# Patient Record
Sex: FEMALE | Race: BLACK OR AFRICAN AMERICAN | Employment: OTHER | URBAN - METROPOLITAN AREA
[De-identification: names, ages, dates, MRNs, and addresses within clinical notes are randomized per-mention and may not be internally consistent; named-entity substitution may affect disease eponyms.]

---

## 2017-01-05 ENCOUNTER — OFFICE VISIT (OUTPATIENT)
Dept: FAMILY MEDICINE CLINIC | Age: 77
End: 2017-01-05

## 2017-01-05 VITALS — DIASTOLIC BLOOD PRESSURE: 82 MMHG | SYSTOLIC BLOOD PRESSURE: 122 MMHG | WEIGHT: 246 LBS | BODY MASS INDEX: 39.11 KG/M2

## 2017-01-05 DIAGNOSIS — I10 ESSENTIAL HYPERTENSION: Primary | ICD-10-CM

## 2017-01-05 DIAGNOSIS — K21.9 GERD WITHOUT ESOPHAGITIS: ICD-10-CM

## 2017-01-05 DIAGNOSIS — N18.30 CHRONIC KIDNEY DISEASE, STAGE 3 (HCC): ICD-10-CM

## 2017-01-05 DIAGNOSIS — F41.9 ANXIETY: ICD-10-CM

## 2017-01-05 DIAGNOSIS — G89.29 CHRONIC MIDLINE LOW BACK PAIN WITHOUT SCIATICA: ICD-10-CM

## 2017-01-05 DIAGNOSIS — I47.1 PAROXYSMAL SUPRAVENTRICULAR TACHYCARDIA (HCC): ICD-10-CM

## 2017-01-05 DIAGNOSIS — M54.50 CHRONIC MIDLINE LOW BACK PAIN WITHOUT SCIATICA: ICD-10-CM

## 2017-01-05 DIAGNOSIS — K64.8 INTERNAL HEMORRHOIDS: ICD-10-CM

## 2017-01-05 DIAGNOSIS — E66.09 NON MORBID OBESITY DUE TO EXCESS CALORIES: ICD-10-CM

## 2017-01-05 LAB
ANION GAP SERPL CALCULATED.3IONS-SCNC: 15 MMOL/L (ref 3–16)
BUN BLDV-MCNC: 16 MG/DL (ref 7–20)
CALCIUM SERPL-MCNC: 9.3 MG/DL (ref 8.3–10.6)
CHLORIDE BLD-SCNC: 101 MMOL/L (ref 99–110)
CHOLESTEROL, TOTAL: 200 MG/DL (ref 0–199)
CO2: 27 MMOL/L (ref 21–32)
CREAT SERPL-MCNC: 1.2 MG/DL (ref 0.6–1.2)
GFR AFRICAN AMERICAN: 53
GFR NON-AFRICAN AMERICAN: 44
GLUCOSE BLD-MCNC: 86 MG/DL (ref 70–99)
HDLC SERPL-MCNC: 70 MG/DL (ref 40–60)
LDL CHOLESTEROL CALCULATED: 111 MG/DL
POTASSIUM SERPL-SCNC: 4 MMOL/L (ref 3.5–5.1)
SODIUM BLD-SCNC: 143 MMOL/L (ref 136–145)
TRIGL SERPL-MCNC: 97 MG/DL (ref 0–150)
VLDLC SERPL CALC-MCNC: 19 MG/DL

## 2017-01-05 PROCEDURE — 36415 COLL VENOUS BLD VENIPUNCTURE: CPT | Performed by: FAMILY MEDICINE

## 2017-01-05 PROCEDURE — 99214 OFFICE O/P EST MOD 30 MIN: CPT | Performed by: FAMILY MEDICINE

## 2017-01-05 ASSESSMENT — ENCOUNTER SYMPTOMS: SHORTNESS OF BREATH: 0

## 2017-01-12 ENCOUNTER — OFFICE VISIT (OUTPATIENT)
Dept: SURGERY | Age: 77
End: 2017-01-12

## 2017-01-12 VITALS
DIASTOLIC BLOOD PRESSURE: 76 MMHG | WEIGHT: 246 LBS | SYSTOLIC BLOOD PRESSURE: 146 MMHG | HEART RATE: 62 BPM | BODY MASS INDEX: 40.98 KG/M2 | HEIGHT: 65 IN

## 2017-01-12 DIAGNOSIS — K64.3 PROLAPSED INTERNAL HEMORRHOIDS, GRADE 4: Primary | ICD-10-CM

## 2017-01-12 PROCEDURE — G8484 FLU IMMUNIZE NO ADMIN: HCPCS | Performed by: SURGERY

## 2017-01-12 PROCEDURE — 1036F TOBACCO NON-USER: CPT | Performed by: SURGERY

## 2017-01-12 PROCEDURE — 1090F PRES/ABSN URINE INCON ASSESS: CPT | Performed by: SURGERY

## 2017-01-12 PROCEDURE — 99203 OFFICE O/P NEW LOW 30 MIN: CPT | Performed by: SURGERY

## 2017-01-12 PROCEDURE — G8599 NO ASA/ANTIPLAT THER USE RNG: HCPCS | Performed by: SURGERY

## 2017-01-12 PROCEDURE — 4040F PNEUMOC VAC/ADMIN/RCVD: CPT | Performed by: SURGERY

## 2017-01-12 PROCEDURE — G8419 CALC BMI OUT NRM PARAM NOF/U: HCPCS | Performed by: SURGERY

## 2017-01-12 PROCEDURE — 1123F ACP DISCUSS/DSCN MKR DOCD: CPT | Performed by: SURGERY

## 2017-01-12 PROCEDURE — G8399 PT W/DXA RESULTS DOCUMENT: HCPCS | Performed by: SURGERY

## 2017-01-12 PROCEDURE — G8427 DOCREV CUR MEDS BY ELIG CLIN: HCPCS | Performed by: SURGERY

## 2017-01-12 ASSESSMENT — ENCOUNTER SYMPTOMS
BLOOD IN STOOL: 1
RECTAL PAIN: 1
CONSTIPATION: 1

## 2017-01-16 RX ORDER — METOPROLOL TARTRATE 100 MG/1
TABLET ORAL
Qty: 90 TABLET | Refills: 0 | Status: SHIPPED | OUTPATIENT
Start: 2017-01-16 | End: 2017-04-17 | Stop reason: SDUPTHER

## 2017-01-19 RX ORDER — HYDROCODONE BITARTRATE AND ACETAMINOPHEN 7.5; 325 MG/1; MG/1
1 TABLET ORAL EVERY 8 HOURS PRN
Qty: 90 TABLET | Refills: 0 | Status: SHIPPED | OUTPATIENT
Start: 2017-01-19 | End: 2017-02-21 | Stop reason: SDUPTHER

## 2017-01-25 ENCOUNTER — OFFICE VISIT (OUTPATIENT)
Dept: FAMILY MEDICINE CLINIC | Age: 77
End: 2017-01-25

## 2017-01-25 VITALS
DIASTOLIC BLOOD PRESSURE: 80 MMHG | WEIGHT: 246 LBS | BODY MASS INDEX: 38.61 KG/M2 | HEIGHT: 67 IN | SYSTOLIC BLOOD PRESSURE: 130 MMHG

## 2017-01-25 DIAGNOSIS — K64.8 OTHER HEMORRHOIDS: ICD-10-CM

## 2017-01-25 DIAGNOSIS — Z01.818 PREOPERATIVE GENERAL PHYSICAL EXAMINATION: Primary | ICD-10-CM

## 2017-01-25 PROCEDURE — 99214 OFFICE O/P EST MOD 30 MIN: CPT | Performed by: FAMILY MEDICINE

## 2017-01-25 PROCEDURE — 4040F PNEUMOC VAC/ADMIN/RCVD: CPT | Performed by: FAMILY MEDICINE

## 2017-01-25 PROCEDURE — 1036F TOBACCO NON-USER: CPT | Performed by: FAMILY MEDICINE

## 2017-01-25 PROCEDURE — G8399 PT W/DXA RESULTS DOCUMENT: HCPCS | Performed by: FAMILY MEDICINE

## 2017-01-25 PROCEDURE — G8419 CALC BMI OUT NRM PARAM NOF/U: HCPCS | Performed by: FAMILY MEDICINE

## 2017-01-25 PROCEDURE — 1090F PRES/ABSN URINE INCON ASSESS: CPT | Performed by: FAMILY MEDICINE

## 2017-01-25 PROCEDURE — G8599 NO ASA/ANTIPLAT THER USE RNG: HCPCS | Performed by: FAMILY MEDICINE

## 2017-01-25 PROCEDURE — G8427 DOCREV CUR MEDS BY ELIG CLIN: HCPCS | Performed by: FAMILY MEDICINE

## 2017-01-25 PROCEDURE — G8484 FLU IMMUNIZE NO ADMIN: HCPCS | Performed by: FAMILY MEDICINE

## 2017-01-25 PROCEDURE — 1123F ACP DISCUSS/DSCN MKR DOCD: CPT | Performed by: FAMILY MEDICINE

## 2017-01-25 ASSESSMENT — ENCOUNTER SYMPTOMS
BLOOD IN STOOL: 1
DIARRHEA: 0
CONSTIPATION: 1
RHINORRHEA: 0
WHEEZING: 0
SORE THROAT: 0
ABDOMINAL PAIN: 0
SHORTNESS OF BREATH: 0
COUGH: 0
NAUSEA: 0
VOMITING: 0

## 2017-01-30 ENCOUNTER — OFFICE VISIT (OUTPATIENT)
Dept: CARDIOLOGY CLINIC | Age: 77
End: 2017-01-30

## 2017-01-30 VITALS — WEIGHT: 243 LBS | BODY MASS INDEX: 38.63 KG/M2

## 2017-01-30 DIAGNOSIS — I47.1 PAROXYSMAL SUPRAVENTRICULAR TACHYCARDIA (HCC): Primary | ICD-10-CM

## 2017-01-30 DIAGNOSIS — I10 ESSENTIAL HYPERTENSION: ICD-10-CM

## 2017-01-30 PROCEDURE — G8599 NO ASA/ANTIPLAT THER USE RNG: HCPCS | Performed by: INTERNAL MEDICINE

## 2017-01-30 PROCEDURE — G8484 FLU IMMUNIZE NO ADMIN: HCPCS | Performed by: INTERNAL MEDICINE

## 2017-01-30 PROCEDURE — G8419 CALC BMI OUT NRM PARAM NOF/U: HCPCS | Performed by: INTERNAL MEDICINE

## 2017-01-30 PROCEDURE — G8427 DOCREV CUR MEDS BY ELIG CLIN: HCPCS | Performed by: INTERNAL MEDICINE

## 2017-01-30 PROCEDURE — 99213 OFFICE O/P EST LOW 20 MIN: CPT | Performed by: INTERNAL MEDICINE

## 2017-01-30 PROCEDURE — 1036F TOBACCO NON-USER: CPT | Performed by: INTERNAL MEDICINE

## 2017-01-30 PROCEDURE — 4040F PNEUMOC VAC/ADMIN/RCVD: CPT | Performed by: INTERNAL MEDICINE

## 2017-01-30 PROCEDURE — 1090F PRES/ABSN URINE INCON ASSESS: CPT | Performed by: INTERNAL MEDICINE

## 2017-01-30 PROCEDURE — 1123F ACP DISCUSS/DSCN MKR DOCD: CPT | Performed by: INTERNAL MEDICINE

## 2017-01-30 PROCEDURE — G8399 PT W/DXA RESULTS DOCUMENT: HCPCS | Performed by: INTERNAL MEDICINE

## 2017-02-02 ENCOUNTER — TELEPHONE (OUTPATIENT)
Dept: SURGERY | Age: 77
End: 2017-02-02

## 2017-02-14 RX ORDER — OMEPRAZOLE 20 MG/1
CAPSULE, DELAYED RELEASE ORAL
Qty: 90 CAPSULE | Refills: 0 | Status: SHIPPED | OUTPATIENT
Start: 2017-02-14 | End: 2017-05-16 | Stop reason: SDUPTHER

## 2017-02-21 RX ORDER — LORAZEPAM 1 MG/1
TABLET ORAL
Qty: 90 TABLET | Refills: 0 | Status: SHIPPED | OUTPATIENT
Start: 2017-02-21 | End: 2017-04-05 | Stop reason: SDUPTHER

## 2017-02-21 RX ORDER — HYDROCODONE BITARTRATE AND ACETAMINOPHEN 7.5; 325 MG/1; MG/1
1 TABLET ORAL EVERY 8 HOURS PRN
Qty: 90 TABLET | Refills: 0 | Status: SHIPPED | OUTPATIENT
Start: 2017-02-21 | End: 2017-03-27 | Stop reason: SDUPTHER

## 2017-02-22 ENCOUNTER — PAT TELEPHONE (OUTPATIENT)
Dept: PREADMISSION TESTING | Age: 77
End: 2017-02-22

## 2017-02-22 VITALS — WEIGHT: 243 LBS | HEIGHT: 67 IN | BODY MASS INDEX: 38.14 KG/M2

## 2017-02-23 ENCOUNTER — TELEPHONE (OUTPATIENT)
Dept: SURGERY | Age: 77
End: 2017-02-23

## 2017-02-24 ENCOUNTER — HOSPITAL ENCOUNTER (OUTPATIENT)
Dept: SURGERY | Age: 77
Discharge: HOME | End: 2017-02-24
Admitting: SURGERY

## 2017-02-24 VITALS
BODY MASS INDEX: 39.01 KG/M2 | HEART RATE: 62 BPM | OXYGEN SATURATION: 94 % | SYSTOLIC BLOOD PRESSURE: 138 MMHG | DIASTOLIC BLOOD PRESSURE: 76 MMHG | WEIGHT: 242.73 LBS | RESPIRATION RATE: 16 BRPM | TEMPERATURE: 97 F | HEIGHT: 66 IN

## 2017-02-24 PROCEDURE — 46255 REMOVE INT/EXT HEM 1 GROUP: CPT | Performed by: SURGERY

## 2017-02-24 RX ORDER — PROMETHAZINE HYDROCHLORIDE 25 MG/ML
6.25 INJECTION, SOLUTION INTRAMUSCULAR; INTRAVENOUS
Status: ACTIVE | OUTPATIENT
Start: 2017-02-24 | End: 2017-02-24

## 2017-02-24 RX ORDER — SODIUM CHLORIDE 9 MG/ML
INJECTION, SOLUTION INTRAVENOUS CONTINUOUS
Status: DISCONTINUED | OUTPATIENT
Start: 2017-02-24 | End: 2017-02-25 | Stop reason: HOSPADM

## 2017-02-24 RX ORDER — OXYCODONE HYDROCHLORIDE 5 MG/1
5 TABLET ORAL EVERY 4 HOURS PRN
Qty: 30 TABLET | Refills: 0 | Status: SHIPPED | OUTPATIENT
Start: 2017-02-24 | End: 2017-03-27

## 2017-02-24 RX ORDER — SODIUM CHLORIDE 0.9 % (FLUSH) 0.9 %
10 SYRINGE (ML) INJECTION EVERY 12 HOURS SCHEDULED
Status: DISCONTINUED | OUTPATIENT
Start: 2017-02-24 | End: 2017-02-25 | Stop reason: HOSPADM

## 2017-02-24 RX ORDER — OXYCODONE HYDROCHLORIDE 5 MG/1
5 TABLET ORAL
Status: COMPLETED | OUTPATIENT
Start: 2017-02-24 | End: 2017-02-24

## 2017-02-24 RX ORDER — LABETALOL HYDROCHLORIDE 5 MG/ML
5 INJECTION, SOLUTION INTRAVENOUS EVERY 10 MIN PRN
Status: DISCONTINUED | OUTPATIENT
Start: 2017-02-24 | End: 2017-02-25 | Stop reason: HOSPADM

## 2017-02-24 RX ORDER — FENTANYL CITRATE 50 UG/ML
25 INJECTION, SOLUTION INTRAMUSCULAR; INTRAVENOUS EVERY 5 MIN PRN
Status: DISCONTINUED | OUTPATIENT
Start: 2017-02-24 | End: 2017-02-25 | Stop reason: HOSPADM

## 2017-02-24 RX ORDER — SODIUM CHLORIDE 0.9 % (FLUSH) 0.9 %
10 SYRINGE (ML) INJECTION PRN
Status: DISCONTINUED | OUTPATIENT
Start: 2017-02-24 | End: 2017-02-25 | Stop reason: HOSPADM

## 2017-02-24 RX ORDER — DOCUSATE SODIUM 100 MG/1
100 CAPSULE, LIQUID FILLED ORAL 2 TIMES DAILY
Qty: 40 CAPSULE | Refills: 0 | Status: SHIPPED | OUTPATIENT
Start: 2017-02-24

## 2017-02-24 RX ADMIN — SODIUM CHLORIDE: 9 INJECTION, SOLUTION INTRAVENOUS at 06:30

## 2017-02-24 RX ADMIN — OXYCODONE HYDROCHLORIDE 5 MG: 5 TABLET ORAL at 09:39

## 2017-02-24 ASSESSMENT — PAIN - FUNCTIONAL ASSESSMENT: PAIN_FUNCTIONAL_ASSESSMENT: 0-10

## 2017-02-24 ASSESSMENT — ENCOUNTER SYMPTOMS: SHORTNESS OF BREATH: 0

## 2017-02-24 ASSESSMENT — PAIN SCALES - GENERAL
PAINLEVEL_OUTOF10: 6
PAINLEVEL_OUTOF10: 6

## 2017-03-16 ENCOUNTER — OFFICE VISIT (OUTPATIENT)
Dept: SURGERY | Age: 77
End: 2017-03-16

## 2017-03-16 DIAGNOSIS — K64.3 GRADE IV HEMORRHOIDS: Primary | ICD-10-CM

## 2017-03-16 PROCEDURE — 99024 POSTOP FOLLOW-UP VISIT: CPT | Performed by: SURGERY

## 2017-03-22 ENCOUNTER — TELEPHONE (OUTPATIENT)
Dept: FAMILY MEDICINE CLINIC | Age: 77
End: 2017-03-22

## 2017-03-27 ENCOUNTER — TELEPHONE (OUTPATIENT)
Dept: FAMILY MEDICINE CLINIC | Age: 77
End: 2017-03-27

## 2017-03-27 RX ORDER — HYDROCODONE BITARTRATE AND ACETAMINOPHEN 7.5; 325 MG/1; MG/1
1 TABLET ORAL EVERY 8 HOURS PRN
Qty: 90 TABLET | Refills: 0 | Status: SHIPPED | OUTPATIENT
Start: 2017-03-27 | End: 2017-05-16 | Stop reason: SDUPTHER

## 2017-04-05 ENCOUNTER — OFFICE VISIT (OUTPATIENT)
Dept: FAMILY MEDICINE CLINIC | Age: 77
End: 2017-04-05

## 2017-04-05 VITALS
HEIGHT: 67 IN | BODY MASS INDEX: 38.45 KG/M2 | SYSTOLIC BLOOD PRESSURE: 122 MMHG | DIASTOLIC BLOOD PRESSURE: 80 MMHG | WEIGHT: 245 LBS

## 2017-04-05 DIAGNOSIS — Z12.39 SCREENING FOR BREAST CANCER: ICD-10-CM

## 2017-04-05 DIAGNOSIS — M54.50 CHRONIC MIDLINE LOW BACK PAIN WITHOUT SCIATICA: Primary | ICD-10-CM

## 2017-04-05 DIAGNOSIS — G89.29 CHRONIC MIDLINE LOW BACK PAIN WITHOUT SCIATICA: Primary | ICD-10-CM

## 2017-04-05 DIAGNOSIS — F41.9 ANXIETY: ICD-10-CM

## 2017-04-05 PROCEDURE — G8417 CALC BMI ABV UP PARAM F/U: HCPCS | Performed by: FAMILY MEDICINE

## 2017-04-05 PROCEDURE — G8399 PT W/DXA RESULTS DOCUMENT: HCPCS | Performed by: FAMILY MEDICINE

## 2017-04-05 PROCEDURE — 1036F TOBACCO NON-USER: CPT | Performed by: FAMILY MEDICINE

## 2017-04-05 PROCEDURE — 1123F ACP DISCUSS/DSCN MKR DOCD: CPT | Performed by: FAMILY MEDICINE

## 2017-04-05 PROCEDURE — G8427 DOCREV CUR MEDS BY ELIG CLIN: HCPCS | Performed by: FAMILY MEDICINE

## 2017-04-05 PROCEDURE — 99213 OFFICE O/P EST LOW 20 MIN: CPT | Performed by: FAMILY MEDICINE

## 2017-04-05 PROCEDURE — 1090F PRES/ABSN URINE INCON ASSESS: CPT | Performed by: FAMILY MEDICINE

## 2017-04-05 PROCEDURE — G8599 NO ASA/ANTIPLAT THER USE RNG: HCPCS | Performed by: FAMILY MEDICINE

## 2017-04-05 PROCEDURE — 4040F PNEUMOC VAC/ADMIN/RCVD: CPT | Performed by: FAMILY MEDICINE

## 2017-04-05 RX ORDER — LORAZEPAM 1 MG/1
TABLET ORAL
Qty: 90 TABLET | Refills: 0 | Status: SHIPPED | OUTPATIENT
Start: 2017-04-05 | End: 2017-07-25 | Stop reason: SDUPTHER

## 2017-04-05 RX ORDER — BIMATOPROST 0.01 %
1 DROPS OPHTHALMIC (EYE)
COMMUNITY
Start: 2017-03-30

## 2017-04-05 ASSESSMENT — ENCOUNTER SYMPTOMS: BACK PAIN: 1

## 2017-04-10 ENCOUNTER — TELEPHONE (OUTPATIENT)
Dept: FAMILY MEDICINE CLINIC | Age: 77
End: 2017-04-10

## 2017-04-10 RX ORDER — AZITHROMYCIN 250 MG/1
TABLET, FILM COATED ORAL
Qty: 1 PACKET | Refills: 0 | Status: SHIPPED | OUTPATIENT
Start: 2017-04-10 | End: 2017-04-20

## 2017-04-12 ENCOUNTER — TELEPHONE (OUTPATIENT)
Dept: FAMILY MEDICINE CLINIC | Age: 77
End: 2017-04-12

## 2017-04-12 RX ORDER — GUAIFENESIN AND CODEINE PHOSPHATE 100; 10 MG/5ML; MG/5ML
5 SOLUTION ORAL 4 TIMES DAILY PRN
Qty: 180 ML | Refills: 0 | Status: SHIPPED | OUTPATIENT
Start: 2017-04-12 | End: 2017-04-22

## 2017-04-18 RX ORDER — METOPROLOL TARTRATE 100 MG/1
100 TABLET ORAL DAILY
Qty: 90 TABLET | Refills: 3 | Status: SHIPPED | OUTPATIENT
Start: 2017-04-18 | End: 2018-04-19 | Stop reason: SDUPTHER

## 2017-05-16 ENCOUNTER — TELEPHONE (OUTPATIENT)
Dept: FAMILY MEDICINE CLINIC | Age: 77
End: 2017-05-16

## 2017-05-16 RX ORDER — HYDROCODONE BITARTRATE AND ACETAMINOPHEN 7.5; 325 MG/1; MG/1
1 TABLET ORAL EVERY 8 HOURS PRN
Qty: 90 TABLET | Refills: 0 | Status: SHIPPED | OUTPATIENT
Start: 2017-05-16 | End: 2017-06-20 | Stop reason: SDUPTHER

## 2017-06-20 ENCOUNTER — TELEPHONE (OUTPATIENT)
Dept: FAMILY MEDICINE CLINIC | Age: 77
End: 2017-06-20

## 2017-06-20 RX ORDER — HYDROCODONE BITARTRATE AND ACETAMINOPHEN 7.5; 325 MG/1; MG/1
1 TABLET ORAL EVERY 8 HOURS PRN
Qty: 90 TABLET | Refills: 0 | Status: SHIPPED | OUTPATIENT
Start: 2017-06-20 | End: 2017-07-20 | Stop reason: SDUPTHER

## 2017-07-20 ENCOUNTER — TELEPHONE (OUTPATIENT)
Dept: FAMILY MEDICINE CLINIC | Age: 77
End: 2017-07-20

## 2017-07-20 RX ORDER — HYDROCODONE BITARTRATE AND ACETAMINOPHEN 7.5; 325 MG/1; MG/1
1 TABLET ORAL EVERY 8 HOURS PRN
Qty: 15 TABLET | Refills: 0 | Status: SHIPPED | OUTPATIENT
Start: 2017-07-20 | End: 2017-07-25 | Stop reason: SDUPTHER

## 2017-07-22 ENCOUNTER — HOSPITAL ENCOUNTER (OUTPATIENT)
Dept: WOMENS IMAGING | Age: 77
Discharge: HOME | End: 2017-07-22
Attending: FAMILY MEDICINE | Admitting: FAMILY MEDICINE

## 2017-07-22 DIAGNOSIS — Z12.39 SCREENING FOR BREAST CANCER: ICD-10-CM

## 2017-07-25 ENCOUNTER — OFFICE VISIT (OUTPATIENT)
Dept: FAMILY MEDICINE CLINIC | Age: 77
End: 2017-07-25

## 2017-07-25 VITALS
WEIGHT: 250 LBS | SYSTOLIC BLOOD PRESSURE: 126 MMHG | BODY MASS INDEX: 39.24 KG/M2 | DIASTOLIC BLOOD PRESSURE: 76 MMHG | HEIGHT: 67 IN

## 2017-07-25 DIAGNOSIS — K21.9 GERD WITHOUT ESOPHAGITIS: ICD-10-CM

## 2017-07-25 DIAGNOSIS — I47.1 PAROXYSMAL SUPRAVENTRICULAR TACHYCARDIA (HCC): ICD-10-CM

## 2017-07-25 DIAGNOSIS — G89.29 CHRONIC MIDLINE LOW BACK PAIN WITHOUT SCIATICA: ICD-10-CM

## 2017-07-25 DIAGNOSIS — N18.30 CHRONIC KIDNEY DISEASE, STAGE 3 (HCC): ICD-10-CM

## 2017-07-25 DIAGNOSIS — F41.9 ANXIETY: ICD-10-CM

## 2017-07-25 DIAGNOSIS — M54.50 CHRONIC MIDLINE LOW BACK PAIN WITHOUT SCIATICA: ICD-10-CM

## 2017-07-25 DIAGNOSIS — I10 ESSENTIAL HYPERTENSION: Primary | ICD-10-CM

## 2017-07-25 DIAGNOSIS — E66.09 NON MORBID OBESITY DUE TO EXCESS CALORIES: ICD-10-CM

## 2017-07-25 LAB
ANION GAP SERPL CALCULATED.3IONS-SCNC: 18 MMOL/L (ref 3–16)
BUN BLDV-MCNC: 20 MG/DL (ref 7–20)
CALCIUM SERPL-MCNC: 9.7 MG/DL (ref 8.3–10.6)
CHLORIDE BLD-SCNC: 102 MMOL/L (ref 99–110)
CO2: 23 MMOL/L (ref 21–32)
CREAT SERPL-MCNC: 1.3 MG/DL (ref 0.6–1.2)
GFR AFRICAN AMERICAN: 48
GFR NON-AFRICAN AMERICAN: 40
GLUCOSE BLD-MCNC: 100 MG/DL (ref 70–99)
POTASSIUM SERPL-SCNC: 4.3 MMOL/L (ref 3.5–5.1)
SODIUM BLD-SCNC: 143 MMOL/L (ref 136–145)

## 2017-07-25 PROCEDURE — 99214 OFFICE O/P EST MOD 30 MIN: CPT | Performed by: FAMILY MEDICINE

## 2017-07-25 PROCEDURE — G8427 DOCREV CUR MEDS BY ELIG CLIN: HCPCS | Performed by: FAMILY MEDICINE

## 2017-07-25 PROCEDURE — G8599 NO ASA/ANTIPLAT THER USE RNG: HCPCS | Performed by: FAMILY MEDICINE

## 2017-07-25 PROCEDURE — 4040F PNEUMOC VAC/ADMIN/RCVD: CPT | Performed by: FAMILY MEDICINE

## 2017-07-25 PROCEDURE — G8417 CALC BMI ABV UP PARAM F/U: HCPCS | Performed by: FAMILY MEDICINE

## 2017-07-25 PROCEDURE — 36415 COLL VENOUS BLD VENIPUNCTURE: CPT | Performed by: FAMILY MEDICINE

## 2017-07-25 PROCEDURE — 1123F ACP DISCUSS/DSCN MKR DOCD: CPT | Performed by: FAMILY MEDICINE

## 2017-07-25 PROCEDURE — 1036F TOBACCO NON-USER: CPT | Performed by: FAMILY MEDICINE

## 2017-07-25 PROCEDURE — G8399 PT W/DXA RESULTS DOCUMENT: HCPCS | Performed by: FAMILY MEDICINE

## 2017-07-25 PROCEDURE — 1090F PRES/ABSN URINE INCON ASSESS: CPT | Performed by: FAMILY MEDICINE

## 2017-07-25 RX ORDER — LORAZEPAM 1 MG/1
TABLET ORAL
Qty: 90 TABLET | Refills: 0 | Status: SHIPPED | OUTPATIENT
Start: 2017-07-25 | End: 2017-10-19 | Stop reason: SDUPTHER

## 2017-07-25 RX ORDER — VIT C/B6/B5/MAGNESIUM/HERB 173 50-5-6-5MG
1000 CAPSULE ORAL DAILY
COMMUNITY
End: 2020-03-03

## 2017-07-25 RX ORDER — RANITIDINE 300 MG/1
300 TABLET ORAL NIGHTLY
Qty: 90 TABLET | Refills: 1 | Status: SHIPPED | OUTPATIENT
Start: 2017-07-25 | End: 2017-08-21

## 2017-07-25 RX ORDER — HYDROCODONE BITARTRATE AND ACETAMINOPHEN 7.5; 325 MG/1; MG/1
1 TABLET ORAL EVERY 8 HOURS PRN
Qty: 90 TABLET | Refills: 0 | Status: SHIPPED | OUTPATIENT
Start: 2017-07-25 | End: 2017-08-25 | Stop reason: SDUPTHER

## 2017-07-25 ASSESSMENT — ENCOUNTER SYMPTOMS: SHORTNESS OF BREATH: 0

## 2017-08-11 RX ORDER — TRIAMTERENE AND HYDROCHLOROTHIAZIDE 37.5; 25 MG/1; MG/1
TABLET ORAL
Qty: 30 TABLET | Refills: 0 | Status: SHIPPED | OUTPATIENT
Start: 2017-08-11 | End: 2017-09-11 | Stop reason: SDUPTHER

## 2017-08-11 RX ORDER — MELOXICAM 15 MG/1
TABLET ORAL
Qty: 30 TABLET | Refills: 0 | Status: SHIPPED | OUTPATIENT
Start: 2017-08-11 | End: 2017-09-11 | Stop reason: SDUPTHER

## 2017-08-21 ENCOUNTER — TELEPHONE (OUTPATIENT)
Dept: FAMILY MEDICINE CLINIC | Age: 77
End: 2017-08-21

## 2017-08-21 RX ORDER — OMEPRAZOLE 20 MG/1
CAPSULE, DELAYED RELEASE ORAL
Qty: 90 CAPSULE | Refills: 1 | Status: SHIPPED | OUTPATIENT
Start: 2017-08-21 | End: 2018-03-07 | Stop reason: SDUPTHER

## 2017-08-25 ENCOUNTER — TELEPHONE (OUTPATIENT)
Dept: FAMILY MEDICINE CLINIC | Age: 77
End: 2017-08-25

## 2017-08-25 RX ORDER — HYDROCODONE BITARTRATE AND ACETAMINOPHEN 7.5; 325 MG/1; MG/1
1 TABLET ORAL EVERY 8 HOURS PRN
Qty: 90 TABLET | Refills: 0 | Status: SHIPPED | OUTPATIENT
Start: 2017-08-25 | End: 2017-10-19 | Stop reason: SDUPTHER

## 2017-09-07 ENCOUNTER — OFFICE VISIT (OUTPATIENT)
Dept: FAMILY MEDICINE CLINIC | Age: 77
End: 2017-09-07

## 2017-09-07 VITALS
BODY MASS INDEX: 40.02 KG/M2 | HEIGHT: 67 IN | DIASTOLIC BLOOD PRESSURE: 62 MMHG | WEIGHT: 255 LBS | SYSTOLIC BLOOD PRESSURE: 122 MMHG

## 2017-09-07 DIAGNOSIS — R35.0 URINARY FREQUENCY: ICD-10-CM

## 2017-09-07 DIAGNOSIS — N30.00 ACUTE CYSTITIS WITHOUT HEMATURIA: Primary | ICD-10-CM

## 2017-09-07 DIAGNOSIS — Z23 NEED FOR INFLUENZA VACCINATION: ICD-10-CM

## 2017-09-07 PROCEDURE — G8399 PT W/DXA RESULTS DOCUMENT: HCPCS | Performed by: FAMILY MEDICINE

## 2017-09-07 PROCEDURE — 1090F PRES/ABSN URINE INCON ASSESS: CPT | Performed by: FAMILY MEDICINE

## 2017-09-07 PROCEDURE — G8427 DOCREV CUR MEDS BY ELIG CLIN: HCPCS | Performed by: FAMILY MEDICINE

## 2017-09-07 PROCEDURE — G8417 CALC BMI ABV UP PARAM F/U: HCPCS | Performed by: FAMILY MEDICINE

## 2017-09-07 PROCEDURE — 4040F PNEUMOC VAC/ADMIN/RCVD: CPT | Performed by: FAMILY MEDICINE

## 2017-09-07 PROCEDURE — 1123F ACP DISCUSS/DSCN MKR DOCD: CPT | Performed by: FAMILY MEDICINE

## 2017-09-07 PROCEDURE — 99213 OFFICE O/P EST LOW 20 MIN: CPT | Performed by: FAMILY MEDICINE

## 2017-09-07 PROCEDURE — 1036F TOBACCO NON-USER: CPT | Performed by: FAMILY MEDICINE

## 2017-09-07 PROCEDURE — G8599 NO ASA/ANTIPLAT THER USE RNG: HCPCS | Performed by: FAMILY MEDICINE

## 2017-09-07 RX ORDER — CIPROFLOXACIN 250 MG/1
250 TABLET, FILM COATED ORAL 2 TIMES DAILY
Qty: 14 TABLET | Refills: 0 | Status: SHIPPED | OUTPATIENT
Start: 2017-09-07 | End: 2017-09-14

## 2017-10-16 RX ORDER — DILTIAZEM HYDROCHLORIDE 120 MG/1
CAPSULE, EXTENDED RELEASE ORAL
Qty: 90 CAPSULE | Refills: 1 | Status: SHIPPED | OUTPATIENT
Start: 2017-10-16 | End: 2018-02-27

## 2017-10-19 ENCOUNTER — TELEPHONE (OUTPATIENT)
Dept: FAMILY MEDICINE CLINIC | Age: 77
End: 2017-10-19

## 2017-10-19 RX ORDER — LORAZEPAM 1 MG/1
TABLET ORAL
Qty: 90 TABLET | Refills: 0 | Status: SHIPPED | OUTPATIENT
Start: 2017-10-19 | End: 2017-12-27 | Stop reason: SDUPTHER

## 2017-10-19 RX ORDER — HYDROCODONE BITARTRATE AND ACETAMINOPHEN 7.5; 325 MG/1; MG/1
1 TABLET ORAL EVERY 8 HOURS PRN
Qty: 90 TABLET | Refills: 0 | Status: SHIPPED | OUTPATIENT
Start: 2017-10-19 | End: 2017-12-27 | Stop reason: SDUPTHER

## 2017-10-24 ENCOUNTER — TELEPHONE (OUTPATIENT)
Dept: FAMILY MEDICINE CLINIC | Age: 77
End: 2017-10-24

## 2017-10-30 ENCOUNTER — OFFICE VISIT (OUTPATIENT)
Dept: FAMILY MEDICINE CLINIC | Age: 77
End: 2017-10-30

## 2017-10-30 VITALS
BODY MASS INDEX: 37.67 KG/M2 | SYSTOLIC BLOOD PRESSURE: 130 MMHG | DIASTOLIC BLOOD PRESSURE: 80 MMHG | HEIGHT: 67 IN | WEIGHT: 240 LBS

## 2017-10-30 DIAGNOSIS — G89.29 CHRONIC MIDLINE LOW BACK PAIN WITHOUT SCIATICA: Primary | ICD-10-CM

## 2017-10-30 DIAGNOSIS — Z23 NEED FOR INFLUENZA VACCINATION: ICD-10-CM

## 2017-10-30 DIAGNOSIS — M54.50 CHRONIC MIDLINE LOW BACK PAIN WITHOUT SCIATICA: Primary | ICD-10-CM

## 2017-10-30 DIAGNOSIS — F41.9 ANXIETY: ICD-10-CM

## 2017-10-30 PROCEDURE — G8599 NO ASA/ANTIPLAT THER USE RNG: HCPCS | Performed by: FAMILY MEDICINE

## 2017-10-30 PROCEDURE — 1090F PRES/ABSN URINE INCON ASSESS: CPT | Performed by: FAMILY MEDICINE

## 2017-10-30 PROCEDURE — 99213 OFFICE O/P EST LOW 20 MIN: CPT | Performed by: FAMILY MEDICINE

## 2017-10-30 PROCEDURE — G8417 CALC BMI ABV UP PARAM F/U: HCPCS | Performed by: FAMILY MEDICINE

## 2017-10-30 PROCEDURE — 1036F TOBACCO NON-USER: CPT | Performed by: FAMILY MEDICINE

## 2017-10-30 PROCEDURE — 4040F PNEUMOC VAC/ADMIN/RCVD: CPT | Performed by: FAMILY MEDICINE

## 2017-10-30 PROCEDURE — G8399 PT W/DXA RESULTS DOCUMENT: HCPCS | Performed by: FAMILY MEDICINE

## 2017-10-30 PROCEDURE — G0008 ADMIN INFLUENZA VIRUS VAC: HCPCS | Performed by: FAMILY MEDICINE

## 2017-10-30 PROCEDURE — G8484 FLU IMMUNIZE NO ADMIN: HCPCS | Performed by: FAMILY MEDICINE

## 2017-10-30 PROCEDURE — 90662 IIV NO PRSV INCREASED AG IM: CPT | Performed by: FAMILY MEDICINE

## 2017-10-30 PROCEDURE — 1123F ACP DISCUSS/DSCN MKR DOCD: CPT | Performed by: FAMILY MEDICINE

## 2017-10-30 PROCEDURE — G8427 DOCREV CUR MEDS BY ELIG CLIN: HCPCS | Performed by: FAMILY MEDICINE

## 2017-10-30 NOTE — PROGRESS NOTES
Vaccine Information Sheet, \"Influenza - Inactivated\"  given to Gaile Mail, or parent/legal guardian of  Gaile Mail and verbalized understanding. Patient responses:    Have you ever had a reaction to a flu vaccine? No  Are you able to eat eggs without adverse effects? Yes  Do you have any current illness? No  Have you ever had [place] Syndrome? No    Flu vaccine given per order. Please see immunization tab.

## 2017-12-07 RX ORDER — PROPAFENONE HYDROCHLORIDE 150 MG/1
TABLET, FILM COATED ORAL
Qty: 270 TABLET | Refills: 3 | Status: SHIPPED | OUTPATIENT
Start: 2017-12-07 | End: 2018-04-26 | Stop reason: ALTCHOICE

## 2017-12-07 NOTE — TELEPHONE ENCOUNTER
Requested Prescriptions     Pending Prescriptions Disp Refills    propafenone (RYTHMOL) 150 MG tablet [Pharmacy Med Name: PROPAFENONE 150MG   TAB] 270 tablet 3     Sig: TAKE ONE TABLET BY MOUTH EVERY 8 HOURS         Last ov:1/30/17    Next ov:2/5/18    Last fill:12/15/16      Please sign in Dr Ricci Garces absence.

## 2017-12-27 ENCOUNTER — TELEPHONE (OUTPATIENT)
Dept: FAMILY MEDICINE CLINIC | Age: 77
End: 2017-12-27

## 2017-12-27 RX ORDER — HYDROCODONE BITARTRATE AND ACETAMINOPHEN 7.5; 325 MG/1; MG/1
1 TABLET ORAL EVERY 8 HOURS PRN
Qty: 90 TABLET | Refills: 0 | Status: SHIPPED | OUTPATIENT
Start: 2017-12-27 | End: 2018-03-09 | Stop reason: SDUPTHER

## 2017-12-27 RX ORDER — LORAZEPAM 1 MG/1
TABLET ORAL
Qty: 90 TABLET | Refills: 0 | Status: SHIPPED | OUTPATIENT
Start: 2017-12-27 | End: 2018-03-09 | Stop reason: SDUPTHER

## 2018-01-04 ENCOUNTER — TELEPHONE (OUTPATIENT)
Dept: FAMILY MEDICINE CLINIC | Age: 78
End: 2018-01-04

## 2018-01-04 ASSESSMENT — ENCOUNTER SYMPTOMS: SHORTNESS OF BREATH: 0

## 2018-01-04 NOTE — PROGRESS NOTES
Subjective:      Patient ID: Emeli Daniels is a 68 y.o. female. Hypertension   This is a chronic problem. The current episode started more than 1 year ago. The problem is unchanged. The problem is controlled. Associated symptoms include palpitations. Pertinent negatives include no chest pain, peripheral edema or shortness of breath. Risk factors for coronary artery disease include family history, obesity, post-menopausal state and sedentary lifestyle. Past treatments include beta blockers, calcium channel blockers and diuretics. The current treatment provides significant improvement. There are no compliance problems. Chronic Kidney Disease Stage 3:  Patient takes Maxzide-25 once daily. Her kidney function is stable. GERD:  Pt is tolerating and compliant with Omeprazole 20 mg daily. She feels that the medication completely controls her symptoms. She previously did not do well with Zantac. Chronic Low Back Pain:  Patient is tolerating and compliant with Mobic 15 mg daily, Flexeril 10 mg TID prn and Norco 7.5-325 every 8 hrs as needed. She feels that the medications keep her functional.       Anxiety:  Pt takes Ativan 1 mg every 8 hrs as needed for anxiety. She generally takes it once daily and feels that it works well to control her allergy symptoms. PSVT:  Patient sees Dr. Benito Angulo (Cardiologist) and takes Rythmol 150 mg TID. She remains in sinus rhythm. Obesity:  Pt weighed 250 at her visit on 7-24-17. She has lost 9 lbs since then. Clogged Left Ear:  Patient started about 2 weeks ago with her left ear feeling clogged. It will occasionally itch but there is no pain. It has not effected her hearing and she denies any drainage. Review of Systems   Constitutional: Negative for chills and fever. HENT: Negative for ear discharge, ear pain and hearing loss. Respiratory: Negative for shortness of breath. Cardiovascular: Positive for palpitations.  Negative for chest pain and leg swelling. /82 (Site: Left Arm)   Ht 5' 6.5\" (1.689 m)   Wt 241 lb (109.3 kg)   BMI 38.32 kg/m²    Objective:   Physical Exam   Constitutional: She is oriented to person, place, and time. She appears well-developed and well-nourished. No distress. HENT:   Head: Normocephalic. Right Ear: External ear normal.   Left Ear: External ear normal.   Mouth/Throat: Oropharynx is clear and moist. No oropharyngeal exudate. Neck: No JVD present. No thyromegaly present. Cardiovascular: Normal rate, regular rhythm and normal heart sounds. No murmur heard. Pulmonary/Chest: Effort normal and breath sounds normal. She has no wheezes. She has no rales. Musculoskeletal: She exhibits no edema. Lymphadenopathy:     She has no cervical adenopathy. Neurological: She is alert and oriented to person, place, and time. Assessment:      Hypertension  Chronic Kidney Disease Stage 3  GERD  Chronic Low Back Pain  Anxiety  PSVT  Obesity  Eustachian Tube Dysfunction Left       Plan:       Chem 7, Lipid Panel  OARRS report was reviewed  Medications refilled  Flonase NS two sprays in each nostril once daily. Patient educated on popping ears  Handout given on ETD. RTO 3 months for Chronic Low Back Pain    Controlled Substances Monitoring:     Attestation: The Prescription Monitoring Report for this patient was reviewed today. Dave Martinez DO)  Documentation: No signs of potential drug abuse or diversion identified. Dave Martinez DO)  Chronic Pain: Functional status reviewed - continues with improved or maintaining ADL's. Dave Martinez DO)  Medication Contracts: Existing medication contract.  Dave Martinez DO)

## 2018-01-05 ENCOUNTER — OFFICE VISIT (OUTPATIENT)
Dept: FAMILY MEDICINE CLINIC | Age: 78
End: 2018-01-05

## 2018-01-05 VITALS
WEIGHT: 241 LBS | DIASTOLIC BLOOD PRESSURE: 82 MMHG | SYSTOLIC BLOOD PRESSURE: 122 MMHG | HEIGHT: 67 IN | BODY MASS INDEX: 37.83 KG/M2

## 2018-01-05 DIAGNOSIS — I10 ESSENTIAL HYPERTENSION: Primary | ICD-10-CM

## 2018-01-05 DIAGNOSIS — H69.82 EUSTACHIAN TUBE DYSFUNCTION, LEFT: ICD-10-CM

## 2018-01-05 DIAGNOSIS — F41.9 ANXIETY: ICD-10-CM

## 2018-01-05 DIAGNOSIS — I47.1 PAROXYSMAL SUPRAVENTRICULAR TACHYCARDIA (HCC): ICD-10-CM

## 2018-01-05 DIAGNOSIS — E66.09 NON MORBID OBESITY DUE TO EXCESS CALORIES: ICD-10-CM

## 2018-01-05 DIAGNOSIS — G89.29 CHRONIC MIDLINE LOW BACK PAIN WITHOUT SCIATICA: ICD-10-CM

## 2018-01-05 DIAGNOSIS — N18.30 CHRONIC KIDNEY DISEASE, STAGE 3 (HCC): ICD-10-CM

## 2018-01-05 DIAGNOSIS — K21.9 GERD WITHOUT ESOPHAGITIS: ICD-10-CM

## 2018-01-05 DIAGNOSIS — M54.50 CHRONIC MIDLINE LOW BACK PAIN WITHOUT SCIATICA: ICD-10-CM

## 2018-01-05 LAB
ANION GAP SERPL CALCULATED.3IONS-SCNC: 14 MMOL/L (ref 3–16)
BUN BLDV-MCNC: 25 MG/DL (ref 7–20)
CALCIUM SERPL-MCNC: 9.4 MG/DL (ref 8.3–10.6)
CHLORIDE BLD-SCNC: 100 MMOL/L (ref 99–110)
CHOLESTEROL, TOTAL: 207 MG/DL (ref 0–199)
CO2: 27 MMOL/L (ref 21–32)
CREAT SERPL-MCNC: 1.3 MG/DL (ref 0.6–1.2)
GFR AFRICAN AMERICAN: 48
GFR NON-AFRICAN AMERICAN: 40
GLUCOSE BLD-MCNC: 94 MG/DL (ref 70–99)
HDLC SERPL-MCNC: 70 MG/DL (ref 40–60)
LDL CHOLESTEROL CALCULATED: 115 MG/DL
POTASSIUM SERPL-SCNC: 4.4 MMOL/L (ref 3.5–5.1)
SODIUM BLD-SCNC: 141 MMOL/L (ref 136–145)
TRIGL SERPL-MCNC: 111 MG/DL (ref 0–150)
VLDLC SERPL CALC-MCNC: 22 MG/DL

## 2018-01-05 PROCEDURE — G8599 NO ASA/ANTIPLAT THER USE RNG: HCPCS | Performed by: FAMILY MEDICINE

## 2018-01-05 PROCEDURE — G8427 DOCREV CUR MEDS BY ELIG CLIN: HCPCS | Performed by: FAMILY MEDICINE

## 2018-01-05 PROCEDURE — 99214 OFFICE O/P EST MOD 30 MIN: CPT | Performed by: FAMILY MEDICINE

## 2018-01-05 PROCEDURE — 1090F PRES/ABSN URINE INCON ASSESS: CPT | Performed by: FAMILY MEDICINE

## 2018-01-05 PROCEDURE — 36415 COLL VENOUS BLD VENIPUNCTURE: CPT | Performed by: FAMILY MEDICINE

## 2018-01-05 PROCEDURE — 4040F PNEUMOC VAC/ADMIN/RCVD: CPT | Performed by: FAMILY MEDICINE

## 2018-01-05 PROCEDURE — 1036F TOBACCO NON-USER: CPT | Performed by: FAMILY MEDICINE

## 2018-01-05 PROCEDURE — G8484 FLU IMMUNIZE NO ADMIN: HCPCS | Performed by: FAMILY MEDICINE

## 2018-01-05 PROCEDURE — 1123F ACP DISCUSS/DSCN MKR DOCD: CPT | Performed by: FAMILY MEDICINE

## 2018-01-05 PROCEDURE — G8417 CALC BMI ABV UP PARAM F/U: HCPCS | Performed by: FAMILY MEDICINE

## 2018-01-05 PROCEDURE — G8399 PT W/DXA RESULTS DOCUMENT: HCPCS | Performed by: FAMILY MEDICINE

## 2018-01-05 RX ORDER — FLUTICASONE PROPIONATE 50 MCG
2 SPRAY, SUSPENSION (ML) NASAL DAILY
Qty: 1 BOTTLE | Refills: 5 | Status: SHIPPED | OUTPATIENT
Start: 2018-01-05 | End: 2018-07-09

## 2018-02-05 ENCOUNTER — OFFICE VISIT (OUTPATIENT)
Dept: CARDIOLOGY CLINIC | Age: 78
End: 2018-02-05

## 2018-02-05 VITALS
WEIGHT: 241.4 LBS | SYSTOLIC BLOOD PRESSURE: 132 MMHG | DIASTOLIC BLOOD PRESSURE: 70 MMHG | HEART RATE: 82 BPM | BODY MASS INDEX: 38.38 KG/M2

## 2018-02-05 DIAGNOSIS — I47.1 PAROXYSMAL SUPRAVENTRICULAR TACHYCARDIA (HCC): Primary | ICD-10-CM

## 2018-02-05 PROCEDURE — G8599 NO ASA/ANTIPLAT THER USE RNG: HCPCS | Performed by: INTERNAL MEDICINE

## 2018-02-05 PROCEDURE — 1123F ACP DISCUSS/DSCN MKR DOCD: CPT | Performed by: INTERNAL MEDICINE

## 2018-02-05 PROCEDURE — G8484 FLU IMMUNIZE NO ADMIN: HCPCS | Performed by: INTERNAL MEDICINE

## 2018-02-05 PROCEDURE — 99213 OFFICE O/P EST LOW 20 MIN: CPT | Performed by: INTERNAL MEDICINE

## 2018-02-05 PROCEDURE — G8399 PT W/DXA RESULTS DOCUMENT: HCPCS | Performed by: INTERNAL MEDICINE

## 2018-02-05 PROCEDURE — 4040F PNEUMOC VAC/ADMIN/RCVD: CPT | Performed by: INTERNAL MEDICINE

## 2018-02-05 PROCEDURE — 1036F TOBACCO NON-USER: CPT | Performed by: INTERNAL MEDICINE

## 2018-02-05 PROCEDURE — 1090F PRES/ABSN URINE INCON ASSESS: CPT | Performed by: INTERNAL MEDICINE

## 2018-02-05 PROCEDURE — G8427 DOCREV CUR MEDS BY ELIG CLIN: HCPCS | Performed by: INTERNAL MEDICINE

## 2018-02-05 PROCEDURE — G8417 CALC BMI ABV UP PARAM F/U: HCPCS | Performed by: INTERNAL MEDICINE

## 2018-02-15 RX ORDER — TRIAMTERENE AND HYDROCHLOROTHIAZIDE 37.5; 25 MG/1; MG/1
TABLET ORAL
Qty: 30 TABLET | Refills: 1 | Status: SHIPPED | OUTPATIENT
Start: 2018-02-15 | End: 2018-04-17 | Stop reason: SDUPTHER

## 2018-02-15 RX ORDER — MELOXICAM 15 MG/1
TABLET ORAL
Qty: 30 TABLET | Refills: 1 | Status: SHIPPED | OUTPATIENT
Start: 2018-02-15 | End: 2018-04-17 | Stop reason: SDUPTHER

## 2018-03-09 ENCOUNTER — TELEPHONE (OUTPATIENT)
Dept: FAMILY MEDICINE CLINIC | Age: 78
End: 2018-03-09

## 2018-03-09 DIAGNOSIS — G89.29 CHRONIC MIDLINE LOW BACK PAIN WITHOUT SCIATICA: Primary | ICD-10-CM

## 2018-03-09 DIAGNOSIS — M54.50 CHRONIC MIDLINE LOW BACK PAIN WITHOUT SCIATICA: Primary | ICD-10-CM

## 2018-03-09 DIAGNOSIS — M15.9 PRIMARY OSTEOARTHRITIS INVOLVING MULTIPLE JOINTS: ICD-10-CM

## 2018-03-09 RX ORDER — LORAZEPAM 1 MG/1
TABLET ORAL
Qty: 90 TABLET | Refills: 0 | Status: CANCELLED | OUTPATIENT
Start: 2018-03-09

## 2018-03-09 RX ORDER — LORAZEPAM 1 MG/1
TABLET ORAL
Qty: 90 TABLET | Refills: 0 | Status: SHIPPED | OUTPATIENT
Start: 2018-03-09 | End: 2018-04-17 | Stop reason: SDUPTHER

## 2018-03-09 RX ORDER — HYDROCODONE BITARTRATE AND ACETAMINOPHEN 7.5; 325 MG/1; MG/1
1 TABLET ORAL EVERY 8 HOURS PRN
Qty: 90 TABLET | Refills: 0 | Status: CANCELLED | OUTPATIENT
Start: 2018-03-09

## 2018-03-09 RX ORDER — HYDROCODONE BITARTRATE AND ACETAMINOPHEN 7.5; 325 MG/1; MG/1
1 TABLET ORAL EVERY 8 HOURS PRN
Qty: 90 TABLET | Refills: 0 | Status: SHIPPED | OUTPATIENT
Start: 2018-03-09 | End: 2018-04-17 | Stop reason: SDUPTHER

## 2018-04-05 ENCOUNTER — OFFICE VISIT (OUTPATIENT)
Dept: FAMILY MEDICINE CLINIC | Age: 78
End: 2018-04-05

## 2018-04-05 VITALS
BODY MASS INDEX: 37.83 KG/M2 | HEIGHT: 67 IN | DIASTOLIC BLOOD PRESSURE: 66 MMHG | SYSTOLIC BLOOD PRESSURE: 118 MMHG | WEIGHT: 241 LBS

## 2018-04-05 DIAGNOSIS — G89.29 CHRONIC MIDLINE LOW BACK PAIN WITHOUT SCIATICA: Primary | ICD-10-CM

## 2018-04-05 DIAGNOSIS — Z23 NEED FOR ZOSTER VACCINATION: ICD-10-CM

## 2018-04-05 DIAGNOSIS — M54.50 CHRONIC MIDLINE LOW BACK PAIN WITHOUT SCIATICA: Primary | ICD-10-CM

## 2018-04-05 PROCEDURE — G8427 DOCREV CUR MEDS BY ELIG CLIN: HCPCS | Performed by: FAMILY MEDICINE

## 2018-04-05 PROCEDURE — 1090F PRES/ABSN URINE INCON ASSESS: CPT | Performed by: FAMILY MEDICINE

## 2018-04-05 PROCEDURE — 1123F ACP DISCUSS/DSCN MKR DOCD: CPT | Performed by: FAMILY MEDICINE

## 2018-04-05 PROCEDURE — G8599 NO ASA/ANTIPLAT THER USE RNG: HCPCS | Performed by: FAMILY MEDICINE

## 2018-04-05 PROCEDURE — 99213 OFFICE O/P EST LOW 20 MIN: CPT | Performed by: FAMILY MEDICINE

## 2018-04-05 PROCEDURE — 4040F PNEUMOC VAC/ADMIN/RCVD: CPT | Performed by: FAMILY MEDICINE

## 2018-04-05 PROCEDURE — G8399 PT W/DXA RESULTS DOCUMENT: HCPCS | Performed by: FAMILY MEDICINE

## 2018-04-05 PROCEDURE — 1036F TOBACCO NON-USER: CPT | Performed by: FAMILY MEDICINE

## 2018-04-05 PROCEDURE — G8417 CALC BMI ABV UP PARAM F/U: HCPCS | Performed by: FAMILY MEDICINE

## 2018-04-17 DIAGNOSIS — M54.50 CHRONIC MIDLINE LOW BACK PAIN WITHOUT SCIATICA: ICD-10-CM

## 2018-04-17 DIAGNOSIS — M15.9 PRIMARY OSTEOARTHRITIS INVOLVING MULTIPLE JOINTS: ICD-10-CM

## 2018-04-17 DIAGNOSIS — G89.29 CHRONIC MIDLINE LOW BACK PAIN WITHOUT SCIATICA: ICD-10-CM

## 2018-04-17 RX ORDER — HYDROCODONE BITARTRATE AND ACETAMINOPHEN 7.5; 325 MG/1; MG/1
1 TABLET ORAL EVERY 8 HOURS PRN
Qty: 90 TABLET | Refills: 0 | Status: SHIPPED | OUTPATIENT
Start: 2018-04-17 | End: 2018-05-21 | Stop reason: SDUPTHER

## 2018-04-17 RX ORDER — LORAZEPAM 1 MG/1
TABLET ORAL
Qty: 90 TABLET | Refills: 0 | Status: SHIPPED | OUTPATIENT
Start: 2018-04-17 | End: 2018-06-22 | Stop reason: SDUPTHER

## 2018-04-19 ENCOUNTER — TELEPHONE (OUTPATIENT)
Dept: FAMILY MEDICINE CLINIC | Age: 78
End: 2018-04-19

## 2018-04-19 RX ORDER — METOPROLOL TARTRATE 100 MG/1
100 TABLET ORAL DAILY
Qty: 90 TABLET | Refills: 0 | Status: SHIPPED | OUTPATIENT
Start: 2018-04-19 | End: 2018-04-26 | Stop reason: ALTCHOICE

## 2018-04-26 ENCOUNTER — OFFICE VISIT (OUTPATIENT)
Dept: CARDIOLOGY CLINIC | Age: 78
End: 2018-04-26

## 2018-04-26 VITALS
HEIGHT: 67 IN | WEIGHT: 240.6 LBS | DIASTOLIC BLOOD PRESSURE: 82 MMHG | SYSTOLIC BLOOD PRESSURE: 120 MMHG | HEART RATE: 61 BPM | BODY MASS INDEX: 37.76 KG/M2 | OXYGEN SATURATION: 96 %

## 2018-04-26 DIAGNOSIS — I48.0 PAF (PAROXYSMAL ATRIAL FIBRILLATION) (HCC): Primary | ICD-10-CM

## 2018-04-26 DIAGNOSIS — I10 ESSENTIAL HYPERTENSION: ICD-10-CM

## 2018-04-26 DIAGNOSIS — I47.29 PVT (PAROXYSMAL VENTRICULAR TACHYCARDIA): ICD-10-CM

## 2018-04-26 PROCEDURE — G8417 CALC BMI ABV UP PARAM F/U: HCPCS | Performed by: INTERNAL MEDICINE

## 2018-04-26 PROCEDURE — 1036F TOBACCO NON-USER: CPT | Performed by: INTERNAL MEDICINE

## 2018-04-26 PROCEDURE — 1123F ACP DISCUSS/DSCN MKR DOCD: CPT | Performed by: INTERNAL MEDICINE

## 2018-04-26 PROCEDURE — 1090F PRES/ABSN URINE INCON ASSESS: CPT | Performed by: INTERNAL MEDICINE

## 2018-04-26 PROCEDURE — 99214 OFFICE O/P EST MOD 30 MIN: CPT | Performed by: INTERNAL MEDICINE

## 2018-04-26 PROCEDURE — 93000 ELECTROCARDIOGRAM COMPLETE: CPT | Performed by: INTERNAL MEDICINE

## 2018-04-26 PROCEDURE — G8399 PT W/DXA RESULTS DOCUMENT: HCPCS | Performed by: INTERNAL MEDICINE

## 2018-04-26 PROCEDURE — G8427 DOCREV CUR MEDS BY ELIG CLIN: HCPCS | Performed by: INTERNAL MEDICINE

## 2018-04-26 PROCEDURE — G8599 NO ASA/ANTIPLAT THER USE RNG: HCPCS | Performed by: INTERNAL MEDICINE

## 2018-04-26 PROCEDURE — 4040F PNEUMOC VAC/ADMIN/RCVD: CPT | Performed by: INTERNAL MEDICINE

## 2018-04-26 RX ORDER — METOPROLOL TARTRATE 50 MG/1
50 TABLET, FILM COATED ORAL 2 TIMES DAILY
Qty: 60 TABLET | Refills: 3
Start: 2018-04-26 | End: 2018-07-17 | Stop reason: SDUPTHER

## 2018-04-30 ENCOUNTER — TELEPHONE (OUTPATIENT)
Dept: CARDIOLOGY CLINIC | Age: 78
End: 2018-04-30

## 2018-05-21 ENCOUNTER — TELEPHONE (OUTPATIENT)
Dept: FAMILY MEDICINE CLINIC | Age: 78
End: 2018-05-21

## 2018-05-21 DIAGNOSIS — G89.29 CHRONIC MIDLINE LOW BACK PAIN WITHOUT SCIATICA: ICD-10-CM

## 2018-05-21 DIAGNOSIS — M15.9 PRIMARY OSTEOARTHRITIS INVOLVING MULTIPLE JOINTS: ICD-10-CM

## 2018-05-21 DIAGNOSIS — M54.50 CHRONIC MIDLINE LOW BACK PAIN WITHOUT SCIATICA: ICD-10-CM

## 2018-05-21 RX ORDER — OMEPRAZOLE 20 MG/1
CAPSULE, DELAYED RELEASE ORAL
Qty: 90 CAPSULE | Refills: 0 | Status: SHIPPED | OUTPATIENT
Start: 2018-05-21 | End: 2018-09-04 | Stop reason: SDUPTHER

## 2018-05-21 RX ORDER — HYDROCODONE BITARTRATE AND ACETAMINOPHEN 7.5; 325 MG/1; MG/1
1 TABLET ORAL EVERY 8 HOURS PRN
Qty: 90 TABLET | Refills: 0 | Status: SHIPPED | OUTPATIENT
Start: 2018-05-21 | End: 2018-06-22 | Stop reason: SDUPTHER

## 2018-05-25 ENCOUNTER — TELEPHONE (OUTPATIENT)
Dept: CARDIOLOGY CLINIC | Age: 78
End: 2018-05-25

## 2018-06-11 DIAGNOSIS — I48.0 PAF (PAROXYSMAL ATRIAL FIBRILLATION) (HCC): ICD-10-CM

## 2018-06-11 DIAGNOSIS — I47.29 PVT (PAROXYSMAL VENTRICULAR TACHYCARDIA): ICD-10-CM

## 2018-06-11 PROCEDURE — 93228 REMOTE 30 DAY ECG REV/REPORT: CPT | Performed by: INTERNAL MEDICINE

## 2018-06-14 ENCOUNTER — OFFICE VISIT (OUTPATIENT)
Dept: CARDIOLOGY CLINIC | Age: 78
End: 2018-06-14

## 2018-06-14 VITALS
BODY MASS INDEX: 37.64 KG/M2 | HEART RATE: 77 BPM | WEIGHT: 239.8 LBS | HEIGHT: 67 IN | DIASTOLIC BLOOD PRESSURE: 80 MMHG | OXYGEN SATURATION: 97 % | SYSTOLIC BLOOD PRESSURE: 110 MMHG

## 2018-06-14 DIAGNOSIS — I10 ESSENTIAL HYPERTENSION: ICD-10-CM

## 2018-06-14 DIAGNOSIS — I47.1 PAROXYSMAL SUPRAVENTRICULAR TACHYCARDIA (HCC): Primary | ICD-10-CM

## 2018-06-14 PROCEDURE — 1036F TOBACCO NON-USER: CPT | Performed by: INTERNAL MEDICINE

## 2018-06-14 PROCEDURE — 99214 OFFICE O/P EST MOD 30 MIN: CPT | Performed by: INTERNAL MEDICINE

## 2018-06-14 PROCEDURE — G8427 DOCREV CUR MEDS BY ELIG CLIN: HCPCS | Performed by: INTERNAL MEDICINE

## 2018-06-14 PROCEDURE — G8599 NO ASA/ANTIPLAT THER USE RNG: HCPCS | Performed by: INTERNAL MEDICINE

## 2018-06-14 PROCEDURE — G8399 PT W/DXA RESULTS DOCUMENT: HCPCS | Performed by: INTERNAL MEDICINE

## 2018-06-14 PROCEDURE — 1123F ACP DISCUSS/DSCN MKR DOCD: CPT | Performed by: INTERNAL MEDICINE

## 2018-06-14 PROCEDURE — 93000 ELECTROCARDIOGRAM COMPLETE: CPT | Performed by: INTERNAL MEDICINE

## 2018-06-14 PROCEDURE — G8417 CALC BMI ABV UP PARAM F/U: HCPCS | Performed by: INTERNAL MEDICINE

## 2018-06-14 PROCEDURE — 4040F PNEUMOC VAC/ADMIN/RCVD: CPT | Performed by: INTERNAL MEDICINE

## 2018-06-14 PROCEDURE — 1090F PRES/ABSN URINE INCON ASSESS: CPT | Performed by: INTERNAL MEDICINE

## 2018-06-22 ENCOUNTER — TELEPHONE (OUTPATIENT)
Dept: FAMILY MEDICINE CLINIC | Age: 78
End: 2018-06-22

## 2018-06-22 DIAGNOSIS — G89.29 CHRONIC MIDLINE LOW BACK PAIN WITHOUT SCIATICA: ICD-10-CM

## 2018-06-22 DIAGNOSIS — F41.9 ANXIETY: Primary | ICD-10-CM

## 2018-06-22 DIAGNOSIS — M54.50 CHRONIC MIDLINE LOW BACK PAIN WITHOUT SCIATICA: ICD-10-CM

## 2018-06-22 DIAGNOSIS — M15.9 PRIMARY OSTEOARTHRITIS INVOLVING MULTIPLE JOINTS: ICD-10-CM

## 2018-06-22 RX ORDER — LORAZEPAM 1 MG/1
TABLET ORAL
Qty: 90 TABLET | Refills: 0 | Status: SHIPPED | OUTPATIENT
Start: 2018-06-22 | End: 2018-10-03 | Stop reason: SDUPTHER

## 2018-06-22 RX ORDER — LORAZEPAM 1 MG/1
TABLET ORAL
Qty: 90 TABLET | Refills: 0 | Status: CANCELLED | OUTPATIENT
Start: 2018-06-22 | End: 2018-07-22

## 2018-06-22 RX ORDER — HYDROCODONE BITARTRATE AND ACETAMINOPHEN 7.5; 325 MG/1; MG/1
1 TABLET ORAL EVERY 8 HOURS PRN
Qty: 90 TABLET | Refills: 0 | Status: SHIPPED | OUTPATIENT
Start: 2018-06-22 | End: 2018-07-23 | Stop reason: SDUPTHER

## 2018-06-22 RX ORDER — HYDROCODONE BITARTRATE AND ACETAMINOPHEN 7.5; 325 MG/1; MG/1
1 TABLET ORAL EVERY 8 HOURS PRN
Qty: 90 TABLET | Refills: 0 | Status: CANCELLED | OUTPATIENT
Start: 2018-06-22 | End: 2018-07-22

## 2018-07-16 ASSESSMENT — ENCOUNTER SYMPTOMS: SHORTNESS OF BREATH: 0

## 2018-07-16 NOTE — PROGRESS NOTES
leg swelling. There were no vitals taken for this visit. Objective:   Physical Exam   Constitutional: She is oriented to person, place, and time. She appears well-developed and well-nourished. No distress. HENT:   Head: Normocephalic. Right Ear: External ear normal.   Left Ear: External ear normal.   Mouth/Throat: Oropharynx is clear and moist. No oropharyngeal exudate. Neck: No JVD present. No thyromegaly present. Cardiovascular: Normal rate, regular rhythm and normal heart sounds. No murmur heard. Pulmonary/Chest: Effort normal and breath sounds normal. She has no wheezes. She has no rales. Musculoskeletal: She exhibits no edema. Lymphadenopathy:     She has no cervical adenopathy. Neurological: She is alert and oriented to person, place, and time. Assessment:      Hypertension  Chronic Kidney Disease Stage 3  GERD  Chronic Low Back Pain  Anxiety  PSVT  Obesity  Eustachian Tube Dysfunction Left       Plan:       Chem 7, Lipid Panel  OARRS report was reviewed  Medications refilled  Flonase NS two sprays in each nostril once daily. Patient educated on popping ears  Handout given on ETD.    RTO 3 months for Chronic Low Back Pain    Controlled Substances Monitoring:

## 2018-07-16 NOTE — PROGRESS NOTES
Subjective:      Patient ID: Shirin Benitez is a 66 y.o. female. Hypertension   This is a chronic problem. The current episode started more than 1 year ago. The problem is unchanged. The problem is controlled. Associated symptoms include palpitations. Pertinent negatives include no chest pain, peripheral edema or shortness of breath. Risk factors for coronary artery disease include family history, obesity, post-menopausal state and sedentary lifestyle. Past treatments include beta blockers, calcium channel blockers and diuretics. The current treatment provides significant improvement. There are no compliance problems. Chronic Kidney Disease Stage 3:  Patient takes Maxzide-25 once daily. She is due for a recheck of her kidney function. GERD:  Pt is tolerating and compliant with Omeprazole 20 mg daily. She feels that the medication completely controls her symptoms. She previously did not do well with Zantac. Chronic Low Back Pain:  Patient is tolerating and compliant with Mobic 15 mg daily, Flexeril 10 mg TID prn and Norco 7.5-325 every 8 hrs as needed. She feels that the medications keep her functional.       Anxiety:  Pt takes Ativan 1 mg every 8 hrs as needed for anxiety. She generally takes it once daily and feels that it works well to control her allergy symptoms. PSVT:  Patient now sees Dr. Mitch Walton and was taken off the Rythmol on 4-26-18 and had an event monitor ordered by him. Obesity:  Pt weighed 241 at her visit on 4-5-18. She has lost 4 lbs since then. Review of Systems   Constitutional: Negative for chills and fever. HENT: Negative for ear discharge, ear pain and hearing loss. Respiratory: Negative for shortness of breath. Cardiovascular: Positive for palpitations. Negative for chest pain and leg swelling.      /78   Pulse 65   Ht 5' 6.5\" (1.689 m)   Wt 237 lb 12.8 oz (107.9 kg)   BMI 37.81 kg/m²    Objective:   Physical Exam   Constitutional: She is oriented to person, place, and time. She appears well-developed and well-nourished. No distress. HENT:   Head: Normocephalic. Right Ear: External ear normal.   Left Ear: External ear normal.   Mouth/Throat: Oropharynx is clear and moist. No oropharyngeal exudate. Neck: No JVD present. No thyromegaly present. Cardiovascular: Normal rate, regular rhythm and normal heart sounds. No murmur heard. Pulmonary/Chest: Effort normal and breath sounds normal. She has no wheezes. She has no rales. Musculoskeletal: She exhibits no edema. Lymphadenopathy:     She has no cervical adenopathy. Neurological: She is alert and oriented to person, place, and time. Assessment:      Hypertension  Chronic Kidney Disease Stage 3  GERD  Chronic Low Back Pain  Anxiety  PSVT  Obesity      Plan:       Chem 7  Refilled medications   OARRS report was reviewed  Medications refilled   RTO 3 months for Chronic Low Back Pain / Anxiety      Controlled Substances Monitoring:     RX Monitoring 7/17/2018   Attestation The Prescription Monitoring Report for this patient was reviewed today. Documentation No signs of potential drug abuse or diversion identified. Chronic Pain Functional status reviewed - continues with improved or maintaining ADL's. Medication Contracts Existing medication contract.

## 2018-07-17 ENCOUNTER — OFFICE VISIT (OUTPATIENT)
Dept: FAMILY MEDICINE CLINIC | Age: 78
End: 2018-07-17

## 2018-07-17 VITALS
WEIGHT: 237.8 LBS | BODY MASS INDEX: 37.32 KG/M2 | DIASTOLIC BLOOD PRESSURE: 78 MMHG | HEIGHT: 67 IN | HEART RATE: 65 BPM | SYSTOLIC BLOOD PRESSURE: 135 MMHG

## 2018-07-17 DIAGNOSIS — M54.50 CHRONIC MIDLINE LOW BACK PAIN WITHOUT SCIATICA: ICD-10-CM

## 2018-07-17 DIAGNOSIS — N18.30 CHRONIC KIDNEY DISEASE, STAGE 3 (HCC): ICD-10-CM

## 2018-07-17 DIAGNOSIS — I10 ESSENTIAL HYPERTENSION: Primary | ICD-10-CM

## 2018-07-17 DIAGNOSIS — I47.1 PAROXYSMAL SUPRAVENTRICULAR TACHYCARDIA (HCC): ICD-10-CM

## 2018-07-17 DIAGNOSIS — E66.09 NON MORBID OBESITY DUE TO EXCESS CALORIES: ICD-10-CM

## 2018-07-17 DIAGNOSIS — K21.9 GERD WITHOUT ESOPHAGITIS: ICD-10-CM

## 2018-07-17 DIAGNOSIS — G89.29 CHRONIC MIDLINE LOW BACK PAIN WITHOUT SCIATICA: ICD-10-CM

## 2018-07-17 DIAGNOSIS — F41.9 ANXIETY: ICD-10-CM

## 2018-07-17 LAB
ANION GAP SERPL CALCULATED.3IONS-SCNC: 12 MMOL/L (ref 3–16)
BUN BLDV-MCNC: 17 MG/DL (ref 7–20)
CALCIUM SERPL-MCNC: 9.3 MG/DL (ref 8.3–10.6)
CHLORIDE BLD-SCNC: 104 MMOL/L (ref 99–110)
CO2: 27 MMOL/L (ref 21–32)
CREAT SERPL-MCNC: 1.3 MG/DL (ref 0.6–1.2)
GFR AFRICAN AMERICAN: 48
GFR NON-AFRICAN AMERICAN: 40
GLUCOSE BLD-MCNC: 92 MG/DL (ref 70–99)
POTASSIUM SERPL-SCNC: 4 MMOL/L (ref 3.5–5.1)
SODIUM BLD-SCNC: 143 MMOL/L (ref 136–145)

## 2018-07-17 PROCEDURE — 1123F ACP DISCUSS/DSCN MKR DOCD: CPT | Performed by: FAMILY MEDICINE

## 2018-07-17 PROCEDURE — G8599 NO ASA/ANTIPLAT THER USE RNG: HCPCS | Performed by: FAMILY MEDICINE

## 2018-07-17 PROCEDURE — G8399 PT W/DXA RESULTS DOCUMENT: HCPCS | Performed by: FAMILY MEDICINE

## 2018-07-17 PROCEDURE — G8427 DOCREV CUR MEDS BY ELIG CLIN: HCPCS | Performed by: FAMILY MEDICINE

## 2018-07-17 PROCEDURE — 4040F PNEUMOC VAC/ADMIN/RCVD: CPT | Performed by: FAMILY MEDICINE

## 2018-07-17 PROCEDURE — G8417 CALC BMI ABV UP PARAM F/U: HCPCS | Performed by: FAMILY MEDICINE

## 2018-07-17 PROCEDURE — 99214 OFFICE O/P EST MOD 30 MIN: CPT | Performed by: FAMILY MEDICINE

## 2018-07-17 PROCEDURE — 1101F PT FALLS ASSESS-DOCD LE1/YR: CPT | Performed by: FAMILY MEDICINE

## 2018-07-17 PROCEDURE — 36415 COLL VENOUS BLD VENIPUNCTURE: CPT | Performed by: FAMILY MEDICINE

## 2018-07-17 PROCEDURE — 1036F TOBACCO NON-USER: CPT | Performed by: FAMILY MEDICINE

## 2018-07-17 PROCEDURE — 1090F PRES/ABSN URINE INCON ASSESS: CPT | Performed by: FAMILY MEDICINE

## 2018-07-17 RX ORDER — MAGNESIUM 200 MG
200 TABLET ORAL DAILY
COMMUNITY
End: 2022-03-28

## 2018-07-17 RX ORDER — CYCLOBENZAPRINE HCL 10 MG
10 TABLET ORAL 3 TIMES DAILY PRN
Qty: 90 TABLET | Refills: 5 | Status: SHIPPED | OUTPATIENT
Start: 2018-07-17 | End: 2019-04-17 | Stop reason: SDUPTHER

## 2018-07-17 RX ORDER — METOPROLOL TARTRATE 50 MG/1
50 TABLET, FILM COATED ORAL 2 TIMES DAILY
Qty: 60 TABLET | Refills: 5 | Status: SHIPPED | OUTPATIENT
Start: 2018-07-17 | End: 2019-01-08 | Stop reason: SDUPTHER

## 2018-07-17 ASSESSMENT — PATIENT HEALTH QUESTIONNAIRE - PHQ9
2. FEELING DOWN, DEPRESSED OR HOPELESS: 0
SUM OF ALL RESPONSES TO PHQ9 QUESTIONS 1 & 2: 0
SUM OF ALL RESPONSES TO PHQ QUESTIONS 1-9: 0
1. LITTLE INTEREST OR PLEASURE IN DOING THINGS: 0

## 2018-07-23 ENCOUNTER — TELEPHONE (OUTPATIENT)
Dept: FAMILY MEDICINE CLINIC | Age: 78
End: 2018-07-23

## 2018-07-23 DIAGNOSIS — G89.29 CHRONIC MIDLINE LOW BACK PAIN WITHOUT SCIATICA: ICD-10-CM

## 2018-07-23 DIAGNOSIS — M15.9 PRIMARY OSTEOARTHRITIS INVOLVING MULTIPLE JOINTS: ICD-10-CM

## 2018-07-23 DIAGNOSIS — M54.50 CHRONIC MIDLINE LOW BACK PAIN WITHOUT SCIATICA: ICD-10-CM

## 2018-07-23 RX ORDER — HYDROCODONE BITARTRATE AND ACETAMINOPHEN 7.5; 325 MG/1; MG/1
1 TABLET ORAL EVERY 8 HOURS PRN
Qty: 90 TABLET | Refills: 0 | Status: CANCELLED | OUTPATIENT
Start: 2018-07-23 | End: 2018-08-22

## 2018-07-23 RX ORDER — HYDROCODONE BITARTRATE AND ACETAMINOPHEN 7.5; 325 MG/1; MG/1
1 TABLET ORAL EVERY 8 HOURS PRN
Qty: 90 TABLET | Refills: 0 | Status: SHIPPED | OUTPATIENT
Start: 2018-07-23 | End: 2018-09-04 | Stop reason: SDUPTHER

## 2018-09-04 ENCOUNTER — TELEPHONE (OUTPATIENT)
Dept: FAMILY MEDICINE CLINIC | Age: 78
End: 2018-09-04

## 2018-09-04 DIAGNOSIS — M54.50 CHRONIC MIDLINE LOW BACK PAIN WITHOUT SCIATICA: ICD-10-CM

## 2018-09-04 DIAGNOSIS — G89.29 CHRONIC MIDLINE LOW BACK PAIN WITHOUT SCIATICA: ICD-10-CM

## 2018-09-04 DIAGNOSIS — M15.9 PRIMARY OSTEOARTHRITIS INVOLVING MULTIPLE JOINTS: ICD-10-CM

## 2018-09-04 RX ORDER — HYDROCODONE BITARTRATE AND ACETAMINOPHEN 7.5; 325 MG/1; MG/1
1 TABLET ORAL EVERY 8 HOURS PRN
Qty: 90 TABLET | Refills: 0 | Status: CANCELLED | OUTPATIENT
Start: 2018-09-04 | End: 2018-10-04

## 2018-09-04 RX ORDER — HYDROCODONE BITARTRATE AND ACETAMINOPHEN 7.5; 325 MG/1; MG/1
1 TABLET ORAL EVERY 8 HOURS PRN
Qty: 90 TABLET | Refills: 0 | Status: SHIPPED | OUTPATIENT
Start: 2018-09-04 | End: 2018-10-03 | Stop reason: SDUPTHER

## 2018-09-04 RX ORDER — OMEPRAZOLE 20 MG/1
CAPSULE, DELAYED RELEASE ORAL
Qty: 90 CAPSULE | Refills: 0 | Status: CANCELLED | OUTPATIENT
Start: 2018-09-04

## 2018-09-04 RX ORDER — OMEPRAZOLE 20 MG/1
CAPSULE, DELAYED RELEASE ORAL
Qty: 90 CAPSULE | Refills: 0 | Status: SHIPPED | OUTPATIENT
Start: 2018-09-04 | End: 2018-12-03 | Stop reason: SDUPTHER

## 2018-10-03 ENCOUNTER — TELEPHONE (OUTPATIENT)
Dept: FAMILY MEDICINE CLINIC | Age: 78
End: 2018-10-03

## 2018-10-03 DIAGNOSIS — M54.50 CHRONIC MIDLINE LOW BACK PAIN WITHOUT SCIATICA: ICD-10-CM

## 2018-10-03 DIAGNOSIS — F41.9 ANXIETY: ICD-10-CM

## 2018-10-03 DIAGNOSIS — M15.9 PRIMARY OSTEOARTHRITIS INVOLVING MULTIPLE JOINTS: ICD-10-CM

## 2018-10-03 DIAGNOSIS — G89.29 CHRONIC MIDLINE LOW BACK PAIN WITHOUT SCIATICA: ICD-10-CM

## 2018-10-03 RX ORDER — LORAZEPAM 1 MG/1
TABLET ORAL
Qty: 90 TABLET | Refills: 0 | Status: SHIPPED | OUTPATIENT
Start: 2018-10-03 | End: 2018-12-26 | Stop reason: SDUPTHER

## 2018-10-03 RX ORDER — HYDROCODONE BITARTRATE AND ACETAMINOPHEN 7.5; 325 MG/1; MG/1
1 TABLET ORAL EVERY 8 HOURS PRN
Qty: 90 TABLET | Refills: 0 | Status: SHIPPED | OUTPATIENT
Start: 2018-10-03 | End: 2018-11-20 | Stop reason: SDUPTHER

## 2018-10-03 RX ORDER — LORAZEPAM 1 MG/1
TABLET ORAL
Qty: 90 TABLET | Refills: 0 | Status: CANCELLED | OUTPATIENT
Start: 2018-10-03 | End: 2018-11-02

## 2018-10-03 RX ORDER — HYDROCODONE BITARTRATE AND ACETAMINOPHEN 7.5; 325 MG/1; MG/1
1 TABLET ORAL EVERY 8 HOURS PRN
Qty: 90 TABLET | Refills: 0 | Status: CANCELLED | OUTPATIENT
Start: 2018-10-03 | End: 2018-11-02

## 2018-10-04 ENCOUNTER — OFFICE VISIT (OUTPATIENT)
Dept: FAMILY MEDICINE CLINIC | Age: 78
End: 2018-10-04
Payer: MEDICARE

## 2018-10-04 VITALS — WEIGHT: 236 LBS | SYSTOLIC BLOOD PRESSURE: 128 MMHG | DIASTOLIC BLOOD PRESSURE: 82 MMHG | BODY MASS INDEX: 37.52 KG/M2

## 2018-10-04 DIAGNOSIS — F41.9 ANXIETY: ICD-10-CM

## 2018-10-04 DIAGNOSIS — Z23 NEED FOR INFLUENZA VACCINATION: ICD-10-CM

## 2018-10-04 DIAGNOSIS — M54.50 CHRONIC MIDLINE LOW BACK PAIN WITHOUT SCIATICA: Primary | ICD-10-CM

## 2018-10-04 DIAGNOSIS — R76.11 POSITIVE PPD: ICD-10-CM

## 2018-10-04 DIAGNOSIS — M54.9 UPPER BACK PAIN ON LEFT SIDE: ICD-10-CM

## 2018-10-04 DIAGNOSIS — G89.29 CHRONIC MIDLINE LOW BACK PAIN WITHOUT SCIATICA: Primary | ICD-10-CM

## 2018-10-04 PROCEDURE — G8417 CALC BMI ABV UP PARAM F/U: HCPCS | Performed by: FAMILY MEDICINE

## 2018-10-04 PROCEDURE — G8427 DOCREV CUR MEDS BY ELIG CLIN: HCPCS | Performed by: FAMILY MEDICINE

## 2018-10-04 PROCEDURE — 1123F ACP DISCUSS/DSCN MKR DOCD: CPT | Performed by: FAMILY MEDICINE

## 2018-10-04 PROCEDURE — 90662 IIV NO PRSV INCREASED AG IM: CPT | Performed by: FAMILY MEDICINE

## 2018-10-04 PROCEDURE — G8482 FLU IMMUNIZE ORDER/ADMIN: HCPCS | Performed by: FAMILY MEDICINE

## 2018-10-04 PROCEDURE — 1090F PRES/ABSN URINE INCON ASSESS: CPT | Performed by: FAMILY MEDICINE

## 2018-10-04 PROCEDURE — 1101F PT FALLS ASSESS-DOCD LE1/YR: CPT | Performed by: FAMILY MEDICINE

## 2018-10-04 PROCEDURE — 99213 OFFICE O/P EST LOW 20 MIN: CPT | Performed by: FAMILY MEDICINE

## 2018-10-04 PROCEDURE — 4040F PNEUMOC VAC/ADMIN/RCVD: CPT | Performed by: FAMILY MEDICINE

## 2018-10-04 PROCEDURE — 1036F TOBACCO NON-USER: CPT | Performed by: FAMILY MEDICINE

## 2018-10-04 PROCEDURE — G8599 NO ASA/ANTIPLAT THER USE RNG: HCPCS | Performed by: FAMILY MEDICINE

## 2018-10-04 PROCEDURE — G8399 PT W/DXA RESULTS DOCUMENT: HCPCS | Performed by: FAMILY MEDICINE

## 2018-10-04 PROCEDURE — G0008 ADMIN INFLUENZA VIRUS VAC: HCPCS | Performed by: FAMILY MEDICINE

## 2018-10-04 ASSESSMENT — ENCOUNTER SYMPTOMS
BACK PAIN: 1
SHORTNESS OF BREATH: 0
COUGH: 0
WHEEZING: 1

## 2018-10-12 ENCOUNTER — HOSPITAL ENCOUNTER (OUTPATIENT)
Age: 78
Discharge: HOME | End: 2018-10-12
Payer: MEDICARE

## 2018-10-12 ENCOUNTER — HOSPITAL ENCOUNTER (OUTPATIENT)
Dept: GENERAL RADIOLOGY | Age: 78
Discharge: HOME | End: 2018-10-12
Payer: MEDICARE

## 2018-10-12 DIAGNOSIS — R76.11 POSITIVE PPD: ICD-10-CM

## 2018-10-12 DIAGNOSIS — M54.9 UPPER BACK PAIN ON LEFT SIDE: ICD-10-CM

## 2018-10-12 PROCEDURE — 71046 X-RAY EXAM CHEST 2 VIEWS: CPT

## 2018-10-12 PROCEDURE — 71100 X-RAY EXAM RIBS UNI 2 VIEWS: CPT

## 2018-10-12 PROCEDURE — 72072 X-RAY EXAM THORAC SPINE 3VWS: CPT

## 2018-11-20 ENCOUNTER — TELEPHONE (OUTPATIENT)
Dept: FAMILY MEDICINE CLINIC | Age: 78
End: 2018-11-20

## 2018-11-20 DIAGNOSIS — G89.29 CHRONIC MIDLINE LOW BACK PAIN WITHOUT SCIATICA: ICD-10-CM

## 2018-11-20 DIAGNOSIS — M15.9 PRIMARY OSTEOARTHRITIS INVOLVING MULTIPLE JOINTS: ICD-10-CM

## 2018-11-20 DIAGNOSIS — M54.50 CHRONIC MIDLINE LOW BACK PAIN WITHOUT SCIATICA: ICD-10-CM

## 2018-11-20 RX ORDER — HYDROCODONE BITARTRATE AND ACETAMINOPHEN 7.5; 325 MG/1; MG/1
1 TABLET ORAL EVERY 8 HOURS PRN
Qty: 90 TABLET | Refills: 0 | Status: SHIPPED | OUTPATIENT
Start: 2018-11-20 | End: 2018-12-26 | Stop reason: SDUPTHER

## 2018-12-03 RX ORDER — OMEPRAZOLE 20 MG/1
CAPSULE, DELAYED RELEASE ORAL
Qty: 90 CAPSULE | Refills: 0 | Status: SHIPPED | OUTPATIENT
Start: 2018-12-03 | End: 2019-03-01 | Stop reason: SDUPTHER

## 2018-12-26 ENCOUNTER — TELEPHONE (OUTPATIENT)
Dept: FAMILY MEDICINE CLINIC | Age: 78
End: 2018-12-26

## 2018-12-26 DIAGNOSIS — F41.9 ANXIETY: ICD-10-CM

## 2018-12-26 DIAGNOSIS — M15.9 PRIMARY OSTEOARTHRITIS INVOLVING MULTIPLE JOINTS: ICD-10-CM

## 2018-12-26 DIAGNOSIS — G89.29 CHRONIC MIDLINE LOW BACK PAIN WITHOUT SCIATICA: ICD-10-CM

## 2018-12-26 DIAGNOSIS — M54.50 CHRONIC MIDLINE LOW BACK PAIN WITHOUT SCIATICA: ICD-10-CM

## 2018-12-26 RX ORDER — LORAZEPAM 1 MG/1
TABLET ORAL
Qty: 90 TABLET | Refills: 0 | Status: SHIPPED | OUTPATIENT
Start: 2018-12-26 | End: 2019-04-17 | Stop reason: SDUPTHER

## 2018-12-26 RX ORDER — HYDROCODONE BITARTRATE AND ACETAMINOPHEN 7.5; 325 MG/1; MG/1
1 TABLET ORAL EVERY 8 HOURS PRN
Qty: 90 TABLET | Refills: 0 | Status: SHIPPED | OUTPATIENT
Start: 2018-12-26 | End: 2019-01-31 | Stop reason: SDUPTHER

## 2019-01-03 ASSESSMENT — ENCOUNTER SYMPTOMS: SHORTNESS OF BREATH: 0

## 2019-01-04 ENCOUNTER — OFFICE VISIT (OUTPATIENT)
Dept: FAMILY MEDICINE CLINIC | Age: 79
End: 2019-01-04
Payer: MEDICARE

## 2019-01-04 VITALS — DIASTOLIC BLOOD PRESSURE: 70 MMHG | BODY MASS INDEX: 38.47 KG/M2 | SYSTOLIC BLOOD PRESSURE: 130 MMHG | WEIGHT: 242 LBS

## 2019-01-04 DIAGNOSIS — M54.50 CHRONIC MIDLINE LOW BACK PAIN WITHOUT SCIATICA: ICD-10-CM

## 2019-01-04 DIAGNOSIS — N18.30 CHRONIC KIDNEY DISEASE, STAGE 3 (HCC): ICD-10-CM

## 2019-01-04 DIAGNOSIS — I10 ESSENTIAL HYPERTENSION: Primary | ICD-10-CM

## 2019-01-04 DIAGNOSIS — F41.9 ANXIETY: ICD-10-CM

## 2019-01-04 DIAGNOSIS — K21.9 GERD WITHOUT ESOPHAGITIS: ICD-10-CM

## 2019-01-04 DIAGNOSIS — E66.09 NON MORBID OBESITY DUE TO EXCESS CALORIES: ICD-10-CM

## 2019-01-04 DIAGNOSIS — M25.549 ARTHRALGIA OF HAND, UNSPECIFIED LATERALITY: ICD-10-CM

## 2019-01-04 DIAGNOSIS — G89.29 CHRONIC MIDLINE LOW BACK PAIN WITHOUT SCIATICA: ICD-10-CM

## 2019-01-04 DIAGNOSIS — I47.1 PAROXYSMAL SUPRAVENTRICULAR TACHYCARDIA (HCC): ICD-10-CM

## 2019-01-04 LAB
ANION GAP SERPL CALCULATED.3IONS-SCNC: 18 MMOL/L (ref 3–16)
BUN BLDV-MCNC: 22 MG/DL (ref 7–20)
CALCIUM SERPL-MCNC: 9.2 MG/DL (ref 8.3–10.6)
CHLORIDE BLD-SCNC: 100 MMOL/L (ref 99–110)
CHOLESTEROL, TOTAL: 216 MG/DL (ref 0–199)
CO2: 25 MMOL/L (ref 21–32)
CREAT SERPL-MCNC: 1.4 MG/DL (ref 0.6–1.2)
GFR AFRICAN AMERICAN: 44
GFR NON-AFRICAN AMERICAN: 36
GLUCOSE BLD-MCNC: 75 MG/DL (ref 70–99)
HDLC SERPL-MCNC: 66 MG/DL (ref 40–60)
LDL CHOLESTEROL CALCULATED: 125 MG/DL
POTASSIUM SERPL-SCNC: 4.3 MMOL/L (ref 3.5–5.1)
RHEUMATOID FACTOR: <10 IU/ML
SODIUM BLD-SCNC: 143 MMOL/L (ref 136–145)
TRIGL SERPL-MCNC: 125 MG/DL (ref 0–150)
VLDLC SERPL CALC-MCNC: 25 MG/DL

## 2019-01-04 PROCEDURE — G8599 NO ASA/ANTIPLAT THER USE RNG: HCPCS | Performed by: FAMILY MEDICINE

## 2019-01-04 PROCEDURE — 4040F PNEUMOC VAC/ADMIN/RCVD: CPT | Performed by: FAMILY MEDICINE

## 2019-01-04 PROCEDURE — G8427 DOCREV CUR MEDS BY ELIG CLIN: HCPCS | Performed by: FAMILY MEDICINE

## 2019-01-04 PROCEDURE — G8482 FLU IMMUNIZE ORDER/ADMIN: HCPCS | Performed by: FAMILY MEDICINE

## 2019-01-04 PROCEDURE — 99214 OFFICE O/P EST MOD 30 MIN: CPT | Performed by: FAMILY MEDICINE

## 2019-01-04 PROCEDURE — G8417 CALC BMI ABV UP PARAM F/U: HCPCS | Performed by: FAMILY MEDICINE

## 2019-01-04 PROCEDURE — 1101F PT FALLS ASSESS-DOCD LE1/YR: CPT | Performed by: FAMILY MEDICINE

## 2019-01-04 PROCEDURE — G8399 PT W/DXA RESULTS DOCUMENT: HCPCS | Performed by: FAMILY MEDICINE

## 2019-01-04 PROCEDURE — 1090F PRES/ABSN URINE INCON ASSESS: CPT | Performed by: FAMILY MEDICINE

## 2019-01-04 PROCEDURE — 36415 COLL VENOUS BLD VENIPUNCTURE: CPT | Performed by: FAMILY MEDICINE

## 2019-01-04 PROCEDURE — 1036F TOBACCO NON-USER: CPT | Performed by: FAMILY MEDICINE

## 2019-01-04 PROCEDURE — 1123F ACP DISCUSS/DSCN MKR DOCD: CPT | Performed by: FAMILY MEDICINE

## 2019-01-07 LAB
ANA INTERPRETATION: NORMAL
ANTI-NUCLEAR ANTIBODY (ANA): NEGATIVE

## 2019-01-08 ENCOUNTER — TELEPHONE (OUTPATIENT)
Dept: FAMILY MEDICINE CLINIC | Age: 79
End: 2019-01-08

## 2019-01-08 RX ORDER — MELOXICAM 15 MG/1
15 TABLET ORAL DAILY
Qty: 90 TABLET | Refills: 1 | Status: SHIPPED | OUTPATIENT
Start: 2019-01-08 | End: 2019-01-08 | Stop reason: SDUPTHER

## 2019-01-08 RX ORDER — TRIAMTERENE AND HYDROCHLOROTHIAZIDE 37.5; 25 MG/1; MG/1
1 TABLET ORAL DAILY
Qty: 90 TABLET | Refills: 0 | Status: CANCELLED | OUTPATIENT
Start: 2019-01-08

## 2019-01-08 RX ORDER — METOPROLOL TARTRATE 50 MG/1
50 TABLET, FILM COATED ORAL 2 TIMES DAILY
Qty: 180 TABLET | Refills: 1 | Status: SHIPPED | OUTPATIENT
Start: 2019-01-08 | End: 2019-01-08 | Stop reason: SDUPTHER

## 2019-01-08 RX ORDER — TRIAMTERENE AND HYDROCHLOROTHIAZIDE 37.5; 25 MG/1; MG/1
1 TABLET ORAL DAILY
Qty: 90 TABLET | Refills: 1 | Status: SHIPPED | OUTPATIENT
Start: 2019-01-08 | End: 2019-01-08 | Stop reason: SDUPTHER

## 2019-01-08 RX ORDER — MELOXICAM 15 MG/1
15 TABLET ORAL DAILY
Qty: 90 TABLET | Refills: 0 | Status: CANCELLED | OUTPATIENT
Start: 2019-01-08

## 2019-01-08 RX ORDER — METOPROLOL TARTRATE 50 MG/1
50 TABLET, FILM COATED ORAL 2 TIMES DAILY
Qty: 180 TABLET | Refills: 1 | Status: CANCELLED | OUTPATIENT
Start: 2019-01-08

## 2019-01-08 RX ORDER — TRIAMTERENE AND HYDROCHLOROTHIAZIDE 37.5; 25 MG/1; MG/1
1 TABLET ORAL DAILY
Qty: 90 TABLET | Refills: 1 | Status: SHIPPED | OUTPATIENT
Start: 2019-01-08 | End: 2019-09-19 | Stop reason: SDUPTHER

## 2019-01-08 RX ORDER — MELOXICAM 15 MG/1
15 TABLET ORAL DAILY
Qty: 90 TABLET | Refills: 1 | Status: SHIPPED | OUTPATIENT
Start: 2019-01-08 | End: 2019-08-05 | Stop reason: SDUPTHER

## 2019-01-08 RX ORDER — METOPROLOL TARTRATE 50 MG/1
50 TABLET, FILM COATED ORAL 2 TIMES DAILY
Qty: 180 TABLET | Refills: 1 | Status: SHIPPED | OUTPATIENT
Start: 2019-01-08 | End: 2019-06-19 | Stop reason: SDUPTHER

## 2019-01-31 DIAGNOSIS — G89.29 CHRONIC MIDLINE LOW BACK PAIN WITHOUT SCIATICA: ICD-10-CM

## 2019-01-31 DIAGNOSIS — M15.9 PRIMARY OSTEOARTHRITIS INVOLVING MULTIPLE JOINTS: ICD-10-CM

## 2019-01-31 DIAGNOSIS — M54.50 CHRONIC MIDLINE LOW BACK PAIN WITHOUT SCIATICA: ICD-10-CM

## 2019-01-31 RX ORDER — HYDROCODONE BITARTRATE AND ACETAMINOPHEN 7.5; 325 MG/1; MG/1
1 TABLET ORAL EVERY 8 HOURS PRN
Qty: 90 TABLET | Refills: 0 | Status: SHIPPED | OUTPATIENT
Start: 2019-01-31 | End: 2019-03-01 | Stop reason: SDUPTHER

## 2019-03-01 ENCOUNTER — TELEPHONE (OUTPATIENT)
Dept: FAMILY MEDICINE CLINIC | Age: 79
End: 2019-03-01

## 2019-03-01 DIAGNOSIS — M54.50 CHRONIC MIDLINE LOW BACK PAIN WITHOUT SCIATICA: ICD-10-CM

## 2019-03-01 DIAGNOSIS — G89.29 CHRONIC MIDLINE LOW BACK PAIN WITHOUT SCIATICA: ICD-10-CM

## 2019-03-01 DIAGNOSIS — M15.9 PRIMARY OSTEOARTHRITIS INVOLVING MULTIPLE JOINTS: ICD-10-CM

## 2019-03-01 RX ORDER — HYDROCODONE BITARTRATE AND ACETAMINOPHEN 7.5; 325 MG/1; MG/1
1 TABLET ORAL EVERY 8 HOURS PRN
Qty: 90 TABLET | Refills: 0 | Status: CANCELLED | OUTPATIENT
Start: 2019-03-01 | End: 2019-03-31

## 2019-03-01 RX ORDER — OMEPRAZOLE 20 MG/1
CAPSULE, DELAYED RELEASE ORAL
Qty: 90 CAPSULE | Refills: 0 | Status: CANCELLED | OUTPATIENT
Start: 2019-03-01

## 2019-03-01 RX ORDER — OMEPRAZOLE 20 MG/1
CAPSULE, DELAYED RELEASE ORAL
Qty: 90 CAPSULE | Refills: 0 | Status: SHIPPED | OUTPATIENT
Start: 2019-03-01 | End: 2019-06-01 | Stop reason: SDUPTHER

## 2019-03-01 RX ORDER — HYDROCODONE BITARTRATE AND ACETAMINOPHEN 7.5; 325 MG/1; MG/1
1 TABLET ORAL EVERY 8 HOURS PRN
Qty: 90 TABLET | Refills: 0 | Status: SHIPPED | OUTPATIENT
Start: 2019-03-01 | End: 2019-04-17 | Stop reason: SDUPTHER

## 2019-04-15 ENCOUNTER — TELEPHONE (OUTPATIENT)
Dept: FAMILY MEDICINE CLINIC | Age: 79
End: 2019-04-15

## 2019-04-16 NOTE — PROGRESS NOTES
Subjective:      Patient ID: Sid Solano is a 78 y.o. female. HPI     Chronic Low Back Pain:  Patient is tolerating and compliant with Mobic 15 mg daily, Flexeril 10 mg TID prn and Norco 7.5-325 every 8 hrs as needed.  She feels that the medications keep her functional.        Anxiety:  Pt takes Ativan 1 mg every 8 hrs as needed for anxiety.  She generally takes it once daily and feels that it works well to control her allergy symptoms. Review of Systems  /80 (Site: Right Upper Arm, Position: Sitting, Cuff Size: Medium Adult)   Ht 5' 6.5\" (1.689 m)   Wt 235 lb 12.8 oz (107 kg)   BMI 37.49 kg/m²    Objective:   Physical Exam   Constitutional: She is oriented to person, place, and time. She appears well-developed and well-nourished. No distress. HENT:   Head: Normocephalic. Right Ear: External ear normal.   Left Ear: External ear normal.   Mouth/Throat: Oropharynx is clear and moist. No oropharyngeal exudate. Neck: No JVD present. No thyromegaly present. Cardiovascular: Normal rate, regular rhythm and normal heart sounds. No murmur heard. Pulmonary/Chest: Effort normal and breath sounds normal. She has no wheezes. She has no rales. Musculoskeletal: She exhibits no edema. Lymphadenopathy:     She has no cervical adenopathy. Neurological: She is alert and oriented to person, place, and time. Assessment:      Chronic Low Back Pain  Anxiety      Plan:      OARRS report was reviewed  Medications refilled   RTO 3 months for Hypertension / Kidney Disease / Back Pain / Anxiety     Controlled Substances Monitoring:     RX Monitoring 4/17/2019   Attestation The Prescription Monitoring Report for this patient was reviewed today. Acute Pain Prescriptions Severe pain not adequately treated with lower dose.    Chronic Pain Routine Monitoring No signs of potential drug abuse or diversion identified: otherwise, see note documentation   Chronic Pain > 80 MEDD -           ENRICO VORA DO

## 2019-04-17 ENCOUNTER — OFFICE VISIT (OUTPATIENT)
Dept: FAMILY MEDICINE CLINIC | Age: 79
End: 2019-04-17
Payer: MEDICARE

## 2019-04-17 VITALS
BODY MASS INDEX: 37.01 KG/M2 | HEIGHT: 67 IN | DIASTOLIC BLOOD PRESSURE: 80 MMHG | SYSTOLIC BLOOD PRESSURE: 136 MMHG | WEIGHT: 235.8 LBS

## 2019-04-17 DIAGNOSIS — M15.9 PRIMARY OSTEOARTHRITIS INVOLVING MULTIPLE JOINTS: ICD-10-CM

## 2019-04-17 DIAGNOSIS — M54.50 CHRONIC MIDLINE LOW BACK PAIN WITHOUT SCIATICA: Primary | ICD-10-CM

## 2019-04-17 DIAGNOSIS — H69.82 EUSTACHIAN TUBE DYSFUNCTION, LEFT: ICD-10-CM

## 2019-04-17 DIAGNOSIS — F41.9 ANXIETY: ICD-10-CM

## 2019-04-17 DIAGNOSIS — G89.29 CHRONIC MIDLINE LOW BACK PAIN WITHOUT SCIATICA: Primary | ICD-10-CM

## 2019-04-17 PROCEDURE — G8599 NO ASA/ANTIPLAT THER USE RNG: HCPCS | Performed by: FAMILY MEDICINE

## 2019-04-17 PROCEDURE — G8399 PT W/DXA RESULTS DOCUMENT: HCPCS | Performed by: FAMILY MEDICINE

## 2019-04-17 PROCEDURE — 4040F PNEUMOC VAC/ADMIN/RCVD: CPT | Performed by: FAMILY MEDICINE

## 2019-04-17 PROCEDURE — 1123F ACP DISCUSS/DSCN MKR DOCD: CPT | Performed by: FAMILY MEDICINE

## 2019-04-17 PROCEDURE — 99213 OFFICE O/P EST LOW 20 MIN: CPT | Performed by: FAMILY MEDICINE

## 2019-04-17 PROCEDURE — 1090F PRES/ABSN URINE INCON ASSESS: CPT | Performed by: FAMILY MEDICINE

## 2019-04-17 PROCEDURE — G8427 DOCREV CUR MEDS BY ELIG CLIN: HCPCS | Performed by: FAMILY MEDICINE

## 2019-04-17 PROCEDURE — G8417 CALC BMI ABV UP PARAM F/U: HCPCS | Performed by: FAMILY MEDICINE

## 2019-04-17 PROCEDURE — 1036F TOBACCO NON-USER: CPT | Performed by: FAMILY MEDICINE

## 2019-04-17 RX ORDER — CYCLOBENZAPRINE HCL 10 MG
10 TABLET ORAL 3 TIMES DAILY PRN
Qty: 90 TABLET | Refills: 5 | Status: SHIPPED | OUTPATIENT
Start: 2019-04-17 | End: 2019-10-16 | Stop reason: SDUPTHER

## 2019-04-17 RX ORDER — LORAZEPAM 1 MG/1
TABLET ORAL
Qty: 90 TABLET | Refills: 0 | Status: SHIPPED | OUTPATIENT
Start: 2019-04-17 | End: 2019-08-22 | Stop reason: SDUPTHER

## 2019-04-17 RX ORDER — HYDROCODONE BITARTRATE AND ACETAMINOPHEN 7.5; 325 MG/1; MG/1
1 TABLET ORAL EVERY 8 HOURS PRN
Qty: 90 TABLET | Refills: 0 | Status: SHIPPED | OUTPATIENT
Start: 2019-04-17 | End: 2019-06-19 | Stop reason: SDUPTHER

## 2019-04-17 RX ORDER — FLUTICASONE PROPIONATE 50 MCG
2 SPRAY, SUSPENSION (ML) NASAL DAILY
Qty: 1 BOTTLE | Refills: 5 | Status: SHIPPED | OUTPATIENT
Start: 2019-04-17 | End: 2021-03-05 | Stop reason: SDUPTHER

## 2019-04-18 ENCOUNTER — TELEPHONE (OUTPATIENT)
Dept: FAMILY MEDICINE CLINIC | Age: 79
End: 2019-04-18

## 2019-04-18 NOTE — TELEPHONE ENCOUNTER
Submitted PA for Cyclobenzaprine HCl 10MG OR TABS, Key: [id number]. Status PENDING.

## 2019-05-13 ENCOUNTER — TELEPHONE (OUTPATIENT)
Dept: FAMILY MEDICINE CLINIC | Age: 79
End: 2019-05-13

## 2019-05-13 DIAGNOSIS — R05.9 COUGH: Primary | ICD-10-CM

## 2019-05-13 RX ORDER — GUAIFENESIN AND CODEINE PHOSPHATE 100; 10 MG/5ML; MG/5ML
5 SOLUTION ORAL 4 TIMES DAILY PRN
Qty: 140 ML | Refills: 0 | Status: SHIPPED | OUTPATIENT
Start: 2019-05-13 | End: 2019-05-20

## 2019-05-13 NOTE — TELEPHONE ENCOUNTER
Patient states she has this cough that is keeping her up at night. States this started with congestion and now it's just an annoying cough. States it started last Thursday. Requesting to see if MD would call a cough medication in for her. Please return call.

## 2019-06-02 RX ORDER — OMEPRAZOLE 20 MG/1
CAPSULE, DELAYED RELEASE ORAL
Qty: 90 CAPSULE | Refills: 0 | Status: SHIPPED | OUTPATIENT
Start: 2019-06-02 | End: 2019-08-30 | Stop reason: SDUPTHER

## 2019-06-19 ENCOUNTER — TELEPHONE (OUTPATIENT)
Dept: FAMILY MEDICINE CLINIC | Age: 79
End: 2019-06-19

## 2019-06-19 DIAGNOSIS — M15.9 PRIMARY OSTEOARTHRITIS INVOLVING MULTIPLE JOINTS: ICD-10-CM

## 2019-06-19 DIAGNOSIS — G89.29 CHRONIC MIDLINE LOW BACK PAIN WITHOUT SCIATICA: ICD-10-CM

## 2019-06-19 DIAGNOSIS — M54.50 CHRONIC MIDLINE LOW BACK PAIN WITHOUT SCIATICA: ICD-10-CM

## 2019-06-19 RX ORDER — HYDROCODONE BITARTRATE AND ACETAMINOPHEN 7.5; 325 MG/1; MG/1
1 TABLET ORAL EVERY 8 HOURS PRN
Qty: 90 TABLET | Refills: 0 | Status: SHIPPED | OUTPATIENT
Start: 2019-06-19 | End: 2019-08-22 | Stop reason: SDUPTHER

## 2019-06-19 RX ORDER — HYDROCODONE BITARTRATE AND ACETAMINOPHEN 7.5; 325 MG/1; MG/1
1 TABLET ORAL EVERY 8 HOURS PRN
Qty: 90 TABLET | Refills: 0 | Status: CANCELLED | OUTPATIENT
Start: 2019-06-19 | End: 2019-07-19

## 2019-06-19 NOTE — TELEPHONE ENCOUNTER
Patient requesting med refill on [place] 7.5-325 mg to go to [facility]. Last office visit 4/17/19.

## 2019-07-11 ASSESSMENT — ENCOUNTER SYMPTOMS: SHORTNESS OF BREATH: 0

## 2019-07-15 ENCOUNTER — OFFICE VISIT (OUTPATIENT)
Dept: FAMILY MEDICINE CLINIC | Age: 79
End: 2019-07-15
Payer: MEDICARE

## 2019-07-15 VITALS
HEIGHT: 67 IN | BODY MASS INDEX: 37.2 KG/M2 | WEIGHT: 237 LBS | SYSTOLIC BLOOD PRESSURE: 130 MMHG | HEART RATE: 64 BPM | DIASTOLIC BLOOD PRESSURE: 82 MMHG

## 2019-07-15 DIAGNOSIS — G89.29 CHRONIC MIDLINE LOW BACK PAIN WITHOUT SCIATICA: ICD-10-CM

## 2019-07-15 DIAGNOSIS — I47.1 PAROXYSMAL SUPRAVENTRICULAR TACHYCARDIA (HCC): ICD-10-CM

## 2019-07-15 DIAGNOSIS — M54.50 CHRONIC MIDLINE LOW BACK PAIN WITHOUT SCIATICA: ICD-10-CM

## 2019-07-15 DIAGNOSIS — N18.30 CHRONIC KIDNEY DISEASE, STAGE 3 (HCC): ICD-10-CM

## 2019-07-15 DIAGNOSIS — K21.9 GERD WITHOUT ESOPHAGITIS: ICD-10-CM

## 2019-07-15 DIAGNOSIS — F41.9 ANXIETY: ICD-10-CM

## 2019-07-15 DIAGNOSIS — Z23 NEED FOR SHINGLES VACCINE: ICD-10-CM

## 2019-07-15 DIAGNOSIS — I10 ESSENTIAL HYPERTENSION: Primary | ICD-10-CM

## 2019-07-15 DIAGNOSIS — Z23 NEED FOR TDAP VACCINATION: ICD-10-CM

## 2019-07-15 DIAGNOSIS — E66.09 NON MORBID OBESITY DUE TO EXCESS CALORIES: ICD-10-CM

## 2019-07-15 PROCEDURE — G8417 CALC BMI ABV UP PARAM F/U: HCPCS | Performed by: FAMILY MEDICINE

## 2019-07-15 PROCEDURE — 99214 OFFICE O/P EST MOD 30 MIN: CPT | Performed by: FAMILY MEDICINE

## 2019-07-15 PROCEDURE — G8599 NO ASA/ANTIPLAT THER USE RNG: HCPCS | Performed by: FAMILY MEDICINE

## 2019-07-15 PROCEDURE — 1036F TOBACCO NON-USER: CPT | Performed by: FAMILY MEDICINE

## 2019-07-15 PROCEDURE — 1123F ACP DISCUSS/DSCN MKR DOCD: CPT | Performed by: FAMILY MEDICINE

## 2019-07-15 PROCEDURE — G8427 DOCREV CUR MEDS BY ELIG CLIN: HCPCS | Performed by: FAMILY MEDICINE

## 2019-07-15 PROCEDURE — 4040F PNEUMOC VAC/ADMIN/RCVD: CPT | Performed by: FAMILY MEDICINE

## 2019-07-15 PROCEDURE — 1090F PRES/ABSN URINE INCON ASSESS: CPT | Performed by: FAMILY MEDICINE

## 2019-07-15 PROCEDURE — G8399 PT W/DXA RESULTS DOCUMENT: HCPCS | Performed by: FAMILY MEDICINE

## 2019-07-15 ASSESSMENT — PATIENT HEALTH QUESTIONNAIRE - PHQ9
SUM OF ALL RESPONSES TO PHQ9 QUESTIONS 1 & 2: 0
1. LITTLE INTEREST OR PLEASURE IN DOING THINGS: 0
2. FEELING DOWN, DEPRESSED OR HOPELESS: 0
SUM OF ALL RESPONSES TO PHQ QUESTIONS 1-9: 0
SUM OF ALL RESPONSES TO PHQ QUESTIONS 1-9: 0

## 2019-08-05 RX ORDER — MELOXICAM 15 MG/1
TABLET ORAL
Qty: 90 TABLET | Refills: 1 | Status: SHIPPED | OUTPATIENT
Start: 2019-08-05 | End: 2019-12-29

## 2019-08-22 ENCOUNTER — TELEPHONE (OUTPATIENT)
Dept: FAMILY MEDICINE CLINIC | Age: 79
End: 2019-08-22

## 2019-08-22 DIAGNOSIS — M54.50 CHRONIC MIDLINE LOW BACK PAIN WITHOUT SCIATICA: ICD-10-CM

## 2019-08-22 DIAGNOSIS — G89.29 CHRONIC MIDLINE LOW BACK PAIN WITHOUT SCIATICA: ICD-10-CM

## 2019-08-22 DIAGNOSIS — M15.9 PRIMARY OSTEOARTHRITIS INVOLVING MULTIPLE JOINTS: ICD-10-CM

## 2019-08-22 DIAGNOSIS — F41.9 ANXIETY: ICD-10-CM

## 2019-08-22 RX ORDER — LORAZEPAM 1 MG/1
TABLET ORAL
Qty: 90 TABLET | Refills: 0 | Status: SHIPPED | OUTPATIENT
Start: 2019-08-22 | End: 2019-12-17 | Stop reason: SDUPTHER

## 2019-08-22 RX ORDER — HYDROCODONE BITARTRATE AND ACETAMINOPHEN 7.5; 325 MG/1; MG/1
1 TABLET ORAL EVERY 8 HOURS PRN
Qty: 90 TABLET | Refills: 0 | Status: SHIPPED | OUTPATIENT
Start: 2019-08-22 | End: 2019-10-16 | Stop reason: SDUPTHER

## 2019-08-30 RX ORDER — OMEPRAZOLE 20 MG/1
CAPSULE, DELAYED RELEASE ORAL
Qty: 90 CAPSULE | Refills: 0 | Status: SHIPPED | OUTPATIENT
Start: 2019-08-30 | End: 2019-11-22 | Stop reason: SDUPTHER

## 2019-09-19 RX ORDER — TRIAMTERENE AND HYDROCHLOROTHIAZIDE 37.5; 25 MG/1; MG/1
TABLET ORAL
Qty: 90 TABLET | Refills: 1 | Status: SHIPPED | OUTPATIENT
Start: 2019-09-19 | End: 2020-03-04

## 2019-09-19 RX ORDER — METOPROLOL TARTRATE 50 MG/1
TABLET, FILM COATED ORAL
Qty: 180 TABLET | Refills: 0 | Status: SHIPPED | OUTPATIENT
Start: 2019-09-19 | End: 2019-12-07 | Stop reason: SDUPTHER

## 2019-10-16 ENCOUNTER — TELEPHONE (OUTPATIENT)
Dept: FAMILY MEDICINE CLINIC | Age: 79
End: 2019-10-16

## 2019-10-16 DIAGNOSIS — M15.9 PRIMARY OSTEOARTHRITIS INVOLVING MULTIPLE JOINTS: ICD-10-CM

## 2019-10-16 DIAGNOSIS — G89.29 CHRONIC MIDLINE LOW BACK PAIN WITHOUT SCIATICA: ICD-10-CM

## 2019-10-16 DIAGNOSIS — M54.50 CHRONIC MIDLINE LOW BACK PAIN WITHOUT SCIATICA: ICD-10-CM

## 2019-10-16 RX ORDER — HYDROCODONE BITARTRATE AND ACETAMINOPHEN 7.5; 325 MG/1; MG/1
1 TABLET ORAL EVERY 8 HOURS PRN
Qty: 90 TABLET | Refills: 0 | Status: SHIPPED | OUTPATIENT
Start: 2019-10-16 | End: 2019-12-18 | Stop reason: SDUPTHER

## 2019-10-16 RX ORDER — CYCLOBENZAPRINE HCL 10 MG
10 TABLET ORAL 3 TIMES DAILY PRN
Qty: 90 TABLET | Refills: 5 | Status: SHIPPED | OUTPATIENT
Start: 2019-10-16 | End: 2022-04-11

## 2019-10-17 ENCOUNTER — OFFICE VISIT (OUTPATIENT)
Dept: FAMILY MEDICINE CLINIC | Age: 79
End: 2019-10-17
Payer: MEDICARE

## 2019-10-17 VITALS
HEIGHT: 67 IN | HEART RATE: 51 BPM | WEIGHT: 235 LBS | DIASTOLIC BLOOD PRESSURE: 70 MMHG | BODY MASS INDEX: 36.88 KG/M2 | SYSTOLIC BLOOD PRESSURE: 136 MMHG

## 2019-10-17 DIAGNOSIS — M54.50 CHRONIC MIDLINE LOW BACK PAIN WITHOUT SCIATICA: Primary | ICD-10-CM

## 2019-10-17 DIAGNOSIS — G89.29 CHRONIC MIDLINE LOW BACK PAIN WITHOUT SCIATICA: Primary | ICD-10-CM

## 2019-10-17 DIAGNOSIS — F41.9 ANXIETY: ICD-10-CM

## 2019-10-17 DIAGNOSIS — Z23 NEED FOR INFLUENZA VACCINATION: ICD-10-CM

## 2019-10-17 PROCEDURE — G8599 NO ASA/ANTIPLAT THER USE RNG: HCPCS | Performed by: FAMILY MEDICINE

## 2019-10-17 PROCEDURE — 90653 IIV ADJUVANT VACCINE IM: CPT | Performed by: FAMILY MEDICINE

## 2019-10-17 PROCEDURE — G8399 PT W/DXA RESULTS DOCUMENT: HCPCS | Performed by: FAMILY MEDICINE

## 2019-10-17 PROCEDURE — 1123F ACP DISCUSS/DSCN MKR DOCD: CPT | Performed by: FAMILY MEDICINE

## 2019-10-17 PROCEDURE — G8417 CALC BMI ABV UP PARAM F/U: HCPCS | Performed by: FAMILY MEDICINE

## 2019-10-17 PROCEDURE — G8427 DOCREV CUR MEDS BY ELIG CLIN: HCPCS | Performed by: FAMILY MEDICINE

## 2019-10-17 PROCEDURE — 1090F PRES/ABSN URINE INCON ASSESS: CPT | Performed by: FAMILY MEDICINE

## 2019-10-17 PROCEDURE — 99213 OFFICE O/P EST LOW 20 MIN: CPT | Performed by: FAMILY MEDICINE

## 2019-10-17 PROCEDURE — G8482 FLU IMMUNIZE ORDER/ADMIN: HCPCS | Performed by: FAMILY MEDICINE

## 2019-10-17 PROCEDURE — G0008 ADMIN INFLUENZA VIRUS VAC: HCPCS | Performed by: FAMILY MEDICINE

## 2019-10-17 PROCEDURE — 1036F TOBACCO NON-USER: CPT | Performed by: FAMILY MEDICINE

## 2019-10-17 PROCEDURE — 4040F PNEUMOC VAC/ADMIN/RCVD: CPT | Performed by: FAMILY MEDICINE

## 2019-10-17 ASSESSMENT — ENCOUNTER SYMPTOMS: BACK PAIN: 1

## 2019-12-05 ENCOUNTER — OFFICE VISIT (OUTPATIENT)
Dept: ORTHOPEDIC SURGERY | Age: 79
End: 2019-12-05
Payer: MEDICARE

## 2019-12-05 VITALS — HEIGHT: 67 IN | WEIGHT: 234 LBS | BODY MASS INDEX: 36.73 KG/M2 | RESPIRATION RATE: 16 BRPM

## 2019-12-05 DIAGNOSIS — M17.11 PRIMARY LOCALIZED OSTEOARTHROSIS OF RIGHT LOWER LEG: Primary | ICD-10-CM

## 2019-12-05 PROCEDURE — G8417 CALC BMI ABV UP PARAM F/U: HCPCS | Performed by: ORTHOPAEDIC SURGERY

## 2019-12-05 PROCEDURE — 4040F PNEUMOC VAC/ADMIN/RCVD: CPT | Performed by: ORTHOPAEDIC SURGERY

## 2019-12-05 PROCEDURE — G8482 FLU IMMUNIZE ORDER/ADMIN: HCPCS | Performed by: ORTHOPAEDIC SURGERY

## 2019-12-05 PROCEDURE — G8427 DOCREV CUR MEDS BY ELIG CLIN: HCPCS | Performed by: ORTHOPAEDIC SURGERY

## 2019-12-05 PROCEDURE — 20610 DRAIN/INJ JOINT/BURSA W/O US: CPT | Performed by: ORTHOPAEDIC SURGERY

## 2019-12-05 PROCEDURE — 1090F PRES/ABSN URINE INCON ASSESS: CPT | Performed by: ORTHOPAEDIC SURGERY

## 2019-12-05 PROCEDURE — G8599 NO ASA/ANTIPLAT THER USE RNG: HCPCS | Performed by: ORTHOPAEDIC SURGERY

## 2019-12-05 PROCEDURE — 99203 OFFICE O/P NEW LOW 30 MIN: CPT | Performed by: ORTHOPAEDIC SURGERY

## 2019-12-05 PROCEDURE — 1123F ACP DISCUSS/DSCN MKR DOCD: CPT | Performed by: ORTHOPAEDIC SURGERY

## 2019-12-05 PROCEDURE — 1036F TOBACCO NON-USER: CPT | Performed by: ORTHOPAEDIC SURGERY

## 2019-12-05 PROCEDURE — G8399 PT W/DXA RESULTS DOCUMENT: HCPCS | Performed by: ORTHOPAEDIC SURGERY

## 2019-12-05 RX ORDER — METHYLPREDNISOLONE ACETATE 40 MG/ML
80 INJECTION, SUSPENSION INTRA-ARTICULAR; INTRALESIONAL; INTRAMUSCULAR; SOFT TISSUE ONCE
Status: COMPLETED | OUTPATIENT
Start: 2019-12-05 | End: 2019-12-05

## 2019-12-05 RX ADMIN — METHYLPREDNISOLONE ACETATE 80 MG: 40 INJECTION, SUSPENSION INTRA-ARTICULAR; INTRALESIONAL; INTRAMUSCULAR; SOFT TISSUE at 09:31

## 2019-12-18 ENCOUNTER — TELEPHONE (OUTPATIENT)
Dept: FAMILY MEDICINE CLINIC | Age: 79
End: 2019-12-18

## 2019-12-18 DIAGNOSIS — M54.50 CHRONIC MIDLINE LOW BACK PAIN WITHOUT SCIATICA: ICD-10-CM

## 2019-12-18 DIAGNOSIS — G89.29 CHRONIC MIDLINE LOW BACK PAIN WITHOUT SCIATICA: ICD-10-CM

## 2019-12-18 DIAGNOSIS — M15.9 PRIMARY OSTEOARTHRITIS INVOLVING MULTIPLE JOINTS: ICD-10-CM

## 2019-12-18 RX ORDER — HYDROCODONE BITARTRATE AND ACETAMINOPHEN 7.5; 325 MG/1; MG/1
1 TABLET ORAL EVERY 8 HOURS PRN
Qty: 90 TABLET | Refills: 0 | Status: CANCELLED | OUTPATIENT
Start: 2019-12-18 | End: 2020-01-17

## 2019-12-18 RX ORDER — HYDROCODONE BITARTRATE AND ACETAMINOPHEN 7.5; 325 MG/1; MG/1
1 TABLET ORAL EVERY 8 HOURS PRN
Qty: 90 TABLET | Refills: 0 | Status: SHIPPED | OUTPATIENT
Start: 2019-12-18 | End: 2020-01-28 | Stop reason: SDUPTHER

## 2020-01-27 ASSESSMENT — ENCOUNTER SYMPTOMS: SHORTNESS OF BREATH: 0

## 2020-01-27 NOTE — PROGRESS NOTES
Subjective:      Patient ID: Katelynn Booth is a [de-identified] y.o. female. Hypertension   This is a chronic problem. The current episode started more than 1 year ago. The problem is unchanged. The problem is controlled. Associated symptoms include palpitations. Pertinent negatives include no chest pain, peripheral edema or shortness of breath. Risk factors for coronary artery disease include family history, obesity, post-menopausal state and sedentary lifestyle. Past treatments include beta blockers, calcium channel blockers and diuretics. The current treatment provides significant improvement. There are no compliance problems. Chronic Kidney Disease Stage 3:  Patient takes Maxzide-25 once daily. Her renal function is checked regularly and has been stable. GERD:  Patient is tolerating and compliant with Omeprazole 20 mg daily. She feels that the medication completely controls her symptoms. She previously did not do well with Zantac. Chronic Low Back Pain:  Patient is tolerating and compliant with Mobic 15 mg daily, Flexeril 10 mg TID prn and Norco 7.5-325 every 8 hrs as needed.  She feels that the medications keep her functional.        Anxiety:  Patient takes Ativan 1 mg every 8 hrs as needed for anxiety.  She generally takes it once daily and feels that it works well to control her allergy symptoms. PSVT:  Patient sees Dr. Jolie Penaloza and was taken off the [facility] on 4-26-18 and had an event monitor ordered by him. She had no events during the 30 day period. She continues to take Lopressor and Calan. Obesity:  Patient weighed 237 at her visit on 7-15-19. She has lost 5 lbs since then. Review of Systems   Constitutional: Negative for chills and fever. HENT: Negative for ear discharge, ear pain and hearing loss. Respiratory: Negative for shortness of breath. Cardiovascular: Positive for palpitations. Negative for chest pain and leg swelling.        /82 (Site: Left Upper Arm)

## 2020-01-28 ENCOUNTER — OFFICE VISIT (OUTPATIENT)
Dept: FAMILY MEDICINE CLINIC | Age: 80
End: 2020-01-28
Payer: MEDICARE

## 2020-01-28 VITALS
BODY MASS INDEX: 36.41 KG/M2 | SYSTOLIC BLOOD PRESSURE: 134 MMHG | HEIGHT: 67 IN | DIASTOLIC BLOOD PRESSURE: 82 MMHG | WEIGHT: 232 LBS

## 2020-01-28 LAB
ANION GAP SERPL CALCULATED.3IONS-SCNC: 16 MMOL/L (ref 3–16)
BUN BLDV-MCNC: 22 MG/DL (ref 7–20)
CALCIUM SERPL-MCNC: 9.5 MG/DL (ref 8.3–10.6)
CHLORIDE BLD-SCNC: 100 MMOL/L (ref 99–110)
CHOLESTEROL, TOTAL: 223 MG/DL (ref 0–199)
CO2: 26 MMOL/L (ref 21–32)
CREAT SERPL-MCNC: 1.4 MG/DL (ref 0.6–1.2)
GFR AFRICAN AMERICAN: 44
GFR NON-AFRICAN AMERICAN: 36
GLUCOSE BLD-MCNC: 81 MG/DL (ref 70–99)
HDLC SERPL-MCNC: 71 MG/DL (ref 40–60)
LDL CHOLESTEROL CALCULATED: 127 MG/DL
POTASSIUM SERPL-SCNC: 4.2 MMOL/L (ref 3.5–5.1)
SODIUM BLD-SCNC: 142 MMOL/L (ref 136–145)
TRIGL SERPL-MCNC: 124 MG/DL (ref 0–150)
VLDLC SERPL CALC-MCNC: 25 MG/DL

## 2020-01-28 PROCEDURE — 1123F ACP DISCUSS/DSCN MKR DOCD: CPT | Performed by: FAMILY MEDICINE

## 2020-01-28 PROCEDURE — 99214 OFFICE O/P EST MOD 30 MIN: CPT | Performed by: FAMILY MEDICINE

## 2020-01-28 PROCEDURE — 1090F PRES/ABSN URINE INCON ASSESS: CPT | Performed by: FAMILY MEDICINE

## 2020-01-28 PROCEDURE — 4040F PNEUMOC VAC/ADMIN/RCVD: CPT | Performed by: FAMILY MEDICINE

## 2020-01-28 PROCEDURE — 1036F TOBACCO NON-USER: CPT | Performed by: FAMILY MEDICINE

## 2020-01-28 PROCEDURE — 36415 COLL VENOUS BLD VENIPUNCTURE: CPT | Performed by: FAMILY MEDICINE

## 2020-01-28 PROCEDURE — G8417 CALC BMI ABV UP PARAM F/U: HCPCS | Performed by: FAMILY MEDICINE

## 2020-01-28 PROCEDURE — G8427 DOCREV CUR MEDS BY ELIG CLIN: HCPCS | Performed by: FAMILY MEDICINE

## 2020-01-28 PROCEDURE — G8482 FLU IMMUNIZE ORDER/ADMIN: HCPCS | Performed by: FAMILY MEDICINE

## 2020-01-28 PROCEDURE — G8399 PT W/DXA RESULTS DOCUMENT: HCPCS | Performed by: FAMILY MEDICINE

## 2020-01-28 RX ORDER — HYDROCODONE BITARTRATE AND ACETAMINOPHEN 7.5; 325 MG/1; MG/1
1 TABLET ORAL EVERY 8 HOURS PRN
Qty: 90 TABLET | Refills: 0 | Status: SHIPPED | OUTPATIENT
Start: 2020-01-28 | End: 2020-03-16 | Stop reason: SDUPTHER

## 2020-02-10 ENCOUNTER — HOSPITAL ENCOUNTER (OUTPATIENT)
Dept: WOMENS IMAGING | Age: 80
Discharge: HOME | End: 2020-02-10
Payer: MEDICARE

## 2020-02-10 PROCEDURE — 77063 BREAST TOMOSYNTHESIS BI: CPT

## 2020-02-21 RX ORDER — OMEPRAZOLE 20 MG/1
CAPSULE, DELAYED RELEASE ORAL
Qty: 90 CAPSULE | Refills: 0 | Status: SHIPPED | OUTPATIENT
Start: 2020-02-21 | End: 2020-05-18

## 2020-03-02 ENCOUNTER — OFFICE VISIT (OUTPATIENT)
Dept: FAMILY MEDICINE CLINIC | Age: 80
End: 2020-03-02
Payer: MEDICARE

## 2020-03-02 VITALS
SYSTOLIC BLOOD PRESSURE: 108 MMHG | OXYGEN SATURATION: 98 % | RESPIRATION RATE: 16 BRPM | TEMPERATURE: 98.1 F | WEIGHT: 230.4 LBS | HEIGHT: 65 IN | BODY MASS INDEX: 38.39 KG/M2 | HEART RATE: 57 BPM | DIASTOLIC BLOOD PRESSURE: 52 MMHG

## 2020-03-02 PROCEDURE — 1123F ACP DISCUSS/DSCN MKR DOCD: CPT | Performed by: FAMILY MEDICINE

## 2020-03-02 PROCEDURE — 4040F PNEUMOC VAC/ADMIN/RCVD: CPT | Performed by: FAMILY MEDICINE

## 2020-03-02 PROCEDURE — G0438 PPPS, INITIAL VISIT: HCPCS | Performed by: FAMILY MEDICINE

## 2020-03-02 PROCEDURE — G8482 FLU IMMUNIZE ORDER/ADMIN: HCPCS | Performed by: FAMILY MEDICINE

## 2020-03-02 ASSESSMENT — LIFESTYLE VARIABLES
AUDIT-C TOTAL SCORE: 1
HOW MANY STANDARD DRINKS CONTAINING ALCOHOL DO YOU HAVE ON A TYPICAL DAY: 0
AUDIT TOTAL SCORE: 1
HOW OFTEN DURING THE LAST YEAR HAVE YOU FOUND THAT YOU WERE NOT ABLE TO STOP DRINKING ONCE YOU HAD STARTED: 0
HOW OFTEN DURING THE LAST YEAR HAVE YOU FAILED TO DO WHAT WAS NORMALLY EXPECTED FROM YOU BECAUSE OF DRINKING: 0
HOW OFTEN DO YOU HAVE SIX OR MORE DRINKS ON ONE OCCASION: 0
HAVE YOU OR SOMEONE ELSE BEEN INJURED AS A RESULT OF YOUR DRINKING: 0
HAS A RELATIVE, FRIEND, DOCTOR, OR ANOTHER HEALTH PROFESSIONAL EXPRESSED CONCERN ABOUT YOUR DRINKING OR SUGGESTED YOU CUT DOWN: 0
HOW OFTEN DURING THE LAST YEAR HAVE YOU HAD A FEELING OF GUILT OR REMORSE AFTER DRINKING: 0
HOW OFTEN DURING THE LAST YEAR HAVE YOU BEEN UNABLE TO REMEMBER WHAT HAPPENED THE NIGHT BEFORE BECAUSE YOU HAD BEEN DRINKING: 0
HOW OFTEN DURING THE LAST YEAR HAVE YOU NEEDED AN ALCOHOLIC DRINK FIRST THING IN THE MORNING TO GET YOURSELF GOING AFTER A NIGHT OF HEAVY DRINKING: 0
HOW OFTEN DO YOU HAVE A DRINK CONTAINING ALCOHOL: 1

## 2020-03-02 NOTE — PROGRESS NOTES
Medicare Annual Wellness Visit  Name: Maye Hinds Date: 3/2/2020   MRN: [id number] Sex: Female   Age: [de-identified] y.o. Ethnicity: Non-/Non    : 1940 Race: Black      Jo Andrews is here for Medicare AWV    Screenings for behavioral, psychosocial and functional/safety risks, and cognitive dysfunction are all negative except as indicated below. These results, as well as other patient data from the [address] form, are documented in Flowsheets linked to this Encounter. Allergies   Allergen Reactions    Latex Itching and Rash    Bactrim Rash       Prior to Visit Medications    Medication Sig Taking?  Authorizing Provider   omeprazole (PRILOSEC) 20 MG delayed release capsule TAKE 1 CAPSULE BY MOUTH ONCE DAILY Yes Rebekah Benitez, DO   meloxicam (MOBIC) 15 MG tablet TAKE 1 TABLET EVERY DAY Yes Konstantin Motta DO   verapamil (CALAN SR) 120 MG extended release tablet TAKE 1 TABLET EVERY DAY Yes Konstantin Motta DO   metoprolol tartrate (LOPRESSOR) 50 MG tablet TAKE 1 TABLET TWICE DAILY Yes Konstantin Motta DO   cyclobenzaprine (FLEXERIL) 10 MG tablet Take 1 tablet by mouth 3 times daily as needed for Muscle spasms Yes Flory Motta, DO   triamterene-hydrochlorothiazide (MAXZIDE-25) 37.5-25 MG per tablet TAKE 1 TABLET EVERY DAY Yes Kathryn Hernandez, DO   fluticasone (FLONASE) 50 MCG/ACT nasal spray 2 sprays by Nasal route daily Yes Konstantin Motta DO   magnesium 200 MG TABS tablet Take 200 mg by mouth daily Yes Historical Provider, MD   B Complex Vitamins (VITAMIN B COMPLEX PO) Take by mouth Yes Historical Provider, MD   Turmeric 500 MG CAPS Take 1,000 mg by mouth daily Yes Historical Provider, MD BRIGGS 0.01 % SOLN ophthalmic drops Place 1 drop into both eyes  Yes Historical Provider, MD   docusate sodium (COLACE) 100 MG capsule Take 1 capsule by mouth 2 times daily Yes Oscar Stewart MD   Zinc 50 MG TABS Take 50 mg by mouth daily  Historical Recommendations for Preventive Services Due: see orders and patient instructions/AVS.     She is now up to date on her Health Maintenance except for T DAP and Shingrix # 2. She will check with [facility] to see if insurance covers and if they have it available. Recommended screening schedule for the next 5-10 years is provided to the patient in written form: see Patient Instructions/AVS.    Dina LINDSEY RN, 3/2/2020, performed the documented evaluation under the direct supervision of the attending physician. This encounter was performed under Victoria murillo DOs, direct supervision, 3/2/2020.

## 2020-03-02 NOTE — PATIENT INSTRUCTIONS
Personalized Preventive Plan for Harris Traore - 3/2/2020  Medicare offers a range of preventive health benefits. Some of the tests and screenings are paid in full while other may be subject to a deductible, co-insurance, and/or copay. Some of these benefits include a comprehensive review of your medical history including lifestyle, illnesses that may run in your family, and various assessments and screenings as appropriate. After reviewing your medical record and screening and assessments performed today your provider may have ordered immunizations, labs, imaging, and/or referrals for you. A list of these orders (if applicable) as well as your Preventive Care list are included within your After Visit Summary for your review. Other Preventive Recommendations:    · A preventive eye exam performed by an eye specialist is recommended every 1-2 years to screen for glaucoma; cataracts, macular degeneration, and other eye disorders. · A preventive dental visit is recommended every 6 months. · Try to get at least 150 minutes of exercise per week or 10,000 steps per day on a pedometer . · Order or download the FREE \"Exercise & Physical Activity: Your Everyday Guide\" from [employer]. Call 5-411.297.5573 or search [employer] online. · You need 1283-1220 mg of calcium and 9500-6467 IU of vitamin D per day. It is possible to meet your calcium requirement with diet alone, but a vitamin D supplement is usually necessary to meet this goal.  · When exposed to the sun, use a sunscreen that protects against both UVA and UVB radiation with an SPF of 30 or greater. Reapply every 2 to 3 hours or after sweating, drying off with a towel, or swimming. · Always wear a seat belt when traveling in a car. Always wear a helmet when riding a bicycle or motorcycle. Learning About Living Saritha Thomas  What is a living will?     A living will is a legal form you use to write down the kind of care you want at the end of your life. It is used by the health professionals who will treat you if you aren't able to decide for yourself. If you put your wishes in writing, your loved ones and others will know what kind of care you want. They won't need to guess. This can ease your mind and be helpful to others. A living will is not the same as an estate or property will. An estate will explains what you want to happen with your money and property after you die. Is a living will a legal document? A living will is a legal document. Each state has its own laws about living walters. If you move to another state, make sure that your living will is legal in the state where you now live. Or you might use a universal form that has been approved by many states. This kind of form can sometimes be completed and stored online. Your electronic copy will then be available wherever you have a connection to the Internet. In most cases, doctors will respect your wishes even if you have a form from a different state. You don't need an  to complete a living will. But legal advice can be helpful if your state's laws are unclear, your health history is complicated, or your family can't agree on what should be in your living will. You can change your living will at any time. Some people find that their wishes about end-of-life care change as their health changes. In addition to making a living will, think about completing a medical power of  form. This form lets you name the person you want to make end-of-life treatment decisions for you (your \"health care agent\") if you're not able to. Many hospitals and nursing homes will give you the forms you need to complete a living will and a medical power of . Your living will is used only if you can't make or communicate decisions for yourself anymore.  If you become able to make decisions again, you can accept or refuse any treatment, no matter what you wrote in your living will. Your state may offer an online registry. This is a place where you can store your living will online so the doctors and nurses who need to treat you can find it right away. What should you think about when creating a living will? Talk about your end-of-life wishes with your family members and your doctor. Let them know what you want. That way the people making decisions for you won't be surprised by your choices. Think about these questions as you make your living will:  Do you know enough about life support methods that might be used? If not, talk to your doctor so you know what might be done if you can't breathe on your own, your heart stops, or you're unable to swallow. What things would you still want to be able to do after you receive life-support methods? Would you want to be able to walk? To speak? To eat on your own? To live without the help of machines? If you have a choice, where do you want to be cared for? In your home? At a hospital or nursing home? Do you want certain Cheondoism practices performed if you become very ill? If you have a choice at the end of your life, where would you prefer to die? At home? In a hospital or nursing home? Somewhere else? Would you prefer to be buried or cremated? Do you want your organs to be donated after you die? What should you do with your living will? Make sure that your family members and your health care agent have copies of your living will. Give your doctor a copy of your living will to keep in your medical record. If you have more than one doctor, make sure that each one has a copy. You may want to put a copy of your living will where it can be easily found. Where can you learn more? Go to [email or web address] and sign in to your [employer] account. Enter P075 in the [employer] box to learn more about \"Learning About Living [place]. \"     If you do not have an account, please click on the \"Sign Up Now\" link. Current as of: April 1, 2019  Content Version: 12.3  © 5713-0321 Healthwise, Incorporated. Care instructions adapted under license by [facility] ([facility]). If you have questions about a medical condition or this instruction, always ask your healthcare professional. [address] any warranty or liability for your use of this information.     ·

## 2020-03-09 ENCOUNTER — ANESTHESIA EVENT (OUTPATIENT)
Dept: ENDOSCOPY | Age: 80
End: 2020-03-09
Payer: MEDICARE

## 2020-03-10 ENCOUNTER — HOSPITAL ENCOUNTER (OUTPATIENT)
Age: 80
Setting detail: OUTPATIENT SURGERY
Discharge: HOME | End: 2020-03-10
Attending: INTERNAL MEDICINE | Admitting: INTERNAL MEDICINE
Payer: MEDICARE

## 2020-03-10 ENCOUNTER — ANESTHESIA (OUTPATIENT)
Dept: ENDOSCOPY | Age: 80
End: 2020-03-10
Payer: MEDICARE

## 2020-03-10 VITALS — DIASTOLIC BLOOD PRESSURE: 65 MMHG | OXYGEN SATURATION: 99 % | SYSTOLIC BLOOD PRESSURE: 127 MMHG

## 2020-03-10 VITALS
TEMPERATURE: 97.1 F | BODY MASS INDEX: 39.27 KG/M2 | WEIGHT: 230 LBS | OXYGEN SATURATION: 100 % | DIASTOLIC BLOOD PRESSURE: 56 MMHG | HEART RATE: 54 BPM | RESPIRATION RATE: 18 BRPM | HEIGHT: 64 IN | SYSTOLIC BLOOD PRESSURE: 121 MMHG

## 2020-03-10 PROCEDURE — 2580000003 HC RX 258: Performed by: NURSE ANESTHETIST, CERTIFIED REGISTERED

## 2020-03-10 PROCEDURE — 7100000010 HC PHASE II RECOVERY - FIRST 15 MIN: Performed by: INTERNAL MEDICINE

## 2020-03-10 PROCEDURE — 3609017100 HC EGD: Performed by: INTERNAL MEDICINE

## 2020-03-10 PROCEDURE — 6360000002 HC RX W HCPCS: Performed by: NURSE ANESTHETIST, CERTIFIED REGISTERED

## 2020-03-10 PROCEDURE — 7100000011 HC PHASE II RECOVERY - ADDTL 15 MIN: Performed by: INTERNAL MEDICINE

## 2020-03-10 PROCEDURE — 3700000000 HC ANESTHESIA ATTENDED CARE: Performed by: INTERNAL MEDICINE

## 2020-03-10 RX ORDER — SODIUM CHLORIDE 9 MG/ML
INJECTION, SOLUTION INTRAVENOUS CONTINUOUS
Status: DISCONTINUED | OUTPATIENT
Start: 2020-03-10 | End: 2020-03-10 | Stop reason: HOSPADM

## 2020-03-10 RX ORDER — SODIUM CHLORIDE 0.9 % (FLUSH) 0.9 %
10 SYRINGE (ML) INJECTION PRN
Status: DISCONTINUED | OUTPATIENT
Start: 2020-03-10 | End: 2020-03-10 | Stop reason: HOSPADM

## 2020-03-10 RX ORDER — SODIUM CHLORIDE 9 MG/ML
INJECTION, SOLUTION INTRAVENOUS CONTINUOUS PRN
Status: DISCONTINUED | OUTPATIENT
Start: 2020-03-10 | End: 2020-03-10 | Stop reason: SDUPTHER

## 2020-03-10 RX ORDER — PROPOFOL 10 MG/ML
INJECTION, EMULSION INTRAVENOUS PRN
Status: DISCONTINUED | OUTPATIENT
Start: 2020-03-10 | End: 2020-03-10 | Stop reason: SDUPTHER

## 2020-03-10 RX ORDER — ONDANSETRON 2 MG/ML
4 INJECTION INTRAMUSCULAR; INTRAVENOUS
Status: DISCONTINUED | OUTPATIENT
Start: 2020-03-10 | End: 2020-03-10 | Stop reason: HOSPADM

## 2020-03-10 RX ORDER — SODIUM CHLORIDE 0.9 % (FLUSH) 0.9 %
10 SYRINGE (ML) INJECTION EVERY 12 HOURS SCHEDULED
Status: DISCONTINUED | OUTPATIENT
Start: 2020-03-10 | End: 2020-03-10 | Stop reason: HOSPADM

## 2020-03-10 RX ADMIN — PROPOFOL 150 MG: 10 INJECTION, EMULSION INTRAVENOUS at 10:11

## 2020-03-10 RX ADMIN — SODIUM CHLORIDE: 9 INJECTION, SOLUTION INTRAVENOUS at 09:50

## 2020-03-10 ASSESSMENT — PAIN SCALES - GENERAL
PAINLEVEL_OUTOF10: 0

## 2020-03-10 ASSESSMENT — PAIN - FUNCTIONAL ASSESSMENT: PAIN_FUNCTIONAL_ASSESSMENT: 0-10

## 2020-03-10 ASSESSMENT — ENCOUNTER SYMPTOMS: SHORTNESS OF BREATH: 0

## 2020-03-10 NOTE — PROCEDURES
[address]                                 PROCEDURE NOTE    PATIENT NAME: Jaspal Grajeda                     :        1940  MED REC NO:   [id number]                          ROOM:  ACCOUNT NO:   [de-identified]                           ADMIT DATE: 03/10/2020  PROVIDER:     Vane Dawson MD      EGD    DATE OF PROCEDURE:  03/10/2020    REFERRING PROVIDER:  Eldora Paget, DO    INSTRUMENT USED:  Olympus GIF-Q180. ANESTHESIA:  The patient was premedicated with Diprivan intravenously as  administered by the anesthesiology service. INDICATIONS:  The patient has a history of chronic GERD with her  symptoms typically well controlled with 20 mg of omeprazole daily. She  went through a recent period of epigastric and retrosternal burning that  did respond to a liquid antacid. This disappeared and she has felt well  since. She does take meloxicam on a daily basis. It was decided to  proceed with an EGD to rule out peptic ulcer disease. PROCEDURE:  The endoscope was inserted into the esophagus without  difficulty. The esophageal mucosa was entirely normal, revealing no  evidence of inflammatory or metaplastic change. The Z-line was located  at 38 cm. In the stomach, there were small scattered fundic gland  polyps. The duodenal bulb and descending duodenum were normal.    ESTIMATED BLOOD LOSS:  None. IMPRESSION:  Incidental gastric fundic gland polyps only. PLAN:  The patient will continue daily omeprazole. If she develops  recurrent symptoms, the dose can be increased to 40 mg daily. The  patient would now like to schedule a surveillance colonoscopy. Her last  examination was in  and polyps were removed.         Alexis Torres MD    D: 03/10/2020 10:44:28       T: 03/10/2020 12:09:57     [id number]  Job#: [id number]     Doc#: [id number]    CC:  Vane Dawson MD

## 2020-03-10 NOTE — H&P
[facility]   Pre-operative History and Physical    Patient: Young Arredondo  : 1940  Acct#:         HISTORY OF PRESENT ILLNESS:    The patient is a [de-identified] y.o. female  who presents with epigastric, chest pain  Past Medical History:        Diagnosis Date    Anesthesia complication     difficulty waking up after breast biopsy    Anxiety     Chronic kidney disease, stage 3 (HCC)     Chronic midline low back pain without sciatica     Coronary artery disease involving native coronary artery without angina pectoris     Essential hypertension     Fatigue     Foot drop, left     GERD without esophagitis     Hot flashes     Internal hemorrhoids     Intrinsic eczema     Lesion of lateral popliteal nerve     Non morbid obesity due to excess calories     Paroxysmal supraventricular tachycardia (HCC)     Positive PPD     Postmenopausal status     Primary insomnia     Primary osteoarthritis involving multiple joints     PUD (peptic ulcer disease)     Restless leg syndrome     Seborrheic keratosis      Past Surgical History:        Procedure Laterality Date    ABDOMINAL EXPLORATION SURGERY      Lysis of Adhesions    BREAST SURGERY Right     Biopsy    CHOLECYSTECTOMY      COLONOSCOPY      CYST REMOVAL      from back    HEMORRHOID SURGERY  2017    HYSTERECTOMY      Complete    JOINT REPLACEMENT      LUMBAR LAMINECTOMY      x 3 with fusion    SHOULDER ARTHROSCOPY Right     Right shoulder scope 2) Right shoulder labral debridement 3) Right shoulder Open Miguel 4) Right shoulder rotator cuff repair    TOTAL HIP ARTHROPLASTY Bilateral     TOTAL KNEE ARTHROPLASTY Left 2-3-15    Dr. Radha Nelson EXTRACTION       Medications prior to admission:   Prior to Admission medications    Medication Sig Start Date End Date Taking?  Authorizing Provider   triamterene-hydrochlorothiazide (MAXZIDE-25) 37.5-25 MG per tablet TAKE 1 TABLET EVERY DAY 3/4/20  Yes Ysabel Deleon, DO metoprolol tartrate (LOPRESSOR) 50 MG tablet TAKE 1 TABLET TWICE DAILY 3/4/20  Yes Mj Motta, DO   verapamil (CALAN SR) 120 MG extended release tablet TAKE 1 TABLET EVERY DAY 3/4/20  Yes Costella Dress Kalia, DO   omeprazole (PRILOSEC) 20 MG delayed release capsule TAKE 1 CAPSULE BY MOUTH ONCE DAILY 20  Yes Rebekah Benitez, DO   meloxicam (MOBIC) 15 MG tablet TAKE 1 TABLET EVERY DAY 19  Yes Dmitriy Kaminski, DO   cyclobenzaprine (FLEXERIL) 10 MG tablet Take 1 tablet by mouth 3 times daily as needed for Muscle spasms 10/16/19  Yes Dmitriy Kaminski, DO   fluticasone (FLONASE) 50 MCG/ACT nasal spray 2 sprays by Nasal route daily 19  Yes Konstantin Motta, DO   magnesium 200 MG TABS tablet Take 200 mg by mouth daily   Yes Historical Provider, MD   B Complex Vitamins (VITAMIN B COMPLEX PO) Take by mouth   Yes Historical Provider, MD BRIGGS 0.01 % SOLN ophthalmic drops Place 1 drop into both eyes  3/30/17  Yes Historical Provider, MD   docusate sodium (COLACE) 100 MG capsule Take 1 capsule by mouth 2 times daily 17  Yes Martín Lima MD     Allergies:    Latex and Bactrim  Social History:   Social History     Socioeconomic History    Marital status:      Spouse name: Not on file    Number of children: 2    Years of education: Not on file    Highest education level: Not on file   Occupational History    Occupation: Retired    Social Needs    Financial resource strain: Not on file    Food insecurity     Worry: Not on file     Inability: Not on file   Mobile [employer] needs     Medical: Not on file     Non-medical: Not on file   Tobacco Use    Smoking status: Former Smoker     Packs/day: 1.00     Last attempt to quit: 1997     Years since quittin.2    Smokeless tobacco: Never Used   Substance and Sexual Activity    Alcohol use:  Yes     Alcohol/week: 2.0 standard drinks     Types: 1 Glasses of wine, 1 Cans of beer per week Comment: Occasional    Drug use: No    Sexual activity: Yes   Lifestyle    Physical activity     Days per week: Not on file     Minutes per session: Not on file    Stress: Not on file   Relationships    Social connections     Talks on phone: Not on file     Gets together: Not on file     Attends Caodaism service: Not on file     Active member of club or organization: Not on file     Attends meetings of clubs or organizations: Not on file     Relationship status: Not on file    Intimate partner violence     Fear of current or ex partner: Not on file     Emotionally abused: Not on file     Physically abused: Not on file     Forced sexual activity: Not on file   Other Topics Concern    Not on file   Social History Narrative    Not on file           Family History:   Family History   Problem Relation Age of Onset    High Blood Pressure Mother     Stroke Father     High Blood Pressure Father     Diabetes Maternal Aunt     Cancer Maternal Uncle         Throat    Alcohol Abuse Maternal Uncle     Asthma Maternal Uncle     Depression Daughter     Cancer Son         Colorectal     Depression Son          PHYSICAL EXAM:      /74   Pulse 58   Temp 96.7 °F (35.9 °C) (Temporal)   Resp 18   Ht 5' 4\" (1.626 m)   Wt 230 lb (104.3 kg)   SpO2 100%   BMI 39.48 kg/m²  I        Heart: Normal    Lungs: Normal    Abdomen: Normal      ASA Grade: ASA 3 - Patient with moderate systemic disease with functional limitations    II (soft palate, uvula, fauces visible)  ASSESSMENT AND PLAN:    1. Patient is a [de-identified] y.o. female here for EGD  2. Procedure options, risks and benefits reviewed with patient who expresses understanding.

## 2020-03-10 NOTE — ANESTHESIA PRE PROCEDURE
Department of Anesthesiology  Preprocedure Note       Name:  Sabrina Yoo   Age:  [de-identified] y.o.  :  1940                                          MRN:  [id number]         Date:  3/10/2020      Surgeon: Shae Eddy):  Anthony Mandujano MD    Procedure: EGD ESOPHAGOGASTRODUODENOSCOPY (N/A )    Medications prior to admission:   Prior to Admission medications    Medication Sig Start Date End Date Taking?  Authorizing Provider   triamterene-hydrochlorothiazide (MAXZIDE-25) 37.5-25 MG per tablet TAKE 1 TABLET EVERY DAY 3/4/20  Yes Dave Motta DO   metoprolol tartrate (LOPRESSOR) 50 MG tablet TAKE 1 TABLET TWICE DAILY 3/4/20  Yes Dave Motta DO   verapamil (CALAN SR) 120 MG extended release tablet TAKE 1 TABLET EVERY DAY 3/4/20  Yes Dave Motta DO   omeprazole (PRILOSEC) 20 MG delayed release capsule TAKE 1 CAPSULE BY MOUTH ONCE DAILY 20  Yes Rebekah Benitez,    meloxicam (MOBIC) 15 MG tablet TAKE 1 TABLET EVERY DAY 19  Yes Zoraida Yanez DO   cyclobenzaprine (FLEXERIL) 10 MG tablet Take 1 tablet by mouth 3 times daily as needed for Muscle spasms 10/16/19  Yes Zoraida Yanez DO   fluticasone (FLONASE) 50 MCG/ACT nasal spray 2 sprays by Nasal route daily 19  Yes Konstantin Motta,    magnesium 200 MG TABS tablet Take 200 mg by mouth daily   Yes Historical Provider, MD   B Complex Vitamins (VITAMIN B COMPLEX PO) Take by mouth   Yes Historical Provider, MD BRIGGS 0.01 % SOLN ophthalmic drops Place 1 drop into both eyes  3/30/17  Yes Historical Provider, MD   docusate sodium (COLACE) 100 MG capsule Take 1 capsule by mouth 2 times daily 17  Yes Luis Norton MD       Current medications:    Current Facility-Administered Medications   Medication Dose Route Frequency Provider Last Rate Last Dose    sodium chloride flush 0.9 % injection 10 mL  10 mL Intravenous 2 times per day Patricia An MD        sodium chloride flush 0.9 % injection 10 mL Fatigue     Foot drop, left     GERD without esophagitis     Hot flashes     Internal hemorrhoids     Intrinsic eczema     Lesion of lateral popliteal nerve     Non morbid obesity due to excess calories     Paroxysmal supraventricular tachycardia (HCC)     Positive PPD     Postmenopausal status     Primary insomnia     Primary osteoarthritis involving multiple joints     PUD (peptic ulcer disease)     Restless leg syndrome     Seborrheic keratosis        Past Surgical History:        Procedure Laterality Date    ABDOMINAL EXPLORATION SURGERY      Lysis of Adhesions    BREAST SURGERY Right     Biopsy    CHOLECYSTECTOMY      COLONOSCOPY      CYST REMOVAL      from back    HEMORRHOID SURGERY  2017    HYSTERECTOMY      Complete    JOINT REPLACEMENT      LUMBAR LAMINECTOMY      x 3 with fusion    SHOULDER ARTHROSCOPY Right     Right shoulder scope 2) Right shoulder labral debridement 3) Right shoulder Open Miguel 4) Right shoulder rotator cuff repair    TOTAL HIP ARTHROPLASTY Bilateral     TOTAL KNEE ARTHROPLASTY Left 2-3-15    Dr. Foster Shed EXTRACTION         Social History:    Social History     Tobacco Use    Smoking status: Former Smoker     Packs/day: 1.00     Last attempt to quit: 1997     Years since quittin.2    Smokeless tobacco: Never Used   Substance Use Topics    Alcohol use:  Yes     Alcohol/week: 2.0 standard drinks     Types: 1 Glasses of wine, 1 Cans of beer per week     Comment: Occasional                                Counseling given: Not Answered      Vital Signs (Current):   Vitals:    20 0926 03/10/20 1003   BP:  129/74   Pulse:  58   Resp:  18   Temp:  96.7 °F (35.9 °C)   TempSrc:  Temporal   SpO2:  100%   Weight: 230 lb (104.3 kg) 230 lb (104.3 kg)   Height: 5' 4.5\" (1.638 m) 5' 4\" (1.626 m)                                              BP Readings from Last 3 Encounters:   03/10/20 129/74   20 (!) 108/52   20 134/82

## 2020-03-10 NOTE — PROGRESS NOTES
Patient and responsible adult verbalized understanding of discharge instructions, sedation medication, and potential complications including pain. Patient instructed to call Doctor if complications occur.
you have a living will and a durable power of  for healthcare, please bring in a copy. As part of our patient safety program to minimize surgical site infections, we ask you to do the following:    · Please notify your surgeon if you develop any illness between         now and the  day of your surgery. · This includes a cough, cold, fever, sore throat, nausea,         or vomiting, and diarrhea, etc.  ·  Please notify your surgeon if you experience dizziness, shortness         of breath or blurred vision between now and the time of your surgery. You may shower the night before surgery or the morning of   your surgery with an antibacterial soap. You will need to bring a photo ID and insurance card    [facility] has an onsite pharmacy, would you like to utilize our pharmacy     If you will be staying overnight and use a C-pap machine, please bring   your C-pap to hospital     Our goal is to provide you with excellent care, therefore, visitors will be limited to two(2) in the room at a time so that we may focus on providing this care for you. Please contact pre-admission testing if you have any further questions. [facility] phone number:  [phone]  Please note these are generalized instructions for all surgical cases, you may be provided with more specific instructions according to your surgery.

## 2020-03-10 NOTE — ANESTHESIA POSTPROCEDURE EVALUATION
Department of Anesthesiology  Postprocedure Note    Patient: Vanesa Bettencourt  MRN: [id number]  YOB: 1940  Date of evaluation: 3/10/2020  Time:  11:06 AM     Procedure Summary     Date:  03/10/20 Room / Location:  [address]    Anesthesia Start:  7877 Anesthesia Stop:  1412    Procedure:  EGD ESOPHAGOGASTRODUODENOSCOPY (N/A ) Diagnosis:       Nausea      Epigastric pain      Reflux esophagitis      (NAUSEA, EPIGASTRIC PAIN, REFLUX)    Surgeon:  Clinton Rosado MD Responsible Provider:  Juan M Washington MD    Anesthesia Type:  MAC ASA Status:  3          Anesthesia Type: MAC    Bandar Phase I: Bandar Score: 10    Bandar Phase II: Bandar Score: 10    Last vitals: Reviewed and per EMR flowsheets.        Anesthesia Post Evaluation    Patient location during evaluation: PACU  Patient participation: complete - patient participated  Level of consciousness: awake and alert  Airway patency: patent  Nausea & Vomiting: no nausea and no vomiting  Complications: no  Cardiovascular status: hemodynamically stable  Respiratory status: acceptable  Hydration status: stable

## 2020-03-16 ENCOUNTER — TELEPHONE (OUTPATIENT)
Dept: GYNECOLOGY | Age: 80
End: 2020-03-16

## 2020-03-16 ENCOUNTER — TELEPHONE (OUTPATIENT)
Dept: FAMILY MEDICINE CLINIC | Age: 80
End: 2020-03-16

## 2020-03-16 RX ORDER — HYDROCODONE BITARTRATE AND ACETAMINOPHEN 7.5; 325 MG/1; MG/1
1 TABLET ORAL EVERY 8 HOURS PRN
Qty: 90 TABLET | Refills: 0 | Status: SHIPPED | OUTPATIENT
Start: 2020-03-16 | End: 2020-04-28 | Stop reason: SDUPTHER

## 2020-03-16 RX ORDER — LORAZEPAM 1 MG/1
TABLET ORAL
Qty: 90 TABLET | Refills: 0 | Status: SHIPPED | OUTPATIENT
Start: 2020-03-16 | End: 2020-04-28 | Stop reason: SDUPTHER

## 2020-04-27 ASSESSMENT — ENCOUNTER SYMPTOMS: BACK PAIN: 1

## 2020-04-27 NOTE — PROGRESS NOTES
compliant with Mobic 15 mg daily, Flexeril 10 mg TID prn and Norco 7.5-325 every 8 hrs as needed.  She feels that the medications keep her functional.        Anxiety:  Patient takes Ativan 1 mg every 8 hrs as needed for anxiety.  She generally takes it once daily and feels that it works well to control her allergy symptoms. Review of Systems   Constitutional: Negative for chills and fever. Musculoskeletal: Positive for back pain. Psychiatric/Behavioral: The patient is nervous/anxious. There were no vitals taken for this visit. Objective:   Physical Exam  Constitutional:       General: She is not in acute distress. Appearance: Normal appearance. She is obese. She is not ill-appearing. HENT:      Head: Normocephalic. Pulmonary:      Effort: Pulmonary effort is normal.   Neurological:      Mental Status: She is alert and oriented to person, place, and time. Psychiatric:         Mood and Affect: Mood normal.         Behavior: Behavior normal.         Thought Content: Thought content normal.         Judgment: Judgment normal.         Assessment:      Chronic Low Back Pain  Anxiety       Plan:      OARRS report was reviewed  Medications refilled   RTO 3 months for Hypertension / CKD / Back Pain / Anxiety    Controlled Substance Monitoring:    Acute and Chronic Pain Monitoring:   RX Monitoring 4/28/2020   Attestation -   Acute Pain Prescriptions Severe pain not adequately treated with lower dose. Periodic Controlled Substance Monitoring No signs of potential drug abuse or diversion identified.    Chronic Pain > 80 MEDD -               ENRICO VORA DO

## 2020-04-28 ENCOUNTER — VIRTUAL VISIT (OUTPATIENT)
Dept: FAMILY MEDICINE CLINIC | Age: 80
End: 2020-04-28
Payer: MEDICARE

## 2020-04-28 PROCEDURE — 99213 OFFICE O/P EST LOW 20 MIN: CPT | Performed by: FAMILY MEDICINE

## 2020-04-28 PROCEDURE — 1090F PRES/ABSN URINE INCON ASSESS: CPT | Performed by: FAMILY MEDICINE

## 2020-04-28 PROCEDURE — 1123F ACP DISCUSS/DSCN MKR DOCD: CPT | Performed by: FAMILY MEDICINE

## 2020-04-28 PROCEDURE — G8399 PT W/DXA RESULTS DOCUMENT: HCPCS | Performed by: FAMILY MEDICINE

## 2020-04-28 PROCEDURE — 1036F TOBACCO NON-USER: CPT | Performed by: FAMILY MEDICINE

## 2020-04-28 PROCEDURE — G8427 DOCREV CUR MEDS BY ELIG CLIN: HCPCS | Performed by: FAMILY MEDICINE

## 2020-04-28 PROCEDURE — 4040F PNEUMOC VAC/ADMIN/RCVD: CPT | Performed by: FAMILY MEDICINE

## 2020-04-28 PROCEDURE — G8417 CALC BMI ABV UP PARAM F/U: HCPCS | Performed by: FAMILY MEDICINE

## 2020-04-28 RX ORDER — LORAZEPAM 1 MG/1
TABLET ORAL
Qty: 90 TABLET | Refills: 0 | Status: SHIPPED | OUTPATIENT
Start: 2020-04-28 | End: 2020-06-11 | Stop reason: SDUPTHER

## 2020-04-28 RX ORDER — HYDROCODONE BITARTRATE AND ACETAMINOPHEN 7.5; 325 MG/1; MG/1
1 TABLET ORAL EVERY 8 HOURS PRN
Qty: 90 TABLET | Refills: 0 | Status: SHIPPED | OUTPATIENT
Start: 2020-04-28 | End: 2020-06-11 | Stop reason: SDUPTHER

## 2020-05-18 RX ORDER — OMEPRAZOLE 20 MG/1
CAPSULE, DELAYED RELEASE ORAL
Qty: 90 CAPSULE | Refills: 0 | Status: SHIPPED | OUTPATIENT
Start: 2020-05-18 | End: 2020-08-14

## 2020-06-11 ENCOUNTER — TELEPHONE (OUTPATIENT)
Dept: FAMILY MEDICINE CLINIC | Age: 80
End: 2020-06-11

## 2020-06-11 RX ORDER — HYDROCODONE BITARTRATE AND ACETAMINOPHEN 7.5; 325 MG/1; MG/1
1 TABLET ORAL EVERY 8 HOURS PRN
Qty: 90 TABLET | Refills: 0 | Status: SHIPPED | OUTPATIENT
Start: 2020-06-11 | End: 2020-07-28 | Stop reason: SDUPTHER

## 2020-06-11 RX ORDER — LORAZEPAM 1 MG/1
TABLET ORAL
Qty: 90 TABLET | Refills: 0 | Status: SHIPPED | OUTPATIENT
Start: 2020-06-11 | End: 2020-07-28 | Stop reason: SDUPTHER

## 2020-07-10 ENCOUNTER — TELEPHONE (OUTPATIENT)
Dept: FAMILY MEDICINE CLINIC | Age: 80
End: 2020-07-10

## 2020-07-10 RX ORDER — MELOXICAM 15 MG/1
TABLET ORAL
Qty: 10 TABLET | Refills: 0 | Status: SHIPPED | OUTPATIENT
Start: 2020-07-10 | End: 2020-08-30

## 2020-07-10 NOTE — TELEPHONE ENCOUNTER
Pt calling, called ariane for refill of meloxicam but it has not arrived yet; pt asking could  call in 4 or 5 pills to get her through until script    [address] [facility], [phone]    Please let her know either way if this can be done before the weekend

## 2020-07-27 ASSESSMENT — ENCOUNTER SYMPTOMS: SHORTNESS OF BREATH: 0

## 2020-07-27 NOTE — PROGRESS NOTES
Subjective:      Patient ID: Florinda Mclaughlin is a [de-identified] y.o. female. Hypertension   This is a chronic problem. The current episode started more than 1 year ago. The problem is unchanged. The problem is controlled. Associated symptoms include palpitations. Pertinent negatives include no chest pain, peripheral edema or shortness of breath. Risk factors for coronary artery disease include family history, obesity, post-menopausal state and sedentary lifestyle. Past treatments include beta blockers, calcium channel blockers and diuretics. The current treatment provides significant improvement. There are no compliance problems. Chronic Kidney Disease Stage 3:  Patient takes Maxzide-25 once daily. Her renal function is checked regularly and has been stable. GERD:  Patient is tolerating and compliant with Omeprazole 20 mg daily. She feels that the medication completely controls her symptoms. She previously did not do well with Zantac. Chronic Low Back Pain:  Patient is tolerating and compliant with Mobic 15 mg daily, Flexeril 10 mg TID prn and Norco 7.5-325 every 8 hrs as needed.  She feels that the medications keep her functional.        Anxiety:  Patient takes Ativan 1 mg every 8 hrs as needed for anxiety.  She generally takes it once daily and feels that it works well to control her allergy symptoms. PSVT:  Patient sees Dr. Deneen Santamaria and was taken off the [employer] on 4-26-18 and had an event monitor ordered by him. She had no events during the 30 day period. She continues to take Lopressor and Calan. Obesity:  Patient weighed 232 at her visit on 1-28-20. She has gained 2 lbs since then. Right Hand Numbness:  Patient states that for years, she has had numbness and tingling in the right hand and wrist.  It has become worse over the past 3 months. She was previously told that she had carpal tunnel but no treatment was done.       Review of Systems   Constitutional: Negative for chills and fever.   HENT: Negative for ear discharge, ear pain and hearing loss. Respiratory: Negative for shortness of breath. Cardiovascular: Positive for palpitations. Negative for chest pain and leg swelling. BP Readings from Last 3 Encounters:   07/28/20 (!) 142/70   03/10/20 127/65   03/10/20 (!) 121/56         BP (!) 142/70   Temp 98.4 °F (36.9 °C) (Oral)   Ht 5' 4\" (1.626 m)   Wt 234 lb 6.4 oz (106.3 kg)   BMI 40.23 kg/m²    Objective:   Physical Exam   Constitutional: She is oriented to person, place, and time. She appears well-developed and well-nourished. No distress. HENT:   Head: Normocephalic. Right Ear: External ear normal.   Left Ear: External ear normal.   Mouth/Throat: Oropharynx is clear and moist. No oropharyngeal exudate. Neck: No JVD present. No thyromegaly present. Cardiovascular: Normal rate, regular rhythm and normal heart sounds. No murmur heard. Pulmonary/Chest: Effort normal and breath sounds normal. She has no wheezes. She has no rales. Musculoskeletal:         General: No edema. Lymphadenopathy:     She has no cervical adenopathy. Neurological: She is alert and oriented to person, place, and time. Assessment:      Hypertension  Chronic Kidney Disease Stage 3  GERD  Chronic Low Back Pain  Anxiety  PSVT  Obesity  Right Hand Numbness - Likely CTS      Plan:       EMG of Right Arm  OARRS report was reviewed  Medications refilled   RTO 3 months for Back Pain / Anxiety       Controlled Substance Monitoring:    Acute and Chronic Pain Monitoring:   RX Monitoring 7/28/2020   Attestation -   Acute Pain Prescriptions Severe pain not adequately treated with lower dose. Periodic Controlled Substance Monitoring No signs of potential drug abuse or diversion identified.    Chronic Pain > 80 MEDD -

## 2020-07-28 ENCOUNTER — OFFICE VISIT (OUTPATIENT)
Dept: FAMILY MEDICINE CLINIC | Age: 80
End: 2020-07-28
Payer: MEDICARE

## 2020-07-28 ENCOUNTER — TELEPHONE (OUTPATIENT)
Dept: FAMILY MEDICINE CLINIC | Age: 80
End: 2020-07-28

## 2020-07-28 VITALS
WEIGHT: 234.4 LBS | DIASTOLIC BLOOD PRESSURE: 70 MMHG | TEMPERATURE: 98.4 F | HEIGHT: 64 IN | BODY MASS INDEX: 40.02 KG/M2 | SYSTOLIC BLOOD PRESSURE: 142 MMHG

## 2020-07-28 PROCEDURE — 1123F ACP DISCUSS/DSCN MKR DOCD: CPT | Performed by: FAMILY MEDICINE

## 2020-07-28 PROCEDURE — G8399 PT W/DXA RESULTS DOCUMENT: HCPCS | Performed by: FAMILY MEDICINE

## 2020-07-28 PROCEDURE — G8417 CALC BMI ABV UP PARAM F/U: HCPCS | Performed by: FAMILY MEDICINE

## 2020-07-28 PROCEDURE — 1036F TOBACCO NON-USER: CPT | Performed by: FAMILY MEDICINE

## 2020-07-28 PROCEDURE — 1090F PRES/ABSN URINE INCON ASSESS: CPT | Performed by: FAMILY MEDICINE

## 2020-07-28 PROCEDURE — G8427 DOCREV CUR MEDS BY ELIG CLIN: HCPCS | Performed by: FAMILY MEDICINE

## 2020-07-28 PROCEDURE — 4040F PNEUMOC VAC/ADMIN/RCVD: CPT | Performed by: FAMILY MEDICINE

## 2020-07-28 PROCEDURE — 99214 OFFICE O/P EST MOD 30 MIN: CPT | Performed by: FAMILY MEDICINE

## 2020-07-28 RX ORDER — HYDROCODONE BITARTRATE AND ACETAMINOPHEN 7.5; 325 MG/1; MG/1
1 TABLET ORAL EVERY 8 HOURS PRN
Qty: 90 TABLET | Refills: 0 | Status: SHIPPED | OUTPATIENT
Start: 2020-07-28 | End: 2020-09-15 | Stop reason: SDUPTHER

## 2020-07-28 RX ORDER — LORAZEPAM 1 MG/1
TABLET ORAL
Qty: 90 TABLET | Refills: 0 | Status: SHIPPED | OUTPATIENT
Start: 2020-07-28 | End: 2020-09-15 | Stop reason: SDUPTHER

## 2020-08-20 ENCOUNTER — HOSPITAL ENCOUNTER (OUTPATIENT)
Dept: NEUROLOGY | Age: 80
Discharge: HOME | End: 2020-08-20
Payer: MEDICARE

## 2020-08-20 PROCEDURE — 95908 NRV CNDJ TST 3-4 STUDIES: CPT

## 2020-08-20 PROCEDURE — 95886 MUSC TEST DONE W/N TEST COMP: CPT

## 2020-08-20 NOTE — PROCEDURES
Test Date:  2020    Patient: Bia Sands : 1940 Physician: Praveen Blackwood DO   Sex: Female ID#:  Ref Phys: Natalie Gardner DO     Patient Complaints:  Patient is a [de-identified]year-old female who presents with numbness in the right upper extremities onset early  increasing in intensity. Patient History / Exam:  PMH: + arthritis, + right shoulder surgery 2+ yrs ago. no endocrine disease. PE: reflexes absent, + thumb opposition weakness    NCV & EMG Findings:  Evaluation of the right median (APB) motor nerve showed prolonged distal onset latency (9.3 ms) and reduced amplitude (4.5 mV). The right median sensory nerve showed no response. The right ulnar sensory nerve showed prolonged distal peak latency (3.9 ms) and decreased conduction velocity (36 m/s). All remaining nerves (as indicated in the following tables) were within normal limits. All examined muscles (as indicated in the following table) showed no evidence of electrical instability. Impression: study is consistent with moderately severe right carpal tunnel syndrome. No evidence of an acute radiculopathy or other lower motor neuron dysfunction. Thank you.        Praveen Blackwood DO        Nerve Conduction Studies  Motor Nerve Results      Latency Amplitude F-Lat Segment Distance CV Comment   Site (ms) Norm (mV) Norm (ms)  (cm) (m/s) Norm    Right Median (APB) Motor   Wrist 9.3  < 4.2 4.5  > 5.0         Elbow 14.1 - 4.4 -  Elbow-Wrist 24 50  > 50    Right Ulnar (ADM) Motor   Wrist 3.9  < 4.2 7.0  > 3.0         Bel Elbow 8.5 - 5.0 -  Bel Elbow-Wrist 25 54  > 50    Abv Elbow 9.7 - 4.7 -  Abv Elbow-Bel Elbow 6 50  > 48      Sensory Nerve Results      Latency (Peak) Amplitude (P-P) Segment Distance CV Comment   Site (ms) Norm (µV) Norm  (cm) (m/s) Norm    Right Median Sensory   Wrist-Dig II NR  < 3.6 NR  > 10 Wrist-Dig II 14 NR  > 39    Right Ulnar Sensory   Wrist-Dig V 3.9  < 3.7 80  > 15 Wrist-Dig V 14 36  > 38 Electromyography     Side Muscle Nerve Root Ins Act Fibs Psw Amp Dur Poly Recrt Int Kiki Solid Comment   Right Deltoid Axillary C5-C6 Nml Nml Nml Nml Nml 0 Nml Nml    Right Biceps Musculocut C5-C6 Nml Nml Nml Nml Nml 0 Nml Nml    Right Triceps Radial C6-C8 Nml Nml Nml Nml Nml 0 Nml Nml    Right Brachiorad Radial C5-C6 Nml Nml Nml Nml Nml 0 Nml Nml    Right Pronator Teres Median C6-C7 Nml Nml Nml Nml Nml 0 Nml Nml    Right EIP Post Interosseous,  R... C7-C8 Nml Nml Nml Nml Nml 0 Nml Nml    Right APB Median C8-T1 Nml Nml Nml Nml Nml 0 Nml Nml    Right FDI Ulnar C8-T1 Nml Nml Nml Nml Nml 0 Nml Nml    Right Cervical Paraspinal (Uppe. .. Rami C1-C3 Nml Nml Nml         Right Cervical Paraspinal (Mid) Rami C4-C6 Nml Nml Nml         Right Cervical Paraspinal (Fiona Hopes. ..  Rami C7-C8 Nml Nml Nml             Electronically signed by Nicholas Sims DO on 8/20/2020 at 10:27 AM

## 2020-08-28 ENCOUNTER — OFFICE VISIT (OUTPATIENT)
Dept: ORTHOPEDIC SURGERY | Age: 80
End: 2020-08-28
Payer: MEDICARE

## 2020-08-28 VITALS — HEIGHT: 64 IN | TEMPERATURE: 97.4 F | BODY MASS INDEX: 39.95 KG/M2 | WEIGHT: 234 LBS

## 2020-08-28 PROBLEM — G56.01 CARPAL TUNNEL SYNDROME OF RIGHT WRIST: Status: ACTIVE | Noted: 2020-08-28

## 2020-08-28 PROCEDURE — G8399 PT W/DXA RESULTS DOCUMENT: HCPCS | Performed by: PHYSICIAN ASSISTANT

## 2020-08-28 PROCEDURE — G8427 DOCREV CUR MEDS BY ELIG CLIN: HCPCS | Performed by: PHYSICIAN ASSISTANT

## 2020-08-28 PROCEDURE — 1090F PRES/ABSN URINE INCON ASSESS: CPT | Performed by: PHYSICIAN ASSISTANT

## 2020-08-28 PROCEDURE — 1123F ACP DISCUSS/DSCN MKR DOCD: CPT | Performed by: PHYSICIAN ASSISTANT

## 2020-08-28 PROCEDURE — 20550 NJX 1 TENDON SHEATH/LIGAMENT: CPT | Performed by: PHYSICIAN ASSISTANT

## 2020-08-28 PROCEDURE — 1036F TOBACCO NON-USER: CPT | Performed by: PHYSICIAN ASSISTANT

## 2020-08-28 PROCEDURE — G8417 CALC BMI ABV UP PARAM F/U: HCPCS | Performed by: PHYSICIAN ASSISTANT

## 2020-08-28 PROCEDURE — 4040F PNEUMOC VAC/ADMIN/RCVD: CPT | Performed by: PHYSICIAN ASSISTANT

## 2020-08-28 PROCEDURE — 99213 OFFICE O/P EST LOW 20 MIN: CPT | Performed by: PHYSICIAN ASSISTANT

## 2020-08-28 NOTE — LETTER
CONSENT TO OPERATION  AND/OR OTHER PROCEDURE(S)          PATIENT : Kalia Arthur OF BIRTH:  1940      DATE : 8/28/20          1. I request and consent that Dr. Tierra Diaz. Stephanie Tran,  and/or his associates or assistants perform an operation and/or procedures on the above patient at  [place] [address], to treat the condition(s) which appear indicated by the diagnostic studies already performed. I have been told that in general terms the nature, purpose and reasonable expectations of the operation and/or procedure(s) are:     Right Carpal Tunnel Release      2. It has been explained to me by the informing physician that during the course of the operation and/or procedure(s) unforeseen conditions may be revealed that necessitate an extension of the original operation and/or procedure(s) or different operation and/or procedures than those set forth in Paragraph 1. I therefore authorize and request that my physician and/or his associates or assistants perform such operations and/or procedures as are necessary and desirable in the exercise of professional judgment. The authority granted under this Paragraph 2 shall extend to all conditions that require treatment and are known to my physician at the time the operation is commenced. 3. I have been made aware by the informing physician of certain risks and consequences that are associated with the operation and/or procedure(s) described in Paragraph 1, the reasonable alternative methods or treatment, the possible consequences, the possibility that the operation and/or procedure(s) may be unsuccessful and the possibility of complications. I understand the reasonably known risks to be:      ? Bleeding  ? Infection  ? Poor Healing  ? Possible Damage to Nerve, Vessel, Tendon/Muscle or Bone  ? Need for further Treatment/Surgery  ? Stiffness  ? Pain  ? Residual or Recurrent Symptoms  ?  Anesthetic and/or Medical Risks ? We have discussed the specific limitations and risks of hospital and/or office based treatment at this time due to the COVID-19 pandemic                I have been counseled about the risks of ry Covid-19 in the nancy-operative and post-operative periods related to this procedure. I have been made aware that ry Covid-19 around the time of a surgical procedure may worsen my prognosis for recovering from the virus and lend to a higher morbidity and or mortality risk. With this knowledge, I have requested to proceed with the procedure as scheduled. 4. I have also been informed by the informing physician that there are other risks from both known and unknown causes that are attendant to the performance of any surgical procedure. I am aware that the practice of medicine and surgery is not an exact science, and that no guarantees have been made to me concerning the results of the operation and/or procedure(s). 5. I   CONSENT / REFUSE CONSENT  (strike the phrase that does not apply) to the taking of photographs before, during and/or after the operation or procedure for scientific/educational purposes. 6. I consent to the administration of anesthesia and to the use of such anesthetics as may be deemed advisable by the anesthesiologist who has been engaged by me or my physician.     7. I certify that I have read and understand the above consent to operation and/or other procedure(s); that the explanations therein referred to were made to me by the informing physician in advance of my signing this consent; that all blanks or statements requiring insertion or completion were filled in and inapplicable paragraphs, if any, were stricken before I signed; and that all questions asked by me about the operation and/or procedure(s) which I have consented to have been fully answered in a satisfactory manner.                                 _______________________           8/28/20 Witness     Signature Of Patient         Date        Regulo Bello. Feng                                                 Informing Physician                                           Signature of Informing Physician                              If patient is unable to sign or is a minor, complete one of the following:    (A)  Patient is a minor   years of age. (B)  Patient is unable to sign because: The undersigned represents that he or she is duly authorized to execute this consent for and on behalf of the above named patient.                Witness               o  Parent  o  Guardian   o  Spouse       o  Other (specify)                                                          Patient Name: Zuleima Amor  Patient YOB: 1940  Dr. Sylvia Benjamin' Return To Work Policy Regarding your ability to return to work after surgery or injury, Dr. Mikal Bruno will not state that any patient is off of work or cannot work at all. He will place you on restrictions after your surgical procedure or injury. This means that you will be allowed to return to work the day after your office visit or surgery with restrictions. Depending on the details of your particular situation, Dr. Mikal Bruno may state that you will have either light use or no use of your hand for a specific number of weeks. It is your obligation to communicate with your employer regarding your restrictions. It is your employer's decision as to whether they will accommodate your restrictions (i.e. allow you to come to work in your restricted capacity) or to not allow you to return to work under your restrictions. Dr. Mikal Bruno does not participate in making this decision and cannot influence your employer regarding their decision. If you do not communicate your restrictions to your employer, or if you do not present to work as you are scheduled to, Dr. Mikal Bruno will not provide an 'excuse' to explain your absence. A doctors note, or official forms (BWC, FMLA, etc.) will be filled out, upon request, to indicate your date of surgery and your restrictions as stated above. Dr. Mikal Bruno' Narcotic Policy  Patients will only be prescribed narcotics after surgical procedures or significant injury. Not all procedures cause pain great enough to require Narcotics and thus, not all patients will receive prescriptions after surgical procedures or injuries. Narcotics are never prescribed for chronic conditions. Narcotics are never prescribed for use longer than one week at a time. Refills are only granted in unusual circumstances and only at Dr. Rincon Simpler discretion. Patients who are receiving narcotic medication from another physician or who are under pain management contracts will not be given a prescription for narcotics for any reason. I have read the above policies and understand that by agreeing to proceed with treatment by Dr. Mona Milan and his team, that I am agreeing to abide by these policies.   Patient Name:  Kemi Amos    Patient Signature:  _____________________________    Demarco Milner Date:   8/28/20

## 2020-08-28 NOTE — Clinical Note
Dear  Evi Mandujano, DO,    Thank you very much for your referral or Ms. Florinda Mclaughlin to me for evaluation and treatment of her Hand & Wrist condition. I appreciate your confidence in me and thank you for allowing me the opportunity to care for your patients. If I can be of any further assistance to you on this or any other patient, please do not hesitate to contact me. Sincerely,    Santiago Finn.  Philomena Dias MD

## 2020-08-28 NOTE — PROGRESS NOTES
Ms. Robin Pandey is a [de-identified] y.o. right handed retiree  who is seen today in Hand Surgical Consultation at the request of 3692 Janet Ghosh DO. She presents today regarding Right symptoms which have been present for approximately 7 years. A history of antecedent trauma or injury is Absent. She reports symptoms to include moderate numbness & tingling in the Median Innervated Digits. Hand symptoms do Frequently awaken her from sleep. She reports no pain located in the diffuse right wrist. Symptoms are worsening over time. Previous treatment has included conservative measures. She does not claim relation of her symptoms to her required work activities. She has undergone electrodiagnostic testing. The patient's , past medical history, medications, allergies,  family history, social history, and review of systems have been updated, reviewed and have been scanned into the chart today. Physical Exam:  Ms. Amina Motta's most recent vitals:  Vitals  Temp: 97.4 °F (36.3 °C)  Temp Source: Infrared  Height: 5' 4\" (162.6 cm)  Weight: 234 lb (106.1 kg)    She is well nourished, oriented to person, place & time. She demonstrates appropriate mood and affect as well as normal gait and station. Skin: Normal in appearance, Normal Color and Free of Lesions Bilaterally   Digital range of motion is limited by pain on the Left, normal on the Right  Wrist range of motion is Full and equal bilaterally bilaterally  There is no evidence of gross joint instability bilaterally. Sensation is subjectively tingling in the Median Innervated Digits on the Right, normal on the Left and objectively present in the same distribution bilaterally. Vascular examination reveals normal, good capillary refill and good color bilaterally  Swelling is absent in the distal volar forearm on the Right, normal on the Left  Muscular strength is clinically appropriate bilaterally.   Examination for Carpal Tunnel Syndrome shows Carpal Tunnel Compression Test to be Mildly Positive on the right & Negative on the left. The patient displays mild baseline symptoms to potentially confound the exam.  The thenar musculature is not atrophied & weakened. Examination for Stenosing Tenosynovitis demonstrates mild tenderness, thickening & nodularity at the A-1 pulley(s) of the Left Middle Finger. There is a palpable Nota's Node. There is triggering on active flexion with pain. No other digits demonstrate evidence of Stenosing Tenosynovitis. Examination for Cubital Tunnel Syndrome shows no tenderness to palpation at the medial & lateral epicondyles of the Humerus. The Ulnar Nerve rests securely behind the medial epicondyle without subluxation upon elbow flexion. Elbow flexion-compression test is negative, and there is no Tinnel's Sign over the Cubital Tunnel. The Ulnar Nerve innervated intrinsic musculature is without atrophy or weakness. Review of Electrodiagnostic Testing:  Test performed on: 08/20/2020    NERVE CONDUCTION STUDY:  RIGHT   Median Nerve: Sensory Latency: NR  Motor Latency: 9.3  Ulnar Nerve:  Conduction Velocity:  50        EMG:  RIGHT  Normal          Impression:  Ms. Robin Pandey is showing clinical evidence of Carpal Tunnel Syndrome  And left middle finger trigger finger and presents requesting further treatment. Plan:  I have had a thorough discussion with Ms. Robin Pandey regarding the treatment options available for her initially presenting carpal tunnel syndrome, which is causing her significant  limitations. I have outlined for Ms. Robin Pandey the benefits and consequences of the various treatment modalities, including the fact that surgical treatment is the only modality which is reasonably expected to provide long lasting or permanent resolution of her symptoms. Based upon our current discussion and a reasonable understating of the options available to her, Ms. Robin Pandey has selected to proceed with surgical Carpal Tunnel Release. I have discussed the details of the surgical procedure, the pre, nancy and postoperative concerns and the appropriate expectations after surgery with Ms. Royal Sylvester today. She was given the opportunity to ask questions, voiced an understanding of the procedure, and she did wish to proceed with Right Carpal Tunnel Release. I had an extensive discussion with Ms. Royal Sylvester (and any family members present with her today) regarding the natural history, etiology, and long term consequences of this problem. We have mutually selected a surgical treatment plan  and, in my opinion, surgical intervention is indicated at this time. I have discussed with her the potential complications, limitations, expectations, alternatives, and risks of Carpal Tunnel Release. She has had full opportunity to ask her questions. I have answered them all to her satisfaction. I feel that Ms. Royal Sylvester (and any family members present with her today) do understand our discussion today and she has provided informed consent for Right Carpal Tunnel Release. I have explained to Ms. Royal Sylvester that despite successful treatment (surgical decompression or otherwise) of her nerve entrapment, that due to her severe nerve damage, that she is likely to have some permanent residual symptoms that do not improve long term. I have also explained that maximal recovery of nerve function may take a full year or longer to realize. She voiced a clear understanding of this. I have had a thorough discussion with Ms. Royal Sylvester regarding the treatment options available for her initially presenting Left Middle Finger stenosing tenosynovitis, which is causing her functional limitations. I have outlined for Ms. Royal Sylvester the benefits and consequences of the various treatment modalities, including a reasonable expectation for the long term success and the likelihood that further more aggressive treatment may be required for her current presenting condition. Based upon our current discussion and a reasonable understating of the options available to her, Ms. Hai Murguia has selected to proceed with  an injection to the left Middle Finger Flexor Tendon Sheath. I have outlined for her the nature of the injection, and the pre, nancy and post injection considerations and the appropriate expectations for this injection. I have clearly explained to her that the above outlined treatment plan should not be expected to 'cure' her stenosing tenosynovitis, but we are rather treating the symptoms with which she presents. She has understood that in order to achieve long lasting relief of her symptoms and to prevent future worsening or further damage, that definitive surgical treatment would be required. Ms. Hai Murguia  voiced an appropriate understanding of our discussion, the options available to her, and of the expectations of her selected  treatment. She did wish to proceed with Left Middle Finger Flexor Tendon Sheath injection. Procedure:  left Middle Finger Trigger Finger Injection  [first Injection]: After full discussion of the nature of this process and outlining a treatment plan with Ms. Hai Murguia, we discussed the complications, limitations, expectations, alternatives, and risks of injection of the flexor tendon sheath. She understood this information well and verbally consented to this treatment. The skin of the symptomatic digit was prepped with Isopropyl Alcohol and under aseptic conditions the flexor tendon sheath was injected with a combination of 1/2 ml of 0.25% Bupivacaine without Epinephrine and 20 mg of Triamcinolone (40 mg/ml). Good filling of the flexor sheath was noted. A dry sterile bandage was applied and the patient tolerated the injection without difficulty. I advised the patient of the expected response, possible reactions and the instructions for care of the hand. I have also discussed with Ms. Aramis Bernal the other treatment options available to her for this condition. We have today selected to proceed with treatment by injection with steroid medication. She and I have agreed that if our current course of Injection treatment does not prove to be effective over the short term future, that she will schedule a follow-up appointment to discuss and select an alternate course of therapy including possibly further conservative treatment or surgical treatment. Ms. Aramis Bernal has been given a full verbal list of instructions and precautions related to her present condition. I have asked her to followup with me in the office at the prescribed time. She is also specifically requested to call or return to the office sooner if her symptoms change or worsen prior to the next scheduled appointment.

## 2020-09-15 ENCOUNTER — TELEPHONE (OUTPATIENT)
Dept: FAMILY MEDICINE CLINIC | Age: 80
End: 2020-09-15

## 2020-09-15 RX ORDER — LORAZEPAM 1 MG/1
TABLET ORAL
Qty: 90 TABLET | Refills: 0 | Status: SHIPPED | OUTPATIENT
Start: 2020-09-15 | End: 2020-10-28 | Stop reason: SDUPTHER

## 2020-09-15 RX ORDER — HYDROCODONE BITARTRATE AND ACETAMINOPHEN 7.5; 325 MG/1; MG/1
1 TABLET ORAL EVERY 8 HOURS PRN
Qty: 90 TABLET | Refills: 0 | Status: SHIPPED | OUTPATIENT
Start: 2020-09-15 | End: 2020-10-28 | Stop reason: SDUPTHER

## 2020-09-15 NOTE — TELEPHONE ENCOUNTER
Patient is calling requesting a refill on:    - HYDROcodone-acetaminophen (NORCO) 7.5-325 MG per tablet     - LORazepam (ATIVAN) 1 MG tablet     *SEND TO [facility], [place], operated by [facility] [address] [facility], [address] - F [phone]

## 2020-10-07 ENCOUNTER — TELEPHONE (OUTPATIENT)
Dept: ORTHOPEDIC SURGERY | Age: 80
End: 2020-10-07

## 2020-10-14 ENCOUNTER — TELEPHONE (OUTPATIENT)
Dept: ORTHOPEDIC SURGERY | Age: 80
End: 2020-10-14

## 2020-10-14 NOTE — TELEPHONE ENCOUNTER
CPT: 67787  AUTHORIZATION: NPR  BODY PART: right wrist  DATE RANGE:   COMMENTS:     Per website, [address] 10/14/20

## 2020-10-26 ASSESSMENT — ENCOUNTER SYMPTOMS: BACK PAIN: 1

## 2020-10-26 NOTE — PROGRESS NOTES
Subjective:      Patient ID: Jon Rodriguez is a [de-identified] y.o. female. HPI PREOPERATIVE PHYSICAL:    Patient was sent by Dr. Josue Sharpe for preoperative clearance to have Right Carpal Tunnel Surgery on 11-3-20 at [facility].      Allergies   Allergen Reactions    Latex Itching and Rash    Bactrim Rash     Current Outpatient Medications   Medication Sig Dispense Refill    verapamil (CALAN SR) 120 MG extended release tablet TAKE 1 TABLET EVERY DAY 90 tablet 1    metoprolol tartrate (LOPRESSOR) 50 MG tablet TAKE 1 TABLET TWICE DAILY 180 tablet 1    triamterene-hydroCHLOROthiazide (MAXZIDE-25) 37.5-25 MG per tablet TAKE 1 TABLET EVERY DAY 90 tablet 1    meloxicam (MOBIC) 15 MG tablet TAKE 1 TABLET EVERY DAY 90 tablet 0    omeprazole (PRILOSEC) 20 MG delayed release capsule Take 1 capsule by mouth once daily 90 capsule 1    cyclobenzaprine (FLEXERIL) 10 MG tablet Take 1 tablet by mouth 3 times daily as needed for Muscle spasms 90 tablet 5    fluticasone (FLONASE) 50 MCG/ACT nasal spray 2 sprays by Nasal route daily 1 Bottle 5    magnesium 200 MG TABS tablet Take 200 mg by mouth daily      B Complex Vitamins (VITAMIN B COMPLEX PO) Take by mouth      LUMIGAN 0.01 % SOLN ophthalmic drops Place 1 drop into both eyes       docusate sodium (COLACE) 100 MG capsule Take 1 capsule by mouth 2 times daily 40 capsule 0     No current facility-administered medications for this visit.       Past Medical History:   Diagnosis Date    Anesthesia complication     difficulty waking up after breast biopsy    Anxiety     Chronic kidney disease, stage 3     Chronic midline low back pain without sciatica     Coronary artery disease involving native coronary artery without angina pectoris     Essential hypertension     Fatigue     Foot drop, left     GERD without esophagitis     Hot flashes     Internal hemorrhoids     Intrinsic eczema     Lesion of lateral popliteal nerve     Non morbid obesity due to excess calories        Anxiety:  Patient takes Ativan 1 mg every 8 hrs as needed for anxiety.  She generally takes it once daily and feels that it works well to control her allergy symptoms. Obesity:  Patient weighed 234 at her visit on 7-28-20. She has gained 2 lbs since then. Review of Systems   Constitutional: Negative for chills and fever. HENT: Negative for congestion, rhinorrhea and sore throat. Respiratory: Negative for cough, shortness of breath and wheezing. Cardiovascular: Positive for leg swelling. Negative for chest pain and palpitations. Gastrointestinal: Positive for constipation. Negative for abdominal pain, blood in stool, diarrhea, nausea and vomiting. Genitourinary: Negative for dysuria, frequency, hematuria and urgency. Musculoskeletal: Positive for back pain. Psychiatric/Behavioral: Negative for decreased concentration and suicidal ideas. The patient is nervous/anxious. BP Readings from Last 3 Encounters:   10/28/20 (!) 140/78   07/28/20 (!) 142/70   03/10/20 127/65         BP (!) 142/78   Temp 97.4 °F (36.3 °C) (Infrared)   Ht 5' 4\" (1.626 m)   Wt 236 lb (107 kg)   BMI 40.51 kg/m²    BP (!) 140/78 (Site: Right Upper Arm)   Temp 97.4 °F (36.3 °C) (Infrared)   Ht 5' 4\" (1.626 m)   Wt 236 lb (107 kg)   BMI 40.51 kg/m²    Objective:   Physical Exam  Constitutional:       General: She is not in acute distress. Appearance: She is well-developed. HENT:      Head: Normocephalic. Right Ear: External ear normal.      Left Ear: External ear normal.      Mouth/Throat:      Pharynx: No oropharyngeal exudate. Neck:      Thyroid: No thyromegaly. Vascular: No JVD. Cardiovascular:      Rate and Rhythm: Normal rate and regular rhythm. Heart sounds: Normal heart sounds. No murmur. Pulmonary:      Effort: Pulmonary effort is normal.      Breath sounds: Normal breath sounds. No wheezing or rales.    Abdominal:      General: Bowel sounds are normal. There is no distension. Palpations: Abdomen is soft. There is no mass. Tenderness: There is no abdominal tenderness. There is no guarding or rebound. Hernia: No hernia is present. Comments: Obese. Musculoskeletal:      Right lower leg: Edema present. Left lower leg: Edema present. Comments: Trace lower leg edema bilaterally. Lymphadenopathy:      Cervical: No cervical adenopathy. Neurological:      Mental Status: She is alert and oriented to person, place, and time. Assessment:       Preoperative Physical  Right Carpal Tunnel Syndrome  Chronic Low Back Pain  Anxiety   Obesity       Plan:       Patient is cleared for surgery. Hold Mobic 1 week prior to surgery  [facility] report was reviewed  Medications refilled   RTO 3 months for Hypertension / Back Pain / Anxiety     Controlled Substance Monitoring:    Acute and Chronic Pain Monitoring:   RX Monitoring 10/28/2020   Attestation -   Acute Pain Prescriptions Severe pain not adequately treated with lower dose. Periodic Controlled Substance Monitoring No signs of potential drug abuse or diversion identified.    Chronic Pain > 80 MEDD -              [place],

## 2020-10-28 ENCOUNTER — OFFICE VISIT (OUTPATIENT)
Dept: PRIMARY CARE CLINIC | Age: 80
End: 2020-10-28
Payer: MEDICARE

## 2020-10-28 ENCOUNTER — OFFICE VISIT (OUTPATIENT)
Dept: FAMILY MEDICINE CLINIC | Age: 80
End: 2020-10-28
Payer: MEDICARE

## 2020-10-28 VITALS
WEIGHT: 236 LBS | DIASTOLIC BLOOD PRESSURE: 78 MMHG | SYSTOLIC BLOOD PRESSURE: 142 MMHG | BODY MASS INDEX: 40.29 KG/M2 | HEIGHT: 64 IN | TEMPERATURE: 97.4 F

## 2020-10-28 PROCEDURE — 4040F PNEUMOC VAC/ADMIN/RCVD: CPT | Performed by: FAMILY MEDICINE

## 2020-10-28 PROCEDURE — G8399 PT W/DXA RESULTS DOCUMENT: HCPCS | Performed by: FAMILY MEDICINE

## 2020-10-28 PROCEDURE — 99214 OFFICE O/P EST MOD 30 MIN: CPT | Performed by: FAMILY MEDICINE

## 2020-10-28 PROCEDURE — 1036F TOBACCO NON-USER: CPT | Performed by: FAMILY MEDICINE

## 2020-10-28 PROCEDURE — 99211 OFF/OP EST MAY X REQ PHY/QHP: CPT | Performed by: NURSE PRACTITIONER

## 2020-10-28 PROCEDURE — 1090F PRES/ABSN URINE INCON ASSESS: CPT | Performed by: FAMILY MEDICINE

## 2020-10-28 PROCEDURE — G8417 CALC BMI ABV UP PARAM F/U: HCPCS | Performed by: FAMILY MEDICINE

## 2020-10-28 PROCEDURE — G8484 FLU IMMUNIZE NO ADMIN: HCPCS | Performed by: FAMILY MEDICINE

## 2020-10-28 PROCEDURE — 1123F ACP DISCUSS/DSCN MKR DOCD: CPT | Performed by: FAMILY MEDICINE

## 2020-10-28 PROCEDURE — G8427 DOCREV CUR MEDS BY ELIG CLIN: HCPCS | Performed by: FAMILY MEDICINE

## 2020-10-28 RX ORDER — HYDROCODONE BITARTRATE AND ACETAMINOPHEN 7.5; 325 MG/1; MG/1
1 TABLET ORAL EVERY 8 HOURS PRN
Qty: 90 TABLET | Refills: 0 | Status: SHIPPED | OUTPATIENT
Start: 2020-10-28 | End: 2021-01-28 | Stop reason: SDUPTHER

## 2020-10-28 RX ORDER — LORAZEPAM 1 MG/1
TABLET ORAL
Qty: 90 TABLET | Refills: 0 | Status: SHIPPED | OUTPATIENT
Start: 2020-10-28 | End: 2021-04-13 | Stop reason: SDUPTHER

## 2020-10-28 ASSESSMENT — ENCOUNTER SYMPTOMS
NAUSEA: 0
SHORTNESS OF BREATH: 0
RHINORRHEA: 0
VOMITING: 0
DIARRHEA: 0
ABDOMINAL PAIN: 0
COUGH: 0
CONSTIPATION: 1
WHEEZING: 0
SORE THROAT: 0
BLOOD IN STOOL: 0

## 2020-10-28 ASSESSMENT — PATIENT HEALTH QUESTIONNAIRE - PHQ9
SUM OF ALL RESPONSES TO PHQ9 QUESTIONS 1 & 2: 0
SUM OF ALL RESPONSES TO PHQ QUESTIONS 1-9: 0
SUM OF ALL RESPONSES TO PHQ QUESTIONS 1-9: 0
2. FEELING DOWN, DEPRESSED OR HOPELESS: 0
1. LITTLE INTEREST OR PLEASURE IN DOING THINGS: 0
SUM OF ALL RESPONSES TO PHQ QUESTIONS 1-9: 0

## 2020-10-28 NOTE — PROGRESS NOTES
Patient presented to [facility] drive up clinic for preop testing. Patient was swabbed and given information advising them to remain isolated until procedure date.

## 2020-10-29 NOTE — PROGRESS NOTES

## 2020-10-29 NOTE — PROGRESS NOTES
[facility] [facility] time______0600______        Surgery time___0730_________    Take the following medications with a sip of water: Follow your MD/Surgeons pre-procedure instructions regarding your medications    Do not eat or drink anything after 12:00 midnight prior to your surgery. This includes water chewing gum, mints and ice chips. You may brush your teeth and gargle the morning of your surgery, but do not swallow the water     Please see your family doctor/pediatrician for a history and physical and/or concerning medications. Bring any test results/reports from your physicians office. If you are under the care of a heart doctor or specialist doctor, please be aware that you may be asked to them for clearance    You may be asked to stop blood thinners such as Coumadin, Plavix, Fragmin, Lovenox, etc., or any anti-inflammatories such as:  Aspirin, Ibuprofen, Advil, Naproxen prior to your surgery. We also ask that you stop any OTC medications such as fish oil, vitamin E, glucosamine, garlic, Multivitamins, COQ 10, etc.    We ask that you do not smoke 24 hours prior to surgery  We ask that you do not  drink any alcoholic beverages 24 hours prior to surgery     You must make arrangements for a responsible adult to take you home after your surgery. For your safety you will not be allowed to leave alone or drive yourself home. Your surgery will be cancelled if you do not have a ride home. Also for your safety, it is strongly suggested that someone stay with you the first 24 hours after your surgery. A parent or legal guardian must accompany a child scheduled for surgery and plan to stay at the hospital until the child is discharged. Please do not bring other children with you. For your comfort, please wear simple loose fitting clothing to the hospital.  Please do not bring valuables.     Do not wear any make-up or nail polish on your fingers or toes      For your safety, please do not wear any jewelry or body piercing's on the day of surgery. All jewelry must be removed. If you have dentures, they will be removed before going to operating room. For your convenience, we will provide you with a container. If you wear contact lenses or glasses, they will be removed, please bring a case for them. If you have a living will and a durable power of  for healthcare, please bring in a copy. As part of our patient safety program to minimize surgical site infections, we ask you to do the following:    · Please notify your surgeon if you develop any illness between         now and the  day of your surgery. · This includes a cough, cold, fever, sore throat, nausea,         or vomiting, and diarrhea, etc.  ·  Please notify your surgeon if you experience dizziness, shortness         of breath or blurred vision between now and the time of your surgery. Do not shave your operative site 96 hours prior to surgery. For face and neck surgery, men may use an electric razor 48 hours   prior to surgery. You may shower the night before surgery or the morning of   your surgery with an antibacterial soap. You will need to bring a photo ID and insurance card    [facility] has an onsite pharmacy, would you like to utilize our pharmacy     If you will be staying overnight and use a C-pap machine, please bring   your C-pap to hospital     Our goal is to provide you with excellent care, therefore, visitors will be limited to two(2) in the room at a time so that we may focus on providing this care for you. Please contact pre-admission testing if you have any further questions. [facility] phone number:  [phone] PAT fax number:  [phone]  Please note these are generalized instructions for all surgical cases, you may be provided with more specific instructions according to your surgery.

## 2020-10-30 LAB — SARS-COV-2, NAA: NOT DETECTED

## 2020-10-30 NOTE — RESULT ENCOUNTER NOTE

## 2020-11-02 ENCOUNTER — ANESTHESIA EVENT (OUTPATIENT)
Dept: OPERATING ROOM | Age: 80
End: 2020-11-02
Payer: MEDICARE

## 2020-11-02 ENCOUNTER — OFFICE VISIT (OUTPATIENT)
Dept: FAMILY MEDICINE CLINIC | Age: 80
End: 2020-11-02
Payer: MEDICARE

## 2020-11-02 VITALS
DIASTOLIC BLOOD PRESSURE: 80 MMHG | WEIGHT: 248 LBS | BODY MASS INDEX: 42.34 KG/M2 | TEMPERATURE: 97 F | SYSTOLIC BLOOD PRESSURE: 124 MMHG | HEIGHT: 64 IN

## 2020-11-02 PROCEDURE — 99213 OFFICE O/P EST LOW 20 MIN: CPT | Performed by: FAMILY MEDICINE

## 2020-11-02 PROCEDURE — G8484 FLU IMMUNIZE NO ADMIN: HCPCS | Performed by: FAMILY MEDICINE

## 2020-11-02 PROCEDURE — 1090F PRES/ABSN URINE INCON ASSESS: CPT | Performed by: FAMILY MEDICINE

## 2020-11-02 PROCEDURE — G8399 PT W/DXA RESULTS DOCUMENT: HCPCS | Performed by: FAMILY MEDICINE

## 2020-11-02 PROCEDURE — 1123F ACP DISCUSS/DSCN MKR DOCD: CPT | Performed by: FAMILY MEDICINE

## 2020-11-02 PROCEDURE — G8417 CALC BMI ABV UP PARAM F/U: HCPCS | Performed by: FAMILY MEDICINE

## 2020-11-02 PROCEDURE — 1036F TOBACCO NON-USER: CPT | Performed by: FAMILY MEDICINE

## 2020-11-02 PROCEDURE — 4040F PNEUMOC VAC/ADMIN/RCVD: CPT | Performed by: FAMILY MEDICINE

## 2020-11-02 PROCEDURE — G8427 DOCREV CUR MEDS BY ELIG CLIN: HCPCS | Performed by: FAMILY MEDICINE

## 2020-11-02 ASSESSMENT — ENCOUNTER SYMPTOMS
NAUSEA: 0
DIARRHEA: 0
BLOOD IN STOOL: 0
ABDOMINAL PAIN: 0
VOMITING: 0
CONSTIPATION: 1

## 2020-11-02 NOTE — PROGRESS NOTES
Subjective:      Patient ID: Colleen Hartman is a [de-identified] y.o. female. HPI     Pain Under Left Ribs:  Patient started early morning yesterday with an ache under the lower left anterior rib cage and radiates into the left flank area. It improved during the day but started again today. It hurts to lift the left arm over the head. She does not recall any injury to this area. The pain is described as a deep ache that is intermittent and moderate. Antacid medication did not help. She has not tried any other medication. She states that she thinks she is constipated. She had a BM this AM but did not feel satisfied. Review of Systems   Constitutional: Negative for chills and fever. Gastrointestinal: Positive for constipation. Negative for abdominal pain, blood in stool, diarrhea, nausea and vomiting. Genitourinary: Positive for frequency. Negative for dysuria, hematuria and urgency. Skin: Negative for rash. /80 (Site: Right Upper Arm)   Temp 97 °F (36.1 °C) (Infrared)   Ht 5' 4\" (1.626 m)   Wt 248 lb (112.5 kg)   BMI 42.57 kg/m²    Objective:   Physical Exam  Constitutional:       General: She is not in acute distress. Appearance: She is well-developed. HENT:      Head: Normocephalic. Right Ear: External ear normal.      Left Ear: External ear normal.      Mouth/Throat:      Pharynx: No oropharyngeal exudate. Neck:      Thyroid: No thyromegaly. Vascular: No JVD. Cardiovascular:      Rate and Rhythm: Normal rate and regular rhythm. Heart sounds: Normal heart sounds. No murmur. Pulmonary:      Effort: Pulmonary effort is normal.      Breath sounds: Normal breath sounds. No wheezing or rales. Abdominal:      General: Bowel sounds are normal. There is no distension. Palpations: Abdomen is soft. There is no mass. Tenderness: There is abdominal tenderness. There is no guarding or rebound. Hernia: No hernia is present. Comments: Obese.   Tenderness to deep palpation of the LUQ. Lymphadenopathy:      Cervical: No cervical adenopathy. Skin:     Comments: No rash noted anterior or posterior. Neurological:      Mental Status: She is alert and oriented to person, place, and time. Assessment:      LUQ Abdominal Pain  Constipation       Plan:      I recommended that she use Miralax over the next couple days. Call if worse or no better.     RTO 3 months for Hypertension / Back Pain / Anxiety         ENRICO VORA, DO

## 2020-11-03 ENCOUNTER — HOSPITAL ENCOUNTER (OUTPATIENT)
Age: 80
Setting detail: OUTPATIENT SURGERY
Discharge: HOME | End: 2020-11-03
Attending: ORTHOPAEDIC SURGERY | Admitting: ORTHOPAEDIC SURGERY
Payer: MEDICARE

## 2020-11-03 ENCOUNTER — ANESTHESIA (OUTPATIENT)
Dept: OPERATING ROOM | Age: 80
End: 2020-11-03
Payer: MEDICARE

## 2020-11-03 VITALS
OXYGEN SATURATION: 100 % | SYSTOLIC BLOOD PRESSURE: 115 MMHG | DIASTOLIC BLOOD PRESSURE: 57 MMHG | RESPIRATION RATE: 11 BRPM

## 2020-11-03 VITALS
HEART RATE: 62 BPM | RESPIRATION RATE: 16 BRPM | DIASTOLIC BLOOD PRESSURE: 77 MMHG | SYSTOLIC BLOOD PRESSURE: 150 MMHG | WEIGHT: 235.89 LBS | OXYGEN SATURATION: 98 % | HEIGHT: 64 IN | BODY MASS INDEX: 40.27 KG/M2 | TEMPERATURE: 97.1 F

## 2020-11-03 PROCEDURE — 7100000011 HC PHASE II RECOVERY - ADDTL 15 MIN: Performed by: ORTHOPAEDIC SURGERY

## 2020-11-03 PROCEDURE — 3600000015 HC SURGERY LEVEL 5 ADDTL 15MIN: Performed by: ORTHOPAEDIC SURGERY

## 2020-11-03 PROCEDURE — 3700000000 HC ANESTHESIA ATTENDED CARE: Performed by: ORTHOPAEDIC SURGERY

## 2020-11-03 PROCEDURE — 3700000001 HC ADD 15 MINUTES (ANESTHESIA): Performed by: ORTHOPAEDIC SURGERY

## 2020-11-03 PROCEDURE — 7100000010 HC PHASE II RECOVERY - FIRST 15 MIN: Performed by: ORTHOPAEDIC SURGERY

## 2020-11-03 PROCEDURE — 2500000003 HC RX 250 WO HCPCS: Performed by: NURSE ANESTHETIST, CERTIFIED REGISTERED

## 2020-11-03 PROCEDURE — 2580000003 HC RX 258: Performed by: ANESTHESIOLOGY

## 2020-11-03 PROCEDURE — 7100000000 HC PACU RECOVERY - FIRST 15 MIN: Performed by: ORTHOPAEDIC SURGERY

## 2020-11-03 PROCEDURE — 6360000002 HC RX W HCPCS: Performed by: NURSE ANESTHETIST, CERTIFIED REGISTERED

## 2020-11-03 PROCEDURE — 2500000003 HC RX 250 WO HCPCS: Performed by: ORTHOPAEDIC SURGERY

## 2020-11-03 PROCEDURE — 2709999900 HC NON-CHARGEABLE SUPPLY: Performed by: ORTHOPAEDIC SURGERY

## 2020-11-03 PROCEDURE — 3600000005 HC SURGERY LEVEL 5 BASE: Performed by: ORTHOPAEDIC SURGERY

## 2020-11-03 PROCEDURE — 7100000001 HC PACU RECOVERY - ADDTL 15 MIN: Performed by: ORTHOPAEDIC SURGERY

## 2020-11-03 RX ORDER — SODIUM CHLORIDE 0.9 % (FLUSH) 0.9 %
10 SYRINGE (ML) INJECTION EVERY 12 HOURS SCHEDULED
Status: DISCONTINUED | OUTPATIENT
Start: 2020-11-03 | End: 2020-11-03 | Stop reason: HOSPADM

## 2020-11-03 RX ORDER — SODIUM CHLORIDE 9 MG/ML
INJECTION, SOLUTION INTRAVENOUS CONTINUOUS
Status: DISCONTINUED | OUTPATIENT
Start: 2020-11-03 | End: 2020-11-03 | Stop reason: HOSPADM

## 2020-11-03 RX ORDER — ONDANSETRON 2 MG/ML
4 INJECTION INTRAMUSCULAR; INTRAVENOUS
Status: DISCONTINUED | OUTPATIENT
Start: 2020-11-03 | End: 2020-11-03 | Stop reason: HOSPADM

## 2020-11-03 RX ORDER — PROPOFOL 10 MG/ML
INJECTION, EMULSION INTRAVENOUS CONTINUOUS PRN
Status: DISCONTINUED | OUTPATIENT
Start: 2020-11-03 | End: 2020-11-03 | Stop reason: SDUPTHER

## 2020-11-03 RX ORDER — SODIUM CHLORIDE 0.9 % (FLUSH) 0.9 %
10 SYRINGE (ML) INJECTION PRN
Status: DISCONTINUED | OUTPATIENT
Start: 2020-11-03 | End: 2020-11-03 | Stop reason: HOSPADM

## 2020-11-03 RX ORDER — LIDOCAINE HYDROCHLORIDE 20 MG/ML
INJECTION, SOLUTION EPIDURAL; INFILTRATION; INTRACAUDAL; PERINEURAL PRN
Status: DISCONTINUED | OUTPATIENT
Start: 2020-11-03 | End: 2020-11-03 | Stop reason: SDUPTHER

## 2020-11-03 RX ADMIN — PROPOFOL 200 MCG/KG/MIN: 10 INJECTION, EMULSION INTRAVENOUS at 07:26

## 2020-11-03 RX ADMIN — LIDOCAINE HYDROCHLORIDE 50 MG: 20 INJECTION, SOLUTION EPIDURAL; INFILTRATION; INTRACAUDAL; PERINEURAL at 07:26

## 2020-11-03 RX ADMIN — SODIUM CHLORIDE: 9 INJECTION, SOLUTION INTRAVENOUS at 06:34

## 2020-11-03 ASSESSMENT — PAIN - FUNCTIONAL ASSESSMENT
PAIN_FUNCTIONAL_ASSESSMENT: ACTIVITIES ARE NOT PREVENTED
PAIN_FUNCTIONAL_ASSESSMENT: 0-10

## 2020-11-03 ASSESSMENT — PULMONARY FUNCTION TESTS
PIF_VALUE: 0

## 2020-11-03 ASSESSMENT — ENCOUNTER SYMPTOMS: SHORTNESS OF BREATH: 0

## 2020-11-03 ASSESSMENT — PAIN DESCRIPTION - DESCRIPTORS: DESCRIPTORS: BURNING

## 2020-11-03 ASSESSMENT — PAIN SCALES - GENERAL
PAINLEVEL_OUTOF10: 0

## 2020-11-03 NOTE — PROGRESS NOTES
Ambulatory Surgery/Procedure Discharge Note    Vitals:    11/03/20 0827   BP: (!) 150/77   Pulse: 62   Resp: 16   Temp: 97.1 °F (36.2 °C)   SpO2: 98%   BP stable    In: 250 [I.V.:250]  Out: -     Restroom use offered before discharge. Yes    Pain assessment:  none  Pain Level: 0    Discharge instructions given to patient and her daughter. Verbalizes understanding. Patient discharged to home/self care.  Patient discharged via wheel chair by transporter to waiting family/S.O.       11/3/2020 8:45 AM

## 2020-11-03 NOTE — H&P
Pre-operative Update of H&P:    I  have seen & examined Ms. Rachele Rodriguez related solely to her hand and upper extremity conditions, prior to the scheduled procedure on the date of her surgery. The indications for the planned surgical procedure & and her upper-extremity condition are unchanged.

## 2020-11-03 NOTE — ANESTHESIA POSTPROCEDURE EVALUATION
Department of Anesthesiology  Postprocedure Note    Patient: Mario Steel  MRN: [id number]  YOB: 1940  Date of evaluation: 11/3/2020  Time:  7:56 AM     Procedure Summary     Date:  11/03/20 Room / Location:   [facility] [address] [place] [address]    Anesthesia Start:  0722 Anesthesia Stop:  8176    Procedure:  RIGHT CARPAL TUNNEL RELEASE (Right ) Diagnosis:       Right carpal tunnel syndrome      (RIGHT CARPAL TUNNEL SYNDROME)    Surgeon:  Claudetta Harrier, MD Responsible Provider:  Arlet Sutton MD    Anesthesia Type:  MAC ASA Status:  3          Anesthesia Type: MAC    Bandar Phase I: Bandar Score: 9    Bandar Phase II:      Last vitals: Reviewed and per EMR flowsheets.        Anesthesia Post Evaluation    Patient location during evaluation: PACU  Patient participation: complete - patient participated  Level of consciousness: awake and alert  Airway patency: patent  Nausea & Vomiting: no nausea and no vomiting  Complications: no  Cardiovascular status: hemodynamically stable  Respiratory status: acceptable  Hydration status: stable

## 2020-11-03 NOTE — OP NOTE
OPERATIVE REPORT          Patient:  Haylee Long    YOB: 1940  Date of Service:  11/3/2020   Location:  [facility]    Preoperative Diagnosis:    Right carpal tunnel syndrome    Postoperative Diagnosis:    Same    Procedure:    Right carpal tunnel release      Surgeon:    Gaviota Ortiz. Cameron Mireles MD    Surgical Assistant:    SINDHU Yun Assistant    Anesthesia:   Local with Sedation    Blood Loss:   Minimal    Complications:  None    Tourniquet Time: 3 minutes     Indications:  Ms. Haylee Long  is a [de-identified]y.o. year-old female with Right carpal tunnel syndrome. I have discussed preoperatively with her  the complications, limitations, expectations, alternatives and risks of surgical care, which she has understood. All of her questions have been fully answered, and she has provided written informed consent to proceed. Procedure:   After written consent was obtained and the proper operative site was identified and marked, Ms. Haylee Long was brought to the operating room, placed in the supine position on the operating room table with the Right arm extended upon a hand table. Under an appropriate level of sedation, local anesthetic (1% Lidocaine and 1/2% Marcaine both without Epinephrine) was instilled in the planned surgical field. Her Right upper extremity was prepped and draped in the usual sterile fashion. After Esmarch exsanguination, the pneumotourniquet was inflated to 250 mm of mercury. A 2 cm longitudinal incision was fashioned at the base of the palm, paralleling the longitudinal thenar crease. Dissection was carried carefully through the subcutaneous tissue identifying and protecting the neurovascular structures. The palmar fascia was incised longitudinally, exposing the transverse carpal ligament. The transverse carpal ligament was incised from its proximal to distal most extent, under direct visualization.  The terminal 2 cm of antebrachial fascia was similarly

## 2020-11-03 NOTE — ANESTHESIA PRE PROCEDURE
Department of Anesthesiology  Preprocedure Note       Name:  Colleen Hartman   Age:  [de-identified] y.o.  :  1940                                          MRN:  [id number]         Date:  11/3/2020      Surgeon: Suzy Corona):  Francia Soria MD    Procedure: RIGHT CARPAL TUNNEL RELEASE (Right )    Medications prior to admission:   Prior to Admission medications    Medication Sig Start Date End Date Taking? Authorizing Provider   HYDROcodone-acetaminophen (NORCO) 7.5-325 MG per tablet Take 1 tablet by mouth every 8 hours as needed for Pain for up to 30 days. 10/28/20 11/27/20 Yes Konstantin Motta, DO   LORazepam (ATIVAN) 1 MG tablet TAKE 1 TABLET BY MOUTH EVERY 8 HOURS AS NEEDED FOR ANXIETY  Patient taking differently: nightly.  TAKE 1 TABLET BY MOUTH EVERY 8 HOURS AS NEEDED FOR ANXIETY 10/28/20 11/27/20 Yes Cite Vinicio Motta DO   verapamil (CALAN SR) 120 MG extended release tablet TAKE 1 TABLET EVERY DAY 9/10/20  Yes Mario Motta DO   metoprolol tartrate (LOPRESSOR) 50 MG tablet TAKE 1 TABLET TWICE DAILY 9/10/20  Yes Sharonda Gomze,    triamterene-hydroCHLOROthiazide (MAXZIDE-25) 37.5-25 MG per tablet TAKE 1 TABLET EVERY DAY 9/10/20  Yes Sharonda Gomez DO   meloxicam (MOBIC) 15 MG tablet TAKE 1 TABLET EVERY DAY 20  Yes Sharonda Gomez DO   omeprazole (PRILOSEC) 20 MG delayed release capsule Take 1 capsule by mouth once daily 20  Yes Mario Motta DO   cyclobenzaprine (FLEXERIL) 10 MG tablet Take 1 tablet by mouth 3 times daily as needed for Muscle spasms 10/16/19  Yes Mario Motta DO   fluticasone (FLONASE) 50 MCG/ACT nasal spray 2 sprays by Nasal route daily  Patient taking differently: 2 sprays by Nasal route daily as needed  19  Yes Konstantin Motta DO   magnesium 200 MG TABS tablet Take 200 mg by mouth daily   Yes Historical Provider, MD   B Complex Vitamins (VITAMIN B COMPLEX PO) Take by mouth daily    Yes Historical Provider, MD BRIGGS 0.01 % SOLN ophthalmic drops Place 1 drop into both eyes  3/30/17  Yes Historical Provider, MD   docusate sodium (COLACE) 100 MG capsule Take 1 capsule by mouth 2 times daily 2/24/17  Yes Michelle Luevano MD       Current medications:    Current Facility-Administered Medications   Medication Dose Route Frequency Provider Last Rate Last Dose    0.9 % sodium chloride infusion   Intravenous Continuous Anabelle Serna  mL/hr at 11/03/20 6996      sodium chloride flush 0.9 % injection 10 mL  10 mL Intravenous 2 times per day Anabelle Serna MD        sodium chloride flush 0.9 % injection 10 mL  10 mL Intravenous PRN Anabelle Serna MD           Allergies:     Allergies   Allergen Reactions    Latex Itching and Rash    Bactrim Rash       Problem List:    Patient Active Problem List   Diagnosis Code    Paroxysmal supraventricular tachycardia (HCC) I47.1    Foot drop, left M21.372    Anxiety F41.9    PUD (peptic ulcer disease) K27.9    Restless leg syndrome G25.81    GERD without esophagitis K21.9    Primary osteoarthritis involving multiple joints M89.49    Non morbid obesity due to excess calories E66.09    Primary insomnia F51.01    Coronary artery disease involving native coronary artery without angina pectoris I25.10    Essential hypertension I10    Chronic kidney disease, stage 3 N18.30    Intrinsic eczema L20.84    Chronic midline low back pain without sciatica M54.5, G89.29    Glenohumeral arthritis M19.019    Biceps tendonitis of both shoulders M75.21, M75.22    Acromioclavicular joint arthritis M19.019    Rotator cuff tendonitis M75.80    Anesthesia complication H66.22PI    Fatigue R53.83    Hot flashes R23.2    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    Postmenopausal status Z78.0    Seborrheic keratosis L82.1    Grade IV hemorrhoids K64.3    Glaucoma H40.9    Internal hemorrhoids K64.8    Positive PPD R76.11    Carpal tunnel syndrome of right wrist G56.01 Past Medical History:        Diagnosis Date    Anesthesia complication     difficulty waking up after breast biopsy    Anxiety     Chronic kidney disease, stage 3     Chronic midline low back pain without sciatica     Coronary artery disease involving native coronary artery without angina pectoris     Essential hypertension     Fatigue     Foot drop, left     GERD (gastroesophageal reflux disease)     History of blood transfusion     Hot flashes     Internal hemorrhoids     Intrinsic eczema     Lesion of lateral popliteal nerve     Non morbid obesity due to excess calories     Paroxysmal supraventricular tachycardia (HCC)     Positive PPD 2020    repeated --second PPD test came back negative    Postmenopausal status     Primary insomnia     Primary osteoarthritis involving multiple joints     PUD (peptic ulcer disease)     Restless leg syndrome     Seborrheic keratosis     Wears glasses        Past Surgical History:        Procedure Laterality Date    ABDOMINAL EXPLORATION SURGERY      Lysis of Adhesions    BREAST SURGERY Right     Biopsy    CHOLECYSTECTOMY      COLONOSCOPY      CYST REMOVAL      from back    HEMORRHOID SURGERY  2017    HYSTERECTOMY      Complete    LUMBAR LAMINECTOMY      x 3 with fusion    SHOULDER ARTHROSCOPY Right     Right shoulder scope 2) Right shoulder labral debridement 3) Right shoulder Open Miguel 4) Right shoulder rotator cuff repair    TOTAL HIP ARTHROPLASTY Bilateral     TOTAL KNEE ARTHROPLASTY Left 2-3-15    Dr. Andrea Paz UPPER GASTROINTESTINAL ENDOSCOPY N/A 3/10/2020    EGD ESOPHAGOGASTRODUODENOSCOPY performed by Dori Chow MD at [facility]         Social History:    Social History     Tobacco Use    Smoking status: Former Smoker     Packs/day: 1.00     Types: Cigarettes     Last attempt to quit: 1997     Years since quittin.8    Smokeless tobacco: Never Used   Substance Use Topics    Alcohol use: Yes     Alcohol/week: 2.0 standard drinks     Types: 1 Glasses of wine, 1 Cans of beer per week     Comment: rare                                Counseling given: Not Answered      Vital Signs (Current):   Vitals:    10/29/20 1557 11/03/20 0628   BP:  129/69   Pulse:  64   Resp:  14   Temp:  97.2 °F (36.2 °C)   TempSrc:  Temporal   SpO2:  99%   Weight: 236 lb (107 kg) 235 lb 14.3 oz (107 kg)   Height: 5' 4\" (1.626 m)                                               BP Readings from Last 3 Encounters:   11/03/20 129/69   11/02/20 124/80   10/28/20 (!) 142/78       NPO Status: Time of last liquid consumption: 2100                        Time of last solid consumption: 2100                        Date of last liquid consumption: 11/02/20                        Date of last solid food consumption: 11/02/20    BMI:   Wt Readings from Last 3 Encounters:   11/03/20 235 lb 14.3 oz (107 kg)   11/02/20 248 lb (112.5 kg)   10/28/20 236 lb (107 kg)     Body mass index is 40.49 kg/m². CBC:   Lab Results   Component Value Date    WBC 5.3 04/22/2015    RBC 4.39 04/22/2015    HGB 11.9 04/22/2015    HCT 36.9 04/22/2015    MCV 84.0 04/22/2015    RDW 15.3 04/22/2015     05/07/2015       CMP:   Lab Results   Component Value Date     01/28/2020    K 4.2 01/28/2020     01/28/2020    CO2 26 01/28/2020    BUN 22 01/28/2020    CREATININE 1.4 01/28/2020    GFRAA 44 01/28/2020    GFRAA 57 09/26/2012    LABGLOM 36 01/28/2020    GLUCOSE 81 01/28/2020    CALCIUM 9.5 01/28/2020    AST 19 09/26/2012    ALT 16 09/26/2012       POC Tests: No results for input(s): POCGLU, POCNA, POCK, POCCL, POCBUN, POCHEMO, POCHCT in the last 72 hours.     Coags:   Lab Results   Component Value Date    PROTIME 10.5 05/07/2015    INR 0.97 05/07/2015    APTT 30.8 01/21/2015       HCG (If Applicable): No results found for: PREGTESTUR, PREGSERUM, HCG, HCGQUANT     ABGs: No results found for: PHART, PO2ART, HYB8TJQ, XIP3ZNE, BEART, T9UVPGTF     Type & Screen (If Applicable):  No results found for: [facility]    Anesthesia Evaluation  Patient summary reviewed history of anesthetic complications (prolonged emergence): Airway: Mallampati: II  TM distance: >3 FB   Neck ROM: full  Mouth opening: > = 3 FB Dental:      Comment: Missing multiple teeth    Pulmonary: breath sounds clear to auscultation      (-) COPD, asthma, shortness of breath, recent URI and sleep apnea                           Cardiovascular:    (+) hypertension:, CAD:, dysrhythmias: SVT and atrial fibrillation,     (-) valvular problems/murmurs, past MI and murmur      Rhythm: regular  Rate: normal                    Neuro/Psych:   (+) neuromuscular disease:,    (-) seizures, TIA, CVA and headaches           GI/Hepatic/Renal:   (+) GERD:, PUD, renal disease: CRI, morbid obesity          Endo/Other:        (-) diabetes mellitus, hypothyroidism, hyperthyroidism, blood dyscrasia               Abdominal:           Vascular:                                          Anesthesia Plan      MAC     ASA 3       Induction: intravenous. Anesthetic plan and risks discussed with patient and child/children. Plan discussed with CRNA. This pre-anesthesia assessment may be used as a history and physical.    DOS STAFF ADDENDUM:    Pt seen and examined, chart reviewed (including anesthesia, drug and allergy history). No interval changes to history and physical examination. Anesthetic plan, risks, benefits, alternatives, and personnel involved discussed with patient. Patient verbalized an understanding and agrees to proceed.       Irasema Velasco MD  November 3, 2020  6:45 AM          Irasema Velasco MD   11/3/2020

## 2020-11-09 ENCOUNTER — OFFICE VISIT (OUTPATIENT)
Dept: ORTHOPEDIC SURGERY | Age: 80
End: 2020-11-09

## 2020-11-09 VITALS — BODY MASS INDEX: 40.12 KG/M2 | HEIGHT: 64 IN | RESPIRATION RATE: 16 BRPM | TEMPERATURE: 97.4 F | WEIGHT: 235 LBS

## 2020-11-09 PROBLEM — G56.01 CARPAL TUNNEL SYNDROME OF RIGHT WRIST: Status: RESOLVED | Noted: 2020-08-28 | Resolved: 2020-11-09

## 2020-11-09 PROCEDURE — 99024 POSTOP FOLLOW-UP VISIT: CPT | Performed by: ORTHOPAEDIC SURGERY

## 2020-11-09 NOTE — Clinical Note
Dear  Ronni Kirby, DO,    Thank you very much for your referral or Ms. Lisa Duggan to me for evaluation and treatment of her Hand & Wrist condition. I appreciate your confidence in me and thank you for allowing me the opportunity to care for your patients. If I can be of any further assistance to you on this or any other patient, please do not hesitate to contact me. Sincerely,    Hardik Teresa.  Babita Desai MD

## 2020-11-09 NOTE — PROGRESS NOTES
Ms. Mario Steel returns today in follow-up of her recent right Carpal Tunnel Release done approximately 1 week ago. She has done well noting no discomfort and no other reported complications. She notes pre-operative symptoms to be completely resolved at this time. Physical Exam:  Skin incision is healing well, without erythema, drainage or sign of infection, nontender. Digital range of motion is Full and equal bilaterally. Wrist range of motion is Full and equal bilaterally. Sensation is significantly improved from preoperatvely  Vascular examination reveals normal, good capillary refill, good color and warm. Swelling is minimal.  Sensory and Motor Median Nerve function is intact. Impression:  Ms. Mario Steel is doing well after recent right Carpal Tunnel Release. Plan:  Ms. Mario Steel is instructed in work on Active & Passive range of motion of the digits, wrist, & elbow. These modalities were specifically demonstrated to her today and she return demonstrated proper understanding. We discussed the appropriateness of gradual resumption of use of the operated hand and the return to normal use as comfort allows. She is given instructions regarding management of the fresh surgical incision and progressive use of desensitization and tissue massage techniques. We discussed the appropriate expectations and timeline for symptom improvement. She is provided a written patient instruction sheet titled: Postoperative Instructions After Carpal Tunnel Release. I have asked Ms. Mario Steel to follow-up with me or contact me by telephone over the next 4 weeks if her symptoms have not fully resolved or if she has not regained full & painless return of function. She is also specifically instructed to return to the office or call for an appointment sooner if her symptoms are changing or worsening prior to that time.

## 2020-11-09 NOTE — PATIENT INSTRUCTIONS
Postoperative Instructions After Carpal Tunnel Release    Dr. Yamile Bauer. Feng        1. After bandages are removed one week from surgery, you may chose to wear a small bandage over the incision if you wish, though you do not need to. 2. Keep incision dry until sutures have fully dissolved  or it has been 14 days since your surgery. Thereafter, you may wash with mild soap and water and shower normally. 3. Once your stiches have fully disappeared & skin appears normal, you should begin gently massaging the incision with Vitamin E (may use Vitamin E lotion or contents of Vitamin E capsule). 4. Work hard on motion of the fingers and wrist, straightening each finger fully and bending each finger fully, bending wrist forward and bending wrist backwards. Do not be concerned if you experience discomfort. This will not damage the surgery. 5. You may begin using the hand as it feels comfortable beginning 12-14 days from the day of surgery. You may not feel entirely comfortable gripping or lifting heavy objects for several weeks. 6. You may expect to see some skin peel off around the incision. You may be left with a small area of pink baby skin. This is quite normal.    Thank you for choosing [facility]) Physicians for your Hand and Upper Extremity needs. If we can be of any further assistance to you, please do not hesitate to contact us.     Office Phone Number:  [phone]  or  [phone]

## 2021-01-28 ENCOUNTER — TELEPHONE (OUTPATIENT)
Dept: FAMILY MEDICINE CLINIC | Age: 81
End: 2021-01-28

## 2021-01-28 DIAGNOSIS — M15.9 PRIMARY OSTEOARTHRITIS INVOLVING MULTIPLE JOINTS: ICD-10-CM

## 2021-01-28 DIAGNOSIS — M54.50 CHRONIC MIDLINE LOW BACK PAIN WITHOUT SCIATICA: ICD-10-CM

## 2021-01-28 DIAGNOSIS — G89.29 CHRONIC MIDLINE LOW BACK PAIN WITHOUT SCIATICA: ICD-10-CM

## 2021-01-28 RX ORDER — HYDROCODONE BITARTRATE AND ACETAMINOPHEN 7.5; 325 MG/1; MG/1
1 TABLET ORAL EVERY 8 HOURS PRN
Qty: 90 TABLET | Refills: 0 | Status: SHIPPED | OUTPATIENT
Start: 2021-01-28 | End: 2021-03-05 | Stop reason: SDUPTHER

## 2021-01-28 ASSESSMENT — ENCOUNTER SYMPTOMS: SHORTNESS OF BREATH: 0

## 2021-01-28 NOTE — TELEPHONE ENCOUNTER
----- Message from [facility] sent at 1/28/2021  8:31 AM EST -----  Subject: Refill Request    QUESTIONS  Name of Medication? HYDROcodone-acetaminophen (NORCO) 7.5-325 MG per   tablet  Patient-reported dosage and instructions? 7.5-325mg  How many days do you have left? 4  Preferred Pharmacy? [address] [place] [address]  Pharmacy phone number (if available)? [phone]  ---------------------------------------------------------------------------  --------------  Elena WHITE  What is the best way for the office to contact you? OK to leave message on   voicemail  Preferred Call Back Phone Number?  [phone]

## 2021-01-28 NOTE — PROGRESS NOTES
Subjective:      Patient ID: Yris Isaac is a 80 y.o. female. Hypertension  This is a chronic problem. The current episode started more than 1 year ago. The problem is unchanged. The problem is controlled. Associated symptoms include palpitations. Pertinent negatives include no chest pain, peripheral edema or shortness of breath. Risk factors for coronary artery disease include family history, obesity, post-menopausal state and sedentary lifestyle. Past treatments include beta blockers, calcium channel blockers and diuretics. The current treatment provides significant improvement. There are no compliance problems. Chronic Kidney Disease Stage 3:  Patient takes Maxzide-25 once daily. Her renal function is checked regularly and has been stable. GERD:  Patient is tolerating and compliant with Omeprazole 20 mg daily. She feels that the medication completely controls her symptoms. She previously did not do well with Zantac. Chronic Low Back Pain:  Patient is tolerating and compliant with Mobic 15 mg daily, Flexeril 10 mg TID prn and Norco 7.5-325 every 8 hrs as needed.  She feels that the medications keep her functional.        Anxiety:  Patient takes Ativan 1 mg every 8 hrs as needed for anxiety.  She generally takes it once daily and feels that it works well to control her allergy symptoms. PSVT:  Patient sees Dr. Stanford Tom and was taken off the [facility] on 4-26-18 and had an event monitor ordered by him. She had no events during the 30 day period. She continues to take Lopressor and Calan. Obesity:  Patient weighed 234 at her visit on 7-28-20. She has lost 2 lbs since then. Review of Systems   Constitutional: Negative for chills and fever. HENT: Negative for ear discharge, ear pain and hearing loss. Respiratory: Negative for shortness of breath. Cardiovascular: Positive for palpitations. Negative for chest pain and leg swelling.        /82 (Site: Right Upper Arm) Temp 98.3 °F (36.8 °C) (Infrared)   Ht 5' 4\" (1.626 m)   Wt 232 lb (105.2 kg)   BMI 39.82 kg/m²    Objective:   Physical Exam   Constitutional: She is oriented to person, place, and time. She appears well-developed and well-nourished. No distress. HENT:   Head: Normocephalic. Right Ear: External ear normal.   Left Ear: External ear normal.   Mouth/Throat: Oropharynx is clear and moist. No oropharyngeal exudate. Neck: No JVD present. No thyromegaly present. Cardiovascular: Normal rate, regular rhythm and normal heart sounds. No murmur heard. Pulmonary/Chest: Effort normal and breath sounds normal. She has no wheezes. She has no rales. Musculoskeletal:         General: No edema. Lymphadenopathy:     She has no cervical adenopathy. Neurological: She is alert and oriented to person, place, and time. Assessment:      Hypertension  Chronic Kidney Disease Stage 3  GERD  Chronic Low Back Pain  Anxiety  PSVT  Obesity      Plan:       Chem 7, Lipid Panel   OARRS report was reviewed  Medications refilled   Rx given for Shingrix shot at the pharmacy. RTO 3 months for Back Pain / Anxiety       Controlled Substance Monitoring:    Acute and Chronic Pain Monitoring:   RX Monitoring 2/1/2021   Attestation -   Acute Pain Prescriptions Severe pain not adequately treated with lower dose. Periodic Controlled Substance Monitoring No signs of potential drug abuse or diversion identified.    Chronic Pain > 80 MEDD -

## 2021-02-01 ENCOUNTER — OFFICE VISIT (OUTPATIENT)
Dept: FAMILY MEDICINE CLINIC | Age: 81
End: 2021-02-01
Payer: MEDICARE

## 2021-02-01 VITALS
TEMPERATURE: 98.3 F | HEIGHT: 64 IN | BODY MASS INDEX: 39.61 KG/M2 | WEIGHT: 232 LBS | DIASTOLIC BLOOD PRESSURE: 82 MMHG | SYSTOLIC BLOOD PRESSURE: 128 MMHG

## 2021-02-01 DIAGNOSIS — E66.09 NON MORBID OBESITY DUE TO EXCESS CALORIES: ICD-10-CM

## 2021-02-01 DIAGNOSIS — Z23 NEED FOR SHINGLES VACCINE: ICD-10-CM

## 2021-02-01 DIAGNOSIS — G89.29 CHRONIC MIDLINE LOW BACK PAIN WITHOUT SCIATICA: ICD-10-CM

## 2021-02-01 DIAGNOSIS — M54.50 CHRONIC MIDLINE LOW BACK PAIN WITHOUT SCIATICA: ICD-10-CM

## 2021-02-01 DIAGNOSIS — N18.32 CHRONIC KIDNEY DISEASE, STAGE 3B (HCC): ICD-10-CM

## 2021-02-01 DIAGNOSIS — I10 ESSENTIAL HYPERTENSION: Primary | ICD-10-CM

## 2021-02-01 DIAGNOSIS — I47.1 PAROXYSMAL SUPRAVENTRICULAR TACHYCARDIA (HCC): ICD-10-CM

## 2021-02-01 DIAGNOSIS — F41.9 ANXIETY: ICD-10-CM

## 2021-02-01 DIAGNOSIS — K21.9 GERD WITHOUT ESOPHAGITIS: ICD-10-CM

## 2021-02-01 LAB
ANION GAP SERPL CALCULATED.3IONS-SCNC: 12 MMOL/L (ref 3–16)
BUN BLDV-MCNC: 24 MG/DL (ref 7–20)
CALCIUM SERPL-MCNC: 9.6 MG/DL (ref 8.3–10.6)
CHLORIDE BLD-SCNC: 100 MMOL/L (ref 99–110)
CHOLESTEROL, TOTAL: 219 MG/DL (ref 0–199)
CO2: 25 MMOL/L (ref 21–32)
CREAT SERPL-MCNC: 1.3 MG/DL (ref 0.6–1.2)
GFR AFRICAN AMERICAN: 48
GFR NON-AFRICAN AMERICAN: 39
GLUCOSE BLD-MCNC: 84 MG/DL (ref 70–99)
HDLC SERPL-MCNC: 75 MG/DL (ref 40–60)
LDL CHOLESTEROL CALCULATED: 124 MG/DL
POTASSIUM SERPL-SCNC: 4.4 MMOL/L (ref 3.5–5.1)
SODIUM BLD-SCNC: 137 MMOL/L (ref 136–145)
TRIGL SERPL-MCNC: 99 MG/DL (ref 0–150)
VLDLC SERPL CALC-MCNC: 20 MG/DL

## 2021-02-01 PROCEDURE — 99214 OFFICE O/P EST MOD 30 MIN: CPT | Performed by: FAMILY MEDICINE

## 2021-02-01 PROCEDURE — G8484 FLU IMMUNIZE NO ADMIN: HCPCS | Performed by: FAMILY MEDICINE

## 2021-02-01 PROCEDURE — 1123F ACP DISCUSS/DSCN MKR DOCD: CPT | Performed by: FAMILY MEDICINE

## 2021-02-01 PROCEDURE — 1090F PRES/ABSN URINE INCON ASSESS: CPT | Performed by: FAMILY MEDICINE

## 2021-02-01 PROCEDURE — G8417 CALC BMI ABV UP PARAM F/U: HCPCS | Performed by: FAMILY MEDICINE

## 2021-02-01 PROCEDURE — 1036F TOBACCO NON-USER: CPT | Performed by: FAMILY MEDICINE

## 2021-02-01 PROCEDURE — G8427 DOCREV CUR MEDS BY ELIG CLIN: HCPCS | Performed by: FAMILY MEDICINE

## 2021-02-01 PROCEDURE — G8399 PT W/DXA RESULTS DOCUMENT: HCPCS | Performed by: FAMILY MEDICINE

## 2021-02-01 PROCEDURE — 36415 COLL VENOUS BLD VENIPUNCTURE: CPT | Performed by: FAMILY MEDICINE

## 2021-02-01 PROCEDURE — 4040F PNEUMOC VAC/ADMIN/RCVD: CPT | Performed by: FAMILY MEDICINE

## 2021-02-01 RX ORDER — TRIAMTERENE AND HYDROCHLOROTHIAZIDE 37.5; 25 MG/1; MG/1
TABLET ORAL
Qty: 90 TABLET | Refills: 1 | Status: SHIPPED | OUTPATIENT
Start: 2021-02-01 | End: 2021-08-05 | Stop reason: SDUPTHER

## 2021-02-01 RX ORDER — ZOSTER VACCINE RECOMBINANT, ADJUVANTED 50 MCG/0.5
0.5 KIT INTRAMUSCULAR ONCE
Qty: 0.5 ML | Refills: 0 | Status: SHIPPED | OUTPATIENT
Start: 2021-02-01 | End: 2021-02-01

## 2021-02-01 RX ORDER — METOPROLOL TARTRATE 50 MG/1
TABLET, FILM COATED ORAL
Qty: 180 TABLET | Refills: 1 | Status: SHIPPED | OUTPATIENT
Start: 2021-02-01 | End: 2021-08-05 | Stop reason: SDUPTHER

## 2021-03-05 DIAGNOSIS — M54.50 CHRONIC MIDLINE LOW BACK PAIN WITHOUT SCIATICA: ICD-10-CM

## 2021-03-05 DIAGNOSIS — H69.82 EUSTACHIAN TUBE DYSFUNCTION, LEFT: ICD-10-CM

## 2021-03-05 DIAGNOSIS — G89.29 CHRONIC MIDLINE LOW BACK PAIN WITHOUT SCIATICA: ICD-10-CM

## 2021-03-05 DIAGNOSIS — M15.9 PRIMARY OSTEOARTHRITIS INVOLVING MULTIPLE JOINTS: ICD-10-CM

## 2021-03-05 RX ORDER — HYDROCODONE BITARTRATE AND ACETAMINOPHEN 7.5; 325 MG/1; MG/1
1 TABLET ORAL EVERY 8 HOURS PRN
Qty: 90 TABLET | Refills: 0 | Status: SHIPPED | OUTPATIENT
Start: 2021-03-05 | End: 2021-04-20 | Stop reason: SDUPTHER

## 2021-03-05 RX ORDER — FLUTICASONE PROPIONATE 50 MCG
2 SPRAY, SUSPENSION (ML) NASAL DAILY
Qty: 1 BOTTLE | Refills: 2 | Status: SHIPPED | OUTPATIENT
Start: 2021-03-05 | End: 2021-04-13 | Stop reason: SDUPTHER

## 2021-03-10 ENCOUNTER — OFFICE VISIT (OUTPATIENT)
Dept: ORTHOPEDIC SURGERY | Age: 81
End: 2021-03-10
Payer: MEDICARE

## 2021-03-10 VITALS
SYSTOLIC BLOOD PRESSURE: 135 MMHG | HEIGHT: 64 IN | WEIGHT: 232 LBS | BODY MASS INDEX: 39.61 KG/M2 | TEMPERATURE: 97.3 F | HEART RATE: 63 BPM | DIASTOLIC BLOOD PRESSURE: 72 MMHG

## 2021-03-10 DIAGNOSIS — M19.019 GLENOHUMERAL ARTHRITIS: ICD-10-CM

## 2021-03-10 DIAGNOSIS — M25.511 CHRONIC RIGHT SHOULDER PAIN: ICD-10-CM

## 2021-03-10 DIAGNOSIS — M75.102 TEAR OF LEFT ROTATOR CUFF, UNSPECIFIED TEAR EXTENT, UNSPECIFIED WHETHER TRAUMATIC: ICD-10-CM

## 2021-03-10 DIAGNOSIS — G89.29 CHRONIC RIGHT SHOULDER PAIN: ICD-10-CM

## 2021-03-10 DIAGNOSIS — M19.012 ARTHRITIS OF LEFT ACROMIOCLAVICULAR JOINT: ICD-10-CM

## 2021-03-10 DIAGNOSIS — G89.29 CHRONIC LEFT SHOULDER PAIN: ICD-10-CM

## 2021-03-10 DIAGNOSIS — M25.512 CHRONIC LEFT SHOULDER PAIN: ICD-10-CM

## 2021-03-10 DIAGNOSIS — M75.80 ROTATOR CUFF TENDINITIS, UNSPECIFIED LATERALITY: Primary | ICD-10-CM

## 2021-03-10 PROCEDURE — 99214 OFFICE O/P EST MOD 30 MIN: CPT | Performed by: ORTHOPAEDIC SURGERY

## 2021-03-10 PROCEDURE — G8399 PT W/DXA RESULTS DOCUMENT: HCPCS | Performed by: ORTHOPAEDIC SURGERY

## 2021-03-10 PROCEDURE — G8484 FLU IMMUNIZE NO ADMIN: HCPCS | Performed by: ORTHOPAEDIC SURGERY

## 2021-03-10 PROCEDURE — G8427 DOCREV CUR MEDS BY ELIG CLIN: HCPCS | Performed by: ORTHOPAEDIC SURGERY

## 2021-03-10 PROCEDURE — 1123F ACP DISCUSS/DSCN MKR DOCD: CPT | Performed by: ORTHOPAEDIC SURGERY

## 2021-03-10 PROCEDURE — 20610 DRAIN/INJ JOINT/BURSA W/O US: CPT | Performed by: ORTHOPAEDIC SURGERY

## 2021-03-10 PROCEDURE — 1090F PRES/ABSN URINE INCON ASSESS: CPT | Performed by: ORTHOPAEDIC SURGERY

## 2021-03-10 PROCEDURE — G8417 CALC BMI ABV UP PARAM F/U: HCPCS | Performed by: ORTHOPAEDIC SURGERY

## 2021-03-10 PROCEDURE — 4040F PNEUMOC VAC/ADMIN/RCVD: CPT | Performed by: ORTHOPAEDIC SURGERY

## 2021-03-10 PROCEDURE — 1036F TOBACCO NON-USER: CPT | Performed by: ORTHOPAEDIC SURGERY

## 2021-03-10 RX ORDER — BUPIVACAINE HYDROCHLORIDE 2.5 MG/ML
3 INJECTION, SOLUTION INFILTRATION; PERINEURAL ONCE
Status: COMPLETED | OUTPATIENT
Start: 2021-03-10 | End: 2021-03-10

## 2021-03-10 RX ORDER — METHYLPREDNISOLONE ACETATE 40 MG/ML
80 INJECTION, SUSPENSION INTRA-ARTICULAR; INTRALESIONAL; INTRAMUSCULAR; SOFT TISSUE ONCE
Status: COMPLETED | OUTPATIENT
Start: 2021-03-10 | End: 2021-03-10

## 2021-03-10 RX ADMIN — BUPIVACAINE HYDROCHLORIDE 7.5 MG: 2.5 INJECTION, SOLUTION INFILTRATION; PERINEURAL at 12:54

## 2021-03-10 RX ADMIN — METHYLPREDNISOLONE ACETATE 80 MG: 40 INJECTION, SUSPENSION INTRA-ARTICULAR; INTRALESIONAL; INTRAMUSCULAR; SOFT TISSUE at 12:55

## 2021-03-10 NOTE — PROGRESS NOTES
Yoselin Sesay  [id number]  March 10, 2021    Chief Complaint   Patient presents with    Shoulder Pain     B shld pain        History: The patient is an 68-year-old female who is here for evaluation of both shoulders. We have seen her in the past for the right shoulder. She did undergo a right shoulder rotator cuff repair back in 2014. She recovered uneventfully. She has been having increasing right shoulder pain after the past few months. 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 The pain is most severe at the end of the day. She does take ibuprofen on a regular basis. She takes Norco for chronic back pain. Her left shoulder has been painful for the past few months as well. She is right-hand dominant. She denies any numbness or tingling. The patient's  past medical history, medications, allergies,  family history, social history, and have been reviewed, and dated and are recorded in the chart. Pertinent items are noted in HPI. Review of systems reviewed from Pertinent History Form dated on 3/10/2021 and available in the patient's chart under the Media tab. Vitals:  /72   Pulse 63   Temp 97.3 °F (36.3 °C) (Temporal)   Ht 5' 4\" (1.626 m)   Wt 232 lb (105.2 kg)   BMI 39.82 kg/m²     Physical: Physical: The patient presents today in no acute distress. She is alert and oriented to person, place and time. She displays appropriate mood and affect. She is well dressed, nourished and  groomed. She has a normal affect. Examination of the neck reveals no evidence of tenderness. Spurling's sign is negative. Active range of motion of the right shoulder is: 175 degrees abduction, 175 degrees forward flexion, 45 degrees of external rotation and internal rotation to L1. Passive range of motion of the right shoulder is: 180 degrees abduction, 180 degrees forward flexion, 50 degrees of external rotation and internal rotation to T11.  Active range of motion of the left shoulder is: 160 degrees abduction, 160 degrees forward flexion, 25 degrees of external rotation and internal rotation to L5. Passive range of motion of the right shoulder is: 170 degrees abduction, 170 degrees forward flexion, 40 degrees of external rotation and internal rotation to L3   Examination of the right shoulder reveals positive Neer and Menjivar' impingement signs. There is mild subacromial crepitus with range of motion. Drop arm test is slightly positive on the left. Drop arm test is negative on the right. Speed's test is negative. There is no evidence of tenderness over the Bicipital groove. She  does not have tenderness over the [facility] joint. Cross body adduction test is negative. She has moderate tenderness over the subacromial space. There is no evidence of scapular winging. Lift off sign is negative. There is no evidence of atrophy. External rotation strength is 3/5 on the left and 5/5 on the right. There are no skin lesions, cellulitis, or extreme edema in the upper extremities. Sensation is intact to axillary, median, radial, and ulnar nerves bilaterally. The patient has warm and well-perfused bilateral upper extremities with brisk capillary refill. Radial and ulnar pulses are palpable and 2+ bilaterally. X-rays: 4 views of both shoulders obtained in the office today were extensively reviewed. There is mild to moderate osteoarthritis of the glenohumeral joint bilaterally. There is no real evidence of elevation of the humerus. There is mild to moderate AC joint arthritis on the left. There is a type II acromion on the left. Impression: #1 bilateral shoulder glenohumeral arthritis #2 bilateral shoulder rotator cuff tendinitis #3 left shoulder AC joint arthritis #4 left shoulder rotator cuff tear    Plan: At this time we will inject the bilateral shoulders under sterile conditions.  After a Betadine prep of the shoulders, 3cc of 0.25% Marcaine and 2cc of 40 mg Depo-Medrol were injected into the shoulders. The patient tolerated the injections rather well. The patient was instructed to follow up immediately for any signs of infection. The patient will follow up with me in 6 week(s). The treatment plan was discussed in detail with the patient and includes activity modification and avoidance of repetitive overhead activities. Home exercises were explained to the patient. A formal therapy program was discussed with the patient and was not indicated. If the patient continues to have severe pain and weakness, we will consider other options. Considering her age and tissue quality, I do not feel she is a great candidate for left shoulder rotator cuff repair.

## 2021-03-19 ENCOUNTER — HOSPITAL ENCOUNTER (OUTPATIENT)
Dept: WOMENS IMAGING | Age: 81
Discharge: HOME | End: 2021-03-19
Payer: MEDICARE

## 2021-03-19 DIAGNOSIS — Z12.31 VISIT FOR SCREENING MAMMOGRAM: ICD-10-CM

## 2021-03-19 PROCEDURE — 77063 BREAST TOMOSYNTHESIS BI: CPT

## 2021-04-13 DIAGNOSIS — F41.9 ANXIETY: ICD-10-CM

## 2021-04-13 DIAGNOSIS — H69.82 EUSTACHIAN TUBE DYSFUNCTION, LEFT: ICD-10-CM

## 2021-04-13 RX ORDER — OMEPRAZOLE 20 MG/1
20 CAPSULE, DELAYED RELEASE ORAL DAILY
Qty: 90 CAPSULE | Refills: 0 | Status: SHIPPED | OUTPATIENT
Start: 2021-04-13 | End: 2021-08-12 | Stop reason: SDUPTHER

## 2021-04-13 RX ORDER — LORAZEPAM 1 MG/1
TABLET ORAL
Qty: 90 TABLET | Refills: 0 | Status: SHIPPED | OUTPATIENT
Start: 2021-04-13 | End: 2021-06-29 | Stop reason: SDUPTHER

## 2021-04-13 RX ORDER — FLUTICASONE PROPIONATE 50 MCG
2 SPRAY, SUSPENSION (ML) NASAL DAILY
Qty: 1 BOTTLE | Refills: 3 | Status: SHIPPED | OUTPATIENT
Start: 2021-04-13 | End: 2022-01-24 | Stop reason: SDUPTHER

## 2021-04-20 DIAGNOSIS — M54.50 CHRONIC MIDLINE LOW BACK PAIN WITHOUT SCIATICA: ICD-10-CM

## 2021-04-20 DIAGNOSIS — G89.29 CHRONIC MIDLINE LOW BACK PAIN WITHOUT SCIATICA: ICD-10-CM

## 2021-04-20 DIAGNOSIS — M15.9 PRIMARY OSTEOARTHRITIS INVOLVING MULTIPLE JOINTS: ICD-10-CM

## 2021-04-20 RX ORDER — HYDROCODONE BITARTRATE AND ACETAMINOPHEN 7.5; 325 MG/1; MG/1
1 TABLET ORAL EVERY 8 HOURS PRN
Qty: 90 TABLET | Refills: 0 | Status: SHIPPED | OUTPATIENT
Start: 2021-04-20 | End: 2021-05-29 | Stop reason: SDUPTHER

## 2021-04-28 ASSESSMENT — ENCOUNTER SYMPTOMS: BACK PAIN: 1

## 2021-04-28 NOTE — PROGRESS NOTES
Subjective:      Patient ID: Jagdish Richardson is a 80 y.o. female. HPI     Chronic Low Back Pain:  Patient is tolerating and compliant with Mobic 15 mg daily, Flexeril 10 mg TID prn and Norco 7.5-325 every 8 hrs as needed.  She feels that the medications keep her functional.        Anxiety:  Patient takes Ativan 1 mg every 8 hrs as needed for anxiety.  She generally takes it once daily and feels that it works well to control her allergy symptoms. Review of Systems   Constitutional: Negative for chills and fever. Musculoskeletal: Positive for back pain. Psychiatric/Behavioral: Negative for suicidal ideas. The patient is nervous/anxious. /82 (Site: Right Upper Arm)   Ht 5' 4\" (1.626 m)   Wt 230 lb (104.3 kg)   BMI 39.48 kg/m²    Objective:   Physical Exam  Constitutional:       General: She is not in acute distress. Appearance: She is well-developed. HENT:      Head: Normocephalic. Right Ear: External ear normal.      Left Ear: External ear normal.      Mouth/Throat:      Pharynx: No oropharyngeal exudate. Neck:      Thyroid: No thyromegaly. Vascular: No JVD. Cardiovascular:      Rate and Rhythm: Normal rate and regular rhythm. Heart sounds: Normal heart sounds. No murmur. Pulmonary:      Effort: Pulmonary effort is normal.      Breath sounds: Normal breath sounds. No wheezing or rales. Lymphadenopathy:      Cervical: No cervical adenopathy. Neurological:      Mental Status: She is alert and oriented to person, place, and time. Assessment:      Chronic Low Back Pain   Anxiety       Plan:      OARRS report was reviewed  Medications refilled   Recommended that patient have an AWV with our nurse. RTO 3 months for Hypertension / Back Pain / Anxiety       Controlled Substance Monitoring:    Acute and Chronic Pain Monitoring:   RX Monitoring 5/3/2021   Attestation -   Acute Pain Prescriptions Severe pain not adequately treated with lower dose.    Periodic Controlled Substance Monitoring No signs of potential drug abuse or diversion identified.    Chronic Pain > 80 MEDD -               ENRICO VORA DO

## 2021-05-03 ENCOUNTER — OFFICE VISIT (OUTPATIENT)
Dept: FAMILY MEDICINE CLINIC | Age: 81
End: 2021-05-03
Payer: MEDICARE

## 2021-05-03 VITALS
SYSTOLIC BLOOD PRESSURE: 130 MMHG | WEIGHT: 230 LBS | HEIGHT: 64 IN | DIASTOLIC BLOOD PRESSURE: 82 MMHG | BODY MASS INDEX: 39.27 KG/M2

## 2021-05-03 DIAGNOSIS — G89.29 CHRONIC MIDLINE LOW BACK PAIN WITHOUT SCIATICA: Primary | ICD-10-CM

## 2021-05-03 DIAGNOSIS — F41.9 ANXIETY: ICD-10-CM

## 2021-05-03 DIAGNOSIS — M54.50 CHRONIC MIDLINE LOW BACK PAIN WITHOUT SCIATICA: Primary | ICD-10-CM

## 2021-05-03 PROCEDURE — 1090F PRES/ABSN URINE INCON ASSESS: CPT | Performed by: FAMILY MEDICINE

## 2021-05-03 PROCEDURE — G8427 DOCREV CUR MEDS BY ELIG CLIN: HCPCS | Performed by: FAMILY MEDICINE

## 2021-05-03 PROCEDURE — 1036F TOBACCO NON-USER: CPT | Performed by: FAMILY MEDICINE

## 2021-05-03 PROCEDURE — 4040F PNEUMOC VAC/ADMIN/RCVD: CPT | Performed by: FAMILY MEDICINE

## 2021-05-03 PROCEDURE — 99213 OFFICE O/P EST LOW 20 MIN: CPT | Performed by: FAMILY MEDICINE

## 2021-05-03 PROCEDURE — G8417 CALC BMI ABV UP PARAM F/U: HCPCS | Performed by: FAMILY MEDICINE

## 2021-05-03 PROCEDURE — 1123F ACP DISCUSS/DSCN MKR DOCD: CPT | Performed by: FAMILY MEDICINE

## 2021-05-03 PROCEDURE — G8399 PT W/DXA RESULTS DOCUMENT: HCPCS | Performed by: FAMILY MEDICINE

## 2021-05-03 RX ORDER — TIMOLOL MALEATE 5 MG/ML
SOLUTION/ DROPS OPHTHALMIC
COMMUNITY
Start: 2021-04-17 | End: 2021-05-10

## 2021-05-03 RX ORDER — NAFTIFINE HYDROCHLORIDE 20 MG/G
CREAM TOPICAL
COMMUNITY
Start: 2021-03-29 | End: 2021-08-12

## 2021-05-07 NOTE — PROGRESS NOTES
Anesthesia complication     difficulty waking up after breast biopsy    Anxiety     Chronic kidney disease, stage 3b     Chronic midline low back pain without sciatica     Coronary artery disease involving native coronary artery without angina pectoris     Essential hypertension     Fatigue     Foot drop, left     GERD (gastroesophageal reflux disease)     History of blood transfusion 2010    Hot flashes     Internal hemorrhoids     Intrinsic eczema     Lesion of lateral popliteal nerve     Non morbid obesity due to excess calories     Paroxysmal supraventricular tachycardia (HCC)     Positive PPD 06/2020    repeated --second PPD test came back negative    Postmenopausal status     Primary insomnia     Primary osteoarthritis involving multiple joints     PUD (peptic ulcer disease)     Restless leg syndrome     Seborrheic keratosis     Wears glasses      Past Surgical History:   Procedure Laterality Date    ABDOMINAL EXPLORATION SURGERY  2000    Lysis of Adhesions    BREAST SURGERY Right     Biopsy    CARPAL TUNNEL RELEASE Right 11/03/2020    Dr. Alicja Guo Right 11/3/2020    RIGHT CARPAL TUNNEL RELEASE performed by Anisa Kramer MD at [address] [facility]      from back    HEMORRHOID SURGERY  02/24/2017    HYSTERECTOMY      Complete    LUMBAR LAMINECTOMY      x 3 with fusion    SHOULDER ARTHROSCOPY Right     Right shoulder scope 2) Right shoulder labral debridement 3) Right shoulder Open Miguel 4) Right shoulder rotator cuff repair    TOTAL HIP ARTHROPLASTY Bilateral     TOTAL KNEE ARTHROPLASTY Left 2-3-15    Dr. Mario Person UPPER GASTROINTESTINAL ENDOSCOPY N/A 3/10/2020    EGD ESOPHAGOGASTRODUODENOSCOPY performed by Sandi Koch MD at [address]       Family History   Problem Relation Age of Onset    High Blood Pressure Mother     Stroke Father     High Blood Pressure Father  Diabetes Maternal Aunt     Cancer Maternal Uncle         Throat    Alcohol Abuse Maternal Uncle     Asthma Maternal Uncle     Depression Daughter     Cancer Son         Colorectal     Depression Son      Social History     Tobacco Use    Smoking status: Former Smoker     Packs/day: 1.00     Types: Cigarettes     Quit date: 1997     Years since quittin.3    Smokeless tobacco: Never Used   Substance Use Topics    Alcohol use: Yes     Alcohol/week: 2.0 standard drinks     Types: 1 Glasses of wine, 1 Cans of beer per week     Comment: rare    Drug use: Never       Allergies   Allergen Reactions    Latex Itching and Rash    Bactrim Rash     Current Outpatient Medications   Medication Sig Dispense Refill    Naftifine HCl 2 % CREA APPLY 1 GRAM 2 OR 3 TIMES DAILY TO AFFECTED AREA OF BILATERAL LOWER LIMBS      HYDROcodone-acetaminophen (NORCO) 7.5-325 MG per tablet Take 1 tablet by mouth every 8 hours as needed for Pain for up to 30 days.  90 tablet 0    fluticasone (FLONASE) 50 MCG/ACT nasal spray 2 sprays by Nasal route daily 1 Bottle 3    omeprazole (PRILOSEC) 20 MG delayed release capsule Take 1 capsule by mouth Daily 90 capsule 0    LORazepam (ATIVAN) 1 MG tablet TAKE 1 TABLET BY MOUTH EVERY 8 HOURS AS NEEDED FOR ANXIETY 90 tablet 0    meloxicam (MOBIC) 15 MG tablet TAKE 1 TABLET EVERY DAY 90 tablet 1    timolol (BETIMOL) 0.5 % ophthalmic solution 1 drop 2 times daily      verapamil (CALAN SR) 120 MG extended release tablet TAKE 1 TABLET EVERY DAY 90 tablet 1    metoprolol tartrate (LOPRESSOR) 50 MG tablet TAKE 1 TABLET TWICE DAILY 180 tablet 1    triamterene-hydroCHLOROthiazide (MAXZIDE-25) 37.5-25 MG per tablet TAKE 1 TABLET EVERY DAY 90 tablet 1    cyclobenzaprine (FLEXERIL) 10 MG tablet Take 1 tablet by mouth 3 times daily as needed for Muscle spasms 90 tablet 5    magnesium 200 MG TABS tablet Take 200 mg by mouth daily      B Complex Vitamins (VITAMIN B COMPLEX PO) Take by mouth daily       LUMIGAN 0.01 % SOLN ophthalmic drops Place 1 drop into both eyes       docusate sodium (COLACE) 100 MG capsule Take 1 capsule by mouth 2 times daily 40 capsule 0     No current facility-administered medications for this visit. Review of Systems:  · Constitutional: no unanticipated weight loss. There's been no change in energy level, sleep pattern, or activity level. No fevers, chills. · Eyes: No visual changes or diplopia. No scleral icterus. · ENT: No Headaches, hearing loss or vertigo. No mouth sores or sore throat. · Cardiovascular: as reviewed in HPI  · Respiratory: No cough or wheezing, no sputum production. No hematemesis. · Gastrointestinal: No abdominal pain, appetite loss, blood in stools. No change in bowel or bladder habits. · Genitourinary: No dysuria, trouble voiding, or hematuria. · Musculoskeletal:  No gait disturbance, no joint complaints. · Integumentary: No rash or pruritis. · Neurological: No headache, diplopia, change in muscle strength, numbness or tingling. · Psychiatric: No anxiety or depression. · Endocrine: No temperature intolerance. No excessive thirst, fluid intake, or urination. No tremor. · Hematologic/Lymphatic: No abnormal bruising or bleeding, blood clots or swollen lymph nodes. · Allergic/Immunologic: No nasal congestion or hives. Physical Exam:   /70   Pulse 71   Ht 5' 4\" (1.626 m)   Wt 230 lb 6.4 oz (104.5 kg)   SpO2 99%   BMI 39.55 kg/m²   Wt Readings from Last 3 Encounters:   05/10/21 230 lb 6.4 oz (104.5 kg)   05/03/21 230 lb (104.3 kg)   03/10/21 232 lb (105.2 kg)     Constitutional: She is oriented to person, place, and time. She appears well-developed and well-nourished. In no acute distress. Head: Normocephalic and atraumatic. Pupils equal and round. Neck: Neck supple. No JVP or carotid bruit appreciated. No mass and no thyromegaly present. No lymphadenopathy present. Cardiovascular: Normal rate. Normal heart sounds. Exam reveals no gallop and no friction rub. No murmur heard. Pulmonary/Chest: Effort normal and breath sounds normal. No respiratory distress. She has no wheezes, rhonchi or rales. Abdominal: Soft, non-tender. Bowel sounds are normal. She exhibits no organomegaly, mass or bruit. Extremities: No edema, cyanosis, or clubbing. Pulses are 2+ radial/carotid bilaterally. Neurological: No gross cranial nerve deficit. Coordination normal.   Skin: Skin is warm and dry. There is no rash or diaphoresis. Psychiatric: She has a normal mood and affect. Her speech is normal and behavior is normal.     Lab Review:   FLP:    Lab Results   Component Value Date    TRIG 99 02/01/2021    HDL 75 02/01/2021    LDLCALC 124 02/01/2021    LABVLDL 20 02/01/2021     BUN/Creatinine:    Lab Results   Component Value Date    BUN 24 02/01/2021    CREATININE 1.3 02/01/2021     EKG Interpretation: 4/26/18 Normal sinus rhythm. Nonspecific T-abnormality. 5/10/21 Sinus rhythm with PACs and nonspecific T wave abnormality. Image Review:   Stress test 11/2013  Normal myocardial perfusion study with no evidence of ischemia or scar.    There is a slight decrease in tracer uptake in the distal anterior wall at    rest and with stress that appears likely due to breast attenuation.    No evidence of inducible ischemia by ECG criteria during Lexiscan infusion.    Normal left ventricular systolic function. Event Monitor 6/2018  Baseline sinus rhythm  No episodes of atrial fibrillation  No reported symptoms    Assessment/Plan:   1) Atypical chest pain. Description not suggestive of CAD and thus no plans for stress testing at this time. Will arrange for an event monitor with her history of arrhythmias. No contraindication to start exercising on her recumbent bike. 2) Possible paroxysmal atrial fibrillation/Paroxysmal supraventricular tachycardia. No recurrent episodes but reports nonexertional chest \"pressure. \" Cardiac event monitor from 2018 showed NSR with no episodes of atrial fibrillation or reported symptoms. Discussed anticoagulation options but she is OK remaining off a DOAC as she has had no documented events in >15 years and was not previously on anticoagulation. With reports of a vague chest pressure will arrange for an event monitor to assess for any arrhythmias. 3) Essential hypertension. Controlled. Goal BP <130/80. Continue medical therapy. Follow up 1 year. Thank you very much for allowing me to participate in the care of your patient. Please do not hesitate to contact me if you have any questions. Sincerely,  Terrence Caceres. Kamryn Bello, [address] [address]  Ph: [phone]  Fax: [phone]    This note was scribed in the presence of Dr Kamryn Bello MD by Db Lorenzo RN. Physician Attestation: The scribes documentation has been prepared under my direction and personally reviewed by me in its entirety. I confirm that the note above accurately reflects all work, treatment, procedures, and medical decision making performed by me. All portions of the note including but not limited to the chief complaint, history of present illness, physical exam, assessment and plan/medical decision making were personally reviewed, edited, and updated on the day of the visit.

## 2021-05-07 NOTE — PATIENT INSTRUCTIONS
Patient Education        Low Sodium Diet (2,000 Milligram): Care Instructions  Overview     Limiting sodium can be an important part of managing some health problems. The most common source of sodium is salt. People get most of the salt in their diet from canned, prepared, and packaged foods. Fast food and restaurant meals also are very high in sodium. Your doctor will probably limit your sodium to less than 2,000 milligrams (mg) a day. This limit counts all the sodium in prepared and packaged foods and any salt you add to your food. Follow-up care is a key part of your treatment and safety. Be sure to make and go to all appointments, and call your doctor if you are having problems. It's also a good idea to know your test results and keep a list of the medicines you take. How can you care for yourself at home? Read food labels  · Read labels on cans and food packages. The labels tell you how much sodium is in each serving. Make sure that you look at the serving size. If you eat more than the serving size, you have eaten more sodium. · Food labels also tell you the Percent Daily Value for sodium. Choose products with low Percent Daily Values for sodium. · Be aware that sodium can come in forms other than salt, including monosodium glutamate (MSG), sodium citrate, and sodium bicarbonate (baking soda). MSG is often added to Asian food. When you eat out, you can sometimes ask for food without MSG or added salt. Buy low-sodium foods  · Buy foods that are labeled \"unsalted\" (no salt added), \"sodium-free\" (less than 5 mg of sodium per serving), or \"low-sodium\" (140 mg or less of sodium per serving). Foods labeled \"reduced-sodium\" and \"light sodium\" may still have too much sodium. Be sure to read the label to see how much sodium you are getting. · Buy fresh vegetables, or frozen vegetables without added sauces. Buy low-sodium versions of canned vegetables, soups, and other canned goods.   Prepare low-sodium meals  · Cut back on the amount of salt you use in cooking. This will help you adjust to the taste. Do not add salt after cooking. One teaspoon of salt has about 2,300 mg of sodium. · Take the salt shaker off the table. · Flavor your food with garlic, lemon juice, onion, vinegar, herbs, and spices. Do not use soy sauce, lite soy sauce, steak sauce, onion salt, garlic salt, celery salt, or ketchup on your food. · Use low-sodium salad dressings, sauces, and ketchup. Or make your own salad dressings and sauces without adding salt. · Use less salt (or none) when recipes call for it. You can often use half the salt a recipe calls for without losing flavor. Other foods such as rice, pasta, and grains do not need added salt. · Rinse canned vegetables, and cook them in fresh water. This removes somebut not allof the salt. · Avoid water that is naturally high in sodium or that has been treated with water softeners, which add sodium. If you buy bottled water, read the label and choose a sodium-free brand. Avoid high-sodium foods  · Avoid eating:  ? Smoked, cured, salted, and canned meat, fish, and poultry. ? Ham, cruz, hot dogs, and luncheon meats. ? Regular, hard, and processed cheese and regular peanut butter. ? Crackers with salted tops, and other salted snack foods such as pretzels, chips, and salted popcorn. ? Frozen prepared meals, unless labeled low-sodium. ? Canned and dried soups, broths, and bouillon, unless labeled sodium-free or low-sodium. ? Canned vegetables, unless labeled sodium-free or low-sodium. ? [employer] fries, pizza, tacos, and other fast foods. ? Pickles, olives, ketchup, and other condiments, especially soy sauce, unless labeled sodium-free or low-sodium. Where can you learn more? Go to https://bayron.healthSparkbuy. org and sign in to your [employer] account. Enter F885 in the [facility] box to learn more about \"Low Sodium Diet (2,000 Milligram): Care Instructions. \" If you do not have an account, please click on the \"Sign Up Now\" link. Current as of: December 17, 2020               Content Version: 12.8  © 2006-2021 Healthwise, Incorporated. Care instructions adapted under license by [facility] ([facility]). If you have questions about a medical condition or this instruction, always ask your healthcare professional. [address] any warranty or liability for your use of this information.

## 2021-05-10 ENCOUNTER — OFFICE VISIT (OUTPATIENT)
Dept: CARDIOLOGY CLINIC | Age: 81
End: 2021-05-10
Payer: MEDICARE

## 2021-05-10 VITALS
DIASTOLIC BLOOD PRESSURE: 70 MMHG | WEIGHT: 230.4 LBS | SYSTOLIC BLOOD PRESSURE: 136 MMHG | HEART RATE: 71 BPM | OXYGEN SATURATION: 99 % | HEIGHT: 64 IN | BODY MASS INDEX: 39.34 KG/M2

## 2021-05-10 DIAGNOSIS — I10 ESSENTIAL HYPERTENSION: ICD-10-CM

## 2021-05-10 DIAGNOSIS — R07.89 ATYPICAL CHEST PAIN: Primary | ICD-10-CM

## 2021-05-10 DIAGNOSIS — I47.1 PAROXYSMAL SUPRAVENTRICULAR TACHYCARDIA (HCC): ICD-10-CM

## 2021-05-10 PROCEDURE — 1090F PRES/ABSN URINE INCON ASSESS: CPT | Performed by: INTERNAL MEDICINE

## 2021-05-10 PROCEDURE — 99214 OFFICE O/P EST MOD 30 MIN: CPT | Performed by: INTERNAL MEDICINE

## 2021-05-10 PROCEDURE — 1036F TOBACCO NON-USER: CPT | Performed by: INTERNAL MEDICINE

## 2021-05-10 PROCEDURE — 4040F PNEUMOC VAC/ADMIN/RCVD: CPT | Performed by: INTERNAL MEDICINE

## 2021-05-10 PROCEDURE — 93000 ELECTROCARDIOGRAM COMPLETE: CPT | Performed by: INTERNAL MEDICINE

## 2021-05-10 PROCEDURE — G8427 DOCREV CUR MEDS BY ELIG CLIN: HCPCS | Performed by: INTERNAL MEDICINE

## 2021-05-10 PROCEDURE — G8399 PT W/DXA RESULTS DOCUMENT: HCPCS | Performed by: INTERNAL MEDICINE

## 2021-05-10 PROCEDURE — 1123F ACP DISCUSS/DSCN MKR DOCD: CPT | Performed by: INTERNAL MEDICINE

## 2021-05-10 PROCEDURE — G8417 CALC BMI ABV UP PARAM F/U: HCPCS | Performed by: INTERNAL MEDICINE

## 2021-05-13 PROCEDURE — 93228 REMOTE 30 DAY ECG REV/REPORT: CPT | Performed by: INTERNAL MEDICINE

## 2021-05-28 ENCOUNTER — TELEPHONE (OUTPATIENT)
Dept: FAMILY MEDICINE CLINIC | Age: 81
End: 2021-05-28

## 2021-05-28 DIAGNOSIS — M54.50 CHRONIC MIDLINE LOW BACK PAIN WITHOUT SCIATICA: ICD-10-CM

## 2021-05-28 DIAGNOSIS — M15.9 PRIMARY OSTEOARTHRITIS INVOLVING MULTIPLE JOINTS: ICD-10-CM

## 2021-05-28 DIAGNOSIS — G89.29 CHRONIC MIDLINE LOW BACK PAIN WITHOUT SCIATICA: ICD-10-CM

## 2021-05-29 RX ORDER — HYDROCODONE BITARTRATE AND ACETAMINOPHEN 7.5; 325 MG/1; MG/1
1 TABLET ORAL EVERY 8 HOURS PRN
Qty: 90 TABLET | Refills: 0 | Status: SHIPPED | OUTPATIENT
Start: 2021-05-29 | End: 2021-06-29 | Stop reason: SDUPTHER

## 2021-06-02 ENCOUNTER — CLINICAL DOCUMENTATION (OUTPATIENT)
Dept: OTHER | Age: 81
End: 2021-06-02

## 2021-06-11 ENCOUNTER — TELEPHONE (OUTPATIENT)
Dept: ORTHOPEDIC SURGERY | Age: 81
End: 2021-06-11

## 2021-06-14 ENCOUNTER — OFFICE VISIT (OUTPATIENT)
Dept: ORTHOPEDIC SURGERY | Age: 81
End: 2021-06-14
Payer: MEDICARE

## 2021-06-14 VITALS
HEART RATE: 67 BPM | HEIGHT: 64 IN | WEIGHT: 230 LBS | BODY MASS INDEX: 39.27 KG/M2 | SYSTOLIC BLOOD PRESSURE: 133 MMHG | DIASTOLIC BLOOD PRESSURE: 75 MMHG

## 2021-06-14 DIAGNOSIS — M54.50 LOW BACK PAIN, UNSPECIFIED BACK PAIN LATERALITY, UNSPECIFIED CHRONICITY, UNSPECIFIED WHETHER SCIATICA PRESENT: ICD-10-CM

## 2021-06-14 DIAGNOSIS — M25.511 RIGHT SHOULDER PAIN, UNSPECIFIED CHRONICITY: Primary | ICD-10-CM

## 2021-06-14 DIAGNOSIS — M70.61 TROCHANTERIC BURSITIS OF RIGHT HIP: ICD-10-CM

## 2021-06-14 DIAGNOSIS — M19.011 ARTHRITIS OF RIGHT SHOULDER REGION: ICD-10-CM

## 2021-06-14 PROCEDURE — G8399 PT W/DXA RESULTS DOCUMENT: HCPCS | Performed by: ORTHOPAEDIC SURGERY

## 2021-06-14 PROCEDURE — 1036F TOBACCO NON-USER: CPT | Performed by: ORTHOPAEDIC SURGERY

## 2021-06-14 PROCEDURE — 1123F ACP DISCUSS/DSCN MKR DOCD: CPT | Performed by: ORTHOPAEDIC SURGERY

## 2021-06-14 PROCEDURE — 99214 OFFICE O/P EST MOD 30 MIN: CPT | Performed by: ORTHOPAEDIC SURGERY

## 2021-06-14 PROCEDURE — 1090F PRES/ABSN URINE INCON ASSESS: CPT | Performed by: ORTHOPAEDIC SURGERY

## 2021-06-14 PROCEDURE — 20610 DRAIN/INJ JOINT/BURSA W/O US: CPT | Performed by: ORTHOPAEDIC SURGERY

## 2021-06-14 PROCEDURE — 4040F PNEUMOC VAC/ADMIN/RCVD: CPT | Performed by: ORTHOPAEDIC SURGERY

## 2021-06-14 PROCEDURE — G8417 CALC BMI ABV UP PARAM F/U: HCPCS | Performed by: ORTHOPAEDIC SURGERY

## 2021-06-14 PROCEDURE — G8427 DOCREV CUR MEDS BY ELIG CLIN: HCPCS | Performed by: ORTHOPAEDIC SURGERY

## 2021-06-14 NOTE — PROGRESS NOTES
[facility]  Office Visit    Chief Complaint: Right shoulder pain, right leg pain    HPI:  Gadiel Rosa is a 80 y.o. who is here for evaluation of right shoulder and right leg issues. She is right-hand dominant. She reports a history of right shoulder rotator cuff repair with Dr. Derrick Rios in January 2014. She most recently saw him in March 2021 at which time she had a steroid injection done and has been doing home exercises since this time. She is also taking meloxicam.  She reports that the similar issue she was having in her left shoulders have resolved but her right shoulder pain has returned. She has trouble with overhead motion and reaching out in front of her. She has cervical muscle strain but no radiating pain down the arm and no numbness or tingling down the arm. She has a history of bilateral total hip arthroplasty as well as 3 lumbar spine fusions. She reports a 3 to 4-week history of right lateral hip pain that radiates down the lateral side of her leg to her knee and occasionally down to her heel. It will hurt to lie on her right side. She has not had this issue before. She does have numbness and tingling in her left foot and a chronic left foot drop related to her prior lumbar spine surgery. She walks with a cane.       Patient Active Problem List   Diagnosis    Paroxysmal supraventricular tachycardia (HCC)    Foot drop, left    Anxiety    PUD (peptic ulcer disease)    Restless leg syndrome    GERD without esophagitis    Primary osteoarthritis involving multiple joints    Non morbid obesity due to excess calories    Primary insomnia    Coronary artery disease involving native coronary artery without angina pectoris    Essential hypertension    Intrinsic eczema    Chronic midline low back pain without sciatica    Glenohumeral arthritis    Biceps tendonitis of both shoulders    Acromioclavicular joint arthritis    Rotator cuff tendonitis    Anesthesia complication    Fatigue    Hot flashes    Lesion of lateral popliteal nerve    Postmenopausal status    Seborrheic keratosis    Grade IV hemorrhoids    Glaucoma    Internal hemorrhoids    Positive PPD    Chronic kidney disease, stage 3b (HCC)       ROS:  Constitutional: denies fever, chills, weight loss  MSK: denies pain in other joints, muscle aches  Neurological: denies numbness, tingling, weakness    Exam:  Blood pressure 133/75, pulse 67, height 5' 4\" (1.626 m), weight 230 lb (104.3 kg), not currently breastfeeding. Appearance: sitting in exam room chair, appears to be in no acute distress, awake and alert  Resp: unlabored breathing on room air  Skin: warm, dry and intact with out erythema or significant increased temperature  RUE: Shoulder forward flexion is 155 degrees, external rotation 35 degrees, internal rotation to lumbar spine. 4/5 strength in resisted abduction in the scapular plane and resisted external rotation at the neutral position, both associate with pain. Brisk capillary refill. 2+ radial pulses. RLE: Examination demonstrates negative logroll and negative Stinchfield. Tender over greater trochanter. There is brisk capillary refill. There are 2+ dorsalis pedis and posterior tibial pulses. Strength is 5/5 in hamstrings, quads, hip flexors. Imagin views of the lumbar spine were performed and interpreted today. Significant for prior L3-S1 posterior instrumented lumbar spinal fusion. There are no broken or loose appearing screws. AP pelvis was performed and interpreted today. Significant for bilateral total hip arthroplasty prostheses in place with no signs of osteolysis, loosening, fracture, dislocation. 3 views of the right shoulder were performed and interpreted today. Significant for high riding humeral head with decreased glenohumeral joint space and periarticular osteophytes.     Assessment:  Right shoulder rotator cuff tear arthropathy  Right greater trochanteric bursitis    Plan:  We discussed the diagnoses and treatment options. We discussed nonoperative versus operative interventions for the right shoulder. We briefly discussed reverse total shoulder arthroplasty. She elects to continue with steroid injections. A glenohumeral joint injection was performed today as described below. We also discussed her lateral right hip pain being caused by greater trochanter bursitis. I recommended a steroid injection for this today as well and was also performed as described below. She will continue her shoulder exercises and meloxicam.  I will see her back in 3 months for reevaluation of her right shoulder and hip or sooner if needed. PROCEDURE NOTE:  After verbal consent was obtained, the patient's right shoulder was prepped with Betadine and anesthetized with ethyl chloride. The glenohumeral joint was then injected under sterile technique with 2mL of 0.25% marcaine and 1 mL of 40 mg/mL Kenalog. A bandage was applied. The patient tolerated procedure well and there were no complications. After verbal consent was obtained, the patient's right hip was prepped with Betadine and anesthetized with ethyl chloride. The right hip greater trochanter was then injected under sterile technique with 2mL of 0.25% marcaine and 1 mL of 40 mg/mL Kenalog. It was dressed with a bandage. The patient tolerated procedure well. There were no complications. Total time spent on today's encounter was at least 32 minutes. This time included reviewing prior notes, radiographs, and lab results when available, reviewing history obtained by medical assistant, performing history and physical exam, reviewing tests/radiographs with the patient, counseling the patient, ordering medications or tests, documentation in the electronic health record, and coordination of care.     This dictation was done with [employer] dictation and may contain mechanical errors related to translation.

## 2021-06-14 NOTE — TELEPHONE ENCOUNTER
I spoke with Luc and changed Emily's appointment to 6/23/21 at 9 am. She will have Jimmy Bernal call back if this date/time does not work. She was informed that Dr. Zhen Salgado is out of the office all week and this is the soonest she can see Dr. Zhen Salgado.

## 2021-06-15 ENCOUNTER — TELEPHONE (OUTPATIENT)
Dept: CARDIOLOGY CLINIC | Age: 81
End: 2021-06-15

## 2021-06-15 NOTE — TELEPHONE ENCOUNTER
reviewed monitor results. Showed baseline is sinus. Frequent PACs (14% burden) but no sustained arrhyhtmias and no Afib. No reported symptoms. Continue current medications. Will call patient with the results.

## 2021-06-18 DIAGNOSIS — R07.89 ATYPICAL CHEST PAIN: ICD-10-CM

## 2021-06-18 DIAGNOSIS — I47.1 PAROXYSMAL SUPRAVENTRICULAR TACHYCARDIA (HCC): ICD-10-CM

## 2021-06-29 DIAGNOSIS — F41.9 ANXIETY: ICD-10-CM

## 2021-06-29 DIAGNOSIS — G89.29 CHRONIC MIDLINE LOW BACK PAIN WITHOUT SCIATICA: ICD-10-CM

## 2021-06-29 DIAGNOSIS — M54.50 CHRONIC MIDLINE LOW BACK PAIN WITHOUT SCIATICA: ICD-10-CM

## 2021-06-29 DIAGNOSIS — M15.9 PRIMARY OSTEOARTHRITIS INVOLVING MULTIPLE JOINTS: ICD-10-CM

## 2021-06-29 RX ORDER — LORAZEPAM 1 MG/1
TABLET ORAL
Qty: 90 TABLET | Refills: 0 | Status: SHIPPED | OUTPATIENT
Start: 2021-06-29 | End: 2021-09-28 | Stop reason: SDUPTHER

## 2021-06-29 RX ORDER — HYDROCODONE BITARTRATE AND ACETAMINOPHEN 7.5; 325 MG/1; MG/1
1 TABLET ORAL EVERY 8 HOURS PRN
Qty: 90 TABLET | Refills: 0 | Status: SHIPPED | OUTPATIENT
Start: 2021-06-29 | End: 2021-07-31 | Stop reason: SDUPTHER

## 2021-07-30 DIAGNOSIS — G89.29 CHRONIC MIDLINE LOW BACK PAIN WITHOUT SCIATICA: ICD-10-CM

## 2021-07-30 DIAGNOSIS — M54.50 CHRONIC MIDLINE LOW BACK PAIN WITHOUT SCIATICA: ICD-10-CM

## 2021-07-30 DIAGNOSIS — M15.9 PRIMARY OSTEOARTHRITIS INVOLVING MULTIPLE JOINTS: ICD-10-CM

## 2021-07-31 RX ORDER — HYDROCODONE BITARTRATE AND ACETAMINOPHEN 7.5; 325 MG/1; MG/1
1 TABLET ORAL EVERY 8 HOURS PRN
Qty: 90 TABLET | Refills: 0 | Status: SHIPPED | OUTPATIENT
Start: 2021-07-31 | End: 2021-09-28 | Stop reason: SDUPTHER

## 2021-08-04 ASSESSMENT — ENCOUNTER SYMPTOMS: SHORTNESS OF BREATH: 0

## 2021-08-04 NOTE — PROGRESS NOTES
Subjective:      Patient ID: Lisa Duggan is a 80 y.o. female. Hypertension  This is a chronic problem. The current episode started more than 1 year ago. The problem is unchanged. The problem is controlled. Pertinent negatives include no chest pain, palpitations, peripheral edema or shortness of breath. Risk factors for coronary artery disease include family history, obesity, post-menopausal state and sedentary lifestyle. Past treatments include beta blockers, calcium channel blockers and diuretics. The current treatment provides significant improvement. There are no compliance problems. Chronic Kidney Disease Stage 3b:  Patient takes Maxzide-25 once daily. Her renal function is checked regularly and has been stable. GERD:  Patient is tolerating and compliant with Omeprazole 20 mg daily. She feels that the medication completely controls her symptoms. She previously did not do well with Zantac. Chronic Low Back Pain:  Patient is tolerating and compliant with Mobic 15 mg daily, Flexeril 10 mg TID prn and Norco 7.5-325 every 8 hrs as needed.  She feels that the medications keep her functional.        Anxiety:  Patient takes Ativan 1 mg every 8 hrs as needed for anxiety.  She generally takes it once daily and feels that it works well to control her allergy symptoms. PSVT:  Patient sees Dr. Gerald Presley and was taken off the [facility] [employer] on 4-26-18 and had an event monitor ordered by him. She had no events during the 30 day period. She continues to take Lopressor and Calan. Obesity:  Patient weighed 230 at her visit on 5-3-21. She has gained 2 lbs since then. Left Ankle Pain:  Patient has chronic left foot drop and now has osteoarthritis of the left ankle. She sees a Podiatrist who offered fusion of the ankle. She would like to see an Ortho for a second opinion. She has seen Dr. Promise Alcaraz in the past.      Review of Systems   Constitutional: Negative for chills and fever.    HENT: Negative for ear discharge, ear pain and hearing loss. Respiratory: Negative for shortness of breath. Cardiovascular: Negative for chest pain, palpitations and leg swelling. /78   Pulse 63   Resp 18   Ht 5' 4\" (1.626 m)   Wt 232 lb (105.2 kg)   SpO2 96%   BMI 39.82 kg/m²    Objective:   Physical Exam  Constitutional:       General: She is not in acute distress. Appearance: She is well-developed. HENT:      Head: Normocephalic. Right Ear: External ear normal.      Left Ear: External ear normal.      Mouth/Throat:      Pharynx: No oropharyngeal exudate. Neck:      Thyroid: No thyromegaly. Vascular: No JVD. Cardiovascular:      Rate and Rhythm: Normal rate and regular rhythm. Heart sounds: Normal heart sounds. No murmur heard. Pulmonary:      Effort: Pulmonary effort is normal.      Breath sounds: Normal breath sounds. No wheezing or rales. Lymphadenopathy:      Cervical: No cervical adenopathy. Neurological:      Mental Status: She is alert and oriented to person, place, and time. Assessment:      Hypertension  Chronic Kidney Disease Stage 3b  GERD  Chronic Low Back Pain  Anxiety  PSVT  Obesity  Primary Osteoarthritis of Left Ankle       Plan:       OAR[facility] report was reviewed  Medications refilled   Patient already has an appointment with Dr. Taya Vazquez for September. Recommended that patient have an AWV. RTO 3 months for Back Pain / Anxiety         Controlled Substance Monitoring:    Acute and Chronic Pain Monitoring:   RX Monitoring 8/5/2021   Attestation -   Acute Pain Prescriptions Severe pain not adequately treated with lower dose. Periodic Controlled Substance Monitoring No signs of potential drug abuse or diversion identified.    Chronic Pain > 80 MEDD -

## 2021-08-05 ENCOUNTER — OFFICE VISIT (OUTPATIENT)
Dept: FAMILY MEDICINE CLINIC | Age: 81
End: 2021-08-05
Payer: MEDICARE

## 2021-08-05 VITALS
HEIGHT: 64 IN | BODY MASS INDEX: 39.61 KG/M2 | HEART RATE: 63 BPM | OXYGEN SATURATION: 96 % | DIASTOLIC BLOOD PRESSURE: 78 MMHG | RESPIRATION RATE: 18 BRPM | SYSTOLIC BLOOD PRESSURE: 138 MMHG | WEIGHT: 232 LBS

## 2021-08-05 DIAGNOSIS — I10 ESSENTIAL HYPERTENSION: Primary | ICD-10-CM

## 2021-08-05 DIAGNOSIS — M19.072 PRIMARY OSTEOARTHRITIS, LEFT ANKLE AND FOOT: ICD-10-CM

## 2021-08-05 DIAGNOSIS — E66.01 SEVERE OBESITY (BMI 35.0-35.9 WITH COMORBIDITY) (HCC): ICD-10-CM

## 2021-08-05 DIAGNOSIS — F41.9 ANXIETY: ICD-10-CM

## 2021-08-05 DIAGNOSIS — N18.32 CHRONIC KIDNEY DISEASE, STAGE 3B (HCC): ICD-10-CM

## 2021-08-05 DIAGNOSIS — E66.09 NON MORBID OBESITY DUE TO EXCESS CALORIES: ICD-10-CM

## 2021-08-05 DIAGNOSIS — G89.29 CHRONIC MIDLINE LOW BACK PAIN WITHOUT SCIATICA: ICD-10-CM

## 2021-08-05 DIAGNOSIS — I47.1 PAROXYSMAL SUPRAVENTRICULAR TACHYCARDIA (HCC): ICD-10-CM

## 2021-08-05 DIAGNOSIS — M54.50 CHRONIC MIDLINE LOW BACK PAIN WITHOUT SCIATICA: ICD-10-CM

## 2021-08-05 PROCEDURE — 1123F ACP DISCUSS/DSCN MKR DOCD: CPT | Performed by: FAMILY MEDICINE

## 2021-08-05 PROCEDURE — 99214 OFFICE O/P EST MOD 30 MIN: CPT | Performed by: FAMILY MEDICINE

## 2021-08-05 PROCEDURE — G8427 DOCREV CUR MEDS BY ELIG CLIN: HCPCS | Performed by: FAMILY MEDICINE

## 2021-08-05 PROCEDURE — 1036F TOBACCO NON-USER: CPT | Performed by: FAMILY MEDICINE

## 2021-08-05 PROCEDURE — G8399 PT W/DXA RESULTS DOCUMENT: HCPCS | Performed by: FAMILY MEDICINE

## 2021-08-05 PROCEDURE — 1090F PRES/ABSN URINE INCON ASSESS: CPT | Performed by: FAMILY MEDICINE

## 2021-08-05 PROCEDURE — G8417 CALC BMI ABV UP PARAM F/U: HCPCS | Performed by: FAMILY MEDICINE

## 2021-08-05 PROCEDURE — 4040F PNEUMOC VAC/ADMIN/RCVD: CPT | Performed by: FAMILY MEDICINE

## 2021-08-05 RX ORDER — METOPROLOL TARTRATE 50 MG/1
TABLET, FILM COATED ORAL
Qty: 180 TABLET | Refills: 1 | Status: SHIPPED | OUTPATIENT
Start: 2021-08-05 | End: 2022-03-13

## 2021-08-05 RX ORDER — MULTIVIT WITH MINERALS/LUTEIN
180 TABLET ORAL DAILY
Status: ON HOLD | COMMUNITY
End: 2022-04-26 | Stop reason: HOSPADM

## 2021-08-05 RX ORDER — TRIAMTERENE AND HYDROCHLOROTHIAZIDE 37.5; 25 MG/1; MG/1
TABLET ORAL
Qty: 90 TABLET | Refills: 1 | Status: SHIPPED | OUTPATIENT
Start: 2021-08-05 | End: 2022-03-13

## 2021-08-05 SDOH — ECONOMIC STABILITY: FOOD INSECURITY: WITHIN THE PAST 12 MONTHS, THE FOOD YOU BOUGHT JUST DIDN'T LAST AND YOU DIDN'T HAVE MONEY TO GET MORE.: NEVER TRUE

## 2021-08-05 SDOH — ECONOMIC STABILITY: FOOD INSECURITY: WITHIN THE PAST 12 MONTHS, YOU WORRIED THAT YOUR FOOD WOULD RUN OUT BEFORE YOU GOT MONEY TO BUY MORE.: NEVER TRUE

## 2021-08-05 ASSESSMENT — SOCIAL DETERMINANTS OF HEALTH (SDOH): HOW HARD IS IT FOR YOU TO PAY FOR THE VERY BASICS LIKE FOOD, HOUSING, MEDICAL CARE, AND HEATING?: NOT HARD AT ALL

## 2021-08-11 NOTE — PROGRESS NOTES
Subjective:      Patient ID: Nya Claudio is a 80 y.o. female. HPI  PREOPERATIVE PHYSICAL:    Patient was sent by Dr. Steve Roca for preoperative clearance to have Bilateral Staged Cataract Surgery on 8-24-21 and 9-7-21 at [facility]. Allergies   Allergen Reactions    Latex Itching and Rash    Bactrim Rash     Current Outpatient Medications   Medication Sig Dispense Refill    vitamin E 1000 units capsule Take 180 Units by mouth daily      COLLAGEN PO Take by mouth      verapamil (CALAN SR) 120 MG extended release tablet TAKE 1 TABLET EVERY DAY 90 tablet 1    metoprolol tartrate (LOPRESSOR) 50 MG tablet TAKE 1 TABLET TWICE DAILY 180 tablet 1    triamterene-hydroCHLOROthiazide (MAXZIDE-25) 37.5-25 MG per tablet TAKE 1 TABLET EVERY DAY 90 tablet 1    HYDROcodone-acetaminophen (NORCO) 7.5-325 MG per tablet Take 1 tablet by mouth every 8 hours as needed for Pain for up to 30 days. 90 tablet 0    fluticasone (FLONASE) 50 MCG/ACT nasal spray 2 sprays by Nasal route daily 1 Bottle 3    omeprazole (PRILOSEC) 20 MG delayed release capsule Take 1 capsule by mouth Daily 90 capsule 0    meloxicam (MOBIC) 15 MG tablet TAKE 1 TABLET EVERY DAY 90 tablet 1    timolol (BETIMOL) 0.5 % ophthalmic solution 1 drop 2 times daily      cyclobenzaprine (FLEXERIL) 10 MG tablet Take 1 tablet by mouth 3 times daily as needed for Muscle spasms 90 tablet 5    magnesium 200 MG TABS tablet Take 200 mg by mouth daily      B Complex Vitamins (VITAMIN B COMPLEX PO) Take by mouth daily       LUMIGAN 0.01 % SOLN ophthalmic drops Place 1 drop into both eyes       docusate sodium (COLACE) 100 MG capsule Take 1 capsule by mouth 2 times daily 40 capsule 0     No current facility-administered medications for this visit.      Past Medical History:   Diagnosis Date    Anesthesia complication     difficulty waking up after breast biopsy    Anxiety     Chronic kidney disease, stage 3b (HCC)     Chronic midline low back pain without Cervical: No cervical adenopathy. Neurological:      Mental Status: She is alert and oriented to person, place, and time. Assessment:      Preoperative Physical  Bilateral Cataracts       Plan:      Patient is cleared for surgery. Recommended that patient have an AWV.    RTO 3 months for Back Pain / Anxiety         ENRICO VORA, DO

## 2021-08-12 ENCOUNTER — OFFICE VISIT (OUTPATIENT)
Dept: FAMILY MEDICINE CLINIC | Age: 81
End: 2021-08-12
Payer: MEDICARE

## 2021-08-12 VITALS
BODY MASS INDEX: 39.61 KG/M2 | RESPIRATION RATE: 17 BRPM | OXYGEN SATURATION: 98 % | SYSTOLIC BLOOD PRESSURE: 114 MMHG | TEMPERATURE: 98 F | HEART RATE: 62 BPM | WEIGHT: 232 LBS | DIASTOLIC BLOOD PRESSURE: 72 MMHG | HEIGHT: 64 IN

## 2021-08-12 DIAGNOSIS — H25.013 CORTICAL AGE-RELATED CATARACT OF BOTH EYES: ICD-10-CM

## 2021-08-12 DIAGNOSIS — Z01.818 PREOPERATIVE GENERAL PHYSICAL EXAMINATION: Primary | ICD-10-CM

## 2021-08-12 PROCEDURE — 1123F ACP DISCUSS/DSCN MKR DOCD: CPT | Performed by: FAMILY MEDICINE

## 2021-08-12 PROCEDURE — G8427 DOCREV CUR MEDS BY ELIG CLIN: HCPCS | Performed by: FAMILY MEDICINE

## 2021-08-12 PROCEDURE — 1090F PRES/ABSN URINE INCON ASSESS: CPT | Performed by: FAMILY MEDICINE

## 2021-08-12 PROCEDURE — G8399 PT W/DXA RESULTS DOCUMENT: HCPCS | Performed by: FAMILY MEDICINE

## 2021-08-12 PROCEDURE — 1036F TOBACCO NON-USER: CPT | Performed by: FAMILY MEDICINE

## 2021-08-12 PROCEDURE — 4040F PNEUMOC VAC/ADMIN/RCVD: CPT | Performed by: FAMILY MEDICINE

## 2021-08-12 PROCEDURE — 99214 OFFICE O/P EST MOD 30 MIN: CPT | Performed by: FAMILY MEDICINE

## 2021-08-12 PROCEDURE — G8417 CALC BMI ABV UP PARAM F/U: HCPCS | Performed by: FAMILY MEDICINE

## 2021-08-12 RX ORDER — OMEPRAZOLE 20 MG/1
20 CAPSULE, DELAYED RELEASE ORAL DAILY
Qty: 90 CAPSULE | Refills: 0 | Status: SHIPPED | OUTPATIENT
Start: 2021-08-12 | End: 2021-11-10

## 2021-08-12 RX ORDER — MELOXICAM 15 MG/1
15 TABLET ORAL DAILY
Qty: 90 TABLET | Refills: 0 | Status: SHIPPED | OUTPATIENT
Start: 2021-08-12 | End: 2021-12-13

## 2021-08-12 ASSESSMENT — ENCOUNTER SYMPTOMS
WHEEZING: 0
DIARRHEA: 0
RHINORRHEA: 0
BLOOD IN STOOL: 0
CONSTIPATION: 0
SORE THROAT: 0
SHORTNESS OF BREATH: 0
NAUSEA: 0
COUGH: 0
VOMITING: 0
ABDOMINAL PAIN: 0

## 2021-08-18 ENCOUNTER — TELEPHONE (OUTPATIENT)
Dept: CARDIOLOGY CLINIC | Age: 81
End: 2021-08-18

## 2021-08-18 NOTE — TELEPHONE ENCOUNTER
Patient is cleared for intraocular lens implant.   No need to hold verapamil unless required by ophthalmologist and or anesthesiologist

## 2021-08-18 NOTE — PROGRESS NOTES
[address] [facility], [place] time__8/24/21 0615__________        Surgery time_0730__________    Take the following medications with a sip of water:    Do not eat or drink anything after 12:00 midnight prior to your surgery. This includes water chewing gum, mints and ice chips. You may brush your teeth and gargle the morning of your surgery, but do not swallow the water     Please see your family doctor/pediatrician for a history and physical and/or concerning medications. Bring any test results/reports from your physicians office. If you are under the care of a heart doctor or specialist doctor, please be aware that you may be asked to them for clearance    You may be asked to stop blood thinners such as Coumadin, Plavix, Fragmin, Lovenox, etc., or any anti-inflammatories such as:  Aspirin, Ibuprofen, Advil, Naproxen prior to your surgery. We also ask that you stop any OTC medications such as fish oil, vitamin E, glucosamine, garlic, Multivitamins, COQ 10, etc.    We ask that you do not smoke 24 hours prior to surgery  We ask that you do not  drink any alcoholic beverages 24 hours prior to surgery     You must make arrangements for a responsible adult to take you home after your surgery. For your safety you will not be allowed to leave alone or drive yourself home. Your surgery will be cancelled if you do not have a ride home. Also for your safety, it is strongly suggested that someone stay with you the first 24 hours after your surgery. A parent or legal guardian must accompany a child scheduled for surgery and plan to stay at the hospital until the child is discharged. Please do not bring other children with you. For your comfort, please wear simple loose fitting clothing to the hospital.  Please do not bring valuables.     Do not wear any make-up or nail polish on your fingers or toes      For your safety, please do not wear any jewelry or body piercing's on the day of surgery. All jewelry must be removed. If you have dentures, they will be removed before going to operating room. For your convenience, we will provide you with a container. If you wear contact lenses or glasses, they will be removed, please bring a case for them. If you have a living will and a durable power of  for healthcare, please bring in a copy. As part of our patient safety program to minimize surgical site infections, we ask you to do the following:    · Please notify your surgeon if you develop any illness between         now and the  day of your surgery. · This includes a cough, cold, fever, sore throat, nausea,         or vomiting, and diarrhea, etc.  ·  Please notify your surgeon if you experience dizziness, shortness         of breath or blurred vision between now and the time of your surgery. Do not shave your operative site 96 hours prior to surgery. For face and neck surgery, men may use an electric razor 48 hours   prior to surgery. You may shower the night before surgery or the morning of   your surgery with an antibacterial soap. You will need to bring a photo ID and insurance card    [facility] has an onsite pharmacy, would you like to utilize our pharmacy     If you will be staying overnight and use a C-pap machine, please bring   your C-pap to hospital     Our goal is to provide you with excellent care, therefore, visitors will be limited to two(2) in the room at a time so that we may focus on providing this care for you. Please contact pre-admission testing if you have any further questions. [facility] phone number:  [phone]  Please note these are generalized instructions for all surgical cases, you may be provided with more specific instructions according to your surgery.

## 2021-08-18 NOTE — TELEPHONE ENCOUNTER
Dr. Mercedes Rock, please advise of cardiac clearance in Dr. Deion Otero absence. Patient scheduled for intra-occular lens implant of the right eye. Patient last seen 5/10/21 and has a hx of presumed SVT/PAF and  HTN. Not on anticoagulation. Needing to hold Verapamil prior to surgery.

## 2021-08-18 NOTE — TELEPHONE ENCOUNTER
Cardiac Risk Assessment message information:     What type of procedure are you having: Phaco w/ Intra occular lense implant- right eye      When is your procedure scheduled for: 8/24/2021     Who is the physician performing your procedure: Dr. Essence Carmona    Which medications need to be held for your procedure and for how long: Meteporal? Verapamil           Phone Number: [phone]     Fax number to send the letter: [phone]    Can we send the previous EKG w/ in the last three months of scheduled surgery, or does she need to have one done now?

## 2021-08-23 ENCOUNTER — ANESTHESIA EVENT (OUTPATIENT)
Dept: SURGERY | Age: 81
End: 2021-08-23
Payer: MEDICARE

## 2021-08-23 NOTE — PRE-PROCEDURE INSTRUCTIONS
[address] [facility]  [phone]        Pre-Op Phone Call:     Patient Name: Mirna Hill     Telephone Information:   Mobile [phone]     Home phone:  [phone]    Surgery Time:    7:30 AM     Arrival Time:  6:15     Left extended Message:  Yes     Message left with: On mobile voicemail     Recent change in health status:  NA     Advised of transportation/ policy:  Yes     NPO policy reviewed: Yes     Advised to take morning heart/blood pressure medications with sips of water morning of surgery? Yes     Instructed to bring eye drops, photo identification, and insurance card day of surgery? Yes     Advised to wear short sleeved button down shirt (no T-shirt underneath):  Yes     Advised not to wear jewelry, hairpins, or pantyhose day of surgery? Yes     Advised not to wear make-up and to wash face day of surgery?   Yes    Remarks:        Electronically signed by:  Latricia Carter RN at 8/23/2021 11:22 AM

## 2021-08-24 ENCOUNTER — ANESTHESIA (OUTPATIENT)
Dept: SURGERY | Age: 81
End: 2021-08-24
Payer: MEDICARE

## 2021-08-24 ENCOUNTER — HOSPITAL ENCOUNTER (OUTPATIENT)
Age: 81
Setting detail: OUTPATIENT SURGERY
Discharge: HOME | End: 2021-08-24
Attending: OPHTHALMOLOGY | Admitting: OPHTHALMOLOGY
Payer: MEDICARE

## 2021-08-24 VITALS
SYSTOLIC BLOOD PRESSURE: 141 MMHG | OXYGEN SATURATION: 100 % | DIASTOLIC BLOOD PRESSURE: 69 MMHG | RESPIRATION RATE: 18 BRPM

## 2021-08-24 VITALS
TEMPERATURE: 96.3 F | SYSTOLIC BLOOD PRESSURE: 163 MMHG | RESPIRATION RATE: 13 BRPM | WEIGHT: 232 LBS | OXYGEN SATURATION: 98 % | DIASTOLIC BLOOD PRESSURE: 89 MMHG | HEART RATE: 60 BPM | HEIGHT: 64 IN | BODY MASS INDEX: 39.61 KG/M2

## 2021-08-24 PROCEDURE — 2709999900 HC NON-CHARGEABLE SUPPLY: Performed by: OPHTHALMOLOGY

## 2021-08-24 PROCEDURE — 3600000014 HC SURGERY LEVEL 4 ADDTL 15MIN: Performed by: OPHTHALMOLOGY

## 2021-08-24 PROCEDURE — V2632 POST CHMBR INTRAOCULAR LENS: HCPCS | Performed by: OPHTHALMOLOGY

## 2021-08-24 PROCEDURE — 3700000000 HC ANESTHESIA ATTENDED CARE: Performed by: OPHTHALMOLOGY

## 2021-08-24 PROCEDURE — 6370000000 HC RX 637 (ALT 250 FOR IP): Performed by: OPHTHALMOLOGY

## 2021-08-24 PROCEDURE — 2580000003 HC RX 258: Performed by: ANESTHESIOLOGY

## 2021-08-24 PROCEDURE — 3600000004 HC SURGERY LEVEL 4 BASE: Performed by: OPHTHALMOLOGY

## 2021-08-24 PROCEDURE — 2500000003 HC RX 250 WO HCPCS: Performed by: OPHTHALMOLOGY

## 2021-08-24 PROCEDURE — 7100000010 HC PHASE II RECOVERY - FIRST 15 MIN: Performed by: OPHTHALMOLOGY

## 2021-08-24 PROCEDURE — 7100000011 HC PHASE II RECOVERY - ADDTL 15 MIN: Performed by: OPHTHALMOLOGY

## 2021-08-24 PROCEDURE — C1713 ANCHOR/SCREW BN/BN,TIS/BN: HCPCS | Performed by: OPHTHALMOLOGY

## 2021-08-24 PROCEDURE — 3700000001 HC ADD 15 MINUTES (ANESTHESIA): Performed by: OPHTHALMOLOGY

## 2021-08-24 PROCEDURE — 6360000002 HC RX W HCPCS: Performed by: NURSE ANESTHETIST, CERTIFIED REGISTERED

## 2021-08-24 DEVICE — LENS IOL SN60WF 20.5D: Type: IMPLANTABLE DEVICE | Site: EYE | Status: FUNCTIONAL

## 2021-08-24 RX ORDER — CIPROFLOXACIN HYDROCHLORIDE 3.5 MG/ML
SOLUTION/ DROPS TOPICAL
Status: COMPLETED | OUTPATIENT
Start: 2021-08-24 | End: 2021-08-24

## 2021-08-24 RX ORDER — FENTANYL CITRATE 50 UG/ML
INJECTION, SOLUTION INTRAMUSCULAR; INTRAVENOUS PRN
Status: DISCONTINUED | OUTPATIENT
Start: 2021-08-24 | End: 2021-08-24 | Stop reason: SDUPTHER

## 2021-08-24 RX ORDER — SODIUM CHLORIDE 0.9 % (FLUSH) 0.9 %
10 SYRINGE (ML) INJECTION EVERY 12 HOURS SCHEDULED
Status: DISCONTINUED | OUTPATIENT
Start: 2021-08-24 | End: 2021-08-24 | Stop reason: HOSPADM

## 2021-08-24 RX ORDER — SODIUM CHLORIDE 0.9 % (FLUSH) 0.9 %
10 SYRINGE (ML) INJECTION EVERY 12 HOURS SCHEDULED
Status: DISCONTINUED | OUTPATIENT
Start: 2021-08-24 | End: 2021-08-24 | Stop reason: SDUPTHER

## 2021-08-24 RX ORDER — SODIUM CHLORIDE 9 MG/ML
25 INJECTION, SOLUTION INTRAVENOUS PRN
Status: DISCONTINUED | OUTPATIENT
Start: 2021-08-24 | End: 2021-08-24 | Stop reason: HOSPADM

## 2021-08-24 RX ORDER — KETOROLAC TROMETHAMINE 5 MG/ML
1 SOLUTION OPHTHALMIC SEE ADMIN INSTRUCTIONS
Status: COMPLETED | OUTPATIENT
Start: 2021-08-24 | End: 2021-08-24

## 2021-08-24 RX ORDER — SODIUM CHLORIDE 9 MG/ML
INJECTION, SOLUTION INTRAVENOUS CONTINUOUS
Status: DISCONTINUED | OUTPATIENT
Start: 2021-08-24 | End: 2021-08-24 | Stop reason: HOSPADM

## 2021-08-24 RX ORDER — PREDNISOLONE ACETATE 10 MG/ML
SUSPENSION/ DROPS OPHTHALMIC
Status: COMPLETED | OUTPATIENT
Start: 2021-08-24 | End: 2021-08-24

## 2021-08-24 RX ORDER — SODIUM CHLORIDE 9 MG/ML
25 INJECTION, SOLUTION INTRAVENOUS PRN
Status: DISCONTINUED | OUTPATIENT
Start: 2021-08-24 | End: 2021-08-24 | Stop reason: SDUPTHER

## 2021-08-24 RX ORDER — SODIUM CHLORIDE 0.9 % (FLUSH) 0.9 %
10 SYRINGE (ML) INJECTION PRN
Status: DISCONTINUED | OUTPATIENT
Start: 2021-08-24 | End: 2021-08-24 | Stop reason: HOSPADM

## 2021-08-24 RX ORDER — TROPICAMIDE 10 MG/ML
1 SOLUTION/ DROPS OPHTHALMIC SEE ADMIN INSTRUCTIONS
Status: COMPLETED | OUTPATIENT
Start: 2021-08-24 | End: 2021-08-24

## 2021-08-24 RX ORDER — PHENYLEPHRINE HCL 2.5 %
1 DROPS OPHTHALMIC (EYE) SEE ADMIN INSTRUCTIONS
Status: COMPLETED | OUTPATIENT
Start: 2021-08-24 | End: 2021-08-24

## 2021-08-24 RX ORDER — MIDAZOLAM HYDROCHLORIDE 1 MG/ML
INJECTION INTRAMUSCULAR; INTRAVENOUS PRN
Status: DISCONTINUED | OUTPATIENT
Start: 2021-08-24 | End: 2021-08-24 | Stop reason: SDUPTHER

## 2021-08-24 RX ORDER — SODIUM CHLORIDE 0.9 % (FLUSH) 0.9 %
10 SYRINGE (ML) INJECTION PRN
Status: DISCONTINUED | OUTPATIENT
Start: 2021-08-24 | End: 2021-08-24 | Stop reason: SDUPTHER

## 2021-08-24 RX ORDER — BALANCED SALT SOLUTION ENRICHED WITH BICARBONATE, DEXTROSE, AND GLUTATHIONE
KIT INTRAOCULAR
Status: COMPLETED | OUTPATIENT
Start: 2021-08-24 | End: 2021-08-24

## 2021-08-24 RX ADMIN — PHENYLEPHRINE HYDROCHLORIDE 1 DROP: 25 SOLUTION/ DROPS OPHTHALMIC at 06:57

## 2021-08-24 RX ADMIN — KETOROLAC TROMETHAMINE 1 DROP: 5 SOLUTION/ DROPS OPHTHALMIC at 06:50

## 2021-08-24 RX ADMIN — TROPICAMIDE 1 DROP: 10 SOLUTION/ DROPS OPHTHALMIC at 06:57

## 2021-08-24 RX ADMIN — PHENYLEPHRINE HYDROCHLORIDE 1 DROP: 25 SOLUTION/ DROPS OPHTHALMIC at 06:50

## 2021-08-24 RX ADMIN — TROPICAMIDE 1 DROP: 10 SOLUTION/ DROPS OPHTHALMIC at 06:50

## 2021-08-24 RX ADMIN — KETOROLAC TROMETHAMINE 1 DROP: 5 SOLUTION/ DROPS OPHTHALMIC at 06:45

## 2021-08-24 RX ADMIN — SODIUM CHLORIDE: 9 INJECTION, SOLUTION INTRAVENOUS at 06:57

## 2021-08-24 RX ADMIN — MIDAZOLAM 1 MG: 1 INJECTION INTRAMUSCULAR; INTRAVENOUS at 07:28

## 2021-08-24 RX ADMIN — PHENYLEPHRINE HYDROCHLORIDE 1 DROP: 25 SOLUTION/ DROPS OPHTHALMIC at 06:45

## 2021-08-24 RX ADMIN — TROPICAMIDE 1 DROP: 10 SOLUTION/ DROPS OPHTHALMIC at 06:45

## 2021-08-24 RX ADMIN — KETOROLAC TROMETHAMINE 1 DROP: 5 SOLUTION/ DROPS OPHTHALMIC at 06:57

## 2021-08-24 RX ADMIN — FENTANYL CITRATE 50 MCG: 50 INJECTION INTRAMUSCULAR; INTRAVENOUS at 07:28

## 2021-08-24 RX ADMIN — MIDAZOLAM 1 MG: 1 INJECTION INTRAMUSCULAR; INTRAVENOUS at 07:33

## 2021-08-24 ASSESSMENT — ENCOUNTER SYMPTOMS: SHORTNESS OF BREATH: 0

## 2021-08-24 ASSESSMENT — PAIN SCALES - GENERAL
PAINLEVEL_OUTOF10: 0
PAINLEVEL_OUTOF10: 0

## 2021-08-24 ASSESSMENT — PAIN - FUNCTIONAL ASSESSMENT: PAIN_FUNCTIONAL_ASSESSMENT: 0-10

## 2021-08-24 NOTE — ANESTHESIA PRE PROCEDURE
Department of Anesthesiology  Preprocedure Note       Name:  Bridger Abreu   Age:  80 y.o.  :  1940                                          MRN:  [id number]         Date:  2021      Surgeon: Yenny Guzman):  Edson Orr MD    Procedure: PHACOEMULSIFICATION WITH INTRAOCULAR LENS IMPLANT RIGHT EYE, (Right )    Medications prior to admission:   Prior to Admission medications    Medication Sig Start Date End Date Taking? Authorizing Provider   omeprazole (PRILOSEC) 20 MG delayed release capsule Take 1 capsule by mouth Daily 21   Paul Meeks, DO   meloxicam (MOBIC) 15 MG tablet Take 1 tablet by mouth daily 21   Paul Meeks, DO   vitamin E 1000 units capsule Take 180 Units by mouth daily    Historical Provider, MD   COLLAGEN PO Take by mouth    Historical Provider, MD   verapamil (CALAN SR) 120 MG extended release tablet TAKE 1 TABLET EVERY DAY 21   Paul Meeks, DO   metoprolol tartrate (LOPRESSOR) 50 MG tablet TAKE 1 TABLET TWICE DAILY 21   Paul Meeks DO   triamterene-hydroCHLOROthiazide (MAXZIDE-25) 37.5-25 MG per tablet TAKE 1 TABLET EVERY DAY 21   Paul Meeks DO   HYDROcodone-acetaminophen (NORCO) 7.5-325 MG per tablet Take 1 tablet by mouth every 8 hours as needed for Pain for up to 30 days.  21  Paul Meeks DO   fluticasone [facility]) 50 MCG/ACT nasal spray 2 sprays by Nasal route daily 21   Paul Meeks DO   timolol (BETIMOL) 0.5 % ophthalmic solution 1 drop 2 times daily    Historical Provider, MD   cyclobenzaprine (FLEXERIL) 10 MG tablet Take 1 tablet by mouth 3 times daily as needed for Muscle spasms 10/16/19   Liz Motta, DO   magnesium 200 MG TABS tablet Take 200 mg by mouth daily    Historical Provider, MD   B Complex Vitamins (VITAMIN B COMPLEX PO) Take by mouth daily     Historical Provider, MD BRIGGS 0.01 % SOLN ophthalmic drops Place 1 drop into both eyes  3/30/17   Historical Primary osteoarthritis involving multiple joints M89.49    Non morbid obesity due to excess calories E66.09    Primary insomnia F51.01    Coronary artery disease involving native coronary artery without angina pectoris I25.10    Essential hypertension I10    Intrinsic eczema L20.84    Chronic midline low back pain without sciatica M54.5, G89.29    Glenohumeral arthritis M19.019    Biceps tendonitis of both shoulders M75.21, M75.22    Acromioclavicular joint arthritis M19.019    Rotator cuff tendonitis M75.80    Anesthesia complication F55.61LZ    Fatigue R53.83    Hot flashes R23.2    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    Postmenopausal status Z78.0    Seborrheic keratosis L82.1    Grade IV hemorrhoids K64.3    Glaucoma H40.9    Internal hemorrhoids K64.8    Positive PPD R76.11    Chronic kidney disease, stage 3b (HCC) N18.32    History of blood transfusion Z92.89       Past Medical History:        Diagnosis Date    Anesthesia complication     difficulty waking up after breast biopsy    Anxiety     Chronic kidney disease, stage 3b (HCC)     Chronic midline low back pain without sciatica     Coronary artery disease involving native coronary artery without angina pectoris     Essential hypertension     Fatigue     Foot drop, left     GERD without esophagitis     History of blood transfusion 2010    Hot flashes     Internal hemorrhoids     Intrinsic eczema     Lesion of lateral popliteal nerve     Non morbid obesity due to excess calories     Paroxysmal supraventricular tachycardia (HCC)     Positive PPD 06/2020    repeated --second PPD test came back negative    Postmenopausal status     Primary insomnia     Primary osteoarthritis involving multiple joints     PUD (peptic ulcer disease)     Restless leg syndrome     Seborrheic keratosis        Past Surgical History:        Procedure Laterality Date    ABDOMINAL EXPLORATION SURGERY  2000    Lysis of Adhesions    BREAST SURGERY Right     Biopsy    CARPAL TUNNEL RELEASE Right 2020    RIGHT CARPAL TUNNEL RELEASE performed by Uma Pineda MD at [address]      from back    HEMORRHOID SURGERY  2017    HYSTERECTOMY      Complete    LUMBAR LAMINECTOMY      x 3 with fusion    SHOULDER ARTHROSCOPY Right     Right shoulder scope 2) Right shoulder labral debridement 3) Right shoulder Open Miguel 4) Right shoulder rotator cuff repair    TOTAL HIP ARTHROPLASTY Bilateral     TOTAL KNEE ARTHROPLASTY Left 2015    Dr. Larry Baum UPPER GASTROINTESTINAL ENDOSCOPY N/A 03/10/2020    EGD ESOPHAGOGASTRODUODENOSCOPY performed by Jose Fan MD at [address] [facility]         Social History:    Social History     Tobacco Use    Smoking status: Former Smoker     Packs/day: 1.00     Types: Cigarettes     Quit date: 1997     Years since quittin.6    Smokeless tobacco: Never Used   Substance Use Topics    Alcohol use: Yes     Alcohol/week: 2.0 standard drinks     Types: 1 Glasses of wine, 1 Cans of beer per week     Comment: rare                                Counseling given: Not Answered      Vital Signs (Current): There were no vitals filed for this visit.                                            BP Readings from Last 3 Encounters:   21 114/72   21 138/78   21 133/75       NPO Status:                                                                                 BMI:   Wt Readings from Last 3 Encounters:   21 232 lb (105.2 kg)   21 232 lb (105.2 kg)   21 230 lb (104.3 kg)     There is no height or weight on file to calculate BMI.    CBC:   Lab Results   Component Value Date    WBC 5.3 2015    RBC 4.39 2015    HGB 11.9 2015    HCT 36.9 2015    MCV 84.0 2015    RDW 15.3 2015     2015       CMP:   Lab Results   Component Value Date     02/01/2021    K 4.4 02/01/2021     02/01/2021    CO2 25 02/01/2021    BUN 24 02/01/2021    CREATININE 1.3 02/01/2021    GFRAA 48 02/01/2021    GFRAA 57 09/26/2012    LABGLOM 39 02/01/2021    GLUCOSE 84 02/01/2021    CALCIUM 9.6 02/01/2021    AST 19 09/26/2012    ALT 16 09/26/2012       POC Tests: No results for input(s): POCGLU, POCNA, POCK, POCCL, POCBUN, POCHEMO, POCHCT in the last 72 hours. Coags:   Lab Results   Component Value Date    PROTIME 10.5 05/07/2015    INR 0.97 05/07/2015    APTT 30.8 01/21/2015       HCG (If Applicable): No results found for: PREGTESTUR, PREGSERUM, HCG, HCGQUANT     ABGs: No results found for: PHART, PO2ART, EVG2AHC, QOY6LXR, BEART, W7LTJVEI     Type & Screen (If Applicable):  No results found for: LABABO, [address]    Anesthesia Evaluation  Patient summary reviewed history of anesthetic complications (prolonged emergence): Airway: Mallampati: II  TM distance: >3 FB   Neck ROM: full  Mouth opening: > = 3 FB Dental:      Comment: Missing multiple teeth, no loose    Pulmonary: breath sounds clear to auscultation      (-) COPD, asthma, shortness of breath, recent URI, sleep apnea, rhonchi, wheezes and rales                           Cardiovascular:    (+) hypertension:, CAD:, dysrhythmias: SVT and atrial fibrillation,     (-) valvular problems/murmurs, past MI, murmur and no pulmonary hypertension      Rhythm: regular  Rate: normal                    Neuro/Psych:   (+) neuromuscular disease:,    (-) seizures, TIA, CVA and headaches           GI/Hepatic/Renal:   (+) GERD:, PUD, renal disease: CRI, morbid obesity (BMI: 40)          Endo/Other:        (-) diabetes mellitus, hypothyroidism, hyperthyroidism, blood dyscrasia               Abdominal:   (+) obese,     Abdomen: soft. Vascular: Other Findings:             Anesthesia Plan      MAC     ASA 3       Induction: intravenous. Anesthetic plan and risks discussed with patient.       Plan discussed with CRNA.        This pre-anesthesia assessment may be used as a history and physical.    DOS STAFF ADDENDUM:    Pt seen and examined, chart reviewed (including anesthesia, drug and allergy history). No interval changes to history and physical examination. Anesthetic plan, risks, benefits, alternatives, and personnel involved discussed with patient. Patient verbalized an understanding and agrees to proceed.       Severo Laughter, MD  August 24, 2021  6:04 AM

## 2021-08-24 NOTE — ANESTHESIA POSTPROCEDURE EVALUATION
Department of Anesthesiology  Postprocedure Note    Patient: Bridger Abreu  MRN: [id number]  YOB: 1940  Date of evaluation: 8/24/2021  Time:  8:21 AM     Procedure Summary     Date: 08/24/21 Room / Location: [address]    Anesthesia Start: 0725 Anesthesia Stop: 1553    Procedure: PHACOEMULSIFICATION WITH INTRAOCULAR LENS IMPLANT RIGHT EYE, (Right Eye) Diagnosis:       Low tension glaucoma of right eye, moderate stage      Age-related nuclear cataract of right eye      (Low tension glaucoma of right eye, moderate stage, Age-related nuclear cataract of right eye)    Surgeons: Edson Orr MD Responsible Provider: Bernie Lozano MD    Anesthesia Type: MAC ASA Status: 3          Anesthesia Type: MAC    Bandar Phase I: Bandar Score: 10    Bandar Phase II: Bandar Score: 10    Last vitals: Reviewed and per EMR flowsheets. Anesthesia Post Evaluation    Patient location during evaluation: PACU  Patient participation: complete - patient participated  Level of consciousness: awake and alert  Airway patency: patent  Nausea & Vomiting: no nausea and no vomiting  Complications: no  Cardiovascular status: blood pressure returned to baseline and hemodynamically stable  Respiratory status: nonlabored ventilation, spontaneous ventilation and room air  Hydration status: stable  Comments: Ms. Gloria Camacho awake and alert following procedure, pleasantly conversing with staff. No complications evident. Appropriate for discharge to home.        Bernie Lozano MD 8/24/2021 8:22 AM

## 2021-08-24 NOTE — OP NOTE
Operative Note        [place] [address]  [phone]      Name:  Sebastián Delgado  :  1940    Age:   80 y.o. Medical Record Number:  [id number]  Date of Procedure:  2021     Preoperative diagnosis:   1. Cataract right eye     Postoperative diagnosis: Same    Procedure:   1. Phacoemulsification with posterior chamber intraocular lens insertion right eye     Surgeon: Idalia Maria MD   Anesthesia: Topical with intracameral lidocaine  Complications:None  Estimated blood loss: None    Indications for procedure: The patient has a visually significant cataract in the right eye that interferes with activities of daily living. The patient desires to improve their vision. Following discussion of all risks, benefits, and alternatives, the patient agreed to have the procedure done. Informed consent was signed. Operative Procedure: The patient was taken to the holding area where the operative site was confirmed. The patient was brought to the operating room and placed in the supine position. The operative eye was prepped and draped in the usual sterile fashion for ophthalmic surgery using betadine and sterile disposable drapes. A lid speculum was placed and the operating microscope swung into position. Under the operating microscope, a temporal paracentesis was created. Epi-shugarcaine was instilled into the anterior chamber. The anterior chamber was then deepened with Viscoat. A temporal biplanar clear corneal incision was created with a 2.4mm keratome. A cystotome and Utrata forceps were used to fashion a continuous curvilinear capsulorrhexis. Balanced salt solution was used to hydrodissect and hydrodelineate the nucleus. The phacoemulsification tip was introduced into the eye and the nucleus was removed using a two-handed divide and conquer with assistance of the Davonte chopper through the paracentesis.  The nucleus was removed with a total CDE of 3.92. Irrigation and aspiration was used to remove residual cortex. The capsular bag was examined and found to be intact. The anterior chamber was deepened with Provisc, and the lens was injected into the capsular bag. Irrigation and aspiration was used to remove residual viscoelastic. The wounds were hydrated and examined. All wounds were found to be watertight. The lid speculum and drapes were removed. Topical antibiotic and steroid were instilled into the operative eye. A patch and shield were taped. The patient tolerated the procedure well and taken to the recovery room in good condition. Electronically signed by:  Armida Patel MD,8/24/2021,7:54 AM

## 2021-08-31 NOTE — PROGRESS NOTES
Preoperative Screening for Elective Surgery/Invasive Procedures While COVID-19 present in the community     Have you tested positive or have been told to self-isolate for no COVID-19 like symptoms within the past 28 days? no   Do you currently have any of the following symptoms? no  o Fever >100.0 F or 99.9 F in immunocompromised patients? o New onset cough, shortness of breath or difficulty breathing? no  o New onset sore throat, myalgia (muscle aches and pains), headache, loss of taste/smell or diarrhea? no   Have you had a potential exposure to COVID-19 within the past 14 days by:  o Close contact with a confirmed case? no  o Close contact with a healthcare worker,  or essential infrastructure worker (grocery store, [employer], gas station, public utilities or transportation)? Yes md offices   o Do you reside in a congregate setting such as; skilled nursing facility, adult home, correctional facility, homeless shelter or other institutional setting? no  o Have you had recent travel to a known COVID-19 hotspot? no    Indicate if the patient has a positive screen by answering yes to one or more of the above questions. Patients who test positive or screen positive prior to surgery or on the day of surgery should be evaluated in conjunction with the surgeon/proceduralist/anesthesiologist to determine the urgency of the procedure.

## 2021-08-31 NOTE — PROGRESS NOTES
the surgery. Do not wear any make-up or nail polish on your fingers or toes      For your safety, please do not wear any jewelry or body piercing's on the day of surgery. All jewelry must be removed. If you have dentures, they will be removed before going to operating room. For your convenience, we will provide you with a container. If you wear contact lenses or glasses, they will be removed, please bring a case for them. If you have a living will and a durable power of  for healthcare, please bring in a copy. As part of our patient safety program to minimize surgical site infections, we ask you to do the following:    · Please notify your surgeon if you develop any illness between         now and the  day of your surgery. · This includes a cough, cold, fever, sore throat, nausea,         or vomiting, and diarrhea, etc.  ·  Please notify your surgeon if you experience dizziness, shortness         of breath or blurred vision between now and the time of your surgery. Do not shave your operative site 96 hours prior to surgery. For face and neck surgery, men may use an electric razor 48 hours   prior to surgery. You may shower the night before surgery or the morning of   your surgery with an antibacterial soap. You will need to bring a photo ID and insurance card    [facility] has an onsite pharmacy, would you like to utilize our pharmacy     If you will be staying overnight and use a C-pap machine, please bring   your C-pap to hospital     Our goal is to provide you with excellent care, therefore, visitors will be limited to two(2) in the room at a time so that we may focus on providing this care for you. Please contact pre-admission testing if you have any further questions.                  [facility] phone number:  [phone]  Please note these are generalized instructions for all surgical cases, you may be provided with more specific instructions according to your surgery.

## 2021-09-03 ENCOUNTER — ANESTHESIA EVENT (OUTPATIENT)
Dept: SURGERY | Age: 81
End: 2021-09-03
Payer: MEDICARE

## 2021-09-03 NOTE — PROGRESS NOTES
[address]        Pre-Op Phone Call:     Patient Name: Charisma Epstein     No relevant phone numbers on file. Home phone:  [phone]    Surgery Time:    7:30 AM     Arrival Time:   0615     Left extended Message:  Yes     Message left with: vm     Recent change in health status:  Call md     Advised of transportation/ policy:  Yes     NPO policy reviewed: Yes vm     Advised to take morning heart/blood pressure medications with sips of water morning of surgery? Yes     Instructed to bring eye drops, photo identification, and insurance card day of surgery? Yes     Advised to wear short sleeved button down shirt (no T-shirt underneath):  Yes     Advised not to wear jewelry, hairpins, or pantyhose day of surgery? Yes     Advised not to wear make-up and to wash face day of surgery?   Yes    Remarks:        Electronically signed by:  Komal Romero RN at 9/3/2021 12:54 PM

## 2021-09-07 ENCOUNTER — HOSPITAL ENCOUNTER (OUTPATIENT)
Age: 81
Setting detail: OUTPATIENT SURGERY
Discharge: HOME | End: 2021-09-07
Attending: OPHTHALMOLOGY | Admitting: OPHTHALMOLOGY
Payer: MEDICARE

## 2021-09-07 ENCOUNTER — ANESTHESIA (OUTPATIENT)
Dept: SURGERY | Age: 81
End: 2021-09-07
Payer: MEDICARE

## 2021-09-07 VITALS — OXYGEN SATURATION: 100 % | DIASTOLIC BLOOD PRESSURE: 66 MMHG | SYSTOLIC BLOOD PRESSURE: 138 MMHG

## 2021-09-07 VITALS
RESPIRATION RATE: 20 BRPM | OXYGEN SATURATION: 99 % | DIASTOLIC BLOOD PRESSURE: 77 MMHG | HEART RATE: 61 BPM | BODY MASS INDEX: 39.61 KG/M2 | TEMPERATURE: 96.5 F | SYSTOLIC BLOOD PRESSURE: 127 MMHG | HEIGHT: 64 IN | WEIGHT: 232 LBS

## 2021-09-07 PROCEDURE — 3700000000 HC ANESTHESIA ATTENDED CARE: Performed by: OPHTHALMOLOGY

## 2021-09-07 PROCEDURE — 2580000003 HC RX 258: Performed by: ANESTHESIOLOGY

## 2021-09-07 PROCEDURE — 3600000014 HC SURGERY LEVEL 4 ADDTL 15MIN: Performed by: OPHTHALMOLOGY

## 2021-09-07 PROCEDURE — V2632 POST CHMBR INTRAOCULAR LENS: HCPCS | Performed by: OPHTHALMOLOGY

## 2021-09-07 PROCEDURE — 6370000000 HC RX 637 (ALT 250 FOR IP): Performed by: OPHTHALMOLOGY

## 2021-09-07 PROCEDURE — C1713 ANCHOR/SCREW BN/BN,TIS/BN: HCPCS | Performed by: OPHTHALMOLOGY

## 2021-09-07 PROCEDURE — 2709999900 HC NON-CHARGEABLE SUPPLY: Performed by: OPHTHALMOLOGY

## 2021-09-07 PROCEDURE — 6360000002 HC RX W HCPCS: Performed by: NURSE ANESTHETIST, CERTIFIED REGISTERED

## 2021-09-07 PROCEDURE — 3700000001 HC ADD 15 MINUTES (ANESTHESIA): Performed by: OPHTHALMOLOGY

## 2021-09-07 PROCEDURE — 7100000010 HC PHASE II RECOVERY - FIRST 15 MIN: Performed by: OPHTHALMOLOGY

## 2021-09-07 PROCEDURE — 2500000003 HC RX 250 WO HCPCS: Performed by: OPHTHALMOLOGY

## 2021-09-07 PROCEDURE — 3600000004 HC SURGERY LEVEL 4 BASE: Performed by: OPHTHALMOLOGY

## 2021-09-07 DEVICE — LENS INTOCU 20.5 DIOPT 118.7 A CONSTANT L13MM DIA6MM 0DEG: Type: IMPLANTABLE DEVICE | Site: EYE | Status: FUNCTIONAL

## 2021-09-07 RX ORDER — TROPICAMIDE 10 MG/ML
1 SOLUTION/ DROPS OPHTHALMIC SEE ADMIN INSTRUCTIONS
Status: DISCONTINUED | OUTPATIENT
Start: 2021-09-07 | End: 2021-09-07 | Stop reason: HOSPADM

## 2021-09-07 RX ORDER — PREDNISOLONE ACETATE 10 MG/ML
SUSPENSION/ DROPS OPHTHALMIC
Status: COMPLETED | OUTPATIENT
Start: 2021-09-07 | End: 2021-09-07

## 2021-09-07 RX ORDER — FENTANYL CITRATE 50 UG/ML
INJECTION, SOLUTION INTRAMUSCULAR; INTRAVENOUS PRN
Status: DISCONTINUED | OUTPATIENT
Start: 2021-09-07 | End: 2021-09-07 | Stop reason: SDUPTHER

## 2021-09-07 RX ORDER — PHENYLEPHRINE HCL 2.5 %
1 DROPS OPHTHALMIC (EYE) SEE ADMIN INSTRUCTIONS
Status: DISCONTINUED | OUTPATIENT
Start: 2021-09-07 | End: 2021-09-07 | Stop reason: HOSPADM

## 2021-09-07 RX ORDER — SODIUM CHLORIDE 0.9 % (FLUSH) 0.9 %
5-40 SYRINGE (ML) INJECTION EVERY 12 HOURS SCHEDULED
Status: DISCONTINUED | OUTPATIENT
Start: 2021-09-07 | End: 2021-09-07 | Stop reason: HOSPADM

## 2021-09-07 RX ORDER — SODIUM CHLORIDE 0.9 % (FLUSH) 0.9 %
10 SYRINGE (ML) INJECTION EVERY 12 HOURS SCHEDULED
Status: DISCONTINUED | OUTPATIENT
Start: 2021-09-07 | End: 2021-09-07 | Stop reason: SDUPTHER

## 2021-09-07 RX ORDER — SODIUM CHLORIDE 9 MG/ML
INJECTION, SOLUTION INTRAVENOUS CONTINUOUS
Status: DISCONTINUED | OUTPATIENT
Start: 2021-09-07 | End: 2021-09-07 | Stop reason: HOSPADM

## 2021-09-07 RX ORDER — KETOROLAC TROMETHAMINE 5 MG/ML
1 SOLUTION OPHTHALMIC SEE ADMIN INSTRUCTIONS
Status: DISCONTINUED | OUTPATIENT
Start: 2021-09-07 | End: 2021-09-07 | Stop reason: HOSPADM

## 2021-09-07 RX ORDER — BALANCED SALT SOLUTION ENRICHED WITH BICARBONATE, DEXTROSE, AND GLUTATHIONE
KIT INTRAOCULAR
Status: COMPLETED | OUTPATIENT
Start: 2021-09-07 | End: 2021-09-07

## 2021-09-07 RX ORDER — SODIUM CHLORIDE 0.9 % (FLUSH) 0.9 %
10 SYRINGE (ML) INJECTION PRN
Status: DISCONTINUED | OUTPATIENT
Start: 2021-09-07 | End: 2021-09-07 | Stop reason: SDUPTHER

## 2021-09-07 RX ORDER — CIPROFLOXACIN HYDROCHLORIDE 3.5 MG/ML
SOLUTION/ DROPS TOPICAL
Status: COMPLETED | OUTPATIENT
Start: 2021-09-07 | End: 2021-09-07

## 2021-09-07 RX ORDER — SODIUM CHLORIDE 0.9 % (FLUSH) 0.9 %
5-40 SYRINGE (ML) INJECTION PRN
Status: DISCONTINUED | OUTPATIENT
Start: 2021-09-07 | End: 2021-09-07 | Stop reason: HOSPADM

## 2021-09-07 RX ORDER — OFLOXACIN 3 MG/ML
SOLUTION/ DROPS OPHTHALMIC
Status: DISCONTINUED | OUTPATIENT
Start: 2021-09-07 | End: 2021-09-07 | Stop reason: ALTCHOICE

## 2021-09-07 RX ORDER — MIDAZOLAM HYDROCHLORIDE 1 MG/ML
INJECTION INTRAMUSCULAR; INTRAVENOUS PRN
Status: DISCONTINUED | OUTPATIENT
Start: 2021-09-07 | End: 2021-09-07 | Stop reason: SDUPTHER

## 2021-09-07 RX ORDER — SODIUM CHLORIDE 9 MG/ML
25 INJECTION, SOLUTION INTRAVENOUS PRN
Status: DISCONTINUED | OUTPATIENT
Start: 2021-09-07 | End: 2021-09-07 | Stop reason: HOSPADM

## 2021-09-07 RX ORDER — SODIUM CHLORIDE 9 MG/ML
25 INJECTION, SOLUTION INTRAVENOUS PRN
Status: DISCONTINUED | OUTPATIENT
Start: 2021-09-07 | End: 2021-09-07 | Stop reason: SDUPTHER

## 2021-09-07 RX ORDER — BRIMONIDINE TARTRATE 2 MG/ML
SOLUTION/ DROPS OPHTHALMIC
Status: COMPLETED | OUTPATIENT
Start: 2021-09-07 | End: 2021-09-07

## 2021-09-07 RX ADMIN — SODIUM CHLORIDE: 9 INJECTION, SOLUTION INTRAVENOUS at 07:02

## 2021-09-07 RX ADMIN — PHENYLEPHRINE HYDROCHLORIDE 1 DROP: 25 SOLUTION/ DROPS OPHTHALMIC at 06:55

## 2021-09-07 RX ADMIN — MIDAZOLAM 1 MG: 1 INJECTION INTRAMUSCULAR; INTRAVENOUS at 07:32

## 2021-09-07 RX ADMIN — TROPICAMIDE 1 DROP: 10 SOLUTION/ DROPS OPHTHALMIC at 06:55

## 2021-09-07 RX ADMIN — TROPICAMIDE 1 DROP: 10 SOLUTION/ DROPS OPHTHALMIC at 07:01

## 2021-09-07 RX ADMIN — FENTANYL CITRATE 50 MCG: 50 INJECTION INTRAMUSCULAR; INTRAVENOUS at 07:39

## 2021-09-07 RX ADMIN — FENTANYL CITRATE 50 MCG: 50 INJECTION INTRAMUSCULAR; INTRAVENOUS at 07:27

## 2021-09-07 RX ADMIN — KETOROLAC TROMETHAMINE 1 DROP: 5 SOLUTION/ DROPS OPHTHALMIC at 07:01

## 2021-09-07 RX ADMIN — MIDAZOLAM 1 MG: 1 INJECTION INTRAMUSCULAR; INTRAVENOUS at 07:27

## 2021-09-07 RX ADMIN — KETOROLAC TROMETHAMINE 1 DROP: 5 SOLUTION/ DROPS OPHTHALMIC at 06:55

## 2021-09-07 RX ADMIN — PHENYLEPHRINE HYDROCHLORIDE 1 DROP: 25 SOLUTION/ DROPS OPHTHALMIC at 07:01

## 2021-09-07 ASSESSMENT — PAIN - FUNCTIONAL ASSESSMENT: PAIN_FUNCTIONAL_ASSESSMENT: 0-10

## 2021-09-07 ASSESSMENT — PAIN SCALES - GENERAL
PAINLEVEL_OUTOF10: 0
PAINLEVEL_OUTOF10: 0

## 2021-09-07 ASSESSMENT — ENCOUNTER SYMPTOMS: SHORTNESS OF BREATH: 0

## 2021-09-07 NOTE — ANESTHESIA POSTPROCEDURE EVALUATION
Department of Anesthesiology  Postprocedure Note    Patient: Antonio Mayfield  MRN: [id number]  YOB: 1940  Date of evaluation: 9/7/2021  Time:  11:20 AM     Procedure Summary     Date: 09/07/21 Room / Location: [address]    Anesthesia Start: 5279 Anesthesia Stop: 8567    Procedure: PHACOEMULSIFICATION WITH INTRAOCULAR LENS IMPLANT - LEFT EYE (Left Eye) Diagnosis:       Low tension glaucoma of left eye, moderate stage      Age-related nuclear cataract, left      (Low tension glaucoma of left eye, moderate stage, Age-related nuclear cataract, left)    Surgeons: Beni Aguero MD Responsible Provider: Candi Rivas MD    Anesthesia Type: MAC ASA Status: 3          Anesthesia Type: MAC    Bandar Phase I: Bandar Score: 10    Bandar Phase II: Bandar Score: 10    Last vitals: Reviewed and per EMR flowsheets.        Anesthesia Post Evaluation    Patient location during evaluation: PACU  Patient participation: complete - patient participated  Level of consciousness: awake and alert  Pain score: 0  Airway patency: patent  Nausea & Vomiting: no nausea and no vomiting  Complications: no  Cardiovascular status: blood pressure returned to baseline  Respiratory status: acceptable  Hydration status: euvolemic

## 2021-09-07 NOTE — OP NOTE
Operative Note        [address] [place] [address]  [phone]      Name:  Junior Gale  :  1940    Age:   80 y.o. Medical Record Number:  [id number]  Date of Procedure:  2021     Preoperative diagnosis:   1. Cataract left eye     Postoperative diagnosis: Same    Procedure:   1. Phacoemulsification with posterior chamber intraocular lens insertion left eye     Surgeon: Kiki Carroll MD   Anesthesia: Topical with intracameral lidocaine  Complications:None  Estimated blood loss: None    Indications for procedure: The patient has a visually significant cataract in the right eye that interferes with activities of daily living. The patient desires to improve their vision. Following discussion of all risks, benefits, and alternatives, the patient agreed to have the procedure done. Informed consent was signed. Operative Procedure: The patient was taken to the holding area where the operative site was confirmed. The patient was brought to the operating room and placed in the supine position. The operative eye was prepped and draped in the usual sterile fashion for ophthalmic surgery using betadine and sterile disposable drapes. A lid speculum was placed and the operating microscope swung into position. Under the operating microscope, a temporal paracentesis was created. Epi-shugarcaine was instilled into the anterior chamber. The anterior chamber was then deepened with Viscoat. A temporal biplanar clear corneal incision was created with a 2.4mm keratome. A cystotome and Utrata forceps were used to fashion a continuous curvilinear capsulorrhexis. Balanced salt solution was used to hydrodissect and hydrodelineate the nucleus. The phacoemulsification tip was introduced into the eye and the nucleus was removed using a two-handed divide and conquer with assistance of the Davonte chopper through the paracentesis.  The nucleus was removed with a total CDE of 7.85. Irrigation and aspiration was used to remove residual cortex. The capsular bag was examined and found to be intact. The anterior chamber was deepened with Provisc, and the lens was injected into the capsular bag. Irrigation and aspiration was used to remove residual viscoelastic. The wounds were hydrated and examined. All wounds were found to be watertight. The lid speculum and drapes were removed. Topical antibiotic and steroid were instilled into the operative eye. A patch and shield were taped. The patient tolerated the procedure well and taken to the recovery room in good condition. Electronically signed by:  Amy Shearer MD,9/7/2021,7:53 AM

## 2021-09-07 NOTE — ANESTHESIA PRE PROCEDURE
Department of Anesthesiology  Preprocedure Note       Name:  Hussein Flores   Age:  80 y.o.  :  1940                                          MRN:  [id number]         Date:  2021      Surgeon: Pedro Rdz):  Burton Justice MD    Procedure: PHACOEMULSIFICATION WITH INTRAOCULAR LENS IMPLANT - LEFT EYE (Left )    Medications prior to admission:   Prior to Admission medications    Medication Sig Start Date End Date Taking?  Authorizing Provider   omeprazole (PRILOSEC) 20 MG delayed release capsule Take 1 capsule by mouth Daily 21   Ehsan Whitleyril, DO   meloxicam (MOBIC) 15 MG tablet Take 1 tablet by mouth daily 21   Ehsan Whitleyril, DO   vitamin E 1000 units capsule Take 180 Units by mouth daily    Historical Provider, MD   COLLAGEN PO Take by mouth    Historical Provider, MD   verapamil (CALAN SR) 120 MG extended release tablet TAKE 1 TABLET EVERY DAY 21   Ehsan Agrawal, DO   metoprolol tartrate (LOPRESSOR) 50 MG tablet TAKE 1 TABLET TWICE DAILY 21   Ehsan Agrawal, DO   triamterene-hydroCHLOROthiazide (MAXZIDE-25) 37.5-25 MG per tablet TAKE 1 TABLET EVERY DAY 21   Ehsan Agrawal, DO   fluticasone (FLONASE) 50 MCG/ACT nasal spray 2 sprays by Nasal route daily 21   Koki Motta DO   timolol (BETIMOL) 0.5 % ophthalmic solution 1 drop 2 times daily    Historical Provider, MD   cyclobenzaprine (FLEXERIL) 10 MG tablet Take 1 tablet by mouth 3 times daily as needed for Muscle spasms 10/16/19   Koki Motta DO   magnesium 200 MG TABS tablet Take 200 mg by mouth daily    Historical Provider, MD   B Complex Vitamins (VITAMIN B COMPLEX PO) Take by mouth daily     Historical Provider, MD BRIGGS 0.01 % SOLN ophthalmic drops Place 1 drop into both eyes  3/30/17   Historical Provider, MD   docusate sodium (COLACE) 100 MG capsule Take 1 capsule by mouth 2 times daily 17   Nanda Ragland MD       Current medications:    No current facility-administered medications for this visit. No current outpatient medications on file. Facility-Administered Medications Ordered in Other Visits   Medication Dose Route Frequency Provider Last Rate Last Admin    0.9 % sodium chloride infusion   IntraVENous Continuous Moncho Patel MD        sodium chloride flush 0.9 % injection 5-40 mL  5-40 mL IntraVENous 2 times per day Moncho Patel MD        sodium chloride flush 0.9 % injection 5-40 mL  5-40 mL IntraVENous PRN Moncho Patel MD        0.9 % sodium chloride infusion  25 mL IntraVENous PRN Moncho Patel MD        ketorolac (ACULAR) 0.5 % ophthalmic solution 1 drop  1 drop Left Eye See Admin Instructions Luisa Low MD        phenylephrine (MYDFRIN) 2.5 % ophthalmic solution 1 drop  1 drop Left Eye See Admin Instructions Luisa Low MD        tropicamide (MYDRIACYL) 1 % ophthalmic solution 1 drop  1 drop Left Eye See Admin Instructions Luisa Low MD           Allergies:     Allergies   Allergen Reactions    Latex Itching and Rash    Bactrim Rash       Problem List:    Patient Active Problem List   Diagnosis Code    Paroxysmal supraventricular tachycardia (HCC) I47.1    Foot drop, left M21.372    Anxiety F41.9    PUD (peptic ulcer disease) K27.9    Restless leg syndrome G25.81    GERD without esophagitis K21.9    Primary osteoarthritis involving multiple joints M89.49    Non morbid obesity due to excess calories E66.09    Primary insomnia F51.01    Coronary artery disease involving native coronary artery without angina pectoris I25.10    Essential hypertension I10    Intrinsic eczema L20.84    Chronic midline low back pain without sciatica M54.5, G89.29    Glenohumeral arthritis M19.019    Biceps tendonitis of both shoulders M75.21, M75.22    Acromioclavicular joint arthritis M19.019    Rotator cuff tendonitis M75.80    Anesthesia complication H25.08DU    Fatigue R53.83    Hot flashes R23.2    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    Postmenopausal status Z78.0    Seborrheic keratosis L82.1    Grade IV hemorrhoids K64.3    Glaucoma H40.9    Internal hemorrhoids K64.8    Positive PPD R76.11    Chronic kidney disease, stage 3b (HCC) N18.32    History of blood transfusion Z92.89       Past Medical History:        Diagnosis Date    Anesthesia complication     difficulty waking up after breast biopsy    Anxiety     Chronic kidney disease, stage 3b (HCC)     Chronic midline low back pain without sciatica     Coronary artery disease involving native coronary artery without angina pectoris     Essential hypertension     Fatigue     Foot drop, left     GERD without esophagitis     History of blood transfusion 2010    Hot flashes     Internal hemorrhoids     Intrinsic eczema     Lesion of lateral popliteal nerve     Non morbid obesity due to excess calories     Paroxysmal supraventricular tachycardia (HCC)     Positive PPD 06/2020    repeated --second PPD test came back negative    Postmenopausal status     Primary insomnia     Primary osteoarthritis involving multiple joints     PUD (peptic ulcer disease)     Restless leg syndrome     Seborrheic keratosis        Past Surgical History:        Procedure Laterality Date    ABDOMINAL EXPLORATION SURGERY  2000    Lysis of Adhesions    BREAST SURGERY Right     Biopsy    CARPAL TUNNEL RELEASE Right 11/03/2020    RIGHT CARPAL TUNNEL RELEASE performed by Vivian Diamond MD at [address] [place] [address] Right 08/24/2021    CHOLECYSTECTOMY      COLONOSCOPY      CYST REMOVAL      from back    HEMORRHOID SURGERY  02/24/2017    HYSTERECTOMY      Complete    INTRACAPSULAR CATARACT EXTRACTION Right 8/24/2021    PHACOEMULSIFICATION WITH INTRAOCULAR LENS IMPLANT RIGHT EYE, performed by Enrico Kolb MD at [address] [place] [address]      x 3 with fusion    SHOULDER ARTHROSCOPY Right     Right shoulder scope 2) Right shoulder labral debridement 3) Right shoulder Open Miguel 4) Right shoulder rotator cuff repair    TOTAL HIP ARTHROPLASTY Bilateral     TOTAL KNEE ARTHROPLASTY Left 2015    Dr. Lesly Garvey UPPER GASTROINTESTINAL ENDOSCOPY N/A 03/10/2020    EGD ESOPHAGOGASTRODUODENOSCOPY performed by Gauri Manuel MD at [address]         Social History:    Social History     Tobacco Use    Smoking status: Former Smoker     Packs/day: 1.00     Types: Cigarettes     Quit date: 1997     Years since quittin.6    Smokeless tobacco: Never Used   Substance Use Topics    Alcohol use: Yes     Alcohol/week: 2.0 standard drinks     Types: 1 Glasses of wine, 1 Cans of beer per week     Comment: rare                                Counseling given: Not Answered      Vital Signs (Current): There were no vitals filed for this visit.                                            BP Readings from Last 3 Encounters:   21 (!) 141/69   21 (!) 163/89   21 114/72       NPO Status:  >8h                                                                               BMI:   Wt Readings from Last 3 Encounters:   21 232 lb (105.2 kg)   21 232 lb (105.2 kg)   21 232 lb (105.2 kg)     There is no height or weight on file to calculate BMI.    CBC:   Lab Results   Component Value Date    WBC 5.3 2015    RBC 4.39 2015    HGB 11.9 2015    HCT 36.9 2015    MCV 84.0 2015    RDW 15.3 2015     2015       CMP:   Lab Results   Component Value Date     2021    K 4.4 2021     2021    CO2 25 2021    BUN 24 2021    CREATININE 1.3 2021    GFRAA 48 2021    GFRAA 57 2012    LABGLOM 39 2021    GLUCOSE 84 2021    CALCIUM 9.6 2021    AST 19 2012    ALT 16 2012       POC Tests: No results for input(s): POCGLU, POCNA, POCK, POCCL, Essie Giron, POCHCT in the last 72 hours. Coags:   Lab Results   Component Value Date    PROTIME 10.5 05/07/2015    INR 0.97 05/07/2015    APTT 30.8 01/21/2015       HCG (If Applicable): No results found for: PREGTESTUR, PREGSERUM, HCG, HCGQUANT     ABGs: No results found for: PHART, PO2ART, EWA1RTF, ETL9ZXW, BEART, K8QLFSZW     Type & Screen (If Applicable):  No results found for: [facility]    Anesthesia Evaluation  Patient summary reviewed history of anesthetic complications (prolonged emergence): Airway: Mallampati: II  TM distance: >3 FB   Neck ROM: full  Mouth opening: > = 3 FB Dental: normal exam     Comment: Missing multiple teeth, no loose    Pulmonary: breath sounds clear to auscultation      (-) COPD, asthma, shortness of breath, recent URI, sleep apnea, rhonchi, wheezes and rales                           Cardiovascular:    (+) hypertension:, CAD:, dysrhythmias: SVT and atrial fibrillation,     (-) valvular problems/murmurs, past MI, murmur and no pulmonary hypertension      Rhythm: regular  Rate: normal                    Neuro/Psych:   (+) neuromuscular disease:,    (-) seizures, TIA, CVA and headaches           GI/Hepatic/Renal:   (+) GERD:, PUD, renal disease: CRI, morbid obesity (BMI: 40)          Endo/Other:        (-) diabetes mellitus, hypothyroidism, hyperthyroidism, blood dyscrasia               Abdominal:             Vascular: Other Findings:               Anesthesia Plan      MAC     ASA 3       Induction: intravenous. Anesthetic plan and risks discussed with patient. Plan discussed with CRNA. This pre-anesthesia assessment may be used as a history and physical.    DOS STAFF ADDENDUM:    Pt seen and examined, chart reviewed (including anesthesia, drug and allergy history). No interval changes to history and physical examination. Anesthetic plan, risks, benefits, alternatives, and personnel involved discussed with patient.   Patient verbalized an understanding and agrees to proceed.       Zay Benitez MD  September 7, 2021  6:53 AM

## 2021-09-16 ENCOUNTER — OFFICE VISIT (OUTPATIENT)
Dept: ORTHOPEDIC SURGERY | Age: 81
End: 2021-09-16
Payer: MEDICARE

## 2021-09-16 VITALS — HEIGHT: 64 IN | RESPIRATION RATE: 15 BRPM | WEIGHT: 232 LBS | BODY MASS INDEX: 39.61 KG/M2

## 2021-09-16 DIAGNOSIS — M25.511 RIGHT SHOULDER PAIN, UNSPECIFIED CHRONICITY: Primary | ICD-10-CM

## 2021-09-16 DIAGNOSIS — M75.80 ROTATOR CUFF TENDINITIS, UNSPECIFIED LATERALITY: ICD-10-CM

## 2021-09-16 PROCEDURE — G8417 CALC BMI ABV UP PARAM F/U: HCPCS | Performed by: PHYSICIAN ASSISTANT

## 2021-09-16 PROCEDURE — 1036F TOBACCO NON-USER: CPT | Performed by: PHYSICIAN ASSISTANT

## 2021-09-16 PROCEDURE — G8399 PT W/DXA RESULTS DOCUMENT: HCPCS | Performed by: PHYSICIAN ASSISTANT

## 2021-09-16 PROCEDURE — G8427 DOCREV CUR MEDS BY ELIG CLIN: HCPCS | Performed by: PHYSICIAN ASSISTANT

## 2021-09-16 PROCEDURE — 4040F PNEUMOC VAC/ADMIN/RCVD: CPT | Performed by: PHYSICIAN ASSISTANT

## 2021-09-16 PROCEDURE — 20610 DRAIN/INJ JOINT/BURSA W/O US: CPT | Performed by: PHYSICIAN ASSISTANT

## 2021-09-16 PROCEDURE — 1123F ACP DISCUSS/DSCN MKR DOCD: CPT | Performed by: PHYSICIAN ASSISTANT

## 2021-09-16 PROCEDURE — 1090F PRES/ABSN URINE INCON ASSESS: CPT | Performed by: PHYSICIAN ASSISTANT

## 2021-09-16 PROCEDURE — 99213 OFFICE O/P EST LOW 20 MIN: CPT | Performed by: PHYSICIAN ASSISTANT

## 2021-09-18 NOTE — PROGRESS NOTES
This dictation was done with Dragon dictation and may contain mechanical errors related to translation. I have today reviewed with Elías Pires the clinically relevant, past medical history, medications, allergies, family history, social history, and Review Of Systems form the patients most recent history form & I have documented any details relevant to today's presenting complaints in my history below. Ms. Katia Motta's self-reported past medical history, medications, allergies, family history, social history, and Review Of Systems form has been scanned into the chart under the \"Media\" tab. Subjective: Elías Pires is a 80 y.o. who is here complaining of pain in her hip and shoulder. She has had a recent injection into her shoulder back in June which was helpful. Hip is doing fairly well she, she has to compensate for her left ankle posterior tib dysfunction.   Thank you      Patient Active Problem List   Diagnosis    Paroxysmal supraventricular tachycardia (HCC)    Foot drop, left    Anxiety    PUD (peptic ulcer disease)    Restless leg syndrome    GERD without esophagitis    Primary osteoarthritis involving multiple joints    Non morbid obesity due to excess calories    Primary insomnia    Coronary artery disease involving native coronary artery without angina pectoris    Essential hypertension    Intrinsic eczema    Chronic midline low back pain without sciatica    Glenohumeral arthritis    Biceps tendonitis of both shoulders    Acromioclavicular joint arthritis    Rotator cuff tendonitis    Anesthesia complication    Fatigue    Hot flashes    Lesion of lateral popliteal nerve    Postmenopausal status    Seborrheic keratosis    Grade IV hemorrhoids    Glaucoma    Internal hemorrhoids    Positive PPD    Chronic kidney disease, stage 3b (HCC)    History of blood transfusion           Current Outpatient Medications on File Prior to Visit   Medication Sig Dispense Refill  omeprazole (PRILOSEC) 20 MG delayed release capsule Take 1 capsule by mouth Daily 90 capsule 0    meloxicam (MOBIC) 15 MG tablet Take 1 tablet by mouth daily 90 tablet 0    vitamin E 1000 units capsule Take 180 Units by mouth daily      COLLAGEN PO Take by mouth      verapamil (CALAN SR) 120 MG extended release tablet TAKE 1 TABLET EVERY DAY 90 tablet 1    metoprolol tartrate (LOPRESSOR) 50 MG tablet TAKE 1 TABLET TWICE DAILY 180 tablet 1    triamterene-hydroCHLOROthiazide (MAXZIDE-25) 37.5-25 MG per tablet TAKE 1 TABLET EVERY DAY 90 tablet 1    fluticasone (FLONASE) 50 MCG/ACT nasal spray 2 sprays by Nasal route daily 1 Bottle 3    timolol (BETIMOL) 0.5 % ophthalmic solution 1 drop 2 times daily      cyclobenzaprine (FLEXERIL) 10 MG tablet Take 1 tablet by mouth 3 times daily as needed for Muscle spasms 90 tablet 5    magnesium 200 MG TABS tablet Take 200 mg by mouth daily      B Complex Vitamins (VITAMIN B COMPLEX PO) Take by mouth daily       LUMIGAN 0.01 % SOLN ophthalmic drops Place 1 drop into both eyes       docusate sodium (COLACE) 100 MG capsule Take 1 capsule by mouth 2 times daily 40 capsule 0     No current facility-administered medications on file prior to visit. Objective:   Resp. rate 15, height 5' 4\" (1.626 m), weight 232 lb (105.2 kg), not currently breastfeeding. On examination today this pleasant 35-year-old female no acute distress she is alert and oriented x3 she can walk but there is a slight limp due to the posterior tib dysfunction she has some weakness to hip abduction on the left compared to the right but overall has good symmetric motion. Right shoulder has weakness of supraspinatus strength testing has pain with crossover she has good  strength good wrist extension strength. She is negative for Spurling's test.      Neuro exam grossly intact both lower extremities. Intact sensation to light touch. Motor exam 4+ to 5/5 in all major motor groups.   Negative Whalen's sign. Skin is warm, dry and intact with out erythema or significant increased temperature around the knee joint(s). There are no cutaneous lesions or lymphadenopathy present. X-RAYS:  No x-rays taken today      Assessment:  Right shoulder rotator cuff tendinitis, osteoarthritis    Plan:  During today's visit, there was approximately 20  minutes of face-to-face discussion in regards to the patient's current condition and treatment options. More than 50 % of the time was counseling and coordination of care as indicated above. At this point we talked about short and long-term expectations and with her consent she was injected with 1 cc Kenalog and 2 cc of Marcaine into the right shoulder subacromial space and glenohumeral joint.   She tolerated well we talked about postinjection activities and she will follow up with us in 3 to 4 months      PROCEDURE NOTE:   Cortisone shot for shoulder      They will schedule a follow up in 3 to 4 months

## 2021-09-28 DIAGNOSIS — M54.50 CHRONIC MIDLINE LOW BACK PAIN WITHOUT SCIATICA: ICD-10-CM

## 2021-09-28 DIAGNOSIS — G89.29 CHRONIC MIDLINE LOW BACK PAIN WITHOUT SCIATICA: ICD-10-CM

## 2021-09-28 DIAGNOSIS — M15.9 PRIMARY OSTEOARTHRITIS INVOLVING MULTIPLE JOINTS: ICD-10-CM

## 2021-09-28 DIAGNOSIS — F41.9 ANXIETY: ICD-10-CM

## 2021-09-28 RX ORDER — HYDROCODONE BITARTRATE AND ACETAMINOPHEN 7.5; 325 MG/1; MG/1
1 TABLET ORAL EVERY 8 HOURS PRN
Qty: 90 TABLET | Refills: 0 | Status: SHIPPED | OUTPATIENT
Start: 2021-09-28 | End: 2021-10-25 | Stop reason: SDUPTHER

## 2021-09-28 RX ORDER — LORAZEPAM 1 MG/1
TABLET ORAL
Qty: 90 TABLET | Refills: 0 | Status: SHIPPED | OUTPATIENT
Start: 2021-09-28 | End: 2022-01-24 | Stop reason: SDUPTHER

## 2021-09-29 NOTE — TELEPHONE ENCOUNTER
----- Message from Hussein Ayala sent at 9/29/2021  9:13 AM EDT -----  Subject: Medication Problem    QUESTIONS  Name of Medication? HYDROcodone-acetaminophen (NORCO) 7.5-325 MG per   tablet  Patient-reported dosage and instructions? not specified  What question or problem do you have with the medication? Pharmacy wants   to know if it is ok to fill these combination scripts of LORazepam   (ATIVAN) 1 MG tablet and HYDROcodone-acetaminophen (Lysle ). Please advise. Preferred Pharmacy? [facility]  Pharmacy phone number (if available)? [phone]  Additional Information for Provider?   ---------------------------------------------------------------------------  --------------  CALL BACK INFO  What is the best way for the office to contact you? OK to leave message on   voicemail  Preferred Call Back Phone Number? [phone]  ---------------------------------------------------------------------------  --------------  SCRIPT ANSWERS  Relationship to Patient? Third Party  Representative Name?  [address]

## 2021-10-12 ENCOUNTER — TELEPHONE (OUTPATIENT)
Dept: FAMILY MEDICINE CLINIC | Age: 81
End: 2021-10-12

## 2021-10-12 NOTE — TELEPHONE ENCOUNTER
I show two previous urine tests on her that were both normal.  There are things other than bladder infection that can cause the same symptoms. I need to check her urine before I can prescribe an antibiotic. Ok to schedule a nurse visit for UA.

## 2021-10-12 NOTE — TELEPHONE ENCOUNTER
Patient called requesting a medication be sent to [address] [place] [address] for a UTI. Patient states she is also having an arthritis flare up and is having difficulty walking so she is unable to come into the office for a UA.  Patient states she has had a UTI before so knows that is what she currently has

## 2021-10-13 ENCOUNTER — NURSE ONLY (OUTPATIENT)
Dept: FAMILY MEDICINE CLINIC | Age: 81
End: 2021-10-13
Payer: MEDICARE

## 2021-10-13 DIAGNOSIS — R39.9 UTI SYMPTOMS: Primary | ICD-10-CM

## 2021-10-13 LAB
BILIRUBIN, POC: NORMAL
BLOOD URINE, POC: NORMAL
CLARITY, POC: NORMAL
COLOR, POC: YELLOW
GLUCOSE URINE, POC: NORMAL
KETONES, POC: NORMAL
LEUKOCYTE EST, POC: NORMAL
NITRITE, POC: NORMAL
PH, POC: 5.5
PROTEIN, POC: NORMAL
SPECIFIC GRAVITY, POC: 1.02
UROBILINOGEN, POC: 0.2

## 2021-10-13 PROCEDURE — 81002 URINALYSIS NONAUTO W/O SCOPE: CPT | Performed by: FAMILY MEDICINE

## 2021-10-13 NOTE — PROGRESS NOTES
Pt came into office to leave urine sample    She is experiencing UTI symptoms    UA completed see lab tab for results

## 2021-10-21 ASSESSMENT — ENCOUNTER SYMPTOMS: BACK PAIN: 1

## 2021-10-21 NOTE — PROGRESS NOTES
Subjective:      Patient ID: Brain French is a 80 y.o. female. HPI     Chronic Low Back Pain:  Patient is tolerating and compliant with Mobic 15 mg daily, Flexeril 10 mg TID prn and Norco 7.5-325 every 8 hrs as needed.  She feels that the medications keep her functional. Vito Juarez will be seeing Dr. Dahiana Wynn takes Ativan 1 mg every 8 hrs as needed for anxiety.  She generally takes it once daily and feels that it works well to control her allergy symptoms. Possible UTI:  Patient complains of a \"light burning sensation\" with urination for the last 10 days. She has been drinking cranberry juice. Review of Systems   Constitutional: Negative for chills and fever. Genitourinary: Positive for dysuria. Negative for frequency, hematuria and urgency. Musculoskeletal: Positive for back pain. Psychiatric/Behavioral: The patient is nervous/anxious. /70 (Site: Right Upper Arm, Position: Sitting, Cuff Size: Medium Adult)   Ht 5' 4\" (1.626 m)   Wt 227 lb (103 kg)   BMI 38.96 kg/m²    Objective:   Physical Exam  Constitutional:       General: She is not in acute distress. Appearance: She is well-developed. HENT:      Head: Normocephalic. Right Ear: External ear normal.      Left Ear: External ear normal.      Mouth/Throat:      Pharynx: No oropharyngeal exudate. Neck:      Thyroid: No thyromegaly. Vascular: No JVD. Cardiovascular:      Rate and Rhythm: Normal rate and regular rhythm. Heart sounds: Normal heart sounds. No murmur heard. Pulmonary:      Effort: Pulmonary effort is normal.      Breath sounds: Normal breath sounds. No wheezing or rales. Lymphadenopathy:      Cervical: No cervical adenopathy. Neurological:      Mental Status: She is alert and oriented to person, place, and time.          Assessment:      Chronic Low Back Pain   Anxiety       Plan:       UA:   [facility] report was reviewed  Medications refilled   Flu Shot Given  RTO 3 months for Hypertension / Back Pain / Anxiety       Controlled Substance Monitoring:    Acute and Chronic Pain Monitoring:   RX Monitoring 10/21/2021   Attestation -   Acute Pain Prescriptions Severe pain not adequately treated with lower dose. Periodic Controlled Substance Monitoring No signs of potential drug abuse or diversion identified.    Chronic Pain > 80 MEDD -              ENRICO VORA DO

## 2021-10-25 ENCOUNTER — OFFICE VISIT (OUTPATIENT)
Dept: FAMILY MEDICINE CLINIC | Age: 81
End: 2021-10-25
Payer: MEDICARE

## 2021-10-25 VITALS
DIASTOLIC BLOOD PRESSURE: 70 MMHG | BODY MASS INDEX: 38.76 KG/M2 | WEIGHT: 227 LBS | HEIGHT: 64 IN | SYSTOLIC BLOOD PRESSURE: 130 MMHG

## 2021-10-25 DIAGNOSIS — G89.29 CHRONIC MIDLINE LOW BACK PAIN WITHOUT SCIATICA: Primary | ICD-10-CM

## 2021-10-25 DIAGNOSIS — R30.0 DYSURIA: ICD-10-CM

## 2021-10-25 DIAGNOSIS — F41.9 ANXIETY: ICD-10-CM

## 2021-10-25 DIAGNOSIS — M15.9 PRIMARY OSTEOARTHRITIS INVOLVING MULTIPLE JOINTS: ICD-10-CM

## 2021-10-25 DIAGNOSIS — Z23 NEED FOR INFLUENZA VACCINATION: ICD-10-CM

## 2021-10-25 DIAGNOSIS — M54.50 CHRONIC MIDLINE LOW BACK PAIN WITHOUT SCIATICA: Primary | ICD-10-CM

## 2021-10-25 LAB
BILIRUBIN, POC: NORMAL
BLOOD URINE, POC: NORMAL
CLARITY, POC: CLEAR
COLOR, POC: YELLOW
GLUCOSE URINE, POC: NORMAL
KETONES, POC: NORMAL
LEUKOCYTE EST, POC: NORMAL
NITRITE, POC: NORMAL
PH, POC: 6
PROTEIN, POC: NORMAL
SPECIFIC GRAVITY, POC: 1.02
UROBILINOGEN, POC: 0.2

## 2021-10-25 PROCEDURE — 1036F TOBACCO NON-USER: CPT | Performed by: FAMILY MEDICINE

## 2021-10-25 PROCEDURE — 81002 URINALYSIS NONAUTO W/O SCOPE: CPT | Performed by: FAMILY MEDICINE

## 2021-10-25 PROCEDURE — G8484 FLU IMMUNIZE NO ADMIN: HCPCS | Performed by: FAMILY MEDICINE

## 2021-10-25 PROCEDURE — G8427 DOCREV CUR MEDS BY ELIG CLIN: HCPCS | Performed by: FAMILY MEDICINE

## 2021-10-25 PROCEDURE — 4040F PNEUMOC VAC/ADMIN/RCVD: CPT | Performed by: FAMILY MEDICINE

## 2021-10-25 PROCEDURE — 99213 OFFICE O/P EST LOW 20 MIN: CPT | Performed by: FAMILY MEDICINE

## 2021-10-25 PROCEDURE — G8417 CALC BMI ABV UP PARAM F/U: HCPCS | Performed by: FAMILY MEDICINE

## 2021-10-25 PROCEDURE — G8399 PT W/DXA RESULTS DOCUMENT: HCPCS | Performed by: FAMILY MEDICINE

## 2021-10-25 PROCEDURE — 1090F PRES/ABSN URINE INCON ASSESS: CPT | Performed by: FAMILY MEDICINE

## 2021-10-25 PROCEDURE — 1123F ACP DISCUSS/DSCN MKR DOCD: CPT | Performed by: FAMILY MEDICINE

## 2021-10-25 PROCEDURE — G0008 ADMIN INFLUENZA VIRUS VAC: HCPCS | Performed by: FAMILY MEDICINE

## 2021-10-25 PROCEDURE — 90694 VACC AIIV4 NO PRSRV 0.5ML IM: CPT | Performed by: FAMILY MEDICINE

## 2021-10-25 RX ORDER — HYDROCODONE BITARTRATE AND ACETAMINOPHEN 7.5; 325 MG/1; MG/1
1 TABLET ORAL EVERY 8 HOURS PRN
Qty: 90 TABLET | Refills: 0 | Status: SHIPPED | OUTPATIENT
Start: 2021-10-25 | End: 2021-12-13 | Stop reason: SDUPTHER

## 2021-10-25 NOTE — PROGRESS NOTES
Vaccine Information Sheet, \"Influenza - Inactivated\"  given to Deidre Murray, or parent/legal guardian of  Deidre Murray and verbalized understanding. Patient responses:    Have you ever had a reaction to a flu vaccine? No  Do you have any current illness? No  Have you ever had [place] Syndrome? No  Do you have a serious allergy to any of the follow: Neomycin, Polymyxin, Thimerosal, eggs or egg products? No    Flu vaccine given per order. Please see immunization tab. Risks and benefits explained. Current VIS given.

## 2021-10-26 ENCOUNTER — TELEPHONE (OUTPATIENT)
Dept: ORTHOPEDIC SURGERY | Age: 81
End: 2021-10-26

## 2021-10-26 ENCOUNTER — OFFICE VISIT (OUTPATIENT)
Dept: ORTHOPEDIC SURGERY | Age: 81
End: 2021-10-26
Payer: MEDICARE

## 2021-10-26 VITALS — WEIGHT: 227 LBS | HEIGHT: 64 IN | BODY MASS INDEX: 38.76 KG/M2

## 2021-10-26 DIAGNOSIS — M48.061 SPINAL STENOSIS OF LUMBAR REGION, UNSPECIFIED WHETHER NEUROGENIC CLAUDICATION PRESENT: ICD-10-CM

## 2021-10-26 DIAGNOSIS — M43.10 DEGENERATIVE SPONDYLOLISTHESIS: Primary | ICD-10-CM

## 2021-10-26 PROCEDURE — G8427 DOCREV CUR MEDS BY ELIG CLIN: HCPCS | Performed by: ORTHOPAEDIC SURGERY

## 2021-10-26 PROCEDURE — 1090F PRES/ABSN URINE INCON ASSESS: CPT | Performed by: ORTHOPAEDIC SURGERY

## 2021-10-26 PROCEDURE — 1123F ACP DISCUSS/DSCN MKR DOCD: CPT | Performed by: ORTHOPAEDIC SURGERY

## 2021-10-26 PROCEDURE — 4040F PNEUMOC VAC/ADMIN/RCVD: CPT | Performed by: ORTHOPAEDIC SURGERY

## 2021-10-26 PROCEDURE — 1036F TOBACCO NON-USER: CPT | Performed by: ORTHOPAEDIC SURGERY

## 2021-10-26 PROCEDURE — G8484 FLU IMMUNIZE NO ADMIN: HCPCS | Performed by: ORTHOPAEDIC SURGERY

## 2021-10-26 PROCEDURE — G8399 PT W/DXA RESULTS DOCUMENT: HCPCS | Performed by: ORTHOPAEDIC SURGERY

## 2021-10-26 PROCEDURE — G8417 CALC BMI ABV UP PARAM F/U: HCPCS | Performed by: ORTHOPAEDIC SURGERY

## 2021-10-26 PROCEDURE — 99214 OFFICE O/P EST MOD 30 MIN: CPT | Performed by: ORTHOPAEDIC SURGERY

## 2021-10-26 NOTE — PROGRESS NOTES
New Patient: LUMBAR SPINE    Referring Provider:  No ref. provider found    CHIEF COMPLAINT:    Chief Complaint   Patient presents with    Back Pain     lumbar       HISTORY OF PRESENT ILLNESS:    Ms. Walt Salcido  is a pleasant 80 y.o. L who is status post lumbar surgery x3 including anterior posterior fusion L3-S1 placated by a left L5 nerve root injury during her last surgery who presents today for the evaluation of perianal numbness. Those symptoms began about 3 weeks ago. She denies numbness around her urethra and bowel bladder incontinence. She does describe burning with urination. She also notes 6/10 bilateral buttocks and posterior thigh pain. Her pain is worse with standing and walking.   .     Current/Past Treatment:   · Physical Therapy: Distant past  · Chiropractic: Distant past  · Injection: Distant past  Medications: Norco      Past Medical History:   Past Medical History:   Diagnosis Date    Anesthesia complication     difficulty waking up after breast biopsy    Anxiety     Chronic kidney disease, stage 3b (HCC)     Chronic midline low back pain without sciatica     Coronary artery disease involving native coronary artery without angina pectoris     Essential hypertension     Fatigue     Foot drop, left     GERD without esophagitis     History of blood transfusion 2010    Hot flashes     Internal hemorrhoids     Intrinsic eczema     Lesion of lateral popliteal nerve     Non morbid obesity due to excess calories     Paroxysmal supraventricular tachycardia (HCC)     Positive PPD 06/2020    repeated --second PPD test came back negative    Postmenopausal status     Primary insomnia     Primary osteoarthritis involving multiple joints     PUD (peptic ulcer disease)     Restless leg syndrome     Seborrheic keratosis       Past Surgical History:     Past Surgical History:   Procedure Laterality Date    ABDOMINAL EXPLORATION SURGERY  2000    Lysis of Adhesions    BREAST SURGERY Right     Biopsy    CARPAL TUNNEL RELEASE Right 11/03/2020    RIGHT CARPAL TUNNEL RELEASE performed by Jolanta Navarrete MD at [address] [facility] [address] Right 08/24/2021    CATARACT REMOVAL WITH IMPLANT Left 09/07/2021    Dr. Pam Jara      from back    HEMORRHOID SURGERY  02/24/2017    HYSTERECTOMY      Complete    INTRACAPSULAR CATARACT EXTRACTION Right 08/24/2021    PHACOEMULSIFICATION WITH INTRAOCULAR LENS IMPLANT RIGHT EYE, performed by Sidney Gray MD at [place] [address] Left 9/7/2021    PHACOEMULSIFICATION WITH INTRAOCULAR LENS IMPLANT - LEFT EYE performed by Sidney Gray MD at [address]      x 3 with fusion    SHOULDER ARTHROSCOPY Right     Right shoulder scope 2) Right shoulder labral debridement 3) Right shoulder Open Miguel 4) Right shoulder rotator cuff repair    TOTAL HIP ARTHROPLASTY Bilateral     TOTAL KNEE ARTHROPLASTY Left 02/03/2015    Dr. Adonis Chambers UPPER GASTROINTESTINAL ENDOSCOPY N/A 03/10/2020    EGD ESOPHAGOGASTRODUODENOSCOPY performed by Sharif Little MD at [address] [facility]       Current Medications:     Current Outpatient Medications:     HYDROcodone-acetaminophen (NORCO) 7.5-325 MG per tablet, Take 1 tablet by mouth every 8 hours as needed for Pain for up to 30 days. , Disp: 90 tablet, Rfl: 0    LORazepam (ATIVAN) 1 MG tablet, TAKE 1 TABLET BY MOUTH EVERY 8 HOURS AS NEEDED FOR ANXIETY, Disp: 90 tablet, Rfl: 0    omeprazole (PRILOSEC) 20 MG delayed release capsule, Take 1 capsule by mouth Daily, Disp: 90 capsule, Rfl: 0    meloxicam (MOBIC) 15 MG tablet, Take 1 tablet by mouth daily, Disp: 90 tablet, Rfl: 0    vitamin E 1000 units capsule, Take 180 Units by mouth daily, Disp: , Rfl:     COLLAGEN PO, Take by mouth, Disp: , Rfl:     verapamil (CALAN SR) 120 MG extended release tablet, TAKE 1 TABLET EVERY DAY, Disp: 90 tablet, Rfl: 1    metoprolol tartrate (LOPRESSOR) 50 MG tablet, TAKE 1 TABLET TWICE DAILY, Disp: 180 tablet, Rfl: 1    triamterene-hydroCHLOROthiazide (MAXZIDE-25) 37.5-25 MG per tablet, TAKE 1 TABLET EVERY DAY, Disp: 90 tablet, Rfl: 1    fluticasone (FLONASE) 50 MCG/ACT nasal spray, 2 sprays by Nasal route daily, Disp: 1 Bottle, Rfl: 3    timolol (BETIMOL) 0.5 % ophthalmic solution, 1 drop 2 times daily, Disp: , Rfl:     cyclobenzaprine (FLEXERIL) 10 MG tablet, Take 1 tablet by mouth 3 times daily as needed for Muscle spasms, Disp: 90 tablet, Rfl: 5    magnesium 200 MG TABS tablet, Take 200 mg by mouth daily, Disp: , Rfl:     B Complex Vitamins (VITAMIN B COMPLEX PO), Take by mouth daily , Disp: , Rfl:     LUMIGAN 0.01 % SOLN ophthalmic drops, Place 1 drop into both eyes , Disp: , Rfl:     docusate sodium (COLACE) 100 MG capsule, Take 1 capsule by mouth 2 times daily, Disp: 40 capsule, Rfl: 0  Allergies:  Latex and Bactrim  Social History:    reports that she quit smoking about 24 years ago. Her smoking use included cigarettes. She smoked 1.00 pack per day. She has never used smokeless tobacco. She reports current alcohol use of about 2.0 standard drinks of alcohol per week. She reports that she does not use drugs.   Family History:   Family History   Problem Relation Age of Onset    High Blood Pressure Mother     Stroke Father     High Blood Pressure Father     Diabetes Maternal Aunt     Cancer Maternal Uncle         Throat    Alcohol Abuse Maternal Uncle     Asthma Maternal Uncle     Depression Daughter     Cancer Son         Colorectal     Depression Son        REVIEW OF SYSTEMS: Full ROS noted & scanned   CONSTITUTIONAL: Denies unexplained weight loss, fevers, chills or fatigue  NEUROLOGICAL: Denies unsteady gait or progressive weakness  MUSCULOSKELETAL: Denies joint swelling or redness  PSYCHOLOGICAL: Denies anxiety, depression   SKIN: Denies skin changes, delayed healing, rash, itching   HEMATOLOGIC: Denies easy bleeding or bruising  ENDOCRINE: Denies excessive thirst, urination, heat/cold  RESPIRATORY: Denies current dyspnea, cough  GI: Denies nausea, vomiting, diarrhea   : Denies bowel or bladder issues      PHYSICAL EXAM:    Vitals: Height 5' 4.02\" (1.626 m), weight 227 lb (103 kg), not currently breastfeeding. GENERAL EXAM:  · General Apparence: Patient is adequately groomed with no evidence of malnutrition. · Orientation: The patient is oriented to time, place and person. · Mood & Affect:The patient's mood and affect are appropriate. · Vascular: Examination reveals no swelling tenderness in upper or lower extremities. Good capillary refill. · Lymphatic: The lymphatic examination bilaterally reveals all areas to be without enlargement or induration  · Sensation: Sensation is intact without deficit  · Coordination/Balance: Good coordination     LUMBAR/SACRAL EXAMINATION:  · Inspection: Local inspection shows no step-off or bruising. Lumbar alignment is normal.  Sagittal and Coronal balance is neutral.      · Palpation:   No evidence of tenderness at the midline. No tenderness bilaterally at the paraspinal or trochanters. There is no step-off or paraspinal spasm. · Range of Motion: Lumbar flexion, extension and rotation are mildly limited due to pain. · Strength: 1/5 strength left ankle dorsiflexors otherwise strength testing is 5/5 in all muscle groups tested. · Special Tests:   Straight leg raise and crossed SLR negative. Leg length and pelvis level. · Skin: There are no rashes, ulcerations or lesions. · Reflexes: Reflexes are symmetrically 2+ at the patellar and ankle tendons. Clonus absent bilaterally at the feet. · Gait & station: normal, patient ambulates without assistance    · Additional Examinations:   · RIGHT LOWER EXTREMITY: Inspection/examination of the right lower extremity does not show any tenderness, deformity or injury.  Range of motion is unremarkable. There is no gross instability. There are no rashes, ulcerations or lesions. Strength and tone are normal.    · LEFT LOWER EXTREMITY:  Inspection/examination of the left lower extremity does not show any tenderness, deformity or injury. Range of motion is unremarkable. There is no gross instability. There are no rashes, ulcerations or lesions. Strength and tone are normal.    Diagnostic Testing:    Reviewed AP and lateral x-rays of her lumbar spine from 6/14/2021 in the office today. Those show a an L3-S1 anterior posterior fusion with severe disc degeneration and spondylolisthesis L2-L3    Impression:   Degenerative spondylolisthesis L2-L3 above and L3-S1 fusion  Spinal stenosis    Plan:    Discussed treatment options including observation, physical therapy, epidural injection, and additional imaging. She would like to proceed with a lumbar MRI scan.

## 2021-10-27 ENCOUNTER — TELEPHONE (OUTPATIENT)
Dept: ORTHOPEDIC SURGERY | Age: 81
End: 2021-10-27

## 2021-10-27 DIAGNOSIS — M48.061 SPINAL STENOSIS OF LUMBAR REGION, UNSPECIFIED WHETHER NEUROGENIC CLAUDICATION PRESENT: ICD-10-CM

## 2021-10-27 LAB
BUN BLDV-MCNC: 24 MG/DL (ref 7–20)
CREAT SERPL-MCNC: 1.3 MG/DL (ref 0.6–1.2)
GFR AFRICAN AMERICAN: 47
GFR NON-AFRICAN AMERICAN: 39

## 2021-10-27 NOTE — TELEPHONE ENCOUNTER
General Question     Subject: MRI & BLOODWORK  Patient and /or Facility Request: PATIENT STATES THAT SHE HAS PAPERWORK THAT SHOWS BLOODWORK WANTS TO KNOW WHERE SHE NEEDS TO GO FOR BLOODWORK AND MRI.  PLEASE CALL TO ADVISE  Contact Number: [phone]

## 2021-10-27 NOTE — TELEPHONE ENCOUNTER
SPOKE TO PATIENT ABOUT GETTING BLOODWORK DONE AT [facility] PRIOR TO GETTING MRI SCAN DONE DUE TO THE FACT THAT SHE IS HAVING A MRI WITH CONTRAST DONE. TOLD HER SHE COULD GO TO OUTPATIENT LAB FOR THAT AND THEN SCHEDULE MRI FOR AFTERWARDS.

## 2021-11-02 ENCOUNTER — HOSPITAL ENCOUNTER (OUTPATIENT)
Dept: MRI IMAGING | Age: 81
Discharge: HOME | End: 2021-11-02
Payer: MEDICARE

## 2021-11-02 DIAGNOSIS — M48.061 SPINAL STENOSIS OF LUMBAR REGION, UNSPECIFIED WHETHER NEUROGENIC CLAUDICATION PRESENT: ICD-10-CM

## 2021-11-02 PROCEDURE — 72148 MRI LUMBAR SPINE W/O DYE: CPT

## 2021-11-04 ENCOUNTER — TELEPHONE (OUTPATIENT)
Dept: ORTHOPEDIC SURGERY | Age: 81
End: 2021-11-04

## 2021-11-04 NOTE — TELEPHONE ENCOUNTER
----- Message from Lamar Arredondo MD sent at 11/3/2021  8:12 AM EDT -----  Lumbar MRI  Nerve compression just above the area of her fusion  May want to try epidural then follow-up with me if pain persists

## 2021-11-05 ENCOUNTER — TELEPHONE (OUTPATIENT)
Dept: ORTHOPEDIC SURGERY | Age: 81
End: 2021-11-05

## 2021-11-05 NOTE — TELEPHONE ENCOUNTER
----- Message from Diane Quinn MD sent at 11/5/2021  8:51 AM EDT -----  Kavitha Fisher should see in office next week.    ----- Message -----  From: Hank Ramirez MA  Sent: 11/4/2021   4:33 PM EDT  To: Diane Quinn MD    She has numbness in her rectal and vaginal area.

## 2021-11-08 ENCOUNTER — OFFICE VISIT (OUTPATIENT)
Dept: ORTHOPEDIC SURGERY | Age: 81
End: 2021-11-08
Payer: MEDICARE

## 2021-11-08 VITALS — WEIGHT: 227 LBS | BODY MASS INDEX: 38.76 KG/M2 | HEIGHT: 64 IN

## 2021-11-08 DIAGNOSIS — M48.062 SPINAL STENOSIS OF LUMBAR REGION WITH NEUROGENIC CLAUDICATION: Primary | ICD-10-CM

## 2021-11-08 PROCEDURE — 1090F PRES/ABSN URINE INCON ASSESS: CPT | Performed by: ORTHOPAEDIC SURGERY

## 2021-11-08 PROCEDURE — G8484 FLU IMMUNIZE NO ADMIN: HCPCS | Performed by: ORTHOPAEDIC SURGERY

## 2021-11-08 PROCEDURE — G8427 DOCREV CUR MEDS BY ELIG CLIN: HCPCS | Performed by: ORTHOPAEDIC SURGERY

## 2021-11-08 PROCEDURE — 1036F TOBACCO NON-USER: CPT | Performed by: ORTHOPAEDIC SURGERY

## 2021-11-08 PROCEDURE — 99214 OFFICE O/P EST MOD 30 MIN: CPT | Performed by: ORTHOPAEDIC SURGERY

## 2021-11-08 PROCEDURE — 4040F PNEUMOC VAC/ADMIN/RCVD: CPT | Performed by: ORTHOPAEDIC SURGERY

## 2021-11-08 PROCEDURE — 1123F ACP DISCUSS/DSCN MKR DOCD: CPT | Performed by: ORTHOPAEDIC SURGERY

## 2021-11-08 PROCEDURE — G8399 PT W/DXA RESULTS DOCUMENT: HCPCS | Performed by: ORTHOPAEDIC SURGERY

## 2021-11-08 PROCEDURE — G8417 CALC BMI ABV UP PARAM F/U: HCPCS | Performed by: ORTHOPAEDIC SURGERY

## 2021-11-08 NOTE — PROGRESS NOTES
New Patient: LUMBAR SPINE    Referring Provider:  No ref. provider found    CHIEF COMPLAINT:    No chief complaint on file. HISTORY OF PRESENT ILLNESS:    Ms. Brain French  is a pleasant 80 y.o. L who is status post lumbar surgery x3 including anterior posterior fusion J7-J1 complicated by a left L5 nerve root injury during her last surgery who presents today for the evaluation of perianal and vaginal numbness. She also notes occasional urinary incontinence. Those symptoms began about 6 weeks ago. She denies bowel incontinence. She also notes 6/10 bilateral buttocks and posterior thigh pain. Her pain is worse with standing and walking.   .     Current/Past Treatment:   · Physical Therapy: Distant past  · Chiropractic: Distant past  · Injection: Distant past  Medications: Norco      Past Medical History:   Past Medical History:   Diagnosis Date    Anesthesia complication     difficulty waking up after breast biopsy    Anxiety     Chronic kidney disease, stage 3b (HCC)     Chronic midline low back pain without sciatica     Coronary artery disease involving native coronary artery without angina pectoris     Essential hypertension     Fatigue     Foot drop, left     GERD without esophagitis     History of blood transfusion 2010    Hot flashes     Internal hemorrhoids     Intrinsic eczema     Lesion of lateral popliteal nerve     Non morbid obesity due to excess calories     Paroxysmal supraventricular tachycardia (HCC)     Positive PPD 06/2020    repeated --second PPD test came back negative    Postmenopausal status     Primary insomnia     Primary osteoarthritis involving multiple joints     PUD (peptic ulcer disease)     Restless leg syndrome     Seborrheic keratosis       Past Surgical History:     Past Surgical History:   Procedure Laterality Date    ABDOMINAL EXPLORATION SURGERY  2000    Lysis of Adhesions    BREAST SURGERY Right     Biopsy    CARPAL TUNNEL RELEASE Right 11/03/2020    RIGHT CARPAL TUNNEL RELEASE performed by Liliam Moffett MD at [address] Right 08/24/2021    CATARACT REMOVAL WITH IMPLANT Left 09/07/2021    Dr. Jie Myers      from back    HEMORRHOID SURGERY  02/24/2017    HYSTERECTOMY      Complete    INTRACAPSULAR CATARACT EXTRACTION Right 08/24/2021    PHACOEMULSIFICATION WITH INTRAOCULAR LENS IMPLANT RIGHT EYE, performed by Trino Barros MD at [place] [address] Left 9/7/2021    PHACOEMULSIFICATION WITH INTRAOCULAR LENS IMPLANT - LEFT EYE performed by Trino Barros MD at [address]      x 3 with fusion    SHOULDER ARTHROSCOPY Right     Right shoulder scope 2) Right shoulder labral debridement 3) Right shoulder Open Miguel 4) Right shoulder rotator cuff repair    TOTAL HIP ARTHROPLASTY Bilateral     TOTAL KNEE ARTHROPLASTY Left 02/03/2015    Dr. Yumiko Taylor UPPER GASTROINTESTINAL ENDOSCOPY N/A 03/10/2020    EGD ESOPHAGOGASTRODUODENOSCOPY performed by Rafy Galarza MD at [address] [facility]       Current Medications:     Current Outpatient Medications:     HYDROcodone-acetaminophen (NORCO) 7.5-325 MG per tablet, Take 1 tablet by mouth every 8 hours as needed for Pain for up to 30 days. , Disp: 90 tablet, Rfl: 0    omeprazole (PRILOSEC) 20 MG delayed release capsule, Take 1 capsule by mouth Daily, Disp: 90 capsule, Rfl: 0    meloxicam (MOBIC) 15 MG tablet, Take 1 tablet by mouth daily, Disp: 90 tablet, Rfl: 0    vitamin E 1000 units capsule, Take 180 Units by mouth daily, Disp: , Rfl:     COLLAGEN PO, Take by mouth, Disp: , Rfl:     verapamil (CALAN SR) 120 MG extended release tablet, TAKE 1 TABLET EVERY DAY, Disp: 90 tablet, Rfl: 1    metoprolol tartrate (LOPRESSOR) 50 MG tablet, TAKE 1 TABLET TWICE DAILY, Disp: 180 tablet, Rfl: 1    triamterene-hydroCHLOROthiazide (MAXZIDE-25) 37.5-25 MG per tablet, TAKE 1 TABLET EVERY DAY, Disp: 90 tablet, Rfl: 1    fluticasone (FLONASE) 50 MCG/ACT nasal spray, 2 sprays by Nasal route daily, Disp: 1 Bottle, Rfl: 3    timolol (BETIMOL) 0.5 % ophthalmic solution, 1 drop 2 times daily, Disp: , Rfl:     cyclobenzaprine (FLEXERIL) 10 MG tablet, Take 1 tablet by mouth 3 times daily as needed for Muscle spasms, Disp: 90 tablet, Rfl: 5    magnesium 200 MG TABS tablet, Take 200 mg by mouth daily, Disp: , Rfl:     B Complex Vitamins (VITAMIN B COMPLEX PO), Take by mouth daily , Disp: , Rfl:     LUMIGAN 0.01 % SOLN ophthalmic drops, Place 1 drop into both eyes , Disp: , Rfl:     docusate sodium (COLACE) 100 MG capsule, Take 1 capsule by mouth 2 times daily, Disp: 40 capsule, Rfl: 0  Allergies:  Latex and Bactrim  Social History:    reports that she quit smoking about 24 years ago. Her smoking use included cigarettes. She smoked 1.00 pack per day. She has never used smokeless tobacco. She reports current alcohol use of about 2.0 standard drinks of alcohol per week. She reports that she does not use drugs.   Family History:   Family History   Problem Relation Age of Onset    High Blood Pressure Mother     Stroke Father     High Blood Pressure Father     Diabetes Maternal Aunt     Cancer Maternal Uncle         Throat    Alcohol Abuse Maternal Uncle     Asthma Maternal Uncle     Depression Daughter     Cancer Son         Colorectal     Depression Son        REVIEW OF SYSTEMS: Full ROS noted & scanned   CONSTITUTIONAL: Denies unexplained weight loss, fevers, chills or fatigue  NEUROLOGICAL: Denies unsteady gait or progressive weakness  MUSCULOSKELETAL: Denies joint swelling or redness  PSYCHOLOGICAL: Denies anxiety, depression   SKIN: Denies skin changes, delayed healing, rash, itching   HEMATOLOGIC: Denies easy bleeding or bruising  ENDOCRINE: Denies excessive thirst, urination, heat/cold  RESPIRATORY: Denies current dyspnea, cough  GI: Denies nausea, vomiting, diarrhea   : Denies bowel or bladder issues      PHYSICAL EXAM:    Vitals: Height 5' 4\" (1.626 m), weight 227 lb (103 kg), not currently breastfeeding. GENERAL EXAM:  · General Apparence: Patient is adequately groomed with no evidence of malnutrition. · Orientation: The patient is oriented to time, place and person. · Mood & Affect:The patient's mood and affect are appropriate. · Vascular: Examination reveals no swelling tenderness in upper or lower extremities. Good capillary refill. · Lymphatic: The lymphatic examination bilaterally reveals all areas to be without enlargement or induration  · Sensation: Sensation is intact without deficit  · Coordination/Balance: Good coordination     LUMBAR/SACRAL EXAMINATION:  · Inspection: Local inspection shows no step-off or bruising. Lumbar alignment is normal.  Sagittal and Coronal balance is neutral.      · Palpation:   No evidence of tenderness at the midline. No tenderness bilaterally at the paraspinal or trochanters. There is no step-off or paraspinal spasm. · Range of Motion: Lumbar flexion, extension and rotation are mildly limited due to pain. · Strength: 1/5 strength left ankle dorsiflexors otherwise strength testing is 5/5 in all muscle groups tested. · Special Tests:   Straight leg raise and crossed SLR negative. Leg length and pelvis level. · Skin: There are no rashes, ulcerations or lesions. · Reflexes: Reflexes are symmetrically 2+ at the patellar and ankle tendons. Clonus absent bilaterally at the feet. · Gait & station: normal, patient ambulates without assistance    · Additional Examinations:   · RIGHT LOWER EXTREMITY: Inspection/examination of the right lower extremity does not show any tenderness, deformity or injury. Range of motion is unremarkable. There is no gross instability. There are no rashes, ulcerations or lesions.  Strength and tone are normal.    · LEFT LOWER EXTREMITY:  Inspection/examination

## 2021-11-10 RX ORDER — OMEPRAZOLE 20 MG/1
CAPSULE, DELAYED RELEASE ORAL
Qty: 90 CAPSULE | Refills: 0 | Status: SHIPPED | OUTPATIENT
Start: 2021-11-10 | End: 2022-01-24

## 2021-12-02 NOTE — PROGRESS NOTES
PATIENT REACHED   YES_X___NO____    PREOP INSTRUCTIONS LEFT ON VM NUMBER_______________      DATE_12/8/21________ TIME_1300________ARRIVAL 1200________PLACE_[address]___________  NOTHING TO EAT OR DRINK  AFTER MIDNIGHT THE EVENING PRIOR OR AS INSTRUCTED BY YOUR DR.  Jodi Bender NEED A RESPONSIBLE ADULT AGE 18 OR OLDER TO DRIVE YOU HOME  PLEASE BRING INSURANCE CARD. PICTURE ID AND COMPLETE LIST OF MEDS  WEAR LOOSE COMFORTABLE CLOTHING  FOLLOW ANY INSTRUCTIONS YOUR DRS OFFICE HAS GIVEN YOU,INCLUDING WHAT MEDICATIONS TO TAKE THE AM OF PROCEDURE AND WHEN AND IF YOU NEED TO STOP ANY BLOOD THINNERS. IF YOU HAVE QUESTIONS REGARDING THIS CALL THE OFFICE  THE GOAL BLOOD SUGAR THE AM OF PROCEDURE  OR LESS ABOVE THAT THE PROCEDURE MAY BE CANCELLED  ANY QUESTIONS CALL YOUR DOCTOR. ALSO,PLEASE READ THE INSTRUCTION PACKET FROM YOUR DR IF YOU RECEIVED ONE. SPINE INTERVENTION NUMBER [phone]      OTHER___________________________________      VISITOR POLICY(subject to change)      There is a one visitor policy at [facility] for all surgeries and endoscopies. Whether the visitor can stay or will be asked to wait in the car will depend on the current policy and if social distancing can be maintained. The policy is subject to change at any time. Please make sure the visitor has a cell phone that is on,charged and able to accept calls, as this may be the way that the staff communicates with them. Pain management is NO VISITOR policyThe patients ride is expected to remain in the car with a cell phone for communication. If the ride is leaving the hospital grounds please make sure they are back in time for pickup. Have the patient inform the staff on arrival what their rides plans are while the patient is in the facility. At the MAIN there is one visitor allowed. Please note that the visitor policy is subject to change.

## 2021-12-08 ENCOUNTER — HOSPITAL ENCOUNTER (OUTPATIENT)
Age: 81
Setting detail: OUTPATIENT SURGERY
Discharge: HOME | End: 2021-12-08
Attending: PHYSICAL MEDICINE & REHABILITATION | Admitting: PHYSICAL MEDICINE & REHABILITATION
Payer: MEDICARE

## 2021-12-08 ENCOUNTER — APPOINTMENT (OUTPATIENT)
Dept: GENERAL RADIOLOGY | Age: 81
End: 2021-12-08
Attending: PHYSICAL MEDICINE & REHABILITATION
Payer: MEDICARE

## 2021-12-08 VITALS
TEMPERATURE: 97.3 F | HEART RATE: 57 BPM | BODY MASS INDEX: 38.41 KG/M2 | WEIGHT: 225 LBS | RESPIRATION RATE: 16 BRPM | OXYGEN SATURATION: 100 % | HEIGHT: 64 IN | DIASTOLIC BLOOD PRESSURE: 62 MMHG | SYSTOLIC BLOOD PRESSURE: 131 MMHG

## 2021-12-08 PROCEDURE — 2500000003 HC RX 250 WO HCPCS: Performed by: PHYSICAL MEDICINE & REHABILITATION

## 2021-12-08 PROCEDURE — 2709999900 HC NON-CHARGEABLE SUPPLY: Performed by: PHYSICAL MEDICINE & REHABILITATION

## 2021-12-08 PROCEDURE — 99153 MOD SED SAME PHYS/QHP EA: CPT | Performed by: PHYSICAL MEDICINE & REHABILITATION

## 2021-12-08 PROCEDURE — 3209999900 FLUORO FOR SURGICAL PROCEDURES

## 2021-12-08 PROCEDURE — 99152 MOD SED SAME PHYS/QHP 5/>YRS: CPT | Performed by: PHYSICAL MEDICINE & REHABILITATION

## 2021-12-08 PROCEDURE — 3610000057 HC PAIN LEVEL 4 ADDL 15 MIN (NON-OR): Performed by: PHYSICAL MEDICINE & REHABILITATION

## 2021-12-08 PROCEDURE — 3610000056 HC PAIN LEVEL 4 BASE (NON-OR): Performed by: PHYSICAL MEDICINE & REHABILITATION

## 2021-12-08 PROCEDURE — 6360000002 HC RX W HCPCS: Performed by: PHYSICAL MEDICINE & REHABILITATION

## 2021-12-08 RX ORDER — LIDOCAINE HYDROCHLORIDE 10 MG/ML
INJECTION, SOLUTION EPIDURAL; INFILTRATION; INTRACAUDAL; PERINEURAL
Status: COMPLETED | OUTPATIENT
Start: 2021-12-08 | End: 2021-12-08

## 2021-12-08 RX ORDER — MIDAZOLAM HYDROCHLORIDE 1 MG/ML
INJECTION INTRAMUSCULAR; INTRAVENOUS
Status: COMPLETED | OUTPATIENT
Start: 2021-12-08 | End: 2021-12-08

## 2021-12-08 RX ORDER — FENTANYL CITRATE 50 UG/ML
INJECTION, SOLUTION INTRAMUSCULAR; INTRAVENOUS
Status: COMPLETED | OUTPATIENT
Start: 2021-12-08 | End: 2021-12-08

## 2021-12-08 RX ORDER — DEXAMETHASONE SODIUM PHOSPHATE 10 MG/ML
INJECTION, SOLUTION INTRAMUSCULAR; INTRAVENOUS
Status: COMPLETED | OUTPATIENT
Start: 2021-12-08 | End: 2021-12-08

## 2021-12-08 ASSESSMENT — PAIN - FUNCTIONAL ASSESSMENT
PAIN_FUNCTIONAL_ASSESSMENT: PREVENTS OR INTERFERES SOME ACTIVE ACTIVITIES AND ADLS
PAIN_FUNCTIONAL_ASSESSMENT: 0-10

## 2021-12-08 ASSESSMENT — PAIN DESCRIPTION - DESCRIPTORS: DESCRIPTORS: BURNING

## 2021-12-08 NOTE — H&P
HISTORY AND PHYSICAL/PRE-SEDATION ASSESSMENT    Patient:  Donna Hurley   :  1940  Medical Record No.:  [id number]   Date:  2021  Physician:  Sha Klein MD  Facility: [address] [place] [address] Drive:                 The patient is a 80 y.o. female whom presents with low back pain. Review of the imaging and physical exam of the patient confirmed the pre-procedure diagnosis. After a thorough discussion of risks, benefits and alternatives informed consent was obtained.     Diagnosis:  M48.062    Past Medical History:   Past Medical History:   Diagnosis Date    Anesthesia complication     difficulty waking up after breast biopsy    Anxiety     Chronic kidney disease, stage 3b (HCC)     Chronic midline low back pain without sciatica     Coronary artery disease involving native coronary artery without angina pectoris     Essential hypertension     Fatigue     Foot drop, left     GERD without esophagitis     History of blood transfusion     Hot flashes     Internal hemorrhoids     Intrinsic eczema     Lesion of lateral popliteal nerve     Non morbid obesity due to excess calories     Paroxysmal supraventricular tachycardia (HCC)     Positive PPD 2020    repeated --second PPD test came back negative    Postmenopausal status     Primary insomnia     Primary osteoarthritis involving multiple joints     PUD (peptic ulcer disease)     Restless leg syndrome     Seborrheic keratosis         Past Surgical History:     Past Surgical History:   Procedure Laterality Date    ABDOMINAL EXPLORATION SURGERY      Lysis of Adhesions    BREAST SURGERY Right     Biopsy    CARPAL TUNNEL RELEASE Right 2020    RIGHT CARPAL TUNNEL RELEASE performed by Lisseth William MD at [address] Right 2021    CATARACT REMOVAL WITH IMPLANT Left 2021    Dr. Alanna Judge REMOVAL      from back    HEMORRHOID SURGERY  02/24/2017    HYSTERECTOMY      Complete    INTRACAPSULAR CATARACT EXTRACTION Right 08/24/2021    PHACOEMULSIFICATION WITH INTRAOCULAR LENS IMPLANT RIGHT EYE, performed by Blanca Jones MD at [place] [id number] Left 9/7/2021    PHACOEMULSIFICATION WITH INTRAOCULAR LENS IMPLANT - LEFT EYE performed by Blanca Jones MD at [address]      x 3 with fusion    SHOULDER ARTHROSCOPY Right     Right shoulder scope 2) Right shoulder labral debridement 3) Right shoulder Open Miguel 4) Right shoulder rotator cuff repair    TOTAL HIP ARTHROPLASTY Bilateral     TOTAL KNEE ARTHROPLASTY Left 02/03/2015    Dr. Alfonso Liang UPPER GASTROINTESTINAL ENDOSCOPY N/A 03/10/2020    EGD ESOPHAGOGASTRODUODENOSCOPY performed by Preeti Junior MD at [address] [facility]         Current Medications:   Prior to Admission medications    Medication Sig Start Date End Date Taking?  Authorizing Provider   omeprazole (PRILOSEC) 20 MG delayed release capsule TAKE 1 CAPSULE EVERY DAY 11/10/21  Yes Sylvia Motta, DO   verapamil (CALAN SR) 120 MG extended release tablet TAKE 1 TABLET EVERY DAY 8/5/21  Yes Sylvia Motta DO   metoprolol tartrate (LOPRESSOR) 50 MG tablet TAKE 1 TABLET TWICE DAILY 8/5/21  Yes Marcus Carterutant, DO   triamterene-hydroCHLOROthiazide (MAXZIDE-25) 37.5-25 MG per tablet TAKE 1 TABLET EVERY DAY 8/5/21  Yes Marcus Marsh, DO   fluticasone (FLONASE) 50 MCG/ACT nasal spray 2 sprays by Nasal route daily 4/13/21  Yes Konstantin Motta, DO   timolol (BETIMOL) 0.5 % ophthalmic solution 1 drop 2 times daily   Yes Historical Provider, MD   cyclobenzaprine (FLEXERIL) 10 MG tablet Take 1 tablet by mouth 3 times daily as needed for Muscle spasms 10/16/19  Yes Sylvia Motta DO   B Complex Vitamins (VITAMIN B COMPLEX PO) Take by mouth daily    Yes Historical Provider, MD LUMIGAN 0.01 % SOLN ophthalmic drops Place 1 drop into both eyes  3/30/17  Yes Historical Provider, MD   docusate sodium (COLACE) 100 MG capsule Take 1 capsule by mouth 2 times daily 2/24/17  Yes Maurisio Davey MD   meloxicam KRUPA DICKINSON [facility]) 15 MG tablet Take 1 tablet by mouth daily 8/12/21   Drenda Lose, DO   vitamin E 1000 units capsule Take 180 Units by mouth daily    Historical Provider, MD   COLLAGEN PO Take by mouth    Historical Provider, MD   magnesium 200 MG TABS tablet Take 200 mg by mouth daily    Historical Provider, MD       Allergies:  Latex and Bactrim    Social History:    reports that she quit smoking about 24 years ago. Her smoking use included cigarettes. She smoked 1.00 pack per day. She has never used smokeless tobacco. She reports current alcohol use of about 2.0 standard drinks of alcohol per week. She reports that she does not use drugs. Family History:   Family History   Problem Relation Age of Onset    High Blood Pressure Mother     Stroke Father     High Blood Pressure Father     Diabetes Maternal Aunt     Cancer Maternal Uncle         Throat    Alcohol Abuse Maternal Uncle     Asthma Maternal Uncle     Depression Daughter     Cancer Son         Colorectal     Depression Son         Vitals: Blood pressure 139/82, pulse 66, temperature 97.3 °F (36.3 °C), temperature source Temporal, resp. rate 18, height 5' 4\" (1.626 m), weight 225 lb (102.1 kg), SpO2 100 %, not currently breastfeeding. PHYSICAL EXAM:  HENT: Airway patent and reviewed  Cardiovascular: Normal rate, regular rhythm, normal heart sounds. Pulmonary/Chest: No wheezes. No rhonchi. No rales. Abdominal: Soft. Bowel sounds are normal. No distension. Extremities: Moves all extremities equally  Lumbar Spine: Painful range of motion, no midline tenderness     ASA CLASS:         []   I. Normal, healthy adult           [x]   II.  Mild systemic disease            []   III.   Severe systemic disease    Mallampati: Mallampati Class II - (soft palate, fauces & uvula are visible)      Sedation plan:   []  Local              [x]  Minimal                  []  General anesthesia    Treatment plan:  Patient's condition acceptable for planned procedure/sedation. Proceed with planned procedure   Post Procedure Plan   Return to same level of care   ______________________     The risks and benefits as well as alternatives to the procedure have been discussed with the patient and or family. The patient and/or next of kin understands and agrees to proceed.     Reji Eng MD

## 2021-12-08 NOTE — OP NOTE
PATIENT:  Bernabe Tineo  AGE:  80 yrs  MEDICAL RECORD #:  [id number]  YOB: 1940     DATE:  12/8/2021  PHYSICIAN: Emilia Archer M.D. PROCEDURE: Right L4 and attempted Left L4 transforaminal epidural steroid injection under fluoroscopy. PRE-OP DIAGNOSIS:  Low Back Pain/Radiculopathy     POST-OP DIAGNOSIS:  same     HISTORY OF PRESENT ILLNESS:  See office notes. Patient has failed previous less-invasive treatments. ALLERGIES:  Latex and Bactrim     MEDICATIONS:    No current facility-administered medications for this encounter. PHYSICAL EXAMINATION:              General:  Awake, alert              Heart:  No audible murmurs, extremities well perfused              Lungs:  No increased WOB or audible wheezing              Extremities:  Normal tone. Warm. No swelling. Anesthesia: 1 mg Versed and 50 mcg fentanyl    Estimated blood loss: None    DESCRIPTION OF PROCEDURE:     Components of the procedure were again reviewed with the patient prior to the procedure. She is aware of risks including infection, bleeding, allergic reaction, and nerve injury. She had ample opportunity for additional questions. She elected to proceed with treatment. The patient was placed in the prone position. Cardiovascular monitoring was initiated, and vital signs were stable prior to, during, and after the procedure. Utilizing fluoroscopy, the L4, vertebrae was identified. The area was sterilely prepped and draped. Skin anesthesia was achieved at each entry site using 1-2 cc of Lidocaine 1%. A 22 g 5.0 inch spinal needle was slowly inserted into the Right neuroforamen using AP, lateral and oblique fluoroscopic imaging After multiple attmpts. Negative aspiration was confirmed. 1 cc Isovue-M 300 was injected at each site showing contrast spread into the epidural space and along the nerve root. A combination of 1 cc Lidocaine 1% and 10 mg dexamethasone were slowly injected at each site.  A repeat procedure was attempted and under multiple attempts the epidural space was unable to be accessed. The needles were removed after the stylets were repositioned. A sterile bandage was applied. The patient was brought to recovery in stable condition. The patient tolerated the procedure well. DISPOSITION:  The patient was transported to recovery. The patient was monitored for 15 to 20 minutes post-procedure. Precautions were discussed and written instructions provided. Comment: Lumbar fusion L4-S1 on right and L3-S1 on the left. Stenotic epidural flow on the left. Unable to access R due to severe arthopathic changes preventing access. May consider BL L3 TESI if needing to repeat.

## 2021-12-08 NOTE — PROGRESS NOTES
Copy of patient's telemetry strips given to patient and instructed to share with her doctors. Okay to give to patient per Dr. Eva Sim.

## 2021-12-13 DIAGNOSIS — G89.29 CHRONIC MIDLINE LOW BACK PAIN WITHOUT SCIATICA: ICD-10-CM

## 2021-12-13 DIAGNOSIS — M15.9 PRIMARY OSTEOARTHRITIS INVOLVING MULTIPLE JOINTS: ICD-10-CM

## 2021-12-13 DIAGNOSIS — M54.50 CHRONIC MIDLINE LOW BACK PAIN WITHOUT SCIATICA: ICD-10-CM

## 2021-12-13 RX ORDER — HYDROCODONE BITARTRATE AND ACETAMINOPHEN 7.5; 325 MG/1; MG/1
1 TABLET ORAL EVERY 8 HOURS PRN
Qty: 90 TABLET | Refills: 0 | Status: SHIPPED | OUTPATIENT
Start: 2021-12-13 | End: 2022-01-24 | Stop reason: SDUPTHER

## 2021-12-13 RX ORDER — MELOXICAM 15 MG/1
TABLET ORAL
Qty: 90 TABLET | Refills: 0 | Status: SHIPPED | OUTPATIENT
Start: 2021-12-13 | End: 2022-03-13

## 2021-12-14 ENCOUNTER — OFFICE VISIT (OUTPATIENT)
Dept: ORTHOPEDIC SURGERY | Age: 81
End: 2021-12-14
Payer: MEDICARE

## 2021-12-14 VITALS — BODY MASS INDEX: 38.41 KG/M2 | HEIGHT: 64 IN | WEIGHT: 225 LBS

## 2021-12-14 DIAGNOSIS — M48.062 SPINAL STENOSIS OF LUMBAR REGION WITH NEUROGENIC CLAUDICATION: Primary | ICD-10-CM

## 2021-12-14 PROCEDURE — 4040F PNEUMOC VAC/ADMIN/RCVD: CPT | Performed by: ORTHOPAEDIC SURGERY

## 2021-12-14 PROCEDURE — G8417 CALC BMI ABV UP PARAM F/U: HCPCS | Performed by: ORTHOPAEDIC SURGERY

## 2021-12-14 PROCEDURE — 1036F TOBACCO NON-USER: CPT | Performed by: ORTHOPAEDIC SURGERY

## 2021-12-14 PROCEDURE — 99214 OFFICE O/P EST MOD 30 MIN: CPT | Performed by: ORTHOPAEDIC SURGERY

## 2021-12-14 PROCEDURE — 1123F ACP DISCUSS/DSCN MKR DOCD: CPT | Performed by: ORTHOPAEDIC SURGERY

## 2021-12-14 PROCEDURE — 1090F PRES/ABSN URINE INCON ASSESS: CPT | Performed by: ORTHOPAEDIC SURGERY

## 2021-12-14 PROCEDURE — G8427 DOCREV CUR MEDS BY ELIG CLIN: HCPCS | Performed by: ORTHOPAEDIC SURGERY

## 2021-12-14 PROCEDURE — G8399 PT W/DXA RESULTS DOCUMENT: HCPCS | Performed by: ORTHOPAEDIC SURGERY

## 2021-12-14 PROCEDURE — G8484 FLU IMMUNIZE NO ADMIN: HCPCS | Performed by: ORTHOPAEDIC SURGERY

## 2021-12-14 NOTE — PROGRESS NOTES
New Patient: LUMBAR SPINE    Referring Provider:  No ref. provider found    CHIEF COMPLAINT:    Chief Complaint   Patient presents with    Back Pain     lumbar       HISTORY OF PRESENT ILLNESS:    Ms. Valeriano Vazquez  is a pleasant 80 y.o. L who is status post lumbar surgery x3 including anterior posterior fusion C6-L9 complicated by a left L5 nerve root injury during her last surgery who presents today for the evaluation of perianal and vaginal numbness. She also notes occasional urinary incontinence. Those symptoms began about 6 weeks ago. She denies bowel incontinence. She also notes 6/10 bilateral buttocks and posterior thigh pain. Her pain is worse with standing and walking. 40% improvement with recent REE.   .     Current/Past Treatment:   · Physical Therapy: Distant past  · Chiropractic: Distant past  · Injection: recent  REE 40% improvement  Medications: Norco      Past Medical History:   Past Medical History:   Diagnosis Date    Anesthesia complication     difficulty waking up after breast biopsy    Anxiety     Chronic kidney disease, stage 3b (HCC)     Chronic midline low back pain without sciatica     Coronary artery disease involving native coronary artery without angina pectoris     Essential hypertension     Fatigue     Foot drop, left     GERD without esophagitis     History of blood transfusion 2010    Hot flashes     Internal hemorrhoids     Intrinsic eczema     Lesion of lateral popliteal nerve     Non morbid obesity due to excess calories     Paroxysmal supraventricular tachycardia (HCC)     Positive PPD 06/2020    repeated --second PPD test came back negative    Postmenopausal status     Primary insomnia     Primary osteoarthritis involving multiple joints     PUD (peptic ulcer disease)     Restless leg syndrome     Seborrheic keratosis       Past Surgical History:     Past Surgical History:   Procedure Laterality Date    ABDOMINAL EXPLORATION SURGERY  2000    Lysis of Adhesions    BREAST SURGERY Right     Biopsy    CARPAL TUNNEL RELEASE Right 11/03/2020    RIGHT CARPAL TUNNEL RELEASE performed by Yovany Magdaleno MD at [address] [facility] Right 08/24/2021    CATARACT REMOVAL WITH IMPLANT Left 09/07/2021    Dr. Isaac León      from back    HEMORRHOID SURGERY  02/24/2017    HYSTERECTOMY      Complete    INTRACAPSULAR CATARACT EXTRACTION Right 08/24/2021    PHACOEMULSIFICATION WITH INTRAOCULAR LENS IMPLANT RIGHT EYE, performed by Soha Washington MD at [place] [address] Left 9/7/2021    PHACOEMULSIFICATION WITH INTRAOCULAR LENS IMPLANT - LEFT EYE performed by Soha Washington MD at [address] [place] [address]      x 3 with fusion    PAIN MANAGEMENT PROCEDURE Bilateral 12/8/2021    BILATERAL L4 TRANSFORMANIAL EPIDURAL STEROID INJECTION WITH FLUOROSCOPY performed by Delvin Fisher MD at [facility] ARTHROSCOPY Right     Right shoulder scope 2) Right shoulder labral debridement 3) Right shoulder Open Miguel 4) Right shoulder rotator cuff repair    TOTAL HIP ARTHROPLASTY Bilateral     TOTAL KNEE ARTHROPLASTY Left 02/03/2015    Dr. Xiao Garcia UPPER GASTROINTESTINAL ENDOSCOPY N/A 03/10/2020    EGD ESOPHAGOGASTRODUODENOSCOPY performed by Tom Hinojosa MD at [address] [facility]       Current Medications:     Current Outpatient Medications:     HYDROcodone-acetaminophen (NORCO) 7.5-325 MG per tablet, Take 1 tablet by mouth every 8 hours as needed for Pain for up to 30 days. , Disp: 90 tablet, Rfl: 0    meloxicam (MOBIC) 15 MG tablet, TAKE 1 TABLET EVERY DAY, Disp: 90 tablet, Rfl: 0    omeprazole (PRILOSEC) 20 MG delayed release capsule, TAKE 1 CAPSULE EVERY DAY, Disp: 90 capsule, Rfl: 0    vitamin E 1000 units capsule, Take 180 Units by mouth daily, Disp: , Rfl:     COLLAGEN PO, Take by mouth, Disp: , Rfl:    Denies anxiety, depression   SKIN: Denies skin changes, delayed healing, rash, itching   HEMATOLOGIC: Denies easy bleeding or bruising  ENDOCRINE: Denies excessive thirst, urination, heat/cold  RESPIRATORY: Denies current dyspnea, cough  GI: Denies nausea, vomiting, diarrhea   : Denies bowel or bladder issues      PHYSICAL EXAM:    Vitals: Height 5' 4.02\" (1.626 m), weight 225 lb (102.1 kg), not currently breastfeeding. GENERAL EXAM:  · General Apparence: Patient is adequately groomed with no evidence of malnutrition. · Orientation: The patient is oriented to time, place and person. · Mood & Affect:The patient's mood and affect are appropriate. · Vascular: Examination reveals no swelling tenderness in upper or lower extremities. Good capillary refill. · Lymphatic: The lymphatic examination bilaterally reveals all areas to be without enlargement or induration  · Sensation: Sensation is intact without deficit  · Coordination/Balance: Good coordination     LUMBAR/SACRAL EXAMINATION:  · Inspection: Local inspection shows no step-off or bruising. Lumbar alignment is normal.  Sagittal and Coronal balance is neutral.      · Palpation:   No evidence of tenderness at the midline. No tenderness bilaterally at the paraspinal or trochanters. There is no step-off or paraspinal spasm. · Range of Motion: Lumbar flexion, extension and rotation are mildly limited due to pain. · Strength: 1/5 strength left ankle dorsiflexors otherwise strength testing is 5/5 in all muscle groups tested. · Special Tests:   Straight leg raise and crossed SLR negative. Leg length and pelvis level. · Skin: There are no rashes, ulcerations or lesions. · Reflexes: Reflexes are symmetrically 2+ at the patellar and ankle tendons. Clonus absent bilaterally at the feet.   · Gait & station: normal, patient ambulates without assistance    · Additional Examinations:   · RIGHT LOWER EXTREMITY: Inspection/examination of the right lower extremity does not show any tenderness, deformity or injury. Range of motion is unremarkable. There is no gross instability. There are no rashes, ulcerations or lesions. Strength and tone are normal.    · LEFT LOWER EXTREMITY:  Inspection/examination of the left lower extremity does not show any tenderness, deformity or injury. Range of motion is unremarkable. There is no gross instability. There are no rashes, ulcerations or lesions. Strength and tone are normal.    Diagnostic Testing:    I reviewed AP and lateral x-rays of her lumbar spine from 6/14/2021 in the office. Those show a an L3-S1 anterior posterior fusion with severe disc degeneration and spondylolisthesis L2-L3. I reviewed MRI images of her lumbar spine in the office today. Those show moderate to severe central stenosis L2-L3, moderate to severe central stenosis with disc herniation L1-L2 and post anterior posterior fusion instrumentation L3-S1    Impression:   Denerative spondylolisthesis L2-L3 with moderate to severe central stenosis  Disc herniation L1-L2 with moderate to severe central stenosis    Plan:    Discussed treatment options including observation, physical therapy, epidural injection, and revision spinal surgery. I will refer her to Dr. Sadaf Membreno at [facility] for possible revision spinal surgery.

## 2022-01-05 NOTE — PROGRESS NOTES
[address]      Cardiology Consult    [facility] Mis  1940 January 7, 2022    Primary Physician: Dr. Thiago Wolff   Reason for Referral: Possible atrial fibrillation    CC: \"back pain\"    HPI:  The patient is 80 y.o. female with a past medical history significant for a SVT and hypertension who presents for chronic management of presumed atrial fibrillation. She previously followed with Dr. Sang Murphy for the arrhythmia. Unfortunately I am unable to find the actual ECG of the event and there is some inconsistencies in documentation. She says the arrhythmia was diagnosed >15 years ago at [facility] when post-op from back surgery. She denied any documented recurrence of the arrhythmia. She endorsed rare brief palpitations of 3-4 times over the past 5 years but the events were not sustained and she did not seek medical attention. The oldest notes from her prior cardiologist lists PSVT as the diagnosis. Without another known event, several years ago the diagnosis was changed to PAF. She was never on anticoagulation. She says she has been on propafenone since the original event. She underwent an epidural steroid injection 12/8/21. She was on telemetry and was instructed to bring in a copy of the monitor strips which showed normal sinus rhythm with PACs. Today, she states she is doing okay and denies any new cardiac sounding complaints. She denies any worsening shortness of breath, chest pains or palpitations. Her main complaint is chronic back pain and states she will possibly undergo surgery but it is not scheduled at this time. She reports compliance with her medications and tolerating. Patient denies exertional chest pain/pressure, dyspnea at rest, worsening BAER, PND, orthopnea, lightheadedness, weight changes, changes in LE edema, and syncope.     Past Medical History:   Diagnosis Date    Anesthesia complication     difficulty waking up after breast biopsy    Anxiety     Chronic kidney disease, stage 3b ([facility] [address]     Chronic midline low back pain without sciatica     Coronary artery disease involving native coronary artery without angina pectoris     Essential hypertension     Fatigue     Foot drop, left     GERD without esophagitis     History of blood transfusion 2010    Hot flashes     Internal hemorrhoids     Intrinsic eczema     Lesion of lateral popliteal nerve     Non morbid obesity due to excess calories     Paroxysmal supraventricular tachycardia (HCC)     Positive PPD 06/2020    repeated --second PPD test came back negative    Postmenopausal status     Primary insomnia     Primary osteoarthritis involving multiple joints     PUD (peptic ulcer disease)     Restless leg syndrome     Seborrheic keratosis      Past Surgical History:   Procedure Laterality Date    ABDOMINAL EXPLORATION SURGERY  2000    Lysis of Adhesions    BREAST SURGERY Right     Biopsy    CARPAL TUNNEL RELEASE Right 11/03/2020    RIGHT CARPAL TUNNEL RELEASE performed by Cherry Hammans, MD at [address] [place] [address] Right 08/24/2021    CATARACT REMOVAL WITH IMPLANT Left 09/07/2021    Dr. Luis Solano      from back    HEMORRHOID SURGERY  02/24/2017    HYSTERECTOMY      Complete    INTRACAPSULAR CATARACT EXTRACTION Right 08/24/2021    PHACOEMULSIFICATION WITH INTRAOCULAR LENS IMPLANT RIGHT EYE, performed by Iban Manriquez MD at [place] [id number] Left 9/7/2021    PHACOEMULSIFICATION WITH INTRAOCULAR LENS IMPLANT - LEFT EYE performed by Iban Manriquez MD at [address] [place] [address]      x 3 with fusion    PAIN MANAGEMENT PROCEDURE Bilateral 12/8/2021    BILATERAL L4 TRANSFORMANIAL EPIDURAL STEROID INJECTION WITH FLUOROSCOPY performed by Sheldon Puga MD at [facility] ARTHROSCOPY Right     Right shoulder scope 2) Right shoulder labral debridement 3) Right shoulder Open Only 4) Right shoulder rotator cuff repair    TOTAL HIP ARTHROPLASTY Bilateral     TOTAL KNEE ARTHROPLASTY Left 2015    Dr. Chelle Angela UPPER GASTROINTESTINAL ENDOSCOPY N/A 03/10/2020    EGD ESOPHAGOGASTRODUODENOSCOPY performed by Damaris Yen MD at [facility] EXTRACTION       Family History   Problem Relation Age of Onset    High Blood Pressure Mother     Stroke Father     High Blood Pressure Father     Diabetes Maternal Aunt     Cancer Maternal Uncle         Throat    Alcohol Abuse Maternal Uncle     Asthma Maternal Uncle     Depression Daughter     Cancer Son         Colorectal     Depression Son      Social History     Tobacco Use    Smoking status: Former Smoker     Packs/day: 1.00     Types: Cigarettes     Quit date: 1997     Years since quittin.0    Smokeless tobacco: Never Used   Vaping Use    Vaping Use: Never used   Substance Use Topics    Alcohol use: Yes     Alcohol/week: 2.0 standard drinks     Types: 1 Glasses of wine, 1 Cans of beer per week     Comment: rare    Drug use: Never       Allergies   Allergen Reactions    Latex Itching and Rash    Bactrim Rash     Current Outpatient Medications   Medication Sig Dispense Refill    HYDROcodone-acetaminophen (NORCO) 7.5-325 MG per tablet Take 1 tablet by mouth every 8 hours as needed for Pain for up to 30 days.  90 tablet 0    meloxicam (MOBIC) 15 MG tablet TAKE 1 TABLET EVERY DAY 90 tablet 0    omeprazole (PRILOSEC) 20 MG delayed release capsule TAKE 1 CAPSULE EVERY DAY 90 capsule 0    COLLAGEN PO Take by mouth      verapamil (CALAN SR) 120 MG extended release tablet TAKE 1 TABLET EVERY DAY 90 tablet 1    metoprolol tartrate (LOPRESSOR) 50 MG tablet TAKE 1 TABLET TWICE DAILY 180 tablet 1    triamterene-hydroCHLOROthiazide (MAXZIDE-25) 37.5-25 MG per tablet TAKE 1 TABLET EVERY DAY 90 tablet 1    fluticasone (FLONASE) 50 MCG/ACT nasal spray 2 sprays by Nasal route daily 1 Bottle 3    timolol (BETIMOL) 0.5 % ophthalmic solution 1 drop 2 times daily      cyclobenzaprine (FLEXERIL) 10 MG tablet Take 1 tablet by mouth 3 times daily as needed for Muscle spasms 90 tablet 5    B Complex Vitamins (VITAMIN B COMPLEX PO) Take by mouth daily       LUMIGAN 0.01 % SOLN ophthalmic drops Place 1 drop into both eyes       docusate sodium (COLACE) 100 MG capsule Take 1 capsule by mouth 2 times daily 40 capsule 0    vitamin E 1000 units capsule Take 180 Units by mouth daily (Patient not taking: Reported on 1/7/2022)      magnesium 200 MG TABS tablet Take 200 mg by mouth daily (Patient not taking: Reported on 1/7/2022)       No current facility-administered medications for this visit. Review of Systems:  · Constitutional: no unanticipated weight loss. There's been no change in energy level, sleep pattern, or activity level. No fevers, chills. · Eyes: No visual changes or diplopia. No scleral icterus. · ENT: No Headaches, hearing loss or vertigo. No mouth sores or sore throat. · Cardiovascular: as reviewed in HPI  · Respiratory: No cough or wheezing, no sputum production. No hematemesis. · Gastrointestinal: No abdominal pain, appetite loss, blood in stools. No change in bowel or bladder habits. · Genitourinary: No dysuria, trouble voiding, or hematuria. · Musculoskeletal:  No gait disturbance, no joint complaints. · Integumentary: No rash or pruritis. · Neurological: No headache, diplopia, change in muscle strength, numbness or tingling. · Psychiatric: No anxiety or depression. · Endocrine: No temperature intolerance. No excessive thirst, fluid intake, or urination. No tremor. · Hematologic/Lymphatic: No abnormal bruising or bleeding, blood clots or swollen lymph nodes. · Allergic/Immunologic: No nasal congestion or hives.     Physical Exam:   /82   Pulse 80   Ht 5' 4\" (1.626 m)   Wt 229 lb 6.4 oz (104.1 kg)   SpO2 95%   BMI 39.38 kg/m²   Wt Readings from Last 3 Encounters: Monitor 6/2018  Baseline sinus rhythm  No episodes of atrial fibrillation  No reported symptoms    Event monitor 6/11/21   Baseline SR, frequent PACs (14%) but not sustained arrhythmias and no Afib. Assessment/Plan:   1) Possible paroxysmal atrial fibrillation/Paroxysmal supraventricular tachycardia. No recurrent episodes. Cardiac event monitor from 2018 and 2021 showed NSR with no episodes of atrial fibrillation or reported symptoms. Discussed anticoagulation options but she is OK remaining off a DOAC as she has had no documented events in >15 years and was not previously on anticoagulation. 2) Essential hypertension. Controlled. Goal BP <130/80. Continue medical therapy. 3) Pre-operative risk assessment. Patient is low cardiac risk based on RCRI score of zero. Patient's risk should not preclude her from proceeding with surgery. Suggest continuation of B-blocker in the nancy-operative period. Follow up 1 year. Thank you very much for allowing me to participate in the care of your patient. Please do not hesitate to contact me if you have any questions. Sincerely,  Jhoana Carr. Tracy Webster, [address] [place] [address]  Ph: [phone]  Fax: [phone]    This note was scribed in the presence of Dr Tracy Webster MD by Lester Rush RN. Physician Attestation: The scribes documentation has been prepared under my direction and personally reviewed by me in its entirety. I confirm that the note above accurately reflects all work, treatment, procedures, and medical decision making performed by me. All portions of the note including but not limited to the chief complaint, history of present illness, physical exam, assessment and plan/medical decision making were personally reviewed, edited, and updated on the day of the visit.

## 2022-01-05 NOTE — PATIENT INSTRUCTIONS

## 2022-01-07 ENCOUNTER — OFFICE VISIT (OUTPATIENT)
Dept: CARDIOLOGY CLINIC | Age: 82
End: 2022-01-07
Payer: MEDICARE

## 2022-01-07 VITALS
WEIGHT: 229.4 LBS | OXYGEN SATURATION: 95 % | DIASTOLIC BLOOD PRESSURE: 82 MMHG | HEART RATE: 80 BPM | HEIGHT: 64 IN | BODY MASS INDEX: 39.16 KG/M2 | SYSTOLIC BLOOD PRESSURE: 128 MMHG

## 2022-01-07 DIAGNOSIS — R07.89 ATYPICAL CHEST PAIN: ICD-10-CM

## 2022-01-07 DIAGNOSIS — I47.1 PAROXYSMAL SUPRAVENTRICULAR TACHYCARDIA (HCC): Primary | ICD-10-CM

## 2022-01-07 DIAGNOSIS — I10 ESSENTIAL HYPERTENSION: ICD-10-CM

## 2022-01-07 DIAGNOSIS — Z01.810 PREOP CARDIOVASCULAR EXAM: ICD-10-CM

## 2022-01-07 PROCEDURE — 1036F TOBACCO NON-USER: CPT | Performed by: INTERNAL MEDICINE

## 2022-01-07 PROCEDURE — 1090F PRES/ABSN URINE INCON ASSESS: CPT | Performed by: INTERNAL MEDICINE

## 2022-01-07 PROCEDURE — G8399 PT W/DXA RESULTS DOCUMENT: HCPCS | Performed by: INTERNAL MEDICINE

## 2022-01-07 PROCEDURE — 1123F ACP DISCUSS/DSCN MKR DOCD: CPT | Performed by: INTERNAL MEDICINE

## 2022-01-07 PROCEDURE — 99214 OFFICE O/P EST MOD 30 MIN: CPT | Performed by: INTERNAL MEDICINE

## 2022-01-07 PROCEDURE — G8417 CALC BMI ABV UP PARAM F/U: HCPCS | Performed by: INTERNAL MEDICINE

## 2022-01-07 PROCEDURE — G8427 DOCREV CUR MEDS BY ELIG CLIN: HCPCS | Performed by: INTERNAL MEDICINE

## 2022-01-07 PROCEDURE — 93000 ELECTROCARDIOGRAM COMPLETE: CPT | Performed by: INTERNAL MEDICINE

## 2022-01-07 PROCEDURE — G8484 FLU IMMUNIZE NO ADMIN: HCPCS | Performed by: INTERNAL MEDICINE

## 2022-01-07 PROCEDURE — 4040F PNEUMOC VAC/ADMIN/RCVD: CPT | Performed by: INTERNAL MEDICINE

## 2022-01-21 ASSESSMENT — ENCOUNTER SYMPTOMS: SHORTNESS OF BREATH: 0

## 2022-01-21 NOTE — PROGRESS NOTES
Subjective:      Patient ID: Cherry Bolden is a 80 y.o. female. Hypertension  This is a chronic problem. The current episode started more than 1 year ago. The problem is unchanged. The problem is controlled. Pertinent negatives include no chest pain, palpitations, peripheral edema or shortness of breath. Risk factors for coronary artery disease include family history, obesity, post-menopausal state and sedentary lifestyle. Past treatments include beta blockers, calcium channel blockers and diuretics. The current treatment provides significant improvement. There are no compliance problems. Chronic Kidney Disease Stage 3b:  Patient takes Maxzide-25 once daily. Her renal function is checked regularly and has been stable. GERD:  Patient is tolerating and compliant with Omeprazole 20 mg daily. She feels that the medication completely controls her symptoms. She would like to try changing to Pepcid. Chronic Low Back Pain:  Patient is tolerating and compliant with Mobic 15 mg daily, Flexeril 10 mg TID prn and Norco 7.5-325 every 8 hrs as needed.  She feels that the medications keep her functional.        Anxiety:  Patient takes Ativan 1 mg every 8 hrs as needed for anxiety.  She generally takes it once daily and feels that it works well to control her allergy symptoms. PSVT:  Patient sees Dr. Faye Callaway and [facility] and had an event monitor ordered by him. She had no events during the 30 day period. She continues to take Lopressor and Calan. Obesity:  Patient weighed 229 on 1-7-22. She has lost 4 lbs since then. Review of Systems   Constitutional: Negative for chills and fever. HENT: Negative for ear discharge, ear pain and hearing loss. Respiratory: Negative for shortness of breath. Cardiovascular: Negative for chest pain, palpitations and leg swelling.        /72   Ht 5' 4\" (1.626 m)   Wt 225 lb (102.1 kg)   BMI 38.62 kg/m²    Objective: Physical Exam  Constitutional:       General: She is not in acute distress. Appearance: She is well-developed. HENT:      Head: Normocephalic. Right Ear: External ear normal.      Left Ear: External ear normal.      Mouth/Throat:      Pharynx: No oropharyngeal exudate. Neck:      Thyroid: No thyromegaly. Vascular: No JVD. Cardiovascular:      Rate and Rhythm: Normal rate and regular rhythm. Heart sounds: Normal heart sounds. No murmur heard. Pulmonary:      Effort: Pulmonary effort is normal.      Breath sounds: Normal breath sounds. No wheezing or rales. Lymphadenopathy:      Cervical: No cervical adenopathy. Neurological:      Mental Status: She is alert and oriented to person, place, and time. Assessment:      Hypertension  Chronic Kidney Disease Stage 3b  GERD  Chronic Low Back Pain  Anxiety  PSVT  Obesity      Plan:       Chem 7, Lipid Panel  Change Omeprazole to Pepcid 20 mg BID. [place]AR[place] report was reviewed  Medications refilled   Recommended that patient have an AWV. RTO 3 months for Low Back Pain / Anxiety         Controlled Substance Monitoring:    Acute and Chronic Pain Monitoring:   RX Monitoring 1/24/2022   Attestation -   Acute Pain Prescriptions Severe pain not adequately treated with lower dose. Periodic Controlled Substance Monitoring No signs of potential drug abuse or diversion identified.    Chronic Pain > 80 MEDD -

## 2022-01-24 ENCOUNTER — OFFICE VISIT (OUTPATIENT)
Dept: ORTHOPEDIC SURGERY | Age: 82
End: 2022-01-24
Payer: MEDICARE

## 2022-01-24 ENCOUNTER — OFFICE VISIT (OUTPATIENT)
Dept: FAMILY MEDICINE CLINIC | Age: 82
End: 2022-01-24
Payer: MEDICARE

## 2022-01-24 VITALS — WEIGHT: 229 LBS | BODY MASS INDEX: 39.09 KG/M2 | HEIGHT: 64 IN

## 2022-01-24 VITALS
BODY MASS INDEX: 38.41 KG/M2 | WEIGHT: 225 LBS | DIASTOLIC BLOOD PRESSURE: 72 MMHG | SYSTOLIC BLOOD PRESSURE: 138 MMHG | HEIGHT: 64 IN

## 2022-01-24 DIAGNOSIS — M19.012 ARTHRITIS OF LEFT SHOULDER REGION: ICD-10-CM

## 2022-01-24 DIAGNOSIS — I10 ESSENTIAL HYPERTENSION: Primary | ICD-10-CM

## 2022-01-24 DIAGNOSIS — E66.09 NON MORBID OBESITY DUE TO EXCESS CALORIES: ICD-10-CM

## 2022-01-24 DIAGNOSIS — N18.32 CHRONIC KIDNEY DISEASE, STAGE 3B (HCC): ICD-10-CM

## 2022-01-24 DIAGNOSIS — K21.9 GERD WITHOUT ESOPHAGITIS: ICD-10-CM

## 2022-01-24 DIAGNOSIS — I47.1 PAROXYSMAL SUPRAVENTRICULAR TACHYCARDIA (HCC): ICD-10-CM

## 2022-01-24 DIAGNOSIS — M19.011 ARTHRITIS OF RIGHT SHOULDER REGION: Primary | ICD-10-CM

## 2022-01-24 DIAGNOSIS — F41.9 ANXIETY: ICD-10-CM

## 2022-01-24 DIAGNOSIS — M15.9 PRIMARY OSTEOARTHRITIS INVOLVING MULTIPLE JOINTS: ICD-10-CM

## 2022-01-24 DIAGNOSIS — M54.50 CHRONIC MIDLINE LOW BACK PAIN WITHOUT SCIATICA: ICD-10-CM

## 2022-01-24 DIAGNOSIS — H69.82 EUSTACHIAN TUBE DYSFUNCTION, LEFT: ICD-10-CM

## 2022-01-24 DIAGNOSIS — G89.29 CHRONIC MIDLINE LOW BACK PAIN WITHOUT SCIATICA: ICD-10-CM

## 2022-01-24 LAB
ANION GAP SERPL CALCULATED.3IONS-SCNC: 14 MMOL/L (ref 3–16)
BUN BLDV-MCNC: 19 MG/DL (ref 7–20)
CALCIUM SERPL-MCNC: 9.6 MG/DL (ref 8.3–10.6)
CHLORIDE BLD-SCNC: 99 MMOL/L (ref 99–110)
CHOLESTEROL, TOTAL: 221 MG/DL (ref 0–199)
CO2: 25 MMOL/L (ref 21–32)
CREAT SERPL-MCNC: 1.2 MG/DL (ref 0.6–1.2)
GFR AFRICAN AMERICAN: 52
GFR NON-AFRICAN AMERICAN: 43
GLUCOSE BLD-MCNC: 90 MG/DL (ref 70–99)
HDLC SERPL-MCNC: 73 MG/DL (ref 40–60)
LDL CHOLESTEROL CALCULATED: 128 MG/DL
POTASSIUM SERPL-SCNC: 4.2 MMOL/L (ref 3.5–5.1)
SODIUM BLD-SCNC: 138 MMOL/L (ref 136–145)
TRIGL SERPL-MCNC: 101 MG/DL (ref 0–150)
VLDLC SERPL CALC-MCNC: 20 MG/DL

## 2022-01-24 PROCEDURE — 1036F TOBACCO NON-USER: CPT | Performed by: FAMILY MEDICINE

## 2022-01-24 PROCEDURE — 20610 DRAIN/INJ JOINT/BURSA W/O US: CPT | Performed by: PHYSICIAN ASSISTANT

## 2022-01-24 PROCEDURE — G8484 FLU IMMUNIZE NO ADMIN: HCPCS | Performed by: PHYSICIAN ASSISTANT

## 2022-01-24 PROCEDURE — 1090F PRES/ABSN URINE INCON ASSESS: CPT | Performed by: PHYSICIAN ASSISTANT

## 2022-01-24 PROCEDURE — 99214 OFFICE O/P EST MOD 30 MIN: CPT | Performed by: FAMILY MEDICINE

## 2022-01-24 PROCEDURE — 1123F ACP DISCUSS/DSCN MKR DOCD: CPT | Performed by: FAMILY MEDICINE

## 2022-01-24 PROCEDURE — G8417 CALC BMI ABV UP PARAM F/U: HCPCS | Performed by: PHYSICIAN ASSISTANT

## 2022-01-24 PROCEDURE — 4040F PNEUMOC VAC/ADMIN/RCVD: CPT | Performed by: FAMILY MEDICINE

## 2022-01-24 PROCEDURE — 1036F TOBACCO NON-USER: CPT | Performed by: PHYSICIAN ASSISTANT

## 2022-01-24 PROCEDURE — G8399 PT W/DXA RESULTS DOCUMENT: HCPCS | Performed by: PHYSICIAN ASSISTANT

## 2022-01-24 PROCEDURE — 99213 OFFICE O/P EST LOW 20 MIN: CPT | Performed by: PHYSICIAN ASSISTANT

## 2022-01-24 PROCEDURE — G8427 DOCREV CUR MEDS BY ELIG CLIN: HCPCS | Performed by: FAMILY MEDICINE

## 2022-01-24 PROCEDURE — G8417 CALC BMI ABV UP PARAM F/U: HCPCS | Performed by: FAMILY MEDICINE

## 2022-01-24 PROCEDURE — G8399 PT W/DXA RESULTS DOCUMENT: HCPCS | Performed by: FAMILY MEDICINE

## 2022-01-24 PROCEDURE — 36415 COLL VENOUS BLD VENIPUNCTURE: CPT | Performed by: FAMILY MEDICINE

## 2022-01-24 PROCEDURE — 1090F PRES/ABSN URINE INCON ASSESS: CPT | Performed by: FAMILY MEDICINE

## 2022-01-24 PROCEDURE — G8427 DOCREV CUR MEDS BY ELIG CLIN: HCPCS | Performed by: PHYSICIAN ASSISTANT

## 2022-01-24 PROCEDURE — G8484 FLU IMMUNIZE NO ADMIN: HCPCS | Performed by: FAMILY MEDICINE

## 2022-01-24 PROCEDURE — 1123F ACP DISCUSS/DSCN MKR DOCD: CPT | Performed by: PHYSICIAN ASSISTANT

## 2022-01-24 PROCEDURE — 4040F PNEUMOC VAC/ADMIN/RCVD: CPT | Performed by: PHYSICIAN ASSISTANT

## 2022-01-24 RX ORDER — BUPIVACAINE HYDROCHLORIDE 2.5 MG/ML
2 INJECTION, SOLUTION INFILTRATION; PERINEURAL ONCE
Status: COMPLETED | OUTPATIENT
Start: 2022-01-24 | End: 2022-01-24

## 2022-01-24 RX ORDER — TRIAMCINOLONE ACETONIDE 40 MG/ML
40 INJECTION, SUSPENSION INTRA-ARTICULAR; INTRAMUSCULAR ONCE
Status: COMPLETED | OUTPATIENT
Start: 2022-01-24 | End: 2022-01-24

## 2022-01-24 RX ORDER — LORAZEPAM 1 MG/1
TABLET ORAL
Qty: 90 TABLET | Refills: 0 | Status: SHIPPED | OUTPATIENT
Start: 2022-01-24 | End: 2022-02-23

## 2022-01-24 RX ORDER — HYDROCODONE BITARTRATE AND ACETAMINOPHEN 7.5; 325 MG/1; MG/1
1 TABLET ORAL EVERY 8 HOURS PRN
Qty: 90 TABLET | Refills: 0 | Status: SHIPPED | OUTPATIENT
Start: 2022-01-24 | End: 2022-03-14 | Stop reason: SDUPTHER

## 2022-01-24 RX ORDER — FLUTICASONE PROPIONATE 50 MCG
2 SPRAY, SUSPENSION (ML) NASAL DAILY
Qty: 3 EACH | Refills: 1 | Status: SHIPPED | OUTPATIENT
Start: 2022-01-24 | End: 2022-04-11

## 2022-01-24 RX ORDER — FAMOTIDINE 20 MG/1
20 TABLET, FILM COATED ORAL 2 TIMES DAILY
Qty: 180 TABLET | Refills: 1 | Status: SHIPPED | OUTPATIENT
Start: 2022-01-24 | End: 2022-03-14

## 2022-01-24 RX ADMIN — TRIAMCINOLONE ACETONIDE 40 MG: 40 INJECTION, SUSPENSION INTRA-ARTICULAR; INTRAMUSCULAR at 09:38

## 2022-01-24 RX ADMIN — BUPIVACAINE HYDROCHLORIDE 5 MG: 2.5 INJECTION, SOLUTION INFILTRATION; PERINEURAL at 09:35

## 2022-01-26 ENCOUNTER — VIRTUAL VISIT (OUTPATIENT)
Dept: FAMILY MEDICINE CLINIC | Age: 82
End: 2022-01-26
Payer: MEDICARE

## 2022-01-26 DIAGNOSIS — Z00.00 ROUTINE GENERAL MEDICAL EXAMINATION AT A HEALTH CARE FACILITY: Primary | ICD-10-CM

## 2022-01-26 PROCEDURE — 4040F PNEUMOC VAC/ADMIN/RCVD: CPT | Performed by: FAMILY MEDICINE

## 2022-01-26 PROCEDURE — G0439 PPPS, SUBSEQ VISIT: HCPCS | Performed by: FAMILY MEDICINE

## 2022-01-26 PROCEDURE — 1123F ACP DISCUSS/DSCN MKR DOCD: CPT | Performed by: FAMILY MEDICINE

## 2022-01-26 PROCEDURE — G8484 FLU IMMUNIZE NO ADMIN: HCPCS | Performed by: FAMILY MEDICINE

## 2022-01-26 ASSESSMENT — PATIENT HEALTH QUESTIONNAIRE - PHQ9
SUM OF ALL RESPONSES TO PHQ QUESTIONS 1-9: 0
SUM OF ALL RESPONSES TO PHQ9 QUESTIONS 1 & 2: 0
SUM OF ALL RESPONSES TO PHQ9 QUESTIONS 1 & 2: 0
SUM OF ALL RESPONSES TO PHQ QUESTIONS 1-9: 0
SUM OF ALL RESPONSES TO PHQ QUESTIONS 1-9: 0
1. LITTLE INTEREST OR PLEASURE IN DOING THINGS: 0
1. LITTLE INTEREST OR PLEASURE IN DOING THINGS: 0
SUM OF ALL RESPONSES TO PHQ QUESTIONS 1-9: 0
SUM OF ALL RESPONSES TO PHQ QUESTIONS 1-9: 0
2. FEELING DOWN, DEPRESSED OR HOPELESS: 0
2. FEELING DOWN, DEPRESSED OR HOPELESS: 0
SUM OF ALL RESPONSES TO PHQ QUESTIONS 1-9: 0

## 2022-01-26 ASSESSMENT — LIFESTYLE VARIABLES
HOW OFTEN DO YOU HAVE A DRINK CONTAINING ALCOHOL: NEVER
HOW OFTEN DO YOU HAVE A DRINK CONTAINING ALCOHOL: 0
HOW OFTEN DO YOU HAVE A DRINK CONTAINING ALCOHOL: 0
AUDIT TOTAL SCORE: INCOMPLETE
AUDIT-C TOTAL SCORE: INCOMPLETE

## 2022-01-26 NOTE — PATIENT INSTRUCTIONS
Personalized Preventive Plan for Mekhi Bruce - 1/26/2022  Medicare offers a range of preventive health benefits. Some of the tests and screenings are paid in full while other may be subject to a deductible, co-insurance, and/or copay. Some of these benefits include a comprehensive review of your medical history including lifestyle, illnesses that may run in your family, and various assessments and screenings as appropriate. After reviewing your medical record and screening and assessments performed today your provider may have ordered immunizations, labs, imaging, and/or referrals for you. A list of these orders (if applicable) as well as your Preventive Care list are included within your After Visit Summary for your review. Other Preventive Recommendations:    · A preventive eye exam performed by an eye specialist is recommended every 1-2 years to screen for glaucoma; cataracts, macular degeneration, and other eye disorders. · A preventive dental visit is recommended every 6 months. · Try to get at least 150 minutes of exercise per week or 10,000 steps per day on a pedometer . · Order or download the FREE \"Exercise & Physical Activity: Your Everyday Guide\" from [employer]. Call 3-527.383.4001 or search [employer] online. · You need 1168-6541 mg of calcium and 1792-0564 IU of vitamin D per day. It is possible to meet your calcium requirement with diet alone, but a vitamin D supplement is usually necessary to meet this goal.  · When exposed to the sun, use a sunscreen that protects against both UVA and UVB radiation with an SPF of 30 or greater. Reapply every 2 to 3 hours or after sweating, drying off with a towel, or swimming. · Always wear a seat belt when traveling in a car. Always wear a helmet when riding a bicycle or motorcycle.

## 2022-01-26 NOTE — PROGRESS NOTES
This dictation was done with [employer] dictation and may contain mechanical errors related to translation. Height 5' 4\" (1.626 m), weight 229 lb (103.9 kg), not currently breastfeeding. This is a very pleasant 6year-old female who is ongoing pain from right and left shoulder osteoarthritis and degenerative rotator cuff arthropathy. I saw her back in September and she had a cortisone shot for her right shoulder that gave him months of relief. The left shoulder was injected back and March 2021 by Dr. Lucho Perkins and its been off and on pain since. This is a pleasant, well-developed patient in no acute distress. They are alert and oriented ×3. They have had continued pain in both of thier shoulders that's aggravated with overhead activities or sleeping on them. They deny any significant radicular pain or neuropathy. They've had injections in the past of cortisone and has helped with her symptoms. On examination, there is a positive [place] and Quinones's test. Impingement pain with crossover and weakness to supraspinatus strength testing. There is good distal pulses with good dorsiflexion and palmar flexion strength. There are no cutaneous lesions or lymphadenopathy present. There is approximately 170° of forward flexion and 100° of abduction. My impression is bilateral rotator cuff tendinitis with impingement syndrome. After a discussion of the multiple options, they consented to a cortisone shot. 1 ml of 40mg/ml Kenalog and 2 ml's of 0.25%Marcaine were injected into both the right and the left shoulder sub acromial spaces. This was done with sterile technique and they tolerated it well. During today's visit, there was approximately 20 minutes of face-to-face discussion in regards to the patient's current condition and treatment options. More than 50 % of the time was counseling and coordination of care.

## 2022-01-26 NOTE — PROGRESS NOTES
Mekhi Bruce is a 80 y.o. female evaluated via telephone on 2022. Consent:  She and/or health care decision maker is aware that that she may receive a bill for this telephone service, which includes applicable co-pays, depending on her insurance coverage, and has provided verbal consent to proceed. Documentation:  I communicated with the patient and/or health care decision maker about (see below). Details of this discussion including any medical advice provided: (see below)      I do not affirm (not billable) this is a Patient Initiated Episode with a Patient who has not had a related appointment within my department in the past 7 days or scheduled within the next 24 hours. Patient identification was verified at the start of the visit: Yes    Total Time: minutes: 21-30 minutes    Mekhi Bruce, was evaluated through a synchronous (real-time) audio-video encounter. The patient (or guardian if applicable) is aware that this is a billable service, which includes applicable co-pays. This Virtual Visit was conducted with patient's (and/or legal guardian's) consent. The visit was conducted pursuant to the emergency declaration under the [address] [place] [address], [address] [place] [address] authority and the [employer] General Act. Patient identification was verified, and a caregiver was present when appropriate. The patient was located at home in a state where the provider was licensed to provide care. Note: not billable if this call serves to triage the patient into an appointment for the relevant concern      Fide James DO     Medicare Annual Wellness Visit  Name: Rohit Aparicio Date: 2022   MRN: [id number] Sex: Female   Age: 80 y.o. Ethnicity: Non- / Non    : 1940 Race: Black /       Mekhi Bruce is here for No chief complaint on file.     Screenings for behavioral, psychosocial and functional/safety risks, and cognitive dysfunction are all negative except as indicated below. These results, as well as otherpatient data from the Health Risk Assessment form, are documented in Flowsheets linked to this Encounter. Allergies   Allergen Reactions    Latex Itching and Rash    Bactrim Rash     Prior to Visit Medications    Medication Sig Taking? Authorizing Provider   famotidine (PEPCID) 20 MG tablet Take 1 tablet by mouth 2 times daily  Katty Yoo DO   HYDROcodone-acetaminophen (NORCO) 7.5-325 MG per tablet Take 1 tablet by mouth every 8 hours as needed for Pain for up to 30 days.   Dagoberto Motta DO   LORazepam (ATIVAN) 1 MG tablet TAKE 1 TABLET BY MOUTH EVERY 8 HOURS AS NEEDED FOR ANXIETY  Myrick Bloch A Niehauser, DO   fluticasone (FLONASE) 50 MCG/ACT nasal spray 2 sprays by Nasal route daily  Katty Yoo DO   meloxicam (MOBIC) 15 MG tablet TAKE 1 TABLET EVERY DAY  Konstantin Motta DO   vitamin E 1000 units capsule Take 180 Units by mouth daily   Historical Provider, MD   COLLAGEN PO Take by mouth  Historical Provider, MD   verapamil (CALAN SR) 120 MG extended release tablet TAKE 1 TABLET EVERY DAY  Konstantin Motta DO   metoprolol tartrate (LOPRESSOR) 50 MG tablet TAKE 1 TABLET TWICE DAILY  Konstantin Motta DO   triamterene-hydroCHLOROthiazide (MAXZIDE-25) 37.5-25 MG per tablet TAKE 1 TABLET EVERY DAY  Konstantin Motta DO   timolol (BETIMOL) 0.5 % ophthalmic solution 1 drop 2 times daily  Historical Provider, MD   cyclobenzaprine (FLEXERIL) 10 MG tablet Take 1 tablet by mouth 3 times daily as needed for Muscle spasms  Dagoberto Motta DO   magnesium 200 MG TABS tablet Take 200 mg by mouth daily   Historical Provider, MD   B Complex Vitamins (VITAMIN B COMPLEX PO) Take by mouth daily   Historical Provider, MD BRIGGS 0.01 % SOLN ophthalmic drops Place 1 drop into both eyes   Historical Provider, MD   docusate sodium (COLACE) 100 MG capsule Take 1 capsule by mouth 2 times daily  Yeni Castillo MD     Past Medical History:   Diagnosis Date    Anesthesia complication     difficulty waking up after breast biopsy    Anxiety     Chronic kidney disease, stage 3b (HCC)     Chronic midline low back pain without sciatica     Coronary artery disease involving native coronary artery without angina pectoris     Essential hypertension     Fatigue     Foot drop, left     GERD without esophagitis     History of blood transfusion 2010    Hot flashes     Internal hemorrhoids     Intrinsic eczema     Lesion of lateral popliteal nerve     Non morbid obesity due to excess calories     Paroxysmal supraventricular tachycardia (HCC)     Positive PPD 06/2020    repeated --second PPD test came back negative    Postmenopausal status     Primary insomnia     Primary osteoarthritis involving multiple joints     PUD (peptic ulcer disease)     Restless leg syndrome     Seborrheic keratosis      Past Surgical History:   Procedure Laterality Date    ABDOMINAL EXPLORATION SURGERY  2000    Lysis of Adhesions    BREAST SURGERY Right     Biopsy    CARPAL TUNNEL RELEASE Right 11/03/2020    RIGHT CARPAL TUNNEL RELEASE performed by Toyin Mooney MD at [address] [place] [address] Right 08/24/2021    CATARACT REMOVAL WITH IMPLANT Left 09/07/2021    Dr. Barry Bermudez      from back    HEMORRHOID SURGERY  02/24/2017    HYSTERECTOMY      Complete    INTRACAPSULAR CATARACT EXTRACTION Right 08/24/2021    PHACOEMULSIFICATION WITH INTRAOCULAR LENS IMPLANT RIGHT EYE, performed by Telma Jackson MD at [place] [address] Left 9/7/2021    PHACOEMULSIFICATION WITH INTRAOCULAR LENS IMPLANT - LEFT EYE performed by Telma Jackson MD at [address] [place] [address]      x  with fusion    PAIN MANAGEMENT PROCEDURE Bilateral 12/8/2021    BILATERAL L4 TRANSFORMANIAL EPIDURAL STEROID INJECTION WITH FLUOROSCOPY performed by Lety Babb MD at [facility] ARTHROSCOPY Right     Right shoulder scope 2) Right shoulder labral debridement 3) Right shoulder Open Miguel 4) Right shoulder rotator cuff repair    TOTAL HIP ARTHROPLASTY Bilateral     TOTAL KNEE ARTHROPLASTY Left 02/03/2015    Dr. Araiza Boards UPPER GASTROINTESTINAL ENDOSCOPY N/A 03/10/2020    EGD ESOPHAGOGASTRODUODENOSCOPY performed by Edson Ruiz MD at [facility]       Family History   Problem Relation Age of Onset    High Blood Pressure Mother     Stroke Father     High Blood Pressure Father     Diabetes Maternal Aunt     Cancer Maternal Uncle         Throat    Alcohol Abuse Maternal Uncle     Asthma Maternal Uncle     Depression Daughter     Cancer Son         Colorectal     Depression Son        CareTeam (Including outside providers/suppliers regularly involved in providing care):   Patient Care Team:  Enrique Mendez DO as PCP - General (Family Medicine)  Enrique Mendez DO [facility] PCP [facility]  Arlet Aguilera MD as Consulting Physician (Orthopedic Surgery)  Edson Ruiz MD as Consulting Physician (Gastroenterology)  Lindsey Carrel, MD (Cardiology)  Felix Roach DPM as Consulting Physician (Podiatry)  Shirley Steve MD as Surgeon (Ophthalmology)    Wt Readings from Last 3 Encounters:   01/24/22 225 lb (102.1 kg)   01/24/22 229 lb (103.9 kg)   01/07/22 229 lb 6.4 oz (104.1 kg)     There were no vitals filed for this visit. Physical Exam    Patient's complete Health Risk Assessment andscreening values have been reviewed and are found in Flowsheets. The following problems were reviewed today and where indicated follow up appointments were madeand/or referrals ordered.     Positive Risk Factor Screeningswith Interventions:          General Health and ACP:  General  In general, how would you say your health is?: Fair  In the past 7 days, have you experienced any of the following? New or Increased Pain, New or Increased Fatigue, Loneliness, Social Isolation, Stress or Anger?: None of These  Do you get the social and emotional support that you need?: Yes  Do you have a Living Will?: (!) No (I recommended that she do a living will.)  Advance Directives     Power of [employer] Will ACP-Advance Directive ACP-Power of     Not on File Not on File Not on File Not on File      General Health Risk Interventions:  · No Living Will: Advance Care Planning addressed with patient today and I recommended that she do a living will. Health Habits/Nutrition:  Health Habits/Nutrition  Do you exercise for at least 20 minutes 2-3 times per week?: Yes  Have you lost any weight without trying in the past 3 months?: No  Do you eat only one meal per day?: No  Have you seen the dentist within the past year?: (!) No (I recommended a dental visit.)     Health Habits/Nutrition Interventions:  · Dental exam overdue:  patient encouraged to make appointment with his/her dentist      ADL:  ADLs  In the past 7 days, did you need help from others to perform any of the following everyday activities? Eating, dressing, grooming, bathing, toileting, or walking/balance?: None  In the past 7 days, did you need help from others to take care of any of the following? Laundry, housekeeping, banking/finances, shopping, telephone use, food preparation, transportation, or taking medications?: (!) Laundry,Housekeeping,Transportation (Daughter and Grandson help.   They live with her.)  ADL Interventions:  · No needs     Personalized Preventive Plan   Current Health Maintenance Status  Immunization History   Administered Date(s) Administered    COVID-19, Pfizer Purple top, DILUTE for use, 12+ yrs, 30mcg/0.3mL dose 01/21/2021, 02/11/2021, 10/02/2021    Influenza A ([id number]) Vaccine PF IM 01/26/2010    Influenza Virus Vaccine 12/15/2010, 09/26/2012, 09/30/2013, 10/13/2014, 10/22/2015, 09/04/2016    Influenza, High Dose (Fluzone 65 yrs and older) 09/26/2012, 09/30/2013, 10/13/2014, 10/22/2015, 09/14/2016, 10/30/2017, 10/04/2018    Influenza, High-dose, Flaquito Sergeant, 65 yrs +, IM (Fluzone) 09/19/2020    Influenza, Quadv, adjuvanted, 65 yrs +, IM, PF (Fluad) 10/25/2021    Influenza, Triv, inactivated, subunit, adjuvanted, IM (Fluad 65 yrs and older) 10/17/2019    Pneumococcal Conjugate 13-valent ([id number]) 10/22/2015    Pneumococcal Polysaccharide ([id number]) 02/27/2007, 12/15/2010    Tdap (Boostrix, Adacel) 01/21/2009, 09/19/2020    Zoster Live (Zostavax) 11/04/2015    Zoster Recombinant (Shingrix) 09/19/2020, 03/18/2021        Health Maintenance   Topic Date Due    Annual Wellness Visit (AWV)  03/03/2021    Potassium monitoring  01/24/2023    Creatinine monitoring  01/24/2023    Depression Screen  01/26/2023    DTaP/Tdap/Td vaccine (3 - Td or Tdap) 09/19/2030    DEXA (modify frequency per FRAX score)  Completed    Flu vaccine  Completed    Shingles Vaccine  Completed    Pneumococcal 65+ years Vaccine  Completed    COVID-19 Vaccine  Completed    Hepatitis A vaccine  Aged Out    Hepatitis B vaccine  Aged Out    Hib vaccine  Aged Out    Meningococcal (ACWY) vaccine  Aged Out     Recommendations forPreventive Services Due: see orders.   Recommended screening schedule for the next 5-10 years is provided to the patient inwritten form: see Patient Instructions/AVS.  RTO 3 months for Low Back Pain / Anxiety

## 2022-02-02 ENCOUNTER — HOSPITAL ENCOUNTER (OUTPATIENT)
Dept: CT IMAGING | Age: 82
Discharge: HOME | End: 2022-02-02
Payer: MEDICARE

## 2022-02-02 DIAGNOSIS — M54.16 LUMBAR RADICULOPATHY: ICD-10-CM

## 2022-02-02 PROCEDURE — 72131 CT LUMBAR SPINE W/O DYE: CPT

## 2022-02-28 ENCOUNTER — TELEPHONE (OUTPATIENT)
Dept: FAMILY MEDICINE CLINIC | Age: 82
End: 2022-02-28

## 2022-02-28 NOTE — TELEPHONE ENCOUNTER
----- Message from Ben Nixon sent at 2/28/2022 11:26 AM EST -----  Subject: Message to Provider    QUESTIONS  Information for Provider? Pt needs to have Dr Kumar Bender call her about   some questions she has about another condition she is having   ---------------------------------------------------------------------------  --------------  CALL BACK INFO  What is the best way for the office to contact you? OK to leave message on   voicemail  Preferred Call Back Phone Number? [phone]  ---------------------------------------------------------------------------  --------------  SCRIPT ANSWERS  Relationship to Patient?  Self

## 2022-02-28 NOTE — TELEPHONE ENCOUNTER
I returned her call. She will be having a lumbar fusion at the end of March and had questions that I answered.

## 2022-03-13 RX ORDER — MELOXICAM 15 MG/1
TABLET ORAL
Qty: 90 TABLET | Refills: 0 | Status: ON HOLD | OUTPATIENT
Start: 2022-03-13 | End: 2022-04-26 | Stop reason: HOSPADM

## 2022-03-13 RX ORDER — OMEPRAZOLE 20 MG/1
CAPSULE, DELAYED RELEASE ORAL
Qty: 90 CAPSULE | Refills: 0 | OUTPATIENT
Start: 2022-03-13

## 2022-03-13 RX ORDER — METOPROLOL TARTRATE 50 MG/1
TABLET, FILM COATED ORAL
Qty: 180 TABLET | Refills: 0 | Status: SHIPPED | OUTPATIENT
Start: 2022-03-13 | End: 2022-06-05

## 2022-03-13 RX ORDER — TRIAMTERENE AND HYDROCHLOROTHIAZIDE 37.5; 25 MG/1; MG/1
TABLET ORAL
Qty: 90 TABLET | Refills: 0 | Status: SHIPPED | OUTPATIENT
Start: 2022-03-13 | End: 2022-06-05

## 2022-03-14 ENCOUNTER — HOSPITAL ENCOUNTER (OUTPATIENT)
Dept: MRI IMAGING | Age: 82
Discharge: HOME | End: 2022-03-14
Payer: MEDICARE

## 2022-03-14 ENCOUNTER — TELEPHONE (OUTPATIENT)
Dept: FAMILY MEDICINE CLINIC | Age: 82
End: 2022-03-14

## 2022-03-14 DIAGNOSIS — M40.209 KYPHOSIS, ACQUIRED: ICD-10-CM

## 2022-03-14 DIAGNOSIS — M15.9 PRIMARY OSTEOARTHRITIS INVOLVING MULTIPLE JOINTS: ICD-10-CM

## 2022-03-14 DIAGNOSIS — M54.50 CHRONIC MIDLINE LOW BACK PAIN WITHOUT SCIATICA: ICD-10-CM

## 2022-03-14 DIAGNOSIS — M54.16 LUMBAR RADICULOPATHY: ICD-10-CM

## 2022-03-14 DIAGNOSIS — G89.29 CHRONIC MIDLINE LOW BACK PAIN WITHOUT SCIATICA: ICD-10-CM

## 2022-03-14 PROCEDURE — 72148 MRI LUMBAR SPINE W/O DYE: CPT

## 2022-03-14 RX ORDER — OMEPRAZOLE 20 MG/1
CAPSULE, DELAYED RELEASE ORAL
Qty: 90 CAPSULE | Refills: 1 | Status: SHIPPED | OUTPATIENT
Start: 2022-03-14 | End: 2022-06-06 | Stop reason: SDUPTHER

## 2022-03-14 RX ORDER — HYDROCODONE BITARTRATE AND ACETAMINOPHEN 7.5; 325 MG/1; MG/1
1 TABLET ORAL EVERY 8 HOURS PRN
Qty: 90 TABLET | Refills: 0 | Status: SHIPPED | OUTPATIENT
Start: 2022-03-14 | End: 2022-04-11 | Stop reason: SDUPTHER

## 2022-03-14 NOTE — TELEPHONE ENCOUNTER
Patient called stating Pepcid is not working for her and requesting to restart Prilosec.  Patient requesting a new prescription for Prilosec be sent to [facility]

## 2022-03-18 ENCOUNTER — HOSPITAL ENCOUNTER (OUTPATIENT)
Dept: WOMENS IMAGING | Age: 82
Discharge: HOME | End: 2022-03-18
Payer: MEDICARE

## 2022-03-18 DIAGNOSIS — M81.8: ICD-10-CM

## 2022-03-18 LAB — VITAMIN D 25-HYDROXY: 58.8 NG/ML

## 2022-03-18 PROCEDURE — 77080 DXA BONE DENSITY AXIAL: CPT

## 2022-03-24 ENCOUNTER — TELEPHONE (OUTPATIENT)
Dept: ENDOCRINOLOGY | Age: 82
End: 2022-03-24

## 2022-03-24 NOTE — TELEPHONE ENCOUNTER
Patient has a referral for osteoporosis and her pcp wanted her to be seen before an upcoming surgery on 4/16. Please advise if anyone can see her before this.

## 2022-03-27 ASSESSMENT — ENCOUNTER SYMPTOMS: BACK PAIN: 1

## 2022-03-27 NOTE — PROGRESS NOTES
Subjective:      Patient ID: Leonard Quinn is a 80 y.o. female. HPI  PREOPERATIVE PHYSICAL:    Patient was sent by Dr. Brant Dorman for preoperative clearance to have Lumbar Fusion Surgery on 4-21-22 at [facility]. Allergies   Allergen Reactions    Latex Itching and Rash    Bactrim Rash     Current Outpatient Medications   Medication Sig Dispense Refill    HYDROcodone-acetaminophen (NORCO) 7.5-325 MG per tablet Take 1 tablet by mouth every 8 hours as needed for Pain for up to 30 days. 90 tablet 0    omeprazole (PRILOSEC) 20 MG delayed release capsule TAKE 1 CAPSULE EVERY DAY 90 capsule 1    triamterene-hydroCHLOROthiazide (MAXZIDE-25) 37.5-25 MG per tablet TAKE 1 TABLET EVERY DAY 90 tablet 0    metoprolol tartrate (LOPRESSOR) 50 MG tablet TAKE 1 TABLET TWICE DAILY 180 tablet 0    verapamil (CALAN SR) 120 MG extended release tablet TAKE 1 TABLET EVERY DAY 90 tablet 0    meloxicam (MOBIC) 15 MG tablet TAKE 1 TABLET EVERY DAY 90 tablet 0    fluticasone (FLONASE) 50 MCG/ACT nasal spray 2 sprays by Nasal route daily 3 each 1    vitamin E 1000 units capsule Take 180 Units by mouth daily       COLLAGEN PO Take by mouth      timolol (BETIMOL) 0.5 % ophthalmic solution 1 drop 2 times daily      cyclobenzaprine (FLEXERIL) 10 MG tablet Take 1 tablet by mouth 3 times daily as needed for Muscle spasms 90 tablet 5    B Complex Vitamins (VITAMIN B COMPLEX PO) Take by mouth daily       LUMIGAN 0.01 % SOLN ophthalmic drops Place 1 drop into both eyes       docusate sodium (COLACE) 100 MG capsule Take 1 capsule by mouth 2 times daily 40 capsule 0     No current facility-administered medications for this visit.      Past Medical History:   Diagnosis Date    Anesthesia complication     difficulty waking up after breast biopsy    Anxiety     Chronic kidney disease, stage 3b (HCC)     Chronic midline low back pain without sciatica     Coronary artery disease involving native coronary artery without angina pectoris     Essential hypertension     Foot drop, left     GERD without esophagitis     Internal hemorrhoids     Intrinsic eczema     Lesion of lateral popliteal nerve     Non morbid obesity due to excess calories     Paroxysmal supraventricular tachycardia (HCC)     Positive PPD 06/2020    repeated --second PPD test came back negative    Postmenopausal status     Primary insomnia     Primary osteoarthritis involving multiple joints     PUD (peptic ulcer disease)     Restless leg syndrome     Seborrheic keratosis      Past Surgical History:   Procedure Laterality Date    ABDOMINAL EXPLORATION SURGERY  2000    Lysis of Adhesions    BREAST SURGERY Right     Biopsy    CARPAL TUNNEL RELEASE Right 11/03/2020    RIGHT CARPAL TUNNEL RELEASE performed by Vashti Manning MD at [place] [address]. Right 08/24/2021    CATARACT REMOVAL WITH IMPLANT Left 09/07/2021    Dr. Allison Boogie      from back    HEMORRHOID SURGERY  02/24/2017    HYSTERECTOMY      Complete    INTRACAPSULAR CATARACT EXTRACTION Right 08/24/2021    PHACOEMULSIFICATION WITH INTRAOCULAR LENS IMPLANT RIGHT EYE, performed by Rin Garcia MD at [place] [address] Left 9/7/2021    PHACOEMULSIFICATION WITH INTRAOCULAR LENS IMPLANT - LEFT EYE performed by Rin Garcia MD at [address] [place] [address]      x 3 with fusion    PAIN MANAGEMENT PROCEDURE Bilateral 12/8/2021    BILATERAL L4 TRANSFORMANIAL EPIDURAL STEROID INJECTION WITH FLUOROSCOPY performed by Zeynep Little MD at [facility] ARTHROSCOPY Right     Right shoulder scope 2) Right shoulder labral debridement 3) Right shoulder Open Miguel 4) Right shoulder rotator cuff repair    TOTAL HIP ARTHROPLASTY Bilateral     TOTAL KNEE ARTHROPLASTY Left 02/03/2015    Dr. Monica Oquendo UPPER GASTROINTESTINAL ENDOSCOPY N/A 03/10/2020    EGD ESOPHAGOGASTRODUODENOSCOPY performed by Damaris Yen MD at [facility] EXTRACTION       Social History     Tobacco Use    Smoking status: Former Smoker     Packs/day: 1.00     Types: Cigarettes     Quit date: 1997     Years since quittin.2    Smokeless tobacco: Never Used   Vaping Use    Vaping Use: Never used   Substance Use Topics    Alcohol use: Yes     Alcohol/week: 2.0 standard drinks     Types: 1 Glasses of wine, 1 Cans of beer per week     Comment: Rare    Drug use: Never     Family History   Problem Relation Age of Onset    High Blood Pressure Mother     Stroke Father     High Blood Pressure Father     Depression Daughter     Cancer Son         Colorectal     Depression Son     Diabetes Maternal Aunt     Cancer Maternal Uncle         Throat    Alcohol Abuse Maternal Uncle     Asthma Maternal Uncle     Alcohol Abuse Maternal Uncle          Review of Systems   Constitutional: Negative for chills and fever. HENT: Positive for congestion. Negative for rhinorrhea and sore throat. Respiratory: Negative for cough, shortness of breath and wheezing. Cardiovascular: Negative for chest pain, palpitations and leg swelling. Gastrointestinal: Positive for constipation. Negative for abdominal pain, blood in stool, diarrhea, nausea and vomiting. Genitourinary: Negative for dysuria, frequency, hematuria and urgency. Musculoskeletal: Positive for back pain. Psychiatric/Behavioral: Negative for dysphoric mood and suicidal ideas. /74   Pulse 54   Temp 96.4 °F (35.8 °C)   Ht 5' 4\" (1.626 m)   Wt 226 lb 9.6 oz (102.8 kg)   SpO2 96%   BMI 38.90 kg/m²    Objective:   Physical Exam  Constitutional:       General: She is not in acute distress. Appearance: She is well-developed. HENT:      Head: Normocephalic. Right Ear: External ear normal.      Left Ear: External ear normal.      Mouth/Throat:      Pharynx: No oropharyngeal exudate. Neck:      Thyroid: No thyromegaly. Vascular: No JVD. Cardiovascular:      Rate and Rhythm: Normal rate and regular rhythm. Heart sounds: Normal heart sounds. No murmur heard. Pulmonary:      Effort: Pulmonary effort is normal.      Breath sounds: Normal breath sounds. No wheezing or rales. Abdominal:      General: Bowel sounds are normal. There is no distension. Palpations: Abdomen is soft. There is no mass. Tenderness: There is no abdominal tenderness. There is no guarding or rebound. Hernia: No hernia is present. Comments: Obese. Lymphadenopathy:      Cervical: No cervical adenopathy. Neurological:      Mental Status: She is alert and oriented to person, place, and time. Assessment:      Preoperative Physical  DDD Lumbar Spine       Plan:      Patient is cleared for surgery. Hold Mobic and Vitamins 1 week prior to surgery. RTO 3 months for Hypertension / Low Back Pain / Anxiety. Controlled Substance Monitoring:    Acute and Chronic Pain Monitoring:   RX Monitoring 3/28/2022   Attestation -   Acute Pain Prescriptions Severe pain not adequately treated with lower dose. Periodic Controlled Substance Monitoring No signs of potential drug abuse or diversion identified.    Chronic Pain > 80 MEDD -                 ENRICO VORA DO

## 2022-03-28 ENCOUNTER — OFFICE VISIT (OUTPATIENT)
Dept: FAMILY MEDICINE CLINIC | Age: 82
End: 2022-03-28
Payer: MEDICARE

## 2022-03-28 VITALS
SYSTOLIC BLOOD PRESSURE: 138 MMHG | WEIGHT: 226.6 LBS | HEIGHT: 64 IN | DIASTOLIC BLOOD PRESSURE: 74 MMHG | TEMPERATURE: 96.4 F | HEART RATE: 54 BPM | OXYGEN SATURATION: 96 % | BODY MASS INDEX: 38.68 KG/M2

## 2022-03-28 DIAGNOSIS — Z01.818 PREOPERATIVE GENERAL PHYSICAL EXAMINATION: Primary | ICD-10-CM

## 2022-03-28 DIAGNOSIS — M51.36 DDD (DEGENERATIVE DISC DISEASE), LUMBAR: ICD-10-CM

## 2022-03-28 PROCEDURE — 1123F ACP DISCUSS/DSCN MKR DOCD: CPT | Performed by: FAMILY MEDICINE

## 2022-03-28 PROCEDURE — 1036F TOBACCO NON-USER: CPT | Performed by: FAMILY MEDICINE

## 2022-03-28 PROCEDURE — G8417 CALC BMI ABV UP PARAM F/U: HCPCS | Performed by: FAMILY MEDICINE

## 2022-03-28 PROCEDURE — G8399 PT W/DXA RESULTS DOCUMENT: HCPCS | Performed by: FAMILY MEDICINE

## 2022-03-28 PROCEDURE — G8484 FLU IMMUNIZE NO ADMIN: HCPCS | Performed by: FAMILY MEDICINE

## 2022-03-28 PROCEDURE — G8427 DOCREV CUR MEDS BY ELIG CLIN: HCPCS | Performed by: FAMILY MEDICINE

## 2022-03-28 PROCEDURE — 1090F PRES/ABSN URINE INCON ASSESS: CPT | Performed by: FAMILY MEDICINE

## 2022-03-28 PROCEDURE — 4040F PNEUMOC VAC/ADMIN/RCVD: CPT | Performed by: FAMILY MEDICINE

## 2022-03-28 PROCEDURE — 99214 OFFICE O/P EST MOD 30 MIN: CPT | Performed by: FAMILY MEDICINE

## 2022-03-28 ASSESSMENT — ENCOUNTER SYMPTOMS
WHEEZING: 0
BLOOD IN STOOL: 0
NAUSEA: 0
VOMITING: 0
SHORTNESS OF BREATH: 0
RHINORRHEA: 0
DIARRHEA: 0
CONSTIPATION: 1
COUGH: 0
SORE THROAT: 0
ABDOMINAL PAIN: 0

## 2022-03-30 ENCOUNTER — TELEPHONE (OUTPATIENT)
Dept: FAMILY MEDICINE CLINIC | Age: 82
End: 2022-03-30

## 2022-03-30 PROBLEM — M81.0 OSTEOPOROSIS OF MULTIPLE SITES: Status: ACTIVE | Noted: 2022-03-30

## 2022-03-30 RX ORDER — ALENDRONATE SODIUM 70 MG/1
70 TABLET ORAL
Qty: 12 TABLET | Refills: 1 | Status: SHIPPED | OUTPATIENT
Start: 2022-03-30 | End: 2022-06-06 | Stop reason: SDUPTHER

## 2022-03-30 NOTE — TELEPHONE ENCOUNTER
I returned her call. Her DEXA scan shows osteoporosis. I started her on Fosamax 70 mg weekly and Caltrate 600 plus D TID.

## 2022-03-30 NOTE — TELEPHONE ENCOUNTER
Patient called requesting a return from Dr Randall Kothari regarding not being able to see endocrinology prior to her surgery in April

## 2022-03-30 NOTE — TELEPHONE ENCOUNTER
Discharge instructions discussed with patient. Questions answered. Patient verbalized understanding. Noted.

## 2022-03-31 DIAGNOSIS — Z01.818 PREOP GENERAL PHYSICAL EXAM: Primary | ICD-10-CM

## 2022-04-05 ENCOUNTER — OFFICE VISIT (OUTPATIENT)
Dept: CARDIOLOGY CLINIC | Age: 82
End: 2022-04-05
Payer: MEDICARE

## 2022-04-05 VITALS
SYSTOLIC BLOOD PRESSURE: 128 MMHG | OXYGEN SATURATION: 100 % | HEIGHT: 64 IN | HEART RATE: 50 BPM | DIASTOLIC BLOOD PRESSURE: 76 MMHG | BODY MASS INDEX: 38.93 KG/M2 | WEIGHT: 228 LBS

## 2022-04-05 DIAGNOSIS — I10 ESSENTIAL HYPERTENSION: ICD-10-CM

## 2022-04-05 DIAGNOSIS — Z01.818 PREOP GENERAL PHYSICAL EXAM: ICD-10-CM

## 2022-04-05 DIAGNOSIS — I47.1 PAROXYSMAL SUPRAVENTRICULAR TACHYCARDIA (HCC): Primary | ICD-10-CM

## 2022-04-05 DIAGNOSIS — Z01.810 PREOP CARDIOVASCULAR EXAM: ICD-10-CM

## 2022-04-05 LAB
ABO/RH: NORMAL
ANION GAP SERPL CALCULATED.3IONS-SCNC: 15 MMOL/L (ref 3–16)
ANTIBODY SCREEN: NORMAL
APTT: 32.7 SEC (ref 26.2–38.6)
BUN BLDV-MCNC: 28 MG/DL (ref 7–20)
CALCIUM SERPL-MCNC: 10 MG/DL (ref 8.3–10.6)
CHLORIDE BLD-SCNC: 103 MMOL/L (ref 99–110)
CO2: 23 MMOL/L (ref 21–32)
CREAT SERPL-MCNC: 1.5 MG/DL (ref 0.6–1.2)
GFR AFRICAN AMERICAN: 40
GFR NON-AFRICAN AMERICAN: 33
GLUCOSE BLD-MCNC: 86 MG/DL (ref 70–99)
HCT VFR BLD CALC: 32.7 % (ref 36–48)
HEMOGLOBIN: 10.6 G/DL (ref 12–16)
INR BLD: 1.04 (ref 0.88–1.12)
MCH RBC QN AUTO: 27 PG (ref 26–34)
MCHC RBC AUTO-ENTMCNC: 32.5 G/DL (ref 31–36)
MCV RBC AUTO: 83.2 FL (ref 80–100)
PDW BLD-RTO: 15.5 % (ref 12.4–15.4)
PLATELET # BLD: 156 K/UL (ref 135–450)
PMV BLD AUTO: 9.2 FL (ref 5–10.5)
POTASSIUM SERPL-SCNC: 4.5 MMOL/L (ref 3.5–5.1)
PROTHROMBIN TIME: 11.8 SEC (ref 9.9–12.7)
RBC # BLD: 3.93 M/UL (ref 4–5.2)
SODIUM BLD-SCNC: 141 MMOL/L (ref 136–145)
WBC # BLD: 5.6 K/UL (ref 4–11)

## 2022-04-05 PROCEDURE — G8399 PT W/DXA RESULTS DOCUMENT: HCPCS | Performed by: INTERNAL MEDICINE

## 2022-04-05 PROCEDURE — 1090F PRES/ABSN URINE INCON ASSESS: CPT | Performed by: INTERNAL MEDICINE

## 2022-04-05 PROCEDURE — 93000 ELECTROCARDIOGRAM COMPLETE: CPT | Performed by: INTERNAL MEDICINE

## 2022-04-05 PROCEDURE — G8427 DOCREV CUR MEDS BY ELIG CLIN: HCPCS | Performed by: INTERNAL MEDICINE

## 2022-04-05 PROCEDURE — G8417 CALC BMI ABV UP PARAM F/U: HCPCS | Performed by: INTERNAL MEDICINE

## 2022-04-05 PROCEDURE — 1036F TOBACCO NON-USER: CPT | Performed by: INTERNAL MEDICINE

## 2022-04-05 PROCEDURE — 4040F PNEUMOC VAC/ADMIN/RCVD: CPT | Performed by: INTERNAL MEDICINE

## 2022-04-05 PROCEDURE — 1123F ACP DISCUSS/DSCN MKR DOCD: CPT | Performed by: INTERNAL MEDICINE

## 2022-04-05 PROCEDURE — 99214 OFFICE O/P EST MOD 30 MIN: CPT | Performed by: INTERNAL MEDICINE

## 2022-04-05 NOTE — PROGRESS NOTES
[facility]      Cardiology Consult    Adelaida Masters  1940 April 5, 2022    Primary Physician: Dr. Chris Mitchell   Reason for Referral: Possible atrial fibrillation    CC: \"I'm having back surgery. \"     HPI:  The patient is 80 y.o. female with a past medical history significant for a SVT and hypertension who presents for chronic management of presumed atrial fibrillation. She previously followed with Dr. Mary Haywood for the arrhythmia. Unfortunately I am unable to find the actual ECG of the event and there is some inconsistencies in documentation. She says the arrhythmia was diagnosed >15 years ago at [facility] when post-op from back surgery. She denied any documented recurrence of the arrhythmia. She endorsed rare brief palpitations of 3-4 times over the past 5-10 years but the events were not sustained and she did not seek medical attention. The oldest notes from her prior cardiologist lists PSVT as the diagnosis. Without another known event, several years ago the diagnosis was changed to PAF. She was never on anticoagulation. She was previously on propafenone. She underwent an epidural steroid injection 12/8/21. She was on telemetry and was instructed to bring in a copy of the monitor strips which showed normal sinus rhythm with PACs. Today, she states overall she is doing well and her main complaint today is chronic back pain. She presents in a wheelchair and accompanied by her daughter. She is scheduled for a lumbar fusion 4/21/22. She denies any new cardiac sounding complaints. She denies any chest pains, palpitations, or worsening shortness of breath. She reports compliance with her medications and tolerating. She denies any abnormal bleeding or bruising. Patient denies exertional chest pain/pressure, dyspnea at rest, worsening BAER, PND, orthopnea, palpitations, lightheadedness, weight changes, changes in LE edema, and syncope.      Past Medical History:   Diagnosis Date    shoulder scope 2) Right shoulder labral debridement 3) Right shoulder Open Miguel 4) Right shoulder rotator cuff repair    TOTAL HIP ARTHROPLASTY Bilateral     TOTAL KNEE ARTHROPLASTY Left 2015    Dr. Louann Akhtar UPPER GASTROINTESTINAL ENDOSCOPY N/A 03/10/2020    EGD ESOPHAGOGASTRODUODENOSCOPY performed by Yadi Navarrete MD at [facility] EXTRACTION       Family History   Problem Relation Age of Onset    High Blood Pressure Mother     Stroke Father     High Blood Pressure Father     Depression Daughter     Cancer Son         Colorectal     Depression Son     Diabetes Maternal Aunt     Cancer Maternal Uncle         Throat    Alcohol Abuse Maternal Uncle     Asthma Maternal Uncle     Alcohol Abuse Maternal Uncle      Social History     Tobacco Use    Smoking status: Former Smoker     Packs/day: 1.00     Types: Cigarettes     Quit date: 1997     Years since quittin.2    Smokeless tobacco: Never Used   Vaping Use    Vaping Use: Never used   Substance Use Topics    Alcohol use: Yes     Alcohol/week: 2.0 standard drinks     Types: 1 Glasses of wine, 1 Cans of beer per week     Comment: Rare    Drug use: Never       Allergies   Allergen Reactions    Latex Itching and Rash    Bactrim Rash     Current Outpatient Medications   Medication Sig Dispense Refill    alendronate (FOSAMAX) 70 MG tablet Take 1 tablet by mouth every 7 days 12 tablet 1    Calcium Carbonate-Vitamin D (CALTRATE 600+D PO) Take 1 capsule by mouth in the morning, at noon, and at bedtime      HYDROcodone-acetaminophen (NORCO) 7.5-325 MG per tablet Take 1 tablet by mouth every 8 hours as needed for Pain for up to 30 days.  90 tablet 0    omeprazole (PRILOSEC) 20 MG delayed release capsule TAKE 1 CAPSULE EVERY DAY 90 capsule 1    triamterene-hydroCHLOROthiazide (MAXZIDE-25) 37.5-25 MG per tablet TAKE 1 TABLET EVERY DAY 90 tablet 0    metoprolol tartrate (LOPRESSOR) 50 MG tablet TAKE 1 TABLET TWICE DAILY 180 tablet 0    verapamil (CALAN SR) 120 MG extended release tablet TAKE 1 TABLET EVERY DAY 90 tablet 0    meloxicam (MOBIC) 15 MG tablet TAKE 1 TABLET EVERY DAY 90 tablet 0    fluticasone (FLONASE) 50 MCG/ACT nasal spray 2 sprays by Nasal route daily 3 each 1    vitamin E 1000 units capsule Take 180 Units by mouth daily       COLLAGEN PO Take by mouth      timolol (BETIMOL) 0.5 % ophthalmic solution 1 drop 2 times daily      cyclobenzaprine (FLEXERIL) 10 MG tablet Take 1 tablet by mouth 3 times daily as needed for Muscle spasms 90 tablet 5    B Complex Vitamins (VITAMIN B COMPLEX PO) Take by mouth daily       LUMIGAN 0.01 % SOLN ophthalmic drops Place 1 drop into both eyes       docusate sodium (COLACE) 100 MG capsule Take 1 capsule by mouth 2 times daily 40 capsule 0     No current facility-administered medications for this visit. Review of Systems:  · Constitutional: no unanticipated weight loss. There's been no change in energy level, sleep pattern, or activity level. No fevers, chills. · Eyes: No visual changes or diplopia. No scleral icterus. · ENT: No Headaches, hearing loss or vertigo. No mouth sores or sore throat. · Cardiovascular: as reviewed in HPI  · Respiratory: No cough or wheezing, no sputum production. No hematemesis. · Gastrointestinal: No abdominal pain, appetite loss, blood in stools. No change in bowel or bladder habits. · Genitourinary: No dysuria, trouble voiding, or hematuria. · Musculoskeletal:  No gait disturbance, no joint complaints. · Integumentary: No rash or pruritis. · Neurological: No headache, diplopia, change in muscle strength, numbness or tingling. · Psychiatric: No anxiety or depression. · Endocrine: No temperature intolerance. No excessive thirst, fluid intake, or urination. No tremor. · Hematologic/Lymphatic: No abnormal bruising or bleeding, blood clots or swollen lymph nodes.   · Allergic/Immunologic: No nasal congestion or hives.    Physical Exam:   /76   Pulse 50   Ht 5' 4\" (1.626 m)   Wt 228 lb (103.4 kg)   SpO2 100%   BMI 39.14 kg/m²   Wt Readings from Last 3 Encounters:   04/05/22 228 lb (103.4 kg)   03/28/22 226 lb 9.6 oz (102.8 kg)   01/24/22 225 lb (102.1 kg)     Constitutional: She is oriented to person, place, and time. She appears well-developed and well-nourished. In no acute distress. Head: Normocephalic and atraumatic. Pupils equal and round. Neck: Neck supple. No JVP or carotid bruit appreciated. No mass and no thyromegaly present. No lymphadenopathy present. Cardiovascular: Normal rate with ectopy. Normal heart sounds. Exam reveals no gallop and no friction rub. No murmur heard. Pulmonary/Chest: Effort normal and breath sounds normal. No respiratory distress. She has no wheezes, rhonchi or rales. Abdominal: Soft, non-tender. Bowel sounds are normal. She exhibits no organomegaly, mass or bruit. Extremities: No edema, cyanosis, or clubbing. Pulses are 2+ radial/carotid bilaterally. Neurological: No gross cranial nerve deficit. Coordination normal.   Skin: Skin is warm and dry. There is no rash or diaphoresis. Psychiatric: She has a normal mood and affect. Her speech is normal and behavior is normal.     Lab Review:   FLP:    Lab Results   Component Value Date    TRIG 101 01/24/2022    HDL 73 01/24/2022    LDLCALC 128 01/24/2022    LABVLDL 20 01/24/2022     BUN/Creatinine:    Lab Results   Component Value Date    BUN 19 01/24/2022    CREATININE 1.2 01/24/2022     EKG Interpretation: 4/26/18 Normal sinus rhythm. Nonspecific T-abnormality. 5/10/21 Sinus rhythm with PACs and nonspecific T wave abnormality. 1/7/22 Sinus rhythm. Voltage criteria for LVH. Nonspecific T wave abnormality. 4/5/22 Sinus bradycardia, occasional PAC. Image Review:   Stress test 11/2013  Normal myocardial perfusion study with no evidence of ischemia or scar.    There is a slight decrease in tracer uptake in the distal anterior wall at   rest and with stress that appears likely due to breast attenuation. No evidence of inducible ischemia by ECG criteria during Lexiscan infusion. Normal left ventricular systolic function. Event Monitor 6/2018  Baseline sinus rhythm  No episodes of atrial fibrillation  No reported symptoms    Event monitor 6/11/21   Baseline SR, frequent PACs (14%) but not sustained arrhythmias and no Afib. Assessment/Plan:   1) Possible paroxysmal atrial fibrillation/Paroxysmal supraventricular tachycardia. No recurrent episodes. Cardiac event monitor from 2018 and 2021 showed NSR with no episodes of atrial fibrillation or reported symptoms. Discussed anticoagulation options but she is OK remaining off a DOAC as she has had no documented events in >15 years and was not previously on anticoagulation. 2) Essential hypertension. Controlled. Goal BP <130/80. Continue medical therapy. 3) Pre-operative risk assessment. Patient is low cardiac risk based on RCRI score of zero. Patient's risk should not preclude her from proceeding with surgery. Suggest continuation of B-blocker in the nancy-operative period. Follow up 1 year. Thank you very much for allowing me to participate in the care of your patient. Please do not hesitate to contact me if you have any questions. Sincerely,  Leigh Mann. Olga Burton, [address] [place] [address]  Ph: [phone]  Fax: [phone]    This note was scribed in the presence of Dr Olga Burton MD by Fiona Anderson RN. Physician Attestation: The scribes documentation has been prepared under my direction and personally reviewed by me in its entirety. I confirm that the note above accurately reflects all work, treatment, procedures, and medical decision making performed by me.  All portions of the note including but not limited to the chief complaint, history of present illness, physical exam, assessment and plan/medical decision making were personally reviewed, edited, and updated on the day of the visit.

## 2022-04-05 NOTE — PATIENT INSTRUCTIONS

## 2022-04-11 DIAGNOSIS — G89.29 CHRONIC MIDLINE LOW BACK PAIN WITHOUT SCIATICA: ICD-10-CM

## 2022-04-11 DIAGNOSIS — M15.9 PRIMARY OSTEOARTHRITIS INVOLVING MULTIPLE JOINTS: ICD-10-CM

## 2022-04-11 DIAGNOSIS — M54.50 CHRONIC MIDLINE LOW BACK PAIN WITHOUT SCIATICA: ICD-10-CM

## 2022-04-11 RX ORDER — HYDROCODONE BITARTRATE AND ACETAMINOPHEN 7.5; 325 MG/1; MG/1
1 TABLET ORAL EVERY 8 HOURS PRN
Qty: 90 TABLET | Refills: 0 | Status: ON HOLD | OUTPATIENT
Start: 2022-04-11 | End: 2022-04-26 | Stop reason: HOSPADM

## 2022-04-11 RX ORDER — LORAZEPAM 1 MG/1
1 TABLET ORAL NIGHTLY
COMMUNITY
End: 2022-06-06 | Stop reason: SDUPTHER

## 2022-04-11 NOTE — PROGRESS NOTES
[facility] PRE-SURGICAL TESTING INSTRUCTIONS                                  PRIOR TO PROCEDURE DATE:        1. PLEASE FOLLOW ANY  GUIDELINES/ INSTRUCTIONS PRIOR TO YOUR PROCEDURE AS ADVISED BY YOUR SURGEON. 2. Arrange for someone to drive you home and be with you for the first 24 hours after discharge for your safety after your procedure for which you received sedation. Ensure it is someone we can share information with regarding your discharge. 3. You must contact your surgeon for instructions IF:   You are taking any blood thinners, aspirin, anti-inflammatory or vitamin E.   There is a change in your physical condition such as a cold, fever, rash, cuts, sores or any other infection, especially near your surgical site. 4. Do not drink alcohol the day before or day of your procedure. 5. A Pre-op History and Physical for surgery MUST be completed by your Physician or Urgent Care within 30 days of your procedure date. Please bring a copy with you on the day of your procedure and along with any other testing performed. THE DAY OF YOUR PROCEDURE:  1. Follow instructions for ARRIVAL TIME as DIRECTED BY YOUR SURGEON. 2. Enter the MAIN entrance from [address] and follow the signs to the free [employer] or [employer] parking (offered free of charge 6am-5pm). 3. Enter the Main Entrance of the hospital (do not enter from the lower level of the parking garage). Upon entrance, check in with the  at the main desk on your left. If no one is available at the desk, proceed into [facility] and go through the door [facility]. There is a Check-in desk ACROSS from Room 5 (marked with a sign hanging from the ceiling). The phone number for the surgery center is [phone]. 4. Please call [phone] option #2 option #2 if you have not been preregistered yet.   On the day of your procedure bring your insurance card and photo ID. You will be registered at your bedside once brought back to your room. 5. DO NOT EAT ANYTHING eight hours prior to your arrival for surgery. May have 8 ounces of water 4 hours prior to your arrival for surgery. NOTE: ALL Gastric, Bariatric and Bowel surgery patients MUST follow their surgeon's instructions. 6. MEDICATIONS    Take the following medications with a SMALL sip of water:  Metoprolol    Bariatric patient's call surgeon if on diabetic medications as some need to be stopped 1 week preop   Use your usual dose of inhalers the morning of surgery. BRING your rescue inhaler with you to hospital.    Anesthesia does NOT want you to take insulin the morning of surgery. They will control your blood sugar while you are at the hospital. Please contact your ordering physician for instructions regarding your insulin the night before your procedure. If you have an insulin pump, please keep it set on basal rate. 7. Do not swallow water when brushing teeth. No gum, candy, mints or ice chips. Refrain from smoking or at least decrease the amount. 8. Dress in loose, comfortable clothing appropriate for redressing after your procedure. Do not wear jewelry (including body piercings), make-up (especially NO eye make-up), fingernail polish (NO toenail polish if foot/leg surgery), lotion, powders or metal hairclips. 9. Dentures, glasses, or contacts will need to be removed before your procedure. Bring cases for your glasses, contacts, dentures, or hearing aids to protect them while you are in surgery. 10. If you use a CPAP, please bring it with you on the day of your procedure. 11. We recommend that valuable personal  belongings such as cash, cell phones, e-tablets or jewelry, be left at home during your stay. The hospital will not be responsible for valuables that are not secured in the hospital safe.  However, if your insurance requires a co-pay, you may want to bring a method of payment, i.e. Check or credit card, if you wish to pay your co-pay the day of surgery. 12. If you are to stay overnight, you may bring a bag with personal items. Please have any large items you may need brought in by your family after your arrival to your hospital room. 15. If you have a Living Will or Durable Power of , please bring a copy on the day of your procedure. 15. With your permission, one family member may accompany you while you are being prepared for surgery. Once you are ready, additional family members may join you. HOW WE KEEP YOU SAFE and WORK TO PREVENT SURGICAL SITE INFECTIONS:  1. Health care workers should always check your ID bracelet to verify your name and birth date. You will be asked many times to state your name, date of birth, and allergies. 2. Health care workers should always clean their hands with soap or alcohol gel before providing care to you. It is okay to ask anyone if they cleaned their hands before they touch you. 3. You will be actively involved in verifying the type of procedure you are having and ensuring the correct surgical site. This will be confirmed multiple times prior to your procedure. Do NOT kika your surgery site UNLESS instructed to by your surgeon. 4. Do not shave or wax for 72 hours prior to procedure near your operative site. Shaving with a razor can irritate your skin and make it easier to develop an infection. On the day of your procedure, any hair that needs to be removed near the surgical site will be clipped by a healthcare worker using a special clippers designed to avoid skin irritation. 5. When you are in the operating room, your surgical site will be cleansed with a special soap, and in most cases, you will be given an antibiotic before the surgery begins. What to expect AFTER YOUR PROCEDURE:  1. Immediately following your procedure, your will be taken to the PACU for the first phase of your recovery.   Your nurse will help you recover from any potential side effects of anesthesia, such as extreme drowsiness, changes in your vital signs or breathing patterns. Nausea, headache, muscle aches, or sore throat may also occur after anesthesia. Your nurse will help you manage these potential side effects. 2. For comfort and safety, arrange to have someone at home with you for the first 24 hours after discharge. 3. You and your family will be given written instructions about your diet, activity, dressing care, medications, and return visits. 4. Once at home, should issues with nausea, pain, or bleeding occur, or should you notice any signs of infection, you should call your surgeon. 5. Always clean your hands before and after caring for your wound. Do not let your family touch your surgery site without cleaning their hands. 6. Narcotic pain medications can cause significant constipation. You may want to add a stool softener to your postoperative medication schedule or speak to your surgeon on how best to manage this SIDE EFFECT. SPECIAL INSTRUCTIONS     Thank you for allowing us to care for you. We strive to exceed your expectations in the delivery of care and service provided to you and your family. If you need to contact the [place] [address] staff for any reason, please call us at [phone]    Instructions reviewed with patient during preadmission testing phone interview.   Kya Kennedy RN.4/11/2022 .10:53 AM      ADDITIONAL EDUCATIONAL INFORMATION REVIEWED PER PHONE WITH YOU AND/OR YOUR FAMILY:  Yes Hibiclens® Bathing Instructions

## 2022-04-11 NOTE — TELEPHONE ENCOUNTER
----- Message from Mohinder Rubin sent at 4/11/2022  1:07 PM EDT -----  Subject: Refill Request    QUESTIONS  Name of Medication? HYDROcodone-acetaminophen (NORCO) 7.5-325 MG per   tablet  Patient-reported dosage and instructions? 1 tab as needed  How many days do you have left? 0  Preferred Pharmacy? [address] [place] [address]  Pharmacy phone number (if available)? [phone]  ---------------------------------------------------------------------------  --------------  Estela Slot INFO  What is the best way for the office to contact you? OK to leave message on   voicemail  Preferred Call Back Phone Number? [phone]  ---------------------------------------------------------------------------  --------------  SCRIPT ANSWERS  Relationship to Patient?  Self

## 2022-04-11 NOTE — PROGRESS NOTES
Place patient label inside box (if no patient label, complete below)  Name:  :  MR#:   Shireen Medico / PROCEDURE  1. I (we), Urban Grow  authorize DR ROMAN FERNANDEZ [address] [place] [address] and/or such assistants as may be selected by him/her, to perform the following operation/procedure(s): L1-2, L2-3 EXTREME LATERAL INTERBODY FUSION, T10-PELVIS DECOMPRESSION, FIXATION AND FUSION, ILIAC WING SCREWS       Note: If unable to obtain consent prior to an emergent procedure, document the emergent reason in the medical record. This procedure has been explained to my (our) satisfaction and included in the explanation was:  A) The intended benefit, nature, and extent of the procedure to be performed;  B) The significant risks involved and the probability of success;  C) Alternative procedures and methods of treatment;  D) The dangers and probable consequences of such alternatives (including no procedure or treatment); E) The expected consequences of the procedure on my future health;  F) Whether other qualified individuals would be performing important surgical tasks and/or whether  would be present to advise or support the procedure. I (we) understand that there are other risks of infection and other serious complications in the pre-operative/procedural and postoperative/procedural stages of my (our) care. I (we) have asked all of the questions which I (we) thought were important in deciding whether or not to undergo treatment or diagnosis. These questions have been answered to my (our) satisfaction. I (we) understand that no assurance can be given that the procedure will be a success, and no guarantee or warranty of success has been given to me (us).     2. It has been explained to me (us) that during the course of the operation/procedure, unforeseen conditions may be revealed that necessitate extension of the original procedure(s) or different procedure(s) than those set forth in Paragraph 1. I (we) authorize and request that the above-named physician, his/her assistants or his/her designees, perform procedures as necessary and desirable if deemed to be in my (our) best interest.     Revised 8/2/2021                                                                          Page 1 of 2       3. I acknowledge that health care personnel may be observing this procedure for the purpose of medical education or other specified purposes as may be necessary as requested and/or approved by my (our) physician. 4. I (we) consent to the disposal by the hospital Pathologist of the removed tissue, parts or organs in accordance with hospital policy. 5. I do ____ do not ____ consent to the use of a local infiltration pain blocking agent that will be used by my provider/surgical provider to help alleviate pain during my procedure. 6. I do ____ do not ____ consent to an emergent blood transfusion in the case of a life-threatening situation that requires blood components to be administered. This consent is valid for 24 hours from the beginning of the procedure. 7. This patient does ____ or does not ____ currently have a DNR status/order. If DNR order is in place, obtain Addendum to the Surgical Consent for ALL Patients with a DNR Order to address nancy-operative status for limited intervention or DNR suspension.      8. I have read and fully understand the above Consent for Operation/Procedure and that all blanks were completed before I signed the consent.   _____________________________       _____________________      ____/____am/pm  Signature of Patient or legal representative      Printed Name / Relationship            Date / Time   ____________________________       _____________________      ____/____am/pm  Witness to Signature                                    Printed Name                    Date / Time     If patient is unable to sign or is a minor, complete the following)  Patient is a minor, ____ years of age, or unable to sign because:   ______________________________________________________________________________________________    Ardyth Moritz If a phone consent is obtained, consent will be documented by using two health care professionals, each affirming that the consenting party has no questions and gives consent for the procedure discussed with the physician/provider.   _____________________          ____________________       _____/_____am/pm   2nd witness to phone consent        Printed name           Date / Time    Informed Consent:  I have provided the explanation described above in section 1 to the patient and/or legal representative.  I have provided the patient and/or legal representative with an opportunity to ask any questions about the proposed operation/procedure.   ___________________________          ____________________         ____/____am/pm  Provider / Proceduralist                            Printed name            Date / Time  Revised 8/2/2021                                                                      Page 2 of 2

## 2022-04-15 ENCOUNTER — HOSPITAL ENCOUNTER (OUTPATIENT)
Dept: WOMENS IMAGING | Age: 82
Discharge: HOME | End: 2022-04-15
Payer: MEDICARE

## 2022-04-15 DIAGNOSIS — Z12.31 VISIT FOR SCREENING MAMMOGRAM: ICD-10-CM

## 2022-04-15 PROCEDURE — 77067 SCR MAMMO BI INCL CAD: CPT

## 2022-04-20 ENCOUNTER — ANESTHESIA EVENT (OUTPATIENT)
Dept: OPERATING ROOM | Age: 82
DRG: 454 | End: 2022-04-20
Payer: MEDICARE

## 2022-04-21 ENCOUNTER — APPOINTMENT (OUTPATIENT)
Dept: GENERAL RADIOLOGY | Age: 82
DRG: 454 | End: 2022-04-21
Attending: NEUROLOGICAL SURGERY
Payer: MEDICARE

## 2022-04-21 ENCOUNTER — ANESTHESIA (OUTPATIENT)
Dept: OPERATING ROOM | Age: 82
DRG: 454 | End: 2022-04-21
Payer: MEDICARE

## 2022-04-21 ENCOUNTER — HOSPITAL ENCOUNTER (INPATIENT)
Age: 82
LOS: 5 days | Discharge: HOME | DRG: 454 | End: 2022-04-26
Attending: NEUROLOGICAL SURGERY | Admitting: NEUROLOGICAL SURGERY
Payer: MEDICARE

## 2022-04-21 ENCOUNTER — APPOINTMENT (OUTPATIENT)
Dept: CT IMAGING | Age: 82
DRG: 454 | End: 2022-04-21
Attending: NEUROLOGICAL SURGERY
Payer: MEDICARE

## 2022-04-21 VITALS — TEMPERATURE: 94.6 F | OXYGEN SATURATION: 100 % | SYSTOLIC BLOOD PRESSURE: 141 MMHG | DIASTOLIC BLOOD PRESSURE: 91 MMHG

## 2022-04-21 DIAGNOSIS — Z98.1 S/P SPINAL FUSION: Primary | ICD-10-CM

## 2022-04-21 PROBLEM — M41.56 SCOLIOSIS OF LUMBAR REGION DUE TO DEGENERATIVE DISEASE OF SPINE IN ADULT: Status: ACTIVE | Noted: 2022-04-21

## 2022-04-21 LAB
ABO/RH: NORMAL
ANTIBODY SCREEN: NORMAL
GLUCOSE BLD-MCNC: 123 MG/DL (ref 70–99)
PERFORMED ON: ABNORMAL

## 2022-04-21 PROCEDURE — 7100000001 HC PACU RECOVERY - ADDTL 15 MIN: Performed by: NEUROLOGICAL SURGERY

## 2022-04-21 PROCEDURE — 2500000003 HC RX 250 WO HCPCS: Performed by: NURSE ANESTHETIST, CERTIFIED REGISTERED

## 2022-04-21 PROCEDURE — 2500000003 HC RX 250 WO HCPCS: Performed by: ANESTHESIOLOGY

## 2022-04-21 PROCEDURE — 3600000014 HC SURGERY LEVEL 4 ADDTL 15MIN: Performed by: NEUROLOGICAL SURGERY

## 2022-04-21 PROCEDURE — 3E0U0GB INTRODUCTION OF RECOMBINANT BONE MORPHOGENETIC PROTEIN INTO JOINTS, OPEN APPROACH: ICD-10-PCS | Performed by: NEUROLOGICAL SURGERY

## 2022-04-21 PROCEDURE — 6360000002 HC RX W HCPCS: Performed by: NURSE ANESTHETIST, CERTIFIED REGISTERED

## 2022-04-21 PROCEDURE — C9290 INJ, BUPIVACAINE LIPOSOME: HCPCS | Performed by: NEUROLOGICAL SURGERY

## 2022-04-21 PROCEDURE — 2000000000 HC ICU R&B

## 2022-04-21 PROCEDURE — 2580000003 HC RX 258: Performed by: NURSE ANESTHETIST, CERTIFIED REGISTERED

## 2022-04-21 PROCEDURE — 2580000003 HC RX 258: Performed by: NEUROLOGICAL SURGERY

## 2022-04-21 PROCEDURE — 0SG704Z FUSION OF RIGHT SACROILIAC JOINT WITH INTERNAL FIXATION DEVICE, OPEN APPROACH: ICD-10-PCS | Performed by: NEUROLOGICAL SURGERY

## 2022-04-21 PROCEDURE — 8E0WXBG COMPUTER ASSISTED PROCEDURE OF TRUNK REGION, WITH COMPUTERIZED TOMOGRAPHY: ICD-10-PCS | Performed by: NEUROLOGICAL SURGERY

## 2022-04-21 PROCEDURE — 0SG3071 FUSION OF LUMBOSACRAL JOINT WITH AUTOLOGOUS TISSUE SUBSTITUTE, POSTERIOR APPROACH, POSTERIOR COLUMN, OPEN APPROACH: ICD-10-PCS | Performed by: NEUROLOGICAL SURGERY

## 2022-04-21 PROCEDURE — C1713 ANCHOR/SCREW BN/BN,TIS/BN: HCPCS | Performed by: NEUROLOGICAL SURGERY

## 2022-04-21 PROCEDURE — 6360000002 HC RX W HCPCS: Performed by: NEUROLOGICAL SURGERY

## 2022-04-21 PROCEDURE — 01NB0ZZ RELEASE LUMBAR NERVE, OPEN APPROACH: ICD-10-PCS | Performed by: NEUROLOGICAL SURGERY

## 2022-04-21 PROCEDURE — 77011 CT SCAN FOR LOCALIZATION: CPT

## 2022-04-21 PROCEDURE — 00NY0ZZ RELEASE LUMBAR SPINAL CORD, OPEN APPROACH: ICD-10-PCS | Performed by: NEUROLOGICAL SURGERY

## 2022-04-21 PROCEDURE — 2580000003 HC RX 258: Performed by: PHYSICIAN ASSISTANT

## 2022-04-21 PROCEDURE — A4217 STERILE WATER/SALINE, 500 ML: HCPCS | Performed by: NEUROLOGICAL SURGERY

## 2022-04-21 PROCEDURE — 0SG707Z FUSION OF RIGHT SACROILIAC JOINT WITH AUTOLOGOUS TISSUE SUBSTITUTE, OPEN APPROACH: ICD-10-PCS | Performed by: NEUROLOGICAL SURGERY

## 2022-04-21 PROCEDURE — 72100 X-RAY EXAM L-S SPINE 2/3 VWS: CPT

## 2022-04-21 PROCEDURE — C9359 IMPLNT,BON VOID FILLER-PUTTY: HCPCS | Performed by: NEUROLOGICAL SURGERY

## 2022-04-21 PROCEDURE — 6370000000 HC RX 637 (ALT 250 FOR IP): Performed by: NEUROLOGICAL SURGERY

## 2022-04-21 PROCEDURE — 6360000002 HC RX W HCPCS: Performed by: ANESTHESIOLOGY

## 2022-04-21 PROCEDURE — 2709999900 HC NON-CHARGEABLE SUPPLY: Performed by: NEUROLOGICAL SURGERY

## 2022-04-21 PROCEDURE — P9041 ALBUMIN (HUMAN),5%, 50ML: HCPCS | Performed by: NURSE ANESTHETIST, CERTIFIED REGISTERED

## 2022-04-21 PROCEDURE — 0QP104Z REMOVAL OF INTERNAL FIXATION DEVICE FROM SACRUM, OPEN APPROACH: ICD-10-PCS | Performed by: NEUROLOGICAL SURGERY

## 2022-04-21 PROCEDURE — 0SG10A0 FUSION OF 2 OR MORE LUMBAR VERTEBRAL JOINTS WITH INTERBODY FUSION DEVICE, ANTERIOR APPROACH, ANTERIOR COLUMN, OPEN APPROACH: ICD-10-PCS | Performed by: NEUROLOGICAL SURGERY

## 2022-04-21 PROCEDURE — 0SG1071 FUSION OF 2 OR MORE LUMBAR VERTEBRAL JOINTS WITH AUTOLOGOUS TISSUE SUBSTITUTE, POSTERIOR APPROACH, POSTERIOR COLUMN, OPEN APPROACH: ICD-10-PCS | Performed by: NEUROLOGICAL SURGERY

## 2022-04-21 PROCEDURE — 86923 COMPATIBILITY TEST ELECTRIC: CPT

## 2022-04-21 PROCEDURE — 0SG804Z FUSION OF LEFT SACROILIAC JOINT WITH INTERNAL FIXATION DEVICE, OPEN APPROACH: ICD-10-PCS | Performed by: NEUROLOGICAL SURGERY

## 2022-04-21 PROCEDURE — 3209999900 FLUORO FOR SURGICAL PROCEDURES

## 2022-04-21 PROCEDURE — P9016 RBC LEUKOCYTES REDUCED: HCPCS

## 2022-04-21 PROCEDURE — 0QP004Z REMOVAL OF INTERNAL FIXATION DEVICE FROM LUMBAR VERTEBRA, OPEN APPROACH: ICD-10-PCS | Performed by: NEUROLOGICAL SURGERY

## 2022-04-21 PROCEDURE — 2720000010 HC SURG SUPPLY STERILE: Performed by: NEUROLOGICAL SURGERY

## 2022-04-21 PROCEDURE — 2500000003 HC RX 250 WO HCPCS: Performed by: NEUROLOGICAL SURGERY

## 2022-04-21 PROCEDURE — 0SB20ZZ EXCISION OF LUMBAR VERTEBRAL DISC, OPEN APPROACH: ICD-10-PCS | Performed by: NEUROLOGICAL SURGERY

## 2022-04-21 PROCEDURE — 0RG7071 FUSION OF 2 TO 7 THORACIC VERTEBRAL JOINTS WITH AUTOLOGOUS TISSUE SUBSTITUTE, POSTERIOR APPROACH, POSTERIOR COLUMN, OPEN APPROACH: ICD-10-PCS | Performed by: NEUROLOGICAL SURGERY

## 2022-04-21 PROCEDURE — 86900 BLOOD TYPING SEROLOGIC ABO: CPT

## 2022-04-21 PROCEDURE — 86850 RBC ANTIBODY SCREEN: CPT

## 2022-04-21 PROCEDURE — 0SG807Z FUSION OF LEFT SACROILIAC JOINT WITH AUTOLOGOUS TISSUE SUBSTITUTE, OPEN APPROACH: ICD-10-PCS | Performed by: NEUROLOGICAL SURGERY

## 2022-04-21 PROCEDURE — 3700000000 HC ANESTHESIA ATTENDED CARE: Performed by: NEUROLOGICAL SURGERY

## 2022-04-21 PROCEDURE — 7100000000 HC PACU RECOVERY - FIRST 15 MIN: Performed by: NEUROLOGICAL SURGERY

## 2022-04-21 PROCEDURE — 2580000003 HC RX 258: Performed by: ANESTHESIOLOGY

## 2022-04-21 PROCEDURE — 2780000010 HC IMPLANT OTHER: Performed by: NEUROLOGICAL SURGERY

## 2022-04-21 PROCEDURE — 0RGA071 FUSION OF THORACOLUMBAR VERTEBRAL JOINT WITH AUTOLOGOUS TISSUE SUBSTITUTE, POSTERIOR APPROACH, POSTERIOR COLUMN, OPEN APPROACH: ICD-10-PCS | Performed by: NEUROLOGICAL SURGERY

## 2022-04-21 PROCEDURE — 86901 BLOOD TYPING SEROLOGIC RH(D): CPT

## 2022-04-21 PROCEDURE — 3700000001 HC ADD 15 MINUTES (ANESTHESIA): Performed by: NEUROLOGICAL SURGERY

## 2022-04-21 PROCEDURE — 3600000004 HC SURGERY LEVEL 4 BASE: Performed by: NEUROLOGICAL SURGERY

## 2022-04-21 DEVICE — IMPLANTABLE DEVICE: Type: IMPLANTABLE DEVICE | Site: SPINE LUMBAR | Status: FUNCTIONAL

## 2022-04-21 DEVICE — SCREW SPNL POLYAX 5.5X6X45 MM TI EXPEDIUM VERSE: Type: IMPLANTABLE DEVICE | Site: SPINE LUMBAR | Status: FUNCTIONAL

## 2022-04-21 DEVICE — SCREW SPNL POLYAX 7X45 MM FOR 5.5 MM ROD TI EXPEDIUM VERSE: Type: IMPLANTABLE DEVICE | Site: SPINE LUMBAR | Status: FUNCTIONAL

## 2022-04-21 DEVICE — SCREW SPNL L45MM OD6MM CORT FIX TI POLYAX FEN EXT TAB MIS: Type: IMPLANTABLE DEVICE | Site: SPINE LUMBAR | Status: FUNCTIONAL

## 2022-04-21 DEVICE — BONE GRFT SUB L 12.5CC BIOACTIVE GLS MTRX: Type: IMPLANTABLE DEVICE | Site: SPINE LUMBAR | Status: FUNCTIONAL

## 2022-04-21 DEVICE — SCREW SPNL POLYAX 7X50 MM FOR 5.5 MM ROD TI EXPEDIUM VERSE: Type: IMPLANTABLE DEVICE | Site: SPINE LUMBAR | Status: FUNCTIONAL

## 2022-04-21 DEVICE — BONE GRAFT KIT 7510400 INFUSE MEDIUM
Type: IMPLANTABLE DEVICE | Site: SPINE LUMBAR | Status: FUNCTIONAL
Brand: INFUSE® BONE GRAFT

## 2022-04-21 DEVICE — SET SCR SPNL TI SGL INNR FOR VIPER 2 MINIMALLY INVASIVE: Type: IMPLANTABLE DEVICE | Site: SPINE LUMBAR | Status: FUNCTIONAL

## 2022-04-21 DEVICE — CONNECTOR 8205300 CONNECTOR
Type: IMPLANTABLE DEVICE | Site: SPINE LUMBAR | Status: FUNCTIONAL
Brand: TRANSLACE™ SPINAL TETHERING SYSTEM

## 2022-04-21 DEVICE — SCREW SET UNITZ 5.5 MM TI EXPEDIUM VERSE: Type: IMPLANTABLE DEVICE | Site: SPINE LUMBAR | Status: FUNCTIONAL

## 2022-04-21 RX ORDER — METHOCARBAMOL 750 MG/1
750 TABLET, FILM COATED ORAL EVERY 8 HOURS PRN
Status: DISCONTINUED | OUTPATIENT
Start: 2022-04-21 | End: 2022-04-22

## 2022-04-21 RX ORDER — ACETAMINOPHEN 325 MG/1
650 TABLET ORAL EVERY 6 HOURS
Status: DISCONTINUED | OUTPATIENT
Start: 2022-04-21 | End: 2022-04-22

## 2022-04-21 RX ORDER — CEFAZOLIN SODIUM 1 G/3ML
INJECTION, POWDER, FOR SOLUTION INTRAMUSCULAR; INTRAVENOUS PRN
Status: DISCONTINUED | OUTPATIENT
Start: 2022-04-21 | End: 2022-04-21 | Stop reason: SDUPTHER

## 2022-04-21 RX ORDER — FENTANYL CITRATE 50 UG/ML
INJECTION, SOLUTION INTRAMUSCULAR; INTRAVENOUS PRN
Status: DISCONTINUED | OUTPATIENT
Start: 2022-04-21 | End: 2022-04-21 | Stop reason: SDUPTHER

## 2022-04-21 RX ORDER — SODIUM CHLORIDE, SODIUM LACTATE, POTASSIUM CHLORIDE, CALCIUM CHLORIDE 600; 310; 30; 20 MG/100ML; MG/100ML; MG/100ML; MG/100ML
INJECTION, SOLUTION INTRAVENOUS CONTINUOUS PRN
Status: DISCONTINUED | OUTPATIENT
Start: 2022-04-21 | End: 2022-04-21 | Stop reason: SDUPTHER

## 2022-04-21 RX ORDER — LABETALOL HYDROCHLORIDE 5 MG/ML
10 INJECTION, SOLUTION INTRAVENOUS
Status: DISCONTINUED | OUTPATIENT
Start: 2022-04-21 | End: 2022-04-21 | Stop reason: HOSPADM

## 2022-04-21 RX ORDER — SODIUM CHLORIDE 9 MG/ML
INJECTION, SOLUTION INTRAVENOUS PRN
Status: DISCONTINUED | OUTPATIENT
Start: 2022-04-21 | End: 2022-04-21 | Stop reason: HOSPADM

## 2022-04-21 RX ORDER — SUCCINYLCHOLINE/SOD CL,ISO/PF 200MG/10ML
SYRINGE (ML) INTRAVENOUS PRN
Status: DISCONTINUED | OUTPATIENT
Start: 2022-04-21 | End: 2022-04-21 | Stop reason: SDUPTHER

## 2022-04-21 RX ORDER — ONDANSETRON 2 MG/ML
INJECTION INTRAMUSCULAR; INTRAVENOUS PRN
Status: DISCONTINUED | OUTPATIENT
Start: 2022-04-21 | End: 2022-04-21 | Stop reason: SDUPTHER

## 2022-04-21 RX ORDER — SODIUM CHLORIDE 9 MG/ML
INJECTION, SOLUTION INTRAVENOUS CONTINUOUS PRN
Status: DISCONTINUED | OUTPATIENT
Start: 2022-04-21 | End: 2022-04-21 | Stop reason: SDUPTHER

## 2022-04-21 RX ORDER — PROPOFOL 10 MG/ML
INJECTION, EMULSION INTRAVENOUS PRN
Status: DISCONTINUED | OUTPATIENT
Start: 2022-04-21 | End: 2022-04-21 | Stop reason: SDUPTHER

## 2022-04-21 RX ORDER — SODIUM CHLORIDE 0.9 % (FLUSH) 0.9 %
5-40 SYRINGE (ML) INJECTION EVERY 12 HOURS SCHEDULED
Status: DISCONTINUED | OUTPATIENT
Start: 2022-04-21 | End: 2022-04-26 | Stop reason: HOSPADM

## 2022-04-21 RX ORDER — METOPROLOL TARTRATE 50 MG/1
50 TABLET, FILM COATED ORAL 2 TIMES DAILY
Status: DISCONTINUED | OUTPATIENT
Start: 2022-04-21 | End: 2022-04-26 | Stop reason: HOSPADM

## 2022-04-21 RX ORDER — TRIAMTERENE AND HYDROCHLOROTHIAZIDE 37.5; 25 MG/1; MG/1
1 TABLET ORAL DAILY
Status: DISCONTINUED | OUTPATIENT
Start: 2022-04-22 | End: 2022-04-26 | Stop reason: HOSPADM

## 2022-04-21 RX ORDER — LABETALOL HYDROCHLORIDE 5 MG/ML
INJECTION, SOLUTION INTRAVENOUS PRN
Status: DISCONTINUED | OUTPATIENT
Start: 2022-04-21 | End: 2022-04-21 | Stop reason: SDUPTHER

## 2022-04-21 RX ORDER — DEXAMETHASONE SODIUM PHOSPHATE 4 MG/ML
INJECTION, SOLUTION INTRA-ARTICULAR; INTRALESIONAL; INTRAMUSCULAR; INTRAVENOUS; SOFT TISSUE PRN
Status: DISCONTINUED | OUTPATIENT
Start: 2022-04-21 | End: 2022-04-21 | Stop reason: SDUPTHER

## 2022-04-21 RX ORDER — TIMOLOL MALEATE 5 MG/ML
1 SOLUTION/ DROPS OPHTHALMIC 2 TIMES DAILY
Status: DISCONTINUED | OUTPATIENT
Start: 2022-04-21 | End: 2022-04-26 | Stop reason: HOSPADM

## 2022-04-21 RX ORDER — VANCOMYCIN HYDROCHLORIDE 1 G/20ML
INJECTION, POWDER, LYOPHILIZED, FOR SOLUTION INTRAVENOUS PRN
Status: DISCONTINUED | OUTPATIENT
Start: 2022-04-21 | End: 2022-04-21 | Stop reason: ALTCHOICE

## 2022-04-21 RX ORDER — SODIUM CHLORIDE 9 MG/ML
INJECTION, SOLUTION INTRAVENOUS CONTINUOUS
Status: DISCONTINUED | OUTPATIENT
Start: 2022-04-21 | End: 2022-04-21 | Stop reason: SDUPTHER

## 2022-04-21 RX ORDER — ALBUMIN, HUMAN INJ 5% 5 %
SOLUTION INTRAVENOUS PRN
Status: DISCONTINUED | OUTPATIENT
Start: 2022-04-21 | End: 2022-04-21 | Stop reason: SDUPTHER

## 2022-04-21 RX ORDER — SENNA AND DOCUSATE SODIUM 50; 8.6 MG/1; MG/1
1 TABLET, FILM COATED ORAL 2 TIMES DAILY
Status: DISCONTINUED | OUTPATIENT
Start: 2022-04-21 | End: 2022-04-26 | Stop reason: HOSPADM

## 2022-04-21 RX ORDER — HYDROMORPHONE HCL 110MG/55ML
PATIENT CONTROLLED ANALGESIA SYRINGE INTRAVENOUS PRN
Status: DISCONTINUED | OUTPATIENT
Start: 2022-04-21 | End: 2022-04-21 | Stop reason: SDUPTHER

## 2022-04-21 RX ORDER — OXYCODONE HYDROCHLORIDE 5 MG/1
10 TABLET ORAL EVERY 4 HOURS PRN
Status: DISCONTINUED | OUTPATIENT
Start: 2022-04-21 | End: 2022-04-26 | Stop reason: HOSPADM

## 2022-04-21 RX ORDER — SODIUM CHLORIDE 0.9 % (FLUSH) 0.9 %
5-40 SYRINGE (ML) INJECTION EVERY 12 HOURS SCHEDULED
Status: DISCONTINUED | OUTPATIENT
Start: 2022-04-21 | End: 2022-04-21 | Stop reason: HOSPADM

## 2022-04-21 RX ORDER — ONDANSETRON 2 MG/ML
4 INJECTION INTRAMUSCULAR; INTRAVENOUS
Status: DISCONTINUED | OUTPATIENT
Start: 2022-04-21 | End: 2022-04-21 | Stop reason: HOSPADM

## 2022-04-21 RX ORDER — LATANOPROST 50 UG/ML
1 SOLUTION/ DROPS OPHTHALMIC NIGHTLY
Status: DISCONTINUED | OUTPATIENT
Start: 2022-04-21 | End: 2022-04-26 | Stop reason: HOSPADM

## 2022-04-21 RX ORDER — SODIUM CHLORIDE 9 MG/ML
INJECTION, SOLUTION INTRAVENOUS PRN
Status: DISCONTINUED | OUTPATIENT
Start: 2022-04-21 | End: 2022-04-26 | Stop reason: HOSPADM

## 2022-04-21 RX ORDER — EPHEDRINE SULFATE 50 MG/ML
INJECTION INTRAVENOUS PRN
Status: DISCONTINUED | OUTPATIENT
Start: 2022-04-21 | End: 2022-04-21 | Stop reason: SDUPTHER

## 2022-04-21 RX ORDER — PANTOPRAZOLE SODIUM 40 MG/1
40 TABLET, DELAYED RELEASE ORAL
Status: DISCONTINUED | OUTPATIENT
Start: 2022-04-22 | End: 2022-04-26 | Stop reason: HOSPADM

## 2022-04-21 RX ORDER — SODIUM CHLORIDE 0.9 % (FLUSH) 0.9 %
5-40 SYRINGE (ML) INJECTION PRN
Status: DISCONTINUED | OUTPATIENT
Start: 2022-04-21 | End: 2022-04-21 | Stop reason: HOSPADM

## 2022-04-21 RX ORDER — DOCUSATE SODIUM 100 MG/1
100 CAPSULE, LIQUID FILLED ORAL 2 TIMES DAILY
Status: DISCONTINUED | OUTPATIENT
Start: 2022-04-21 | End: 2022-04-26 | Stop reason: HOSPADM

## 2022-04-21 RX ORDER — DIAZEPAM 5 MG/1
5 TABLET ORAL EVERY 6 HOURS PRN
Status: DISCONTINUED | OUTPATIENT
Start: 2022-04-21 | End: 2022-04-26 | Stop reason: HOSPADM

## 2022-04-21 RX ORDER — ONDANSETRON 2 MG/ML
4 INJECTION INTRAMUSCULAR; INTRAVENOUS EVERY 6 HOURS PRN
Status: DISCONTINUED | OUTPATIENT
Start: 2022-04-21 | End: 2022-04-22 | Stop reason: SDUPTHER

## 2022-04-21 RX ORDER — LIDOCAINE HYDROCHLORIDE 20 MG/ML
INJECTION, SOLUTION INTRAVENOUS PRN
Status: DISCONTINUED | OUTPATIENT
Start: 2022-04-21 | End: 2022-04-21 | Stop reason: SDUPTHER

## 2022-04-21 RX ORDER — PROPOFOL 10 MG/ML
INJECTION, EMULSION INTRAVENOUS CONTINUOUS PRN
Status: DISCONTINUED | OUTPATIENT
Start: 2022-04-21 | End: 2022-04-21 | Stop reason: SDUPTHER

## 2022-04-21 RX ORDER — SODIUM CHLORIDE 0.9 % (FLUSH) 0.9 %
5-40 SYRINGE (ML) INJECTION PRN
Status: DISCONTINUED | OUTPATIENT
Start: 2022-04-21 | End: 2022-04-26 | Stop reason: HOSPADM

## 2022-04-21 RX ORDER — OXYCODONE HYDROCHLORIDE 5 MG/1
5 TABLET ORAL EVERY 4 HOURS PRN
Status: DISCONTINUED | OUTPATIENT
Start: 2022-04-21 | End: 2022-04-26 | Stop reason: HOSPADM

## 2022-04-21 RX ORDER — ENOXAPARIN SODIUM 100 MG/ML
40 INJECTION SUBCUTANEOUS DAILY
Status: DISCONTINUED | OUTPATIENT
Start: 2022-04-22 | End: 2022-04-26 | Stop reason: HOSPADM

## 2022-04-21 RX ORDER — SODIUM CHLORIDE, SODIUM LACTATE, POTASSIUM CHLORIDE, CALCIUM CHLORIDE 600; 310; 30; 20 MG/100ML; MG/100ML; MG/100ML; MG/100ML
INJECTION, SOLUTION INTRAVENOUS CONTINUOUS
Status: DISCONTINUED | OUTPATIENT
Start: 2022-04-21 | End: 2022-04-21

## 2022-04-21 RX ORDER — SODIUM CHLORIDE 9 MG/ML
INJECTION, SOLUTION INTRAVENOUS CONTINUOUS
Status: DISCONTINUED | OUTPATIENT
Start: 2022-04-21 | End: 2022-04-22

## 2022-04-21 RX ORDER — HYDRALAZINE HYDROCHLORIDE 20 MG/ML
10 INJECTION INTRAMUSCULAR; INTRAVENOUS
Status: DISCONTINUED | OUTPATIENT
Start: 2022-04-21 | End: 2022-04-21 | Stop reason: HOSPADM

## 2022-04-21 RX ORDER — REMIFENTANIL HYDROCHLORIDE 1 MG/ML
INJECTION, POWDER, LYOPHILIZED, FOR SOLUTION INTRAVENOUS CONTINUOUS PRN
Status: DISCONTINUED | OUTPATIENT
Start: 2022-04-21 | End: 2022-04-21 | Stop reason: SDUPTHER

## 2022-04-21 RX ORDER — ROCURONIUM BROMIDE 10 MG/ML
INJECTION, SOLUTION INTRAVENOUS PRN
Status: DISCONTINUED | OUTPATIENT
Start: 2022-04-21 | End: 2022-04-21 | Stop reason: SDUPTHER

## 2022-04-21 RX ADMIN — SODIUM CHLORIDE, SODIUM LACTATE, POTASSIUM CHLORIDE, AND CALCIUM CHLORIDE: .6; .31; .03; .02 INJECTION, SOLUTION INTRAVENOUS at 10:04

## 2022-04-21 RX ADMIN — OXYCODONE 10 MG: 5 TABLET ORAL at 22:28

## 2022-04-21 RX ADMIN — ONDANSETRON 4 MG: 2 INJECTION INTRAMUSCULAR; INTRAVENOUS at 17:09

## 2022-04-21 RX ADMIN — SODIUM CHLORIDE, POTASSIUM CHLORIDE, SODIUM LACTATE AND CALCIUM CHLORIDE: 600; 310; 30; 20 INJECTION, SOLUTION INTRAVENOUS at 07:50

## 2022-04-21 RX ADMIN — SODIUM CHLORIDE, PRESERVATIVE FREE 10 ML: 5 INJECTION INTRAVENOUS at 22:37

## 2022-04-21 RX ADMIN — FENTANYL CITRATE 100 MCG: 50 INJECTION, SOLUTION INTRAMUSCULAR; INTRAVENOUS at 10:13

## 2022-04-21 RX ADMIN — ROCURONIUM BROMIDE 40 MG: 10 INJECTION INTRAVENOUS at 12:44

## 2022-04-21 RX ADMIN — METOPROLOL TARTRATE 50 MG: 50 TABLET, FILM COATED ORAL at 22:29

## 2022-04-21 RX ADMIN — PROPOFOL 200 MG: 10 INJECTION, EMULSION INTRAVENOUS at 10:13

## 2022-04-21 RX ADMIN — SENNOSIDES AND DOCUSATE SODIUM 1 TABLET: 50; 8.6 TABLET ORAL at 22:29

## 2022-04-21 RX ADMIN — ALBUMIN (HUMAN) 250 ML: 12.5 INJECTION, SOLUTION INTRAVENOUS at 13:52

## 2022-04-21 RX ADMIN — FENTANYL CITRATE 50 MCG: 50 INJECTION, SOLUTION INTRAMUSCULAR; INTRAVENOUS at 12:35

## 2022-04-21 RX ADMIN — HYDROMORPHONE HYDROCHLORIDE 0.5 MG: 1 INJECTION, SOLUTION INTRAMUSCULAR; INTRAVENOUS; SUBCUTANEOUS at 18:15

## 2022-04-21 RX ADMIN — HYDROMORPHONE HYDROCHLORIDE 1 MG: 2 INJECTION, SOLUTION INTRAMUSCULAR; INTRAVENOUS; SUBCUTANEOUS at 17:14

## 2022-04-21 RX ADMIN — CEFAZOLIN SODIUM 2000 MG: 10 INJECTION, POWDER, FOR SOLUTION INTRAVENOUS at 10:04

## 2022-04-21 RX ADMIN — LABETALOL HYDROCHLORIDE 5 MG: 5 INJECTION, SOLUTION INTRAVENOUS at 17:31

## 2022-04-21 RX ADMIN — LABETALOL HYDROCHLORIDE 10 MG: 5 INJECTION, SOLUTION INTRAVENOUS at 18:10

## 2022-04-21 RX ADMIN — TIMOLOL MALEATE 1 DROP: 5 SOLUTION OPHTHALMIC at 23:09

## 2022-04-21 RX ADMIN — SUGAMMADEX 100 MG: 100 INJECTION, SOLUTION INTRAVENOUS at 14:04

## 2022-04-21 RX ADMIN — LATANOPROST 1 DROP: 50 SOLUTION OPHTHALMIC at 22:36

## 2022-04-21 RX ADMIN — Medication 140 MG: at 10:13

## 2022-04-21 RX ADMIN — METHOCARBAMOL 1000 MG: 100 INJECTION, SOLUTION INTRAMUSCULAR; INTRAVENOUS at 22:31

## 2022-04-21 RX ADMIN — CEFAZOLIN SODIUM 2000 MG: 1 POWDER, FOR SOLUTION INTRAMUSCULAR; INTRAVENOUS at 13:59

## 2022-04-21 RX ADMIN — SODIUM CHLORIDE: 9 INJECTION, SOLUTION INTRAVENOUS at 22:30

## 2022-04-21 RX ADMIN — DOCUSATE SODIUM 100 MG: 100 CAPSULE, LIQUID FILLED ORAL at 22:28

## 2022-04-21 RX ADMIN — CEFAZOLIN SODIUM 2000 MG: 10 INJECTION, POWDER, FOR SOLUTION INTRAVENOUS at 23:07

## 2022-04-21 RX ADMIN — HYDROMORPHONE HYDROCHLORIDE 0.5 MG: 2 INJECTION, SOLUTION INTRAMUSCULAR; INTRAVENOUS; SUBCUTANEOUS at 17:34

## 2022-04-21 RX ADMIN — HYDROMORPHONE HYDROCHLORIDE 0.5 MG: 1 INJECTION, SOLUTION INTRAMUSCULAR; INTRAVENOUS; SUBCUTANEOUS at 18:30

## 2022-04-21 RX ADMIN — SODIUM CHLORIDE, SODIUM LACTATE, POTASSIUM CHLORIDE, AND CALCIUM CHLORIDE: .6; .31; .03; .02 INJECTION, SOLUTION INTRAVENOUS at 15:14

## 2022-04-21 RX ADMIN — REMIFENTANIL HYDROCHLORIDE 0.12 MCG/KG/MIN: 1 INJECTION, POWDER, LYOPHILIZED, FOR SOLUTION INTRAVENOUS at 10:13

## 2022-04-21 RX ADMIN — DEXAMETHASONE SODIUM PHOSPHATE 4 MG: 4 INJECTION, SOLUTION INTRAMUSCULAR; INTRAVENOUS at 10:13

## 2022-04-21 RX ADMIN — SODIUM CHLORIDE: 9 INJECTION, SOLUTION INTRAVENOUS at 16:37

## 2022-04-21 RX ADMIN — EPHEDRINE SULFATE 5 MG: 50 INJECTION INTRAVENOUS at 10:31

## 2022-04-21 RX ADMIN — LABETALOL HYDROCHLORIDE 5 MG: 5 INJECTION, SOLUTION INTRAVENOUS at 17:38

## 2022-04-21 RX ADMIN — PROPOFOL 120 MCG/KG/MIN: 10 INJECTION, EMULSION INTRAVENOUS at 10:13

## 2022-04-21 RX ADMIN — FENTANYL CITRATE 50 MCG: 50 INJECTION, SOLUTION INTRAMUSCULAR; INTRAVENOUS at 11:44

## 2022-04-21 RX ADMIN — EPHEDRINE SULFATE 5 MG: 50 INJECTION INTRAVENOUS at 10:44

## 2022-04-21 RX ADMIN — LIDOCAINE HYDROCHLORIDE 50 MG: 20 INJECTION, SOLUTION INTRAVENOUS at 10:13

## 2022-04-21 RX ADMIN — EPHEDRINE SULFATE 5 MG: 50 INJECTION INTRAVENOUS at 10:42

## 2022-04-21 RX ADMIN — SODIUM CHLORIDE: 9 INJECTION, SOLUTION INTRAVENOUS at 20:54

## 2022-04-21 RX ADMIN — ACETAMINOPHEN 650 MG: 325 TABLET ORAL at 22:28

## 2022-04-21 RX ADMIN — HYDROMORPHONE HYDROCHLORIDE 0.5 MG: 1 INJECTION, SOLUTION INTRAMUSCULAR; INTRAVENOUS; SUBCUTANEOUS at 19:45

## 2022-04-21 RX ADMIN — ALBUMIN (HUMAN) 250 ML: 12.5 INJECTION, SOLUTION INTRAVENOUS at 15:57

## 2022-04-21 RX ADMIN — SODIUM CHLORIDE, SODIUM LACTATE, POTASSIUM CHLORIDE, CALCIUM CHLORIDE: 600; 310; 30; 20 INJECTION, SOLUTION INTRAVENOUS at 10:04

## 2022-04-21 ASSESSMENT — PULMONARY FUNCTION TESTS
PIF_VALUE: 22
PIF_VALUE: 24
PIF_VALUE: 21
PIF_VALUE: 25
PIF_VALUE: 22
PIF_VALUE: 24
PIF_VALUE: 22
PIF_VALUE: 22
PIF_VALUE: 13
PIF_VALUE: 22
PIF_VALUE: 23
PIF_VALUE: 22
PIF_VALUE: 23
PIF_VALUE: 24
PIF_VALUE: 22
PIF_VALUE: 25
PIF_VALUE: 22
PIF_VALUE: 24
PIF_VALUE: 22
PIF_VALUE: 20
PIF_VALUE: 22
PIF_VALUE: 22
PIF_VALUE: 2
PIF_VALUE: 25
PIF_VALUE: 24
PIF_VALUE: 22
PIF_VALUE: 25
PIF_VALUE: 25
PIF_VALUE: 22
PIF_VALUE: 24
PIF_VALUE: 24
PIF_VALUE: 14
PIF_VALUE: 20
PIF_VALUE: 25
PIF_VALUE: 25
PIF_VALUE: 6
PIF_VALUE: 23
PIF_VALUE: 13
PIF_VALUE: 21
PIF_VALUE: 22
PIF_VALUE: 28
PIF_VALUE: 23
PIF_VALUE: 24
PIF_VALUE: 22
PIF_VALUE: 24
PIF_VALUE: 22
PIF_VALUE: 25
PIF_VALUE: 24
PIF_VALUE: 22
PIF_VALUE: 22
PIF_VALUE: 21
PIF_VALUE: 22
PIF_VALUE: 23
PIF_VALUE: 14
PIF_VALUE: 22
PIF_VALUE: 23
PIF_VALUE: 24
PIF_VALUE: 22
PIF_VALUE: 22
PIF_VALUE: 26
PIF_VALUE: 24
PIF_VALUE: 23
PIF_VALUE: 22
PIF_VALUE: 19
PIF_VALUE: 24
PIF_VALUE: 14
PIF_VALUE: 22
PIF_VALUE: 22
PIF_VALUE: 24
PIF_VALUE: 23
PIF_VALUE: 20
PIF_VALUE: 22
PIF_VALUE: 24
PIF_VALUE: 22
PIF_VALUE: 23
PIF_VALUE: 26
PIF_VALUE: 25
PIF_VALUE: 22
PIF_VALUE: 10
PIF_VALUE: 11
PIF_VALUE: 26
PIF_VALUE: 22
PIF_VALUE: 23
PIF_VALUE: 21
PIF_VALUE: 25
PIF_VALUE: 22
PIF_VALUE: 30
PIF_VALUE: 25
PIF_VALUE: 22
PIF_VALUE: 26
PIF_VALUE: 22
PIF_VALUE: 19
PIF_VALUE: 22
PIF_VALUE: 25
PIF_VALUE: 24
PIF_VALUE: 22
PIF_VALUE: 21
PIF_VALUE: 22
PIF_VALUE: 22
PIF_VALUE: 26
PIF_VALUE: 22
PIF_VALUE: 22
PIF_VALUE: 26
PIF_VALUE: 23
PIF_VALUE: 25
PIF_VALUE: 22
PIF_VALUE: 20
PIF_VALUE: 35
PIF_VALUE: 2
PIF_VALUE: 21
PIF_VALUE: 22
PIF_VALUE: 23
PIF_VALUE: 21
PIF_VALUE: 3
PIF_VALUE: 22
PIF_VALUE: 25
PIF_VALUE: 23
PIF_VALUE: 25
PIF_VALUE: 9
PIF_VALUE: 32
PIF_VALUE: 22
PIF_VALUE: 24
PIF_VALUE: 0
PIF_VALUE: 23
PIF_VALUE: 22
PIF_VALUE: 22
PIF_VALUE: 10
PIF_VALUE: 22
PIF_VALUE: 25
PIF_VALUE: 21
PIF_VALUE: 22
PIF_VALUE: 25
PIF_VALUE: 22
PIF_VALUE: 25
PIF_VALUE: 22
PIF_VALUE: 20
PIF_VALUE: 21
PIF_VALUE: 27
PIF_VALUE: 23
PIF_VALUE: 24
PIF_VALUE: 20
PIF_VALUE: 21
PIF_VALUE: 22
PIF_VALUE: 27
PIF_VALUE: 21
PIF_VALUE: 25
PIF_VALUE: 23
PIF_VALUE: 25
PIF_VALUE: 21
PIF_VALUE: 25
PIF_VALUE: 14
PIF_VALUE: 26
PIF_VALUE: 7
PIF_VALUE: 9
PIF_VALUE: 0
PIF_VALUE: 22
PIF_VALUE: 25
PIF_VALUE: 21
PIF_VALUE: 25
PIF_VALUE: 3
PIF_VALUE: 22
PIF_VALUE: 25
PIF_VALUE: 22
PIF_VALUE: 23
PIF_VALUE: 21
PIF_VALUE: 22
PIF_VALUE: 25
PIF_VALUE: 22
PIF_VALUE: 20
PIF_VALUE: 22
PIF_VALUE: 23
PIF_VALUE: 22
PIF_VALUE: 8
PIF_VALUE: 22
PIF_VALUE: 23
PIF_VALUE: 21
PIF_VALUE: 22
PIF_VALUE: 21
PIF_VALUE: 22
PIF_VALUE: 14
PIF_VALUE: 25
PIF_VALUE: 23
PIF_VALUE: 22
PIF_VALUE: 27
PIF_VALUE: 21
PIF_VALUE: 21
PIF_VALUE: 22
PIF_VALUE: 22
PIF_VALUE: 25
PIF_VALUE: 22
PIF_VALUE: 21
PIF_VALUE: 26
PIF_VALUE: 22
PIF_VALUE: 20
PIF_VALUE: 22
PIF_VALUE: 22
PIF_VALUE: 24
PIF_VALUE: 25
PIF_VALUE: 12
PIF_VALUE: 22
PIF_VALUE: 20
PIF_VALUE: 22
PIF_VALUE: 25
PIF_VALUE: 24
PIF_VALUE: 24
PIF_VALUE: 22
PIF_VALUE: 25
PIF_VALUE: 22
PIF_VALUE: 27
PIF_VALUE: 22
PIF_VALUE: 22
PIF_VALUE: 24
PIF_VALUE: 22
PIF_VALUE: 18
PIF_VALUE: 24
PIF_VALUE: 23
PIF_VALUE: 22
PIF_VALUE: 22
PIF_VALUE: 24
PIF_VALUE: 25
PIF_VALUE: 23
PIF_VALUE: 3
PIF_VALUE: 22
PIF_VALUE: 24
PIF_VALUE: 21
PIF_VALUE: 23
PIF_VALUE: 23
PIF_VALUE: 24
PIF_VALUE: 22
PIF_VALUE: 24
PIF_VALUE: 24
PIF_VALUE: 23
PIF_VALUE: 22
PIF_VALUE: 22
PIF_VALUE: 21
PIF_VALUE: 22
PIF_VALUE: 25
PIF_VALUE: 10
PIF_VALUE: 23
PIF_VALUE: 23
PIF_VALUE: 22
PIF_VALUE: 24
PIF_VALUE: 22
PIF_VALUE: 22
PIF_VALUE: 21
PIF_VALUE: 20
PIF_VALUE: 24
PIF_VALUE: 25
PIF_VALUE: 22
PIF_VALUE: 28
PIF_VALUE: 22
PIF_VALUE: 23
PIF_VALUE: 22
PIF_VALUE: 25
PIF_VALUE: 22
PIF_VALUE: 23
PIF_VALUE: 22
PIF_VALUE: 24
PIF_VALUE: 22
PIF_VALUE: 22
PIF_VALUE: 3
PIF_VALUE: 23
PIF_VALUE: 22
PIF_VALUE: 22
PIF_VALUE: 24
PIF_VALUE: 23
PIF_VALUE: 23
PIF_VALUE: 22
PIF_VALUE: 23
PIF_VALUE: 22
PIF_VALUE: 23
PIF_VALUE: 18
PIF_VALUE: 22
PIF_VALUE: 5
PIF_VALUE: 25
PIF_VALUE: 24
PIF_VALUE: 22
PIF_VALUE: 23
PIF_VALUE: 21
PIF_VALUE: 25
PIF_VALUE: 25
PIF_VALUE: 21
PIF_VALUE: 23
PIF_VALUE: 22
PIF_VALUE: 24
PIF_VALUE: 25
PIF_VALUE: 24
PIF_VALUE: 21
PIF_VALUE: 23
PIF_VALUE: 21
PIF_VALUE: 22
PIF_VALUE: 23
PIF_VALUE: 22
PIF_VALUE: 23
PIF_VALUE: 20
PIF_VALUE: 24
PIF_VALUE: 22
PIF_VALUE: 23
PIF_VALUE: 24
PIF_VALUE: 22
PIF_VALUE: 26
PIF_VALUE: 24
PIF_VALUE: 3
PIF_VALUE: 26
PIF_VALUE: 27
PIF_VALUE: 22
PIF_VALUE: 34
PIF_VALUE: 22
PIF_VALUE: 14
PIF_VALUE: 22
PIF_VALUE: 10
PIF_VALUE: 12
PIF_VALUE: 22
PIF_VALUE: 25
PIF_VALUE: 22
PIF_VALUE: 22
PIF_VALUE: 20
PIF_VALUE: 22
PIF_VALUE: 21
PIF_VALUE: 24
PIF_VALUE: 26
PIF_VALUE: 21
PIF_VALUE: 22
PIF_VALUE: 21
PIF_VALUE: 22
PIF_VALUE: 26
PIF_VALUE: 22
PIF_VALUE: 25
PIF_VALUE: 22
PIF_VALUE: 0
PIF_VALUE: 21
PIF_VALUE: 24
PIF_VALUE: 25
PIF_VALUE: 23
PIF_VALUE: 22
PIF_VALUE: 22
PIF_VALUE: 29
PIF_VALUE: 25
PIF_VALUE: 21
PIF_VALUE: 24
PIF_VALUE: 3
PIF_VALUE: 19
PIF_VALUE: 20
PIF_VALUE: 22
PIF_VALUE: 26
PIF_VALUE: 26
PIF_VALUE: 22
PIF_VALUE: 22
PIF_VALUE: 24
PIF_VALUE: 21
PIF_VALUE: 20
PIF_VALUE: 22
PIF_VALUE: 22
PIF_VALUE: 24
PIF_VALUE: 25
PIF_VALUE: 22
PIF_VALUE: 21
PIF_VALUE: 25
PIF_VALUE: 22
PIF_VALUE: 22
PIF_VALUE: 23
PIF_VALUE: 12
PIF_VALUE: 21
PIF_VALUE: 20
PIF_VALUE: 25
PIF_VALUE: 25
PIF_VALUE: 26
PIF_VALUE: 21
PIF_VALUE: 22
PIF_VALUE: 23
PIF_VALUE: 25
PIF_VALUE: 27
PIF_VALUE: 26
PIF_VALUE: 24
PIF_VALUE: 25
PIF_VALUE: 22
PIF_VALUE: 23
PIF_VALUE: 21
PIF_VALUE: 25
PIF_VALUE: 24
PIF_VALUE: 23
PIF_VALUE: 22
PIF_VALUE: 3
PIF_VALUE: 24
PIF_VALUE: 23
PIF_VALUE: 25
PIF_VALUE: 22
PIF_VALUE: 22
PIF_VALUE: 25
PIF_VALUE: 21
PIF_VALUE: 29
PIF_VALUE: 22
PIF_VALUE: 23
PIF_VALUE: 24
PIF_VALUE: 20
PIF_VALUE: 23
PIF_VALUE: 22
PIF_VALUE: 23
PIF_VALUE: 22
PIF_VALUE: 25
PIF_VALUE: 21
PIF_VALUE: 25
PIF_VALUE: 22
PIF_VALUE: 22
PIF_VALUE: 25
PIF_VALUE: 22
PIF_VALUE: 26
PIF_VALUE: 3
PIF_VALUE: 24
PIF_VALUE: 22
PIF_VALUE: 20
PIF_VALUE: 23
PIF_VALUE: 0
PIF_VALUE: 22
PIF_VALUE: 22
PIF_VALUE: 23
PIF_VALUE: 22
PIF_VALUE: 27
PIF_VALUE: 26
PIF_VALUE: 22
PIF_VALUE: 22
PIF_VALUE: 24
PIF_VALUE: 22
PIF_VALUE: 26
PIF_VALUE: 4
PIF_VALUE: 18
PIF_VALUE: 23
PIF_VALUE: 25
PIF_VALUE: 20
PIF_VALUE: 26
PIF_VALUE: 22
PIF_VALUE: 23
PIF_VALUE: 19
PIF_VALUE: 24

## 2022-04-21 ASSESSMENT — PAIN DESCRIPTION - LOCATION
LOCATION: BACK
LOCATION: HIP;OTHER (COMMENT)
LOCATION: BACK

## 2022-04-21 ASSESSMENT — PAIN - FUNCTIONAL ASSESSMENT
PAIN_FUNCTIONAL_ASSESSMENT: PREVENTS OR INTERFERES WITH MANY ACTIVE NOT PASSIVE ACTIVITIES
PAIN_FUNCTIONAL_ASSESSMENT: PREVENTS OR INTERFERES SOME ACTIVE ACTIVITIES AND ADLS

## 2022-04-21 ASSESSMENT — PAIN SCALES - GENERAL
PAINLEVEL_OUTOF10: 7
PAINLEVEL_OUTOF10: 5
PAINLEVEL_OUTOF10: 4
PAINLEVEL_OUTOF10: 3
PAINLEVEL_OUTOF10: 0
PAINLEVEL_OUTOF10: 9
PAINLEVEL_OUTOF10: 5
PAINLEVEL_OUTOF10: 10
PAINLEVEL_OUTOF10: 5
PAINLEVEL_OUTOF10: 6
PAINLEVEL_OUTOF10: 6
PAINLEVEL_OUTOF10: 7

## 2022-04-21 ASSESSMENT — PAIN DESCRIPTION - ONSET
ONSET: ON-GOING
ONSET: ON-GOING

## 2022-04-21 ASSESSMENT — PAIN DESCRIPTION - DESCRIPTORS
DESCRIPTORS: ACHING
DESCRIPTORS: SHOOTING
DESCRIPTORS: SHARP
DESCRIPTORS: BURNING;ACHING
DESCRIPTORS: SHOOTING
DESCRIPTORS: SHOOTING

## 2022-04-21 ASSESSMENT — PAIN DESCRIPTION - ORIENTATION
ORIENTATION: MID
ORIENTATION: MID
ORIENTATION: RIGHT
ORIENTATION: MID

## 2022-04-21 ASSESSMENT — PAIN DESCRIPTION - PAIN TYPE
TYPE: CHRONIC PAIN
TYPE: SURGICAL PAIN

## 2022-04-21 ASSESSMENT — PAIN DESCRIPTION - FREQUENCY
FREQUENCY: CONTINUOUS
FREQUENCY: CONTINUOUS

## 2022-04-21 NOTE — ANESTHESIA PRE PROCEDURE
Department of Anesthesiology  Preprocedure Note       Name:  Mario Steel   Age:  80 y.o.  :  1940                                          MRN:  [id number]         Date:  2022      Surgeon: Krysten Montes):  Rachna Will. MD Lisa Helms MD    Procedure: Procedure(s):  L1-2, L2-3 EXTREME LATERAL INTERBODY FUSION, T10-PELVIS DECOMPRESSION, FIXATION AND FUSION, ILIAC WING SCREWS  . Medications prior to admission:   Prior to Admission medications    Medication Sig Start Date End Date Taking? Authorizing Provider   LORazepam (ATIVAN) 1 MG tablet Take 1 mg by mouth at bedtime. Yes Historical Provider, MD   HYDROcodone-acetaminophen (NORCO) 7.5-325 MG per tablet Take 1 tablet by mouth every 8 hours as needed for Pain for up to 30 days.  22  Yobany Knox, DO   alendronate (FOSAMAX) 70 MG tablet Take 1 tablet by mouth every 7 days 3/30/22   Yobany Knox, DO   Calcium Carbonate-Vitamin D (CALTRATE 600+D PO) Take 1 capsule by mouth in the morning, at noon, and at bedtime    Historical Provider, MD   omeprazole (PRILOSEC) 20 MG delayed release capsule TAKE 1 CAPSULE EVERY DAY 3/14/22   Yobany Knox, DO   triamterene-hydroCHLOROthiazide (MAXZIDE-25) 37.5-25 MG per tablet TAKE 1 TABLET EVERY DAY 3/13/22   Yobany Knox, DO   metoprolol tartrate (LOPRESSOR) 50 MG tablet TAKE 1 TABLET TWICE DAILY 3/13/22   Yobany Knox, DO   verapamil (CALAN SR) 120 MG extended release tablet TAKE 1 TABLET EVERY DAY 3/13/22   [facility]   meloxicam (MOBIC) 15 MG tablet TAKE 1 TABLET EVERY DAY 3/13/22   Yobany Knox, DO   vitamin E 1000 units capsule Take 180 Units by mouth daily     Historical Provider, MD   timolol (BETIMOL) 0.5 % ophthalmic solution 1 drop 2 times daily    Historical Provider, MD BRIGGS 0.01 % SOLN ophthalmic drops Place 1 drop into both eyes  3/30/17   Historical Provider, MD   docusate sodium (COLACE) 100 MG capsule Take 1 capsule by mouth 2 times daily 2/24/17   Teresa Kothari MD       Current medications:    Current Facility-Administered Medications   Medication Dose Route Frequency Provider Last Rate Last Admin    ceFAZolin (ANCEF) 2000 mg in dextrose 5 % 50 mL IVPB  2,000 mg IntraVENous Once Jose Zayas MD        lactated ringers infusion   IntraVENous Continuous Lupe Rich DO           Allergies:     Allergies   Allergen Reactions    Latex Itching and Rash    Bactrim Rash       Problem List:    Patient Active Problem List   Diagnosis Code    Paroxysmal supraventricular tachycardia (HCC) I47.1    Foot drop, left M21.372    Anxiety F41.9    PUD (peptic ulcer disease) K27.9    Restless leg syndrome G25.81    GERD without esophagitis K21.9    Primary osteoarthritis involving multiple joints M89.49    Non morbid obesity due to excess calories E66.09    Primary insomnia F51.01    Coronary artery disease involving native coronary artery without angina pectoris I25.10    Essential hypertension I10    Intrinsic eczema L20.84    Chronic midline low back pain without sciatica M54.50, G89.29    Anesthesia complication I75.45NU    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    Postmenopausal status Z78.0    Seborrheic keratosis L82.1    Grade IV hemorrhoids K64.3    Glaucoma H40.9    Internal hemorrhoids K64.8    Positive PPD R76.11    Chronic kidney disease, stage 3b (HCC) N18.32    Osteoporosis of multiple sites M81.0       Past Medical History:        Diagnosis Date    Anesthesia complication     difficulty waking up after breast biopsy    Anxiety     Chronic kidney disease, stage 3b (HCC)     Chronic midline low back pain without sciatica     Coronary artery disease involving native coronary artery without angina pectoris     Essential hypertension     Foot drop, left     GERD without esophagitis     History of blood transfusion     Internal hemorrhoids     Intrinsic eczema     Lesion of lateral popliteal nerve     Non morbid obesity due to excess calories     Osteoporosis of multiple sites     Paroxysmal supraventricular tachycardia (HCC)     Positive PPD 06/2020    repeated --second PPD test came back negative    Postmenopausal status     Primary insomnia     Primary osteoarthritis involving multiple joints     Prolonged emergence from general anesthesia     PUD (peptic ulcer disease)     Restless leg syndrome     Seborrheic keratosis        Past Surgical History:        Procedure Laterality Date    ABDOMINAL EXPLORATION SURGERY  2000    Lysis of Adhesions    BREAST SURGERY Right     Biopsy    CARPAL TUNNEL RELEASE Right 11/03/2020    RIGHT CARPAL TUNNEL RELEASE performed by Kalia Hamlin MD at [address] [place] [address] Right 08/24/2021    CATARACT REMOVAL WITH IMPLANT Left 09/07/2021    Dr. Sophy Vieira      from back   [facility] SURGERY  02/24/2017    HYSTERECTOMY      Complete    INTRACAPSULAR CATARACT EXTRACTION Right 08/24/2021    PHACOEMULSIFICATION WITH INTRAOCULAR LENS IMPLANT RIGHT EYE, performed by Kiki Carroll MD at [place] [address] Left 9/7/2021    PHACOEMULSIFICATION WITH INTRAOCULAR LENS IMPLANT - LEFT EYE performed by Kiki Carroll MD at [address] [place] [address]      x  with fusion    PAIN MANAGEMENT PROCEDURE Bilateral 12/8/2021    BILATERAL L4 TRANSFORMANIAL EPIDURAL STEROID INJECTION WITH FLUOROSCOPY performed by Joshua Marcelo MD at [facility] ARTHROSCOPY Right     Right shoulder scope 2) Right shoulder labral debridement 3) Right shoulder Open Miguel 4) Right shoulder rotator cuff repair    TOTAL HIP ARTHROPLASTY Bilateral     TOTAL KNEE ARTHROPLASTY Left 02/03/2015    Dr. Annemarie Ramírez UPPER GASTROINTESTINAL ENDOSCOPY N/A 03/10/2020    EGD ESOPHAGOGASTRODUODENOSCOPY performed by Kailey Kwon MD at [address] [facility]         Social History:    Social History     Tobacco Use    Smoking status: Former Smoker     Packs/day: 1.00     Types: Cigarettes     Quit date: 1997     Years since quittin.3    Smokeless tobacco: Never Used   Substance Use Topics    Alcohol use: Yes     Alcohol/week: 2.0 standard drinks     Types: 1 Glasses of wine, 1 Cans of beer per week     Comment: Rare                                Counseling given: Not Answered      Vital Signs (Current):   Vitals:    22 1044   Weight: 226 lb (102.5 kg)   Height: 5' 4\" (1.626 m)                                              BP Readings from Last 3 Encounters:   22 128/76   22 138/74   22 138/72       NPO Status:                                                                                 BMI:   Wt Readings from Last 3 Encounters:   22 226 lb (102.5 kg)   22 228 lb (103.4 kg)   22 226 lb 9.6 oz (102.8 kg)     Body mass index is 38.79 kg/m². CBC:   Lab Results   Component Value Date    WBC 5.6 2022    RBC 3.93 2022    HGB 10.6 2022    HCT 32.7 2022    MCV 83.2 2022    RDW 15.5 2022     2022       CMP:   Lab Results   Component Value Date     2022    K 4.5 2022     2022    CO2 23 2022    BUN 28 2022    CREATININE 1.5 2022    GFRAA 40 2022    GFRAA 57 2012    LABGLOM 33 2022    GLUCOSE 86 2022    CALCIUM 10.0 2022    AST 19 2012    ALT 16 2012       POC Tests: No results for input(s): POCGLU, POCNA, POCK, POCCL, POCBUN, POCHEMO, POCHCT in the last 72 hours.     Coags:   Lab Results   Component Value Date    PROTIME 11.8 2022    INR 1.04 2022    APTT 32.7 2022       HCG (If Applicable): No results found for: PREGTESTUR, PREGSERUM, HCG, HCGQUANT     ABGs: No results found for: PHART, PO2ART, GDX5DQC, UDG9GWX, BEART, E8SFTZBO     Type & Screen (If Applicable):  No results found for: LABABO, LABRH    Drug/Infectious Status (If Applicable):  No results found for: HIV, HEPCAB    COVID-19 Screening (If Applicable):   Lab Results   Component Value Date    COVID19 NOT DETECTED 10/28/2020           Anesthesia Evaluation  Patient summary reviewed and Nursing notes reviewed  Airway: Mallampati: III  TM distance: >3 FB   Neck ROM: full  Mouth opening: > = 3 FB Dental: normal exam         Pulmonary:normal exam                               Cardiovascular:  Exercise tolerance: good (>4 METS),   (+) hypertension:, CAD:, dysrhythmias (NO occurance for 20 yrs): SVT,     (-)  angina, orthopnea and PND    ECG reviewed  Rhythm: regular  Rate: normal           Beta Blocker:  Dose within 24 Hrs         Neuro/Psych:   (+) neuromuscular disease:,             GI/Hepatic/Renal:   (+) GERD:, PUD,           Endo/Other:                     Abdominal:   (+) obese,     Abdomen: soft. Vascular: Other Findings:             Anesthesia Plan      general     ASA 3       Induction: intravenous. MIPS: Postoperative opioids intended and Prophylactic antiemetics administered. Anesthetic plan and risks discussed with patient. Plan discussed with CRNA and attending.                   Eryn Osorio DO   4/21/2022

## 2022-04-21 NOTE — H&P
Junior Gale    [id number]    [facility] Same Day Surgery Update H & P  Department of General Surgery   Surgical Service   Pre-operative History and Physical  Last H & P within the last 30 days. DIAGNOSIS:   Kyphosis, unspecified kyphosis type, unspecified spinal region [M40.209]    Procedure(s):  L1-2, L2-3 EXTREME LATERAL INTERBODY FUSION, T10-PELVIS DECOMPRESSION, FIXATION AND FUSION, ILIAC WING SCREWS  . History obtained from: Patient interview and EHR      HISTORY OF PRESENT ILLNESS:   Patient is a 80 y.o. female with c/o chronic lumbar pain, radiating distally into BLE, right worse than left. Imaging demonstrates moderate-sized right paracentral disc extrusion at L1-L2, and left paracentral disc extrusion at T9-T10. She presents today for the above procedures with Sharon Cardona and Meagan Nogueira.     Illness screening:  Patient denies recent fever, chills, worsening cough, or known sick exposure     Past Medical History:        Diagnosis Date    Anesthesia complication     difficulty waking up after breast biopsy    Anxiety     Chronic kidney disease, stage 3b (HCC)     Chronic midline low back pain without sciatica     Coronary artery disease involving native coronary artery without angina pectoris     Essential hypertension     Foot drop, left     GERD without esophagitis     History of blood transfusion     Internal hemorrhoids     Intrinsic eczema     Lesion of lateral popliteal nerve     Non morbid obesity due to excess calories     Osteoporosis of multiple sites     Paroxysmal supraventricular tachycardia (HCC)     Positive PPD 06/2020    repeated --second PPD test came back negative    Postmenopausal status     Primary insomnia     Primary osteoarthritis involving multiple joints     Prolonged emergence from general anesthesia     PUD (peptic ulcer disease)     Restless leg syndrome     Seborrheic keratosis      Past Surgical History:        Procedure Laterality Date    ABDOMINAL EXPLORATION SURGERY  2000    Lysis of Adhesions    BREAST SURGERY Right     Biopsy    CARPAL TUNNEL RELEASE Right 11/03/2020    RIGHT CARPAL TUNNEL RELEASE performed by Joana Dandy, MD at [address] Right 08/24/2021    CATARACT REMOVAL WITH IMPLANT Left 09/07/2021    Dr. Laura Torres      from back   [address] [facility] SURGERY  02/24/2017    HYSTERECTOMY      Complete    INTRACAPSULAR CATARACT EXTRACTION Right 08/24/2021    PHACOEMULSIFICATION WITH INTRAOCULAR LENS IMPLANT RIGHT EYE, performed by Edgar William MD at [place] [address] Left 9/7/2021    PHACOEMULSIFICATION WITH INTRAOCULAR LENS IMPLANT - LEFT EYE performed by Edgar William MD at [facility]      x 3 with fusion    PAIN MANAGEMENT PROCEDURE Bilateral 12/8/2021    BILATERAL L4 TRANSFORMANIAL EPIDURAL STEROID INJECTION WITH FLUOROSCOPY performed by Indira Lopez MD at [facility] ARTHROSCOPY Right     Right shoulder scope 2) Right shoulder labral debridement 3) Right shoulder Open Miguel 4) Right shoulder rotator cuff repair    TOTAL HIP ARTHROPLASTY Bilateral     TOTAL KNEE ARTHROPLASTY Left 02/03/2015    Dr. Alni Murphy UPPER GASTROINTESTINAL ENDOSCOPY N/A 03/10/2020    EGD ESOPHAGOGASTRODUODENOSCOPY performed by Shelby Sanders MD at [address] [facility]         Medications Prior to Admission:      Prior to Admission medications    Medication Sig Start Date End Date Taking? Authorizing Provider   LORazepam (ATIVAN) 1 MG tablet Take 1 mg by mouth at bedtime. Yes Historical Provider, MD   HYDROcodone-acetaminophen (NORCO) 7.5-325 MG per tablet Take 1 tablet by mouth every 8 hours as needed for Pain for up to 30 days.  4/11/22 5/11/22  Chanell Motta DO   alendronate (FOSAMAX) 70 MG tablet Take 1 tablet by mouth every 7 days 3/30/22   Ann-Marie Puffer, DO   Calcium Carbonate-Vitamin D (CALTRATE 600+D PO) Take 1 capsule by mouth in the morning, at noon, and at bedtime    Historical Provider, MD   omeprazole (PRILOSEC) 20 MG delayed release capsule TAKE 1 CAPSULE EVERY DAY 3/14/22   [place] Puffer, DO   triamterene-hydroCHLOROthiazide (MAXZIDE-25) 37.5-25 MG per tablet TAKE 1 TABLET EVERY DAY 3/13/22   [place] Puffer, DO   metoprolol tartrate (LOPRESSOR) 50 MG tablet TAKE 1 TABLET TWICE DAILY 3/13/22   Ann-Marie Puffer, DO   verapamil (CALAN SR) 120 MG extended release tablet TAKE 1 TABLET EVERY DAY 3/13/22   Ann-Marie Puffer, DO   meloxicam (MOBIC) 15 MG tablet TAKE 1 TABLET EVERY DAY 3/13/22   [place] Puffer, DO   vitamin E 1000 units capsule Take 180 Units by mouth daily     Historical Provider, MD   timolol (BETIMOL) 0.5 % ophthalmic solution 1 drop 2 times daily    Historical Provider, MD   LUMIGAN 0.01 % SOLN ophthalmic drops Place 1 drop into both eyes  3/30/17   Historical Provider, MD   docusate sodium (COLACE) 100 MG capsule Take 1 capsule by mouth 2 times daily 2/24/17   Michelle Luevano MD         Allergies:  Latex and Bactrim    PHYSICAL EXAM:      BP (!) 146/70 Comment: Hx HTN  Pulse 70   Temp 97.2 °F (36.2 °C) (Temporal)   Resp 18   Ht 5' 4\" (1.626 m)   Wt 213 lb 6.4 oz (96.8 kg)   SpO2 98%   BMI 36.63 kg/m²      Airway:  Airway patent with no audible stridor. Heart:  Regular rate and rhythm. No murmur noted. Lungs:  No increased work of breathing, good air exchange, clear to auscultation bilaterally, no crackles or wheezing. Abdomen:  Soft, non-distended, non-tender, no rebound tenderness or guarding, and no masses palpated. ASSESSMENT AND PLAN     Patient is a 80 y.o. female with above specified procedure planned. 1.  The patients history and physical was obtained and signed off by the pre-admission testing department.   Patient seen and focused

## 2022-04-21 NOTE — PROGRESS NOTES
Neurosurgery    L1-2, L2-3 EXTREME LATERAL INTERBODY FUSION, T10-PELVIS DECOMPRESSION, FIXATION AND FUSION, ILIAC WING SCREWS - Left  General  Dr Nelia Haney    Current Allergies: Latex and Bactrim    No results for input(s): POCGLU in the last 72 hours. Admitted to PACU bed 10 from OR. Arrived on a ICU bed . Attached to PACU monitoring system. Alarms and parameters set. Report received from anesthesia personnel [facility]. OR staff did report skin issues that were observed while in OR lips put lotion on them  No problems reported intraoperatively. Pt arrived with oxygen per nasal cannula with oxygen at 3 liters. Athrombic wraps in place placed machine on. Doctors aware of all labs before coming to recovery.

## 2022-04-21 NOTE — PROGRESS NOTES
Patient blood pressure cuff and A-line not correlating  Call out to Dr Sony Kay Labetalol 10 mg  For blood pressure with A-line  Dr Stuart Fabian said to use Blood Pressure cuff  Gave Dilaudid  0.5 mg IV at 18:15  Gave additional dose of Dilaudid at 0.5 mg at 18:30    Call out to Jazlyn ALCALA for orders  Gave Verbal to transfer to ICU  Fluids and Robaxin   Every hour neuro checks  Empty hemovac every shift  Unable to find the order set   Abhilash Son will get someone to put in the orders that are needed    Dilaudid 0.5 mg IV given at 19:45 for trip up to ICU    Spoke with daughter Rcahel Sahu will go up to ICU waiting room

## 2022-04-21 NOTE — ANESTHESIA POSTPROCEDURE EVALUATION
Department of Anesthesiology  Postprocedure Note    Patient: Sebastián Delgado  MRN: [id number]  YOB: 1940  Date of evaluation: 4/21/2022  Time:  7:06 PM     Procedure Summary     Date: 04/21/22 Room / Location: [facility]    Anesthesia Start: 1004 Anesthesia Stop: 2696    Procedures:       L1-2, L2-3 EXTREME LATERAL INTERBODY FUSION, T10-PELVIS DECOMPRESSION, FIXATION AND FUSION, ILIAC WING SCREWS (Left Spine Lumbar)      . (N/A Spine Thoracic) Diagnosis:       Kyphosis, unspecified kyphosis type, unspecified spinal region      (Kyphosis, unspecified kyphosis type, unspecified spinal region [M40.209])    Surgeons: Heaven Zayas MD Responsible Provider: Myra Jalloh DO    Anesthesia Type: general ASA Status: 3          Anesthesia Type: general    Bandar Phase I: Bandar Score: 8    Bandar Phase II:      Last vitals: Reviewed and per EMR flowsheets.        Anesthesia Post Evaluation    Patient location during evaluation: PACU  Patient participation: complete - patient participated  Level of consciousness: awake and alert  Airway patency: patent  Nausea & Vomiting: no nausea and no vomiting  Cardiovascular status: blood pressure returned to baseline  Respiratory status: acceptable  Hydration status: euvolemic

## 2022-04-22 ENCOUNTER — APPOINTMENT (OUTPATIENT)
Dept: CT IMAGING | Age: 82
DRG: 454 | End: 2022-04-22
Attending: NEUROLOGICAL SURGERY
Payer: MEDICARE

## 2022-04-22 LAB
ANION GAP SERPL CALCULATED.3IONS-SCNC: 14 MMOL/L (ref 3–16)
BUN BLDV-MCNC: 19 MG/DL (ref 7–20)
CALCIUM SERPL-MCNC: 8.4 MG/DL (ref 8.3–10.6)
CHLORIDE BLD-SCNC: 103 MMOL/L (ref 99–110)
CO2: 22 MMOL/L (ref 21–32)
CREAT SERPL-MCNC: 1.1 MG/DL (ref 0.6–1.2)
GFR AFRICAN AMERICAN: 58
GFR NON-AFRICAN AMERICAN: 48
GLUCOSE BLD-MCNC: 120 MG/DL (ref 70–99)
GLUCOSE BLD-MCNC: 124 MG/DL (ref 70–99)
HCT VFR BLD CALC: 23.2 % (ref 36–48)
HCT VFR BLD CALC: 25 % (ref 36–48)
HCT VFR BLD CALC: 25.5 % (ref 36–48)
HEMOGLOBIN: 7.6 G/DL (ref 12–16)
HEMOGLOBIN: 8 G/DL (ref 12–16)
HEMOGLOBIN: 8.5 G/DL (ref 12–16)
MCH RBC QN AUTO: 27.3 PG (ref 26–34)
MCHC RBC AUTO-ENTMCNC: 33.2 G/DL (ref 31–36)
MCV RBC AUTO: 82.4 FL (ref 80–100)
PDW BLD-RTO: 15.2 % (ref 12.4–15.4)
PERFORMED ON: ABNORMAL
PLATELET # BLD: 129 K/UL (ref 135–450)
PMV BLD AUTO: 8.6 FL (ref 5–10.5)
POTASSIUM SERPL-SCNC: 3.9 MMOL/L (ref 3.5–5.1)
RBC # BLD: 3.09 M/UL (ref 4–5.2)
SODIUM BLD-SCNC: 139 MMOL/L (ref 136–145)
WBC # BLD: 12.3 K/UL (ref 4–11)

## 2022-04-22 PROCEDURE — 6360000002 HC RX W HCPCS: Performed by: NEUROLOGICAL SURGERY

## 2022-04-22 PROCEDURE — 36415 COLL VENOUS BLD VENIPUNCTURE: CPT

## 2022-04-22 PROCEDURE — 6370000000 HC RX 637 (ALT 250 FOR IP): Performed by: NURSE PRACTITIONER

## 2022-04-22 PROCEDURE — 6370000000 HC RX 637 (ALT 250 FOR IP): Performed by: NEUROLOGICAL SURGERY

## 2022-04-22 PROCEDURE — 2580000003 HC RX 258: Performed by: NURSE PRACTITIONER

## 2022-04-22 PROCEDURE — 6360000002 HC RX W HCPCS: Performed by: NURSE PRACTITIONER

## 2022-04-22 PROCEDURE — 2000000000 HC ICU R&B

## 2022-04-22 PROCEDURE — 85018 HEMOGLOBIN: CPT

## 2022-04-22 PROCEDURE — 2580000003 HC RX 258: Performed by: NEUROLOGICAL SURGERY

## 2022-04-22 PROCEDURE — 97530 THERAPEUTIC ACTIVITIES: CPT

## 2022-04-22 PROCEDURE — 2580000003 HC RX 258: Performed by: PHYSICIAN ASSISTANT

## 2022-04-22 PROCEDURE — 72131 CT LUMBAR SPINE W/O DYE: CPT

## 2022-04-22 PROCEDURE — 97162 PT EVAL MOD COMPLEX 30 MIN: CPT

## 2022-04-22 PROCEDURE — 97535 SELF CARE MNGMENT TRAINING: CPT

## 2022-04-22 PROCEDURE — 97166 OT EVAL MOD COMPLEX 45 MIN: CPT

## 2022-04-22 PROCEDURE — 80048 BASIC METABOLIC PNL TOTAL CA: CPT

## 2022-04-22 PROCEDURE — 85027 COMPLETE CBC AUTOMATED: CPT

## 2022-04-22 PROCEDURE — 6360000002 HC RX W HCPCS

## 2022-04-22 PROCEDURE — 85014 HEMATOCRIT: CPT

## 2022-04-22 RX ORDER — METOCLOPRAMIDE HYDROCHLORIDE 5 MG/ML
10 INJECTION INTRAMUSCULAR; INTRAVENOUS EVERY 6 HOURS
Status: DISCONTINUED | OUTPATIENT
Start: 2022-04-22 | End: 2022-04-22

## 2022-04-22 RX ORDER — ACETAMINOPHEN 500 MG
1000 TABLET ORAL EVERY 6 HOURS
Status: DISCONTINUED | OUTPATIENT
Start: 2022-04-22 | End: 2022-04-26 | Stop reason: HOSPADM

## 2022-04-22 RX ORDER — ONDANSETRON 4 MG/1
4 TABLET, ORALLY DISINTEGRATING ORAL EVERY 8 HOURS PRN
Status: DISCONTINUED | OUTPATIENT
Start: 2022-04-22 | End: 2022-04-26 | Stop reason: HOSPADM

## 2022-04-22 RX ORDER — SODIUM CHLORIDE 0.9 % (FLUSH) 0.9 %
5-40 SYRINGE (ML) INJECTION EVERY 12 HOURS SCHEDULED
Status: DISCONTINUED | OUTPATIENT
Start: 2022-04-22 | End: 2022-04-26 | Stop reason: HOSPADM

## 2022-04-22 RX ORDER — SODIUM CHLORIDE 9 MG/ML
INJECTION, SOLUTION INTRAVENOUS PRN
Status: DISCONTINUED | OUTPATIENT
Start: 2022-04-22 | End: 2022-04-26 | Stop reason: HOSPADM

## 2022-04-22 RX ORDER — METHOCARBAMOL 750 MG/1
750 TABLET, FILM COATED ORAL EVERY 6 HOURS PRN
Status: DISCONTINUED | OUTPATIENT
Start: 2022-04-22 | End: 2022-04-26 | Stop reason: HOSPADM

## 2022-04-22 RX ORDER — METOCLOPRAMIDE HYDROCHLORIDE 5 MG/ML
5 INJECTION INTRAMUSCULAR; INTRAVENOUS EVERY 6 HOURS PRN
Status: DISCONTINUED | OUTPATIENT
Start: 2022-04-22 | End: 2022-04-26 | Stop reason: HOSPADM

## 2022-04-22 RX ORDER — METOCLOPRAMIDE HYDROCHLORIDE 5 MG/ML
10 INJECTION INTRAMUSCULAR; INTRAVENOUS EVERY 6 HOURS PRN
Status: DISCONTINUED | OUTPATIENT
Start: 2022-04-22 | End: 2022-04-22

## 2022-04-22 RX ORDER — ONDANSETRON 2 MG/ML
4 INJECTION INTRAMUSCULAR; INTRAVENOUS EVERY 6 HOURS PRN
Status: DISCONTINUED | OUTPATIENT
Start: 2022-04-22 | End: 2022-04-26 | Stop reason: HOSPADM

## 2022-04-22 RX ORDER — LIDOCAINE 4 G/G
1 PATCH TOPICAL DAILY
Status: DISCONTINUED | OUTPATIENT
Start: 2022-04-22 | End: 2022-04-26 | Stop reason: HOSPADM

## 2022-04-22 RX ORDER — SODIUM CHLORIDE 0.9 % (FLUSH) 0.9 %
5-40 SYRINGE (ML) INJECTION PRN
Status: DISCONTINUED | OUTPATIENT
Start: 2022-04-22 | End: 2022-04-26 | Stop reason: HOSPADM

## 2022-04-22 RX ADMIN — HYDROMORPHONE HYDROCHLORIDE 0.25 MG: 1 INJECTION, SOLUTION INTRAMUSCULAR; INTRAVENOUS; SUBCUTANEOUS at 22:30

## 2022-04-22 RX ADMIN — METOCLOPRAMIDE HYDROCHLORIDE 10 MG: 5 INJECTION INTRAMUSCULAR; INTRAVENOUS at 01:09

## 2022-04-22 RX ADMIN — SODIUM CHLORIDE, PRESERVATIVE FREE 10 ML: 5 INJECTION INTRAVENOUS at 20:12

## 2022-04-22 RX ADMIN — CEFAZOLIN SODIUM 2000 MG: 10 INJECTION, POWDER, FOR SOLUTION INTRAVENOUS at 06:25

## 2022-04-22 RX ADMIN — ACETAMINOPHEN 650 MG: 325 TABLET ORAL at 02:55

## 2022-04-22 RX ADMIN — SENNOSIDES AND DOCUSATE SODIUM 1 TABLET: 50; 8.6 TABLET ORAL at 08:46

## 2022-04-22 RX ADMIN — ENOXAPARIN SODIUM 40 MG: 100 INJECTION SUBCUTANEOUS at 08:46

## 2022-04-22 RX ADMIN — HYDROMORPHONE HYDROCHLORIDE 0.5 MG: 1 INJECTION, SOLUTION INTRAMUSCULAR; INTRAVENOUS; SUBCUTANEOUS at 05:31

## 2022-04-22 RX ADMIN — TIMOLOL MALEATE 1 DROP: 5 SOLUTION OPHTHALMIC at 08:47

## 2022-04-22 RX ADMIN — SENNOSIDES AND DOCUSATE SODIUM 1 TABLET: 50; 8.6 TABLET ORAL at 20:11

## 2022-04-22 RX ADMIN — OXYCODONE 10 MG: 5 TABLET ORAL at 16:00

## 2022-04-22 RX ADMIN — METOPROLOL TARTRATE 50 MG: 50 TABLET, FILM COATED ORAL at 08:46

## 2022-04-22 RX ADMIN — METHOCARBAMOL 750 MG: 750 TABLET ORAL at 02:55

## 2022-04-22 RX ADMIN — METOCLOPRAMIDE HYDROCHLORIDE 5 MG: 5 INJECTION INTRAMUSCULAR; INTRAVENOUS at 20:11

## 2022-04-22 RX ADMIN — TRIAMTERENE AND HYDROCHLOROTHIAZIDE 1 TABLET: 37.5; 25 TABLET ORAL at 08:47

## 2022-04-22 RX ADMIN — METHOCARBAMOL 750 MG: 750 TABLET ORAL at 10:24

## 2022-04-22 RX ADMIN — METHOCARBAMOL 1000 MG: 100 INJECTION, SOLUTION INTRAMUSCULAR; INTRAVENOUS at 12:57

## 2022-04-22 RX ADMIN — METHOCARBAMOL 750 MG: 750 TABLET ORAL at 18:02

## 2022-04-22 RX ADMIN — HYDROMORPHONE HYDROCHLORIDE 0.25 MG: 1 INJECTION, SOLUTION INTRAMUSCULAR; INTRAVENOUS; SUBCUTANEOUS at 01:16

## 2022-04-22 RX ADMIN — DOCUSATE SODIUM 100 MG: 100 CAPSULE, LIQUID FILLED ORAL at 20:11

## 2022-04-22 RX ADMIN — DOCUSATE SODIUM 100 MG: 100 CAPSULE, LIQUID FILLED ORAL at 08:46

## 2022-04-22 RX ADMIN — METHOCARBAMOL 1000 MG: 100 INJECTION, SOLUTION INTRAMUSCULAR; INTRAVENOUS at 05:15

## 2022-04-22 RX ADMIN — ACETAMINOPHEN 1000 MG: 500 TABLET ORAL at 16:00

## 2022-04-22 RX ADMIN — CEFAZOLIN SODIUM 2000 MG: 10 INJECTION, POWDER, FOR SOLUTION INTRAVENOUS at 22:33

## 2022-04-22 RX ADMIN — CEFAZOLIN SODIUM 2000 MG: 10 INJECTION, POWDER, FOR SOLUTION INTRAVENOUS at 15:00

## 2022-04-22 RX ADMIN — ACETAMINOPHEN 650 MG: 325 TABLET ORAL at 08:46

## 2022-04-22 RX ADMIN — DIAZEPAM 5 MG: 5 TABLET ORAL at 02:55

## 2022-04-22 RX ADMIN — ACETAMINOPHEN 1000 MG: 500 TABLET ORAL at 21:00

## 2022-04-22 RX ADMIN — ONDANSETRON 4 MG: 2 INJECTION INTRAMUSCULAR; INTRAVENOUS at 08:46

## 2022-04-22 RX ADMIN — ONDANSETRON 4 MG: 2 INJECTION INTRAMUSCULAR; INTRAVENOUS at 00:32

## 2022-04-22 RX ADMIN — METOPROLOL TARTRATE 50 MG: 50 TABLET, FILM COATED ORAL at 20:11

## 2022-04-22 RX ADMIN — LATANOPROST 1 DROP: 50 SOLUTION OPHTHALMIC at 20:12

## 2022-04-22 RX ADMIN — TIMOLOL MALEATE 1 DROP: 5 SOLUTION OPHTHALMIC at 20:12

## 2022-04-22 RX ADMIN — PANTOPRAZOLE SODIUM 40 MG: 40 TABLET, DELAYED RELEASE ORAL at 08:46

## 2022-04-22 ASSESSMENT — PAIN DESCRIPTION - DESCRIPTORS
DESCRIPTORS: DISCOMFORT
DESCRIPTORS: DISCOMFORT
DESCRIPTORS: ACHING;DISCOMFORT
DESCRIPTORS: DISCOMFORT

## 2022-04-22 ASSESSMENT — PAIN DESCRIPTION - PAIN TYPE
TYPE: ACUTE PAIN;SURGICAL PAIN

## 2022-04-22 ASSESSMENT — PAIN SCALES - GENERAL
PAINLEVEL_OUTOF10: 3
PAINLEVEL_OUTOF10: 8
PAINLEVEL_OUTOF10: 6
PAINLEVEL_OUTOF10: 3
PAINLEVEL_OUTOF10: 6
PAINLEVEL_OUTOF10: 7
PAINLEVEL_OUTOF10: 7
PAINLEVEL_OUTOF10: 3
PAINLEVEL_OUTOF10: 5
PAINLEVEL_OUTOF10: 0
PAINLEVEL_OUTOF10: 6

## 2022-04-22 ASSESSMENT — PAIN - FUNCTIONAL ASSESSMENT
PAIN_FUNCTIONAL_ASSESSMENT: PREVENTS OR INTERFERES SOME ACTIVE ACTIVITIES AND ADLS
PAIN_FUNCTIONAL_ASSESSMENT: ACTIVITIES ARE NOT PREVENTED
PAIN_FUNCTIONAL_ASSESSMENT: ACTIVITIES ARE NOT PREVENTED

## 2022-04-22 ASSESSMENT — PAIN DESCRIPTION - ONSET: ONSET: ON-GOING

## 2022-04-22 ASSESSMENT — PAIN DESCRIPTION - LOCATION
LOCATION: BACK

## 2022-04-22 ASSESSMENT — PAIN DESCRIPTION - ORIENTATION
ORIENTATION: MID

## 2022-04-22 ASSESSMENT — PAIN DESCRIPTION - FREQUENCY: FREQUENCY: CONTINUOUS

## 2022-04-22 NOTE — PROGRESS NOTES
4 Eyes Admission Assessment     I agree as the admission nurse that 2 RN's have performed a thorough Head to Toe Skin Assessment on the patient. ALL assessment sites listed below have been assessed on admission. Areas assessed by both nurses:   [x]   Head, Face, and Ears   [x]   Shoulders, Back, and Chest  [x]   Arms, Elbows, and Hands   [x]   Coccyx, Sacrum, and Ischum  [x]   Legs, Feet, and Heels        Does the Patient have Skin Breakdown?   No         Hemanth Prevention initiated:  Yes   Wound Care Orders initiated:  NA      [facility] nurse consulted for Pressure Injury (Stage 3,4, Unstageable, DTI, NWPT, and Complex wounds):  NA      Nurse 1 eSignature: Electronically signed by Paola Mccoy RN on 4/22/22 at 3:03 AM EDT    **SHARE this note so that the co-signing nurse is able to place an eSignature**    Nurse 2 eSignature: Electronically signed by Agapito Moritz, RN on 4/22/22 at 3:04 AM EDT

## 2022-04-22 NOTE — PLAN OF CARE
Problem: Pain  Goal: Verbalizes/displays adequate comfort level or baseline comfort level  Outcome: Progressing     Problem: Safety - Adult  Goal: Free from fall injury  Outcome: Progressing     Problem: Skin/Tissue Integrity  Goal: Absence of new skin breakdown  Description: 1. Monitor for areas of redness and/or skin breakdown  2. Assess vascular access sites hourly  3. Every 4-6 hours minimum:  Change oxygen saturation probe site  4. Every 4-6 hours:  If on nasal continuous positive airway pressure, respiratory therapy assess nares and determine need for appliance change or resting period.   Outcome: Progressing     Problem: ABCDS Injury Assessment  Goal: Absence of physical injury  Outcome: Progressing

## 2022-04-22 NOTE — PROGRESS NOTES
PACU Transfer to Floor Note ICU #[id number]    Procedure(s):  L1-2, L2-3 EXTREME LATERAL INTERBODY FUSION, T10-PELVIS DECOMPRESSION, FIXATION AND FUSION, ILIAC WING SCREWS  . Dr Alvino Summers  Current Allergies: Latex and Bactrim    Pt meets criteria as per Teofilo Score and ASPAN Standards to transfer to next phase of care. No results for input(s): POCGLU in the last 72 hours. Vitals:    04/21/22 2030   BP: 135/73   Pulse: 72   Resp: 18   Temp: 97.5 °F (36.4 °C)   SpO2: 100%     Vitals within 20% of pt's admission vitals as per TEOFILO SCORE    SpO2: 100 %    O2 Flow Rate (L/min): 2 L/min      Intake/Output Summary (Last 24 hours) at 4/21/2022 2055  Last data filed at 4/21/2022 2030  Gross per 24 hour   Intake 4630 ml   Output 1880 ml   Net 2750 ml       Pain assessment:  receiving treatment    Pain Level: 5    Patient was assessed for alterations to skin integrity. There were not alterations observed. Is patient incontinent: no  [place]ri[place] to gravity    Handoff report given at bedside.  Luiz Baum RN ICU  Family updated and directed to pt room took in to meet RN      4/21/2022 8:55 PM

## 2022-04-22 NOTE — OP NOTE
Operative Report    PATIENT NAME: Neftali Ayala OF BIRTH: 1940  MEDICAL RECORD# [id number]  SURGERY DATE: 4/21/2022  SURGEON:  Deni Ca MD, PhD  DICTATED BY:  Deni Ca MD            PRE-OPERATIVE DIAGNOSIS:   1. Severe spondylosis T10-S1, adult degenerative kyphoscoliosis  2. Adjacent segment degeneration, severe central and foraminal stenosis     POST-OPERATIVE DIAGNOSIS:   1. Same     SURGEON: Al Boudreaux M.D., Ph.D.      ANESTHESIA: GETA     ESTIMATED BLOOD LOSS:  700 mL     PROCEDURES PERFORMED: Lateral Approach  1.  Left-sided far lateral approach to the L2-3 interspace for far lateral discectomy. 2.  Placement of lateral Synthes [place] LS PEEK interbody cages packed with Infuse/Fibergraft into the L2-3 interspace  3. Left-sided far lateral approach to the L1-2 interspace for far lateral discectomy. 4.  Placement of lateral Synthes [place] LS PEEK interbody cages packed with Infuse/Fibergraft into the L1-2 interspace  5.  Intraoperative monitoring for sensory potentials, stimulation of lumbosacral plexus    IMPLANTS:  1. Synthes [place] LS 10 mm x 18 x 50 mm, 15 degree lordotic cage at L2-3  2. Synthes [place] LS 10 mm x 18 x 50 mm, 15 degree lordotic cage at L1-2        PROCEDURES PERFORMED: Posterior Approach  1. SSEP, MEP, and EMG monitoring  2. T11-S1 bilateral pedicle screw fixation  3. Extension down to the pelvis via iliac wing screws  4. T10 to iliac bilateral posterolateral fusion with autograft and FiberGraft matrix  5. T10 Sub-laminar bands  6. Computer-assisted stereotactic navigation with [place] CT  7. Stage 2 [place] osteotomies L1-2 and L2-3 with central and foraminal decompression  8. Central laminectomy L3-4 for central decompression  9. Removal of prior L3-S1 screws and rods  10. Cement augmentation of T11 and T12 pedicle screws  11.  Correction of sagittal plane deformity, SVA +15 cm, PI-LL Mismatch ~50 degrees across 8 segments     CO-SURGEON: Dr. Eryn Jung assisted with the POSTERIOR portion of the operation. He provided critical assistance with placement of the surgical implants, opening and closure of this complex case.     INDICATIONS:  Mrs. Motta is an 80year old woman who presented with loss of bowel/bladder function, neurogenic claudication and lower extremity severe radiculopathy and weakness. She was found to have degenerative lumbar kyphoscoliosis and adjacent segment degeneration above her prior L3-S1 fusion. She had undergone extensive prior conservative management with relapsing symptoms. She had a prior L3-S1 fusion. Given these findings and the patients symptoms, surgical revision with extension to pelvis and fusion was recommended. The patient understands the risks and benefits of the procedure including but not limited to bleeding, infection, worsened neurologic outcome, vascular injury, cerebral spinal fluid leak, pseudoarthrosis, need for additional procedures, fracture of hardware, anesthesia risks, and death.      DETAILS OF PROCEDURE: Under universal protocol and informed consent, the patient was brought to the operating room and placed under general anesthesia. Intravenous antibiotics were administered, 2 g Ancef. She was then placed lateral. All bony prominences were inspected and padded prior to sterile draping. The hips and shoulders were secured with silk tape to ensure true lateral placement, and the table was broke at the hip to open the lateral space. The left flank was then prepped and draped in the usual sterile fashion. Fluoroscopy was used to verify the incision over the L1-3 interspaces and ensure true lateral position.      Using a #15 blade knife, the skin was incised.  The oblique fascia was incised with a bovie and blunt dissection was then used within the retroperitoneal space to develop the plane anterior to transverse processes of L2-3.  We used biplanar fluoroscopic imaging to localize and target the L2-3 interspace with stimulator probe. Once over the interspace, stimulation was performed to avoid the L2-3 nerve roots within the psoas muscle. This was gradually advanced under stimulation into the lateral annulus of L2-3. The exposure was progressively dilated with the tubular retractors, stimulating between each consecutive dilation. The retractor was then advanced over the dilators and fixed in place. Fluoroscopy verified true orthogonal position over the L2-3 space. A #4 Penfield dissector was used to sweep the psoas muscle posteriorly. The entrance into the disc was again stimulated with no response and the posterior kevin placed into the disc space to secure the retractor. The ALL retractor was then placed and secured. The retractor blades were distracted to allow for proper visualization. A #15 blade was used to incise the lateral annulus. A discectomy was performed using pituitary rongeurs, curettes and cob dissectors. The contralateral annulus was transected using the cob dissector. Once the disc preparation was complete, a #6, #8 and then #10 trial was used open the space. A 10 mm, 15 degree lordotic cage was selected, filled with Infuse/Fibergraft and impacted into the disc space. Position was verified with fluoroscopy. The graft was noted to be angled over the remodeled portion of the L3 body and was re-positioned further posterior. The wound was then irrigated with antibiotic solution. The retractor was collapsed under direct visualization and removed slowly to visualize the tissues.        We used biplanar fluoroscopic imaging to localize and target the L1-2 interspace with stimulator probe. Once over the interspace, stimulation was performed to avoid the nerve roots within the psoas muscle. This was gradually advanced under stimulation into the lateral annulus of L1-2. The exposure was progressively dilated with the tubular retractors, stimulating between each consecutive dilation.  The retractor was then advanced over the dilators and fixed in place. Fluoroscopy verified true orthogonal position over the L1-2 space. A #4 Penfield dissector was used to sweep the psoas muscle posteriorly. The entrance into the disc was again stimulated with no response and the posterior kevin placed into the disc space to secure the retractor. The ALL retractor was then placed and secured. The retractor blades were distracted to allow for proper visualization. A #15 blade was used to incise the lateral annulus. A discectomy was performed using pituitary rongeurs, curettes and cob dissectors. The contralateral annulus was transected using the cob dissector. Once the disc preparation was complete, a #6, #8 and #10 trial was used open the space. A 10 mm, 15 degree lordotic cage was selected, filled with Infuse/Fibergraft and impacted into the disc space. Position was verified with fluoroscopy. The wound was then irrigated with antibiotic solution. The retractor was collapsed under direct visualization and removed slowly to visualize the tissues.        The wound was again irrigated and then closed in the usual fashion using interrupted 0 Vicryl sutures in the fascia, followed by 3-0 Vicryl and running 4-0 Monocryl in the subcuticular layer.  A Dermabond dressing was then applied.      The patient was then positioned prone onto an open Brent frame linked to the [facility] intraoperative CT scanner. The arms were positioned on armboards and all pressure points were appropriately padded. The patients previous linear incision was remarked and slightly expanded to span T10-S2 levels. The surgical site was prepped and draped in the usual sterile fashion.     The incision was made with a #15 scalpel. Bovie electrocautery was then used to complete the dissection down to the fascia which was split at the midline.  Subperiosteal dissection was undertaken to fully expose the remaining lamina, spinous processes, facets, and transverse processes of T11 to S2 bilaterally. Bilateral laminotomies were performed at the top and bottom of the T10 lamina and the yellow ligament removed with the 3 mm punch. The [employer] sub-laminar bands were passed caudal to rostral and retrieved with the punch. The prior L3-S1 screws and rods were uncovered and removed. An image guidance array was then attached to the spinous process and an AIRO intraoperative CT was obtained with images transferred to the [address] [place] [address] navigation unit. We confirmed that the system was accurate and proceeded with pedicle screw instrumentation at T11. For each pedicle,  holes were drilled using the AM-8 attachment on the Mid[employer] Declan drill in accordance with anatomic and CT guidance. A navigated pedicle probe was then used to cannulate the pedicles. After confirming with a ball-point probe that the pedicle wall had not been breached, the screws were placed based on measurements derived via [employer] AIRO navigation. This was done at T11 to S1 bilaterally with excellent purchase. The screws at the sites of prior removal were up-sized in diameter.     Next, image-guidance was used to place iliac wing screws to achieve pelvic fixation.  A trajectory was planned and cannulated on the right.  A K-wire was placed, and a 7.5 mm cannulated tap was used to expand the channel.  Finally, 8.0 x 80 mm screw was placed. This was repeated on the left in identical fashion. At the completion of instrumentation, a scan was obtained with the intraoperative CT scanner to confirm the position of the hardware. A second AIRO CT was performed and the pedicle screws were found to be in excellent position. The iliac wing bolts appeared to breach the ilium, they were re-positioned and a final scan confirmed excellent position.      The Midas declan drill was used to perform a Stage 2 [place] osteotomy at L1-2 and L2-3 for sagittal plane correction of the patient's kyhoscoliosis.  The medial facets were amputated and removed. The lamina was removed. The subarticular space, central canal and foramen were generously decompressed by removing the hypertrophic ligamentum flavum, inferior/superior articular processes and osteophytic bone spurs bilaterally at both levels. The foramen and subarticular zone were palpated with the nerve hook and found to be well decompressed.      The Midas miri was used to perform a central laminectomy bilateral L3-L4. The lamina were drilled away and the yellow ligament removed. The lateral recesses were generously decompressed bilaterally using the 3 mm punch.       With the instrumentation verified, 5.5 mm titanium rods were cut to the appropriate length and bent to fit into the polyaxial pedicle screw heads. The attachments to the T10 laminar bands were placed onto the rods bilaterally. Caps were placed and final-tightened over these to lock in the rods. The laminar bands were then placed under tension and final tightened. The excess bands were cut. Meticulous hemostasis was undertaken and the wound was irrigated copiously with antibiotic solution. The navigation frame was removed. The remaining laminae, facets, and transverse processes of T10-S1 were bilaterally decorticated with the high speed drill. Fibergraft was placed along the exposed bone to provide a substrate for fusion from T10 to the pelvis. Infuse was also place beneath the fibergraft at the lumbo-sacral junction to promote adequate postero-lateral fusion. Two medium hemovac drains were then laid deep to the fascia, tunneled away from the incision line, and secured to the skin with a stitch. The incision was then closed in layers using 0 Vicryl sutures in the deep muscles and fascia and 2-0 Vicryl sutures in the subcutaneous tissues. Vancomycin powder (2g) was sprinkled in the wound suprafascially during the closure.  A 1:1 mixture of Exparel and Marcaine was injected nancy-incisionally in the paraspinous muscles. The skin was closed with staples. The wound was dressed.      All needle, sponge, and instrument counts were correct. Notably, SSEPs, MEPs, and EMGs remained stable for the duration of the case. The patient was turned back to supine position onto a hospital bed and extubated without difficulty. She was taken to the PACU in stable condition. I affirm in accordance with CMS regulations that I was present and scrubbed for all critical portions of the case.      IMPLANTS:   1. Synthes Verse 5.5 system   2. [employer] Expedium iliac screws 8.0 x 90 mm bilaterally  3. Tiempy Titanium rods 5.5 bilaterally  4. FiberGraft matrix 12.5 cc, Infuse for anterior cages     SPECIMEN: None     DRAINS:   1.  Medium Hemovac (10F) drain x 2     COMPLICATIONS: No complications apparent.     DISPOSITION: The patient was transferred to the PACU in stable condition, awake, alert, and moving all extremities on command.     In accordance with CMS guidelines, I attest that I was present for the entire procedure from the creation of the skin incision to the closure.     Dictated by: Warren Ruiz MD

## 2022-04-22 NOTE — PROGRESS NOTES
Catheter removal (Order #[id number]) on 4/22/22     Additional Referral Information    Referral Priority   Routine [1]              Order Information    Order Date/Time Release Date/Time Start Date/Time End Date/Time   04/22/22 11:44 AM None 04/22/22 11:45 AM 04/22/22 11:45 AM     Remove Arterial Line (Order #[id number]) on 4/22/22     Additional Referral Information    Referral Priority   Routine [1]              Order Information    Order Date/Time Release Date/Time Start Date/Time End Date/Time   04/22/22 11:44 AM None 04/22/22 11:45 AM 04/22/22 11:45 AM       LATANYA Roberts, with Neurosurgery Team, updated on patient condition with new orders for Arrieta Catheter & Arterial Line removal, see above. Both removed at this time.

## 2022-04-22 NOTE — PROGRESS NOTES
CT LUMBAR SPINE WO CONTRAST (Order #[id number]) on 4/21/22       Order Providers    [address] REBECCA Britt MD            Order Information    Order Date/Time Release Date/Time Start Date/Time End Date/Time   04/21/22 09:28 PM None 04/21/22 09:30 PM 04/21/22 09:30 PM       Scheduled CT Lumbar Spine, see above, done at this time. Patient tolerated transfer with x1 assist & no s/s of distress/discomfort noted. Patient & patient family updated on plan of care with all questions answered.

## 2022-04-22 NOTE — PROGRESS NOTES
Pt having N/V. Pt states she cannot tolerate at CT scan at this time. Attempt to call back to radiology to update. No response.

## 2022-04-22 NOTE — PROGRESS NOTES
NEUROSURGERY POST-OP PROGRESS NOTE    Patient Name: Quincy Goodson YOB: 1940   Sex: Female Age: 80 yrs     Medical Record Number: [id number] Acct Number: [de-identified]   Room Number: 5068/4982-80 Hospital Day: Hospital Day: 2     Interval History:  Post-operative Day# 1 s/p  L1-2, L2-3 EXTREME LATERAL INTERBODY FUSION, T10-PELVIS DECOMPRESSION, FIXATION AND FUSION, ILIAC WING SCREWS with Dr. Steffen Zayas    Subjective:      Objective:    VITAL SIGNS   /86   Pulse 89   Temp 97.9 °F (36.6 °C) (Temporal)   Resp 19   Ht 5' 4\" (1.626 m)   Wt 213 lb 6.4 oz (96.8 kg)   SpO2 97%   BMI 36.63 kg/m²    Height Height: 5' 4\" (162.6 cm)   Weight Weight: 213 lb 6.4 oz (96.8 kg)        Allergies Allergies   Allergen Reactions    Latex Itching and Rash    Bactrim Rash      NPO Status ADULT DIET; Regular   Isolation No active isolations     LABS   Basic Metabolic Profile Recent Labs     04/22/22  0100         CO2 22   BUN 19   CREATININE 1.1   GLUCOSE 124*      Complete Blood Count Recent Labs     04/22/22  0345   WBC 12.3*   RBC 3.09*      Coagulation Studies No results for input(s): PTT, INR in the last 72 hours.     Invalid input(s): PLATELETS, PROA, PT, PTTA     MEDICATIONS   Inpatient Medications     sodium chloride flush, 5-40 mL, IntraVENous, 2 times per day    acetaminophen, 1,000 mg, Oral, Q6H    lidocaine, 1 patch, TransDERmal, Daily    docusate sodium, 100 mg, Oral, BID    latanoprost, 1 drop, Both Eyes, Nightly    metoprolol tartrate, 50 mg, Oral, BID    pantoprazole, 40 mg, Oral, QAM AC    triamterene-hydroCHLOROthiazide, 1 tablet, Oral, Daily    verapamil, 120 mg, Oral, Daily    sodium chloride flush, 5-40 mL, IntraVENous, 2 times per day    sennosides-docusate sodium, 1 tablet, Oral, BID    enoxaparin, 40 mg, SubCUTAneous, Daily    timolol, 1 drop, Both Eyes, BID   Infusions    sodium chloride      sodium chloride        Antibiotics   Recent Abx Admin ceFAZolin (ANCEF) 2000 mg in dextrose 5 % 50 mL IVPB (mg) 2,000 mg New Bag 04/22/22 0625     2,000 mg New Bag 04/21/22 2307    ceFAZolin (ANCEF) injection 1000 mg (mg) 2,000 mg Given 04/21/22 1359                 Neurologic Exam:    Mental status: awake/alert, oriented x 4    Musculoskeletal:   Gait: Not tested   Tone: normal   Sensory: intact to light touch   Motor strength:    Right  Left    Right  Left    Deltoid  5 5  Hip Flex  4- 4-   Biceps  5 5  Knee Extensors  4- 4-   Triceps  5 5  Knee Flexors  4- 4-   Wrist Ext  5 5  Ankle Dorsiflex. 4 1   Wrist Flex  5 5  Ankle Plantarflex. 4 3   Handgrip  5 5        Thumb Ext  5 5         Incision: staples c/d/i   Drain: Left: 150/20: 170ml             Right: 250/100: 350ml    Respiratory:  Unlabored respiratory pattern    Abdomen:   Soft, ND   Last BM pre op    Cardiovascular:  Warm, well perfused    Assessment   79 yo female POD 1 s/p L1-2, L2-3 EXTREME LATERAL INTERBODY FUSION, T10-PELVIS DECOMPRESSION, FIXATION AND FUSION, ILIAC WING SCREWS with Dr. Chung So:  1. Neurologic exam frequency: q 4   2. Mobility: PT/OT, activity as tolerated   3. Diet: ADAT  4. Antibiotics: ancef post op while drains in place   5. Drain: continue to monitor output q shift  6. Arrieta: remove today, monitor for urinary retention   7. DVT Prophylaxis: SCDs and  SQ lovenox   8. Bowel Regimen: colace and senokot    9. Pain control: tylenol, dilaudid, roxicodone, lidocaine patch  10. Valium/robaxin for muscle spasms   11. TLSO brace when OOB  12. Incisional Care: cleanse daily with soap/water, pat dry with clean towel and paint with CHG swab  13. Post op blood loss anemia. Hgb 8.5, check H/H q 6 hours and transfuse if Hgb < 8.0  14. Hospitalist for management of medical comorbidity   15. Dispo Planning: continue care in ICU, likely transfer to [address] [place] [address] tomorrow. ARU consult placed     Patient was discussed with Dr. Ignacio Hinds who agrees with above assessment and plan.

## 2022-04-22 NOTE — PROGRESS NOTES
Physical Therapy  Facility/Department: [address] [place] [address] ICU  Physical Therapy Initial Assessment and Treatment     Name: Lisa Duggan  : 1940  MRN: [id number]  Date of Service: 2022    Discharge Recommendations: Lisa Duggan scored a  on the AM-PAC short mobility form. Current research shows that an AM-PAC score of 17 or less is typically not associated with a discharge to the patient's home setting. Based on the patient's AM-PAC score and their current functional mobility deficits, it is recommended that the patient have 3-5 sessions per week of Physical Therapy at d/c to increase the patient's independence. Please see assessment section for further patient specific details. If patient discharges prior to next session this note will serve as a discharge summary. Please see below for the latest assessment towards goals. PT Equipment Recommendations  Equipment Needed:  (Rolling walker if returning home)      Patient Diagnosis(es): There were no encounter diagnoses. Past Medical History:  has a past medical history of Anxiety, Chronic kidney disease, stage 3b ([address] Chronic midline low back pain without sciatica, Coronary artery disease involving native coronary artery without angina pectoris, Essential hypertension, Foot drop, left, GERD without esophagitis, History of blood transfusion, Internal hemorrhoids, Intrinsic eczema, Lesion of lateral popliteal nerve, Osteoporosis of multiple sites, Paroxysmal supraventricular tachycardia ([address] Primary osteoarthritis involving multiple joints, Prolonged emergence from general anesthesia, PUD (peptic ulcer disease), and Restless leg syndrome. Past Surgical History:  has a past surgical history that includes Cholecystectomy; Hysterectomy; lumbar laminectomy; [place] tooth extraction; Abdominal exploration surgery (); Colonoscopy; cyst removal; Shoulder arthroscopy (Right); Breast surgery (Right); Total knee arthroplasty (Left, 2015);  Total hip arthroplasty (Bilateral); Hemorrhoid surgery (02/24/2017); Upper gastrointestinal endoscopy (N/A, 03/10/2020); Carpal tunnel release (Right, 11/03/2020); Cataract removal with implant (Right, 08/24/2021); Intracapsular cataract extraction (Right, 08/24/2021); Cataract removal with implant (Left, 09/07/2021); Intracapsular cataract extraction (Left, 09/07/2021); Pain management procedure (Bilateral, 12/08/2021); eye surgery; joint replacement; and Lumbar spine surgery (04/21/2022). Assessment   Body Structures, Functions, Activity Limitations Requiring Skilled Therapeutic Intervention: Decreased functional mobility ; Decreased ROM; Decreased strength;Decreased safe awareness;Decreased endurance;Decreased balance;Decreased coordination; Increased pain;Decreased posture  Assessment: Pt from home s/p L1-3 fusion. Pt demonstrates increased pain and anxious behaviors t/o session. Pt demonstrates decreased independent mobility from baseline. Pt normally ambulates independently with a cane and currently requiring max assist x2 with bed mobility, mod assist x2 with walker for transfers, and min assist x1 with walker for amb. Pt demonstrates L foot drop which she reports has been dealing with prior to sx. Pt only able to tolerated bed to chair transfer today. Pt requires VC's for safety t/o session. Pt is a fall risk and not safe to stand alone. Safety concerns for pt returning home however would benefit from SNF placement to improve strength and mobility. If pt returns home, will require 24hr assist and home PT. Pt would benefit from skilled PT to improve strength and mobility. Treatment Diagnosis: Decreased functional mobility associated with kyphosis  Therapy Prognosis: Good  Decision Making: Medium Complexity  Requires PT Follow-Up: Yes  Activity Tolerance  Activity Tolerance: Patient limited by fatigue;Patient limited by pain; Patient limited by endurance    Pain: 6-7/10, low back, RN notified     Plan   Plan  Plan: 5-7 times per week  Current Treatment Recommendations: Strengthening,ROM,Balance training,Functional mobility training,Transfer training,Endurance training,Gait training,Stair training,Neuromuscular re-education,Pain management,Safety education & training,Patient/Caregiver education & training,Therapeutic activities  Plan Comment: Further assess gait as able  Safety Devices  Type of Devices: Bed alarm in place,Left in bed,Call light within reach,Nurse notified     Restrictions  Position Activity Restriction  Other position/activity restrictions: Activity as tolerated, ambulate pt; pt has brace in room but no orders in chart - pt reports she was fit with it prior to surgery     Subjective   General  Chart Reviewed: Yes  Additional Pertinent Hx: Ms. Ras Patino is a 81 y/o female presenting s/p  L1-2, L2-3 EXTREME LATERAL INTERBODY FUSION, T10-PELVIS DECOMPRESSION, FIXATION AND FUSION, ILIAC WING SCREWS (Left Spine Lumbar) on 4/22 per Dr. Ino Browne. Pt was dx with kyphosis. CT-L spine ordered. CT guided stereotactic: T10-S1 and bilateral iliac transpedicular screws with posterior rods in satisfactory alignment and positioning of intermittent vertebral spacers. PMH: CKD III, CAD, HTN, foot drop L, osteoporosis, paroxysmal supraventricular tachycardia, PUD. Family / Caregiver Present: Yes (Daughter)  Referring Practitioner: Arianne Browne MD  Referral Date : 04/21/22  Diagnosis: Kyphosis  General Comment  Comments: Pt presents with A line, telemetry, deleon catheter, BP cuff, IV, x2 drains, SpO2 monitor, SCD's  Subjective  Subjective: Pt presents lying supine in bed. Pt agreeable to PT. Pt states \"I don't want to do anything until I see the doctor\".          Social/Functional History  Social/Functional History  Lives With: Daughter John Emanuel)  Type of Home: House  Home Layout: Two level,Laundry in basement (Bedroom and bathroom on 1st floor)  Home Access: Stairs to enter without rails  Entrance Stairs - Number of Steps: 6 KENNETH  Bathroom Shower/Tub: Walk-in shower  Bathroom Equipment: Grab bars in shower,Shower chair,Toilet raiser  Bathroom Accessibility: Accessible  Home Equipment: Cane,Walker, 4 wheeled,Walker, standard  Has the patient had two or more falls in the past year or any fall with injury in the past year?: Yes (1 fall d/t drop foot and tripping while walking)  Receives Help From: Family  ADL Assistance: Independent  Homemaking Assistance: Needs assistance (Daughter and grandson perform laundry)  Homemaking Responsibilities: Yes (Performs cooking and cleaning)  Ambulation Assistance: Independent (With cane)  Transfer Assistance: Independent  Active : Yes (Able to drive however daughter usually drives)  Occupation: Retired  Type of Occupation: Medical lab technician  Additional Comments: Daughter works during the day however grandson is able to assist pt when daughter not home upon d/c. Vision/Hearing  Vision Exceptions: Wears glasses for reading  Hearing: Within functional limits    Cognition   Orientation  Overall Orientation Status: Within Functional Limits     Objective               AROM RLE (degrees)  RLE General AROM: ankle DF WFL; knee flex grossly 45 degrees, AAROM - WFL; hip flex N/T  AROM LLE (degrees)  LLE General AROM: Ankle DF lacking 10 degrees from neutral; knee flex grossly 30 degrees, AAROM - WFL, hip flex N/T  Strength RLE  Comment: Grossly decreased  Strength LLE  Comment: Grossly decreased           Bed mobility  Rolling to Left: Minimal assistance (Use of rail)  Supine to Sit: Maximum assistance;2 Person assistance (Via log roll, VC's for proper technique, assist required for B LE and trunk support)  Sit to Supine: Maximum assistance;2 Person assistance (Via reverse log roll, VC's for proper technique, assist required for B LE and trunk support)  Scooting: Maximal assistance (To EOB)  Brace donned sitting EOB prior to standing. Transfers  Sit to Stand: 2 Person Assistance; Moderate Assistance (With walker, pulling up on walker despite VC's for hand placement, increased time and effort)  Stand to sit: 2 Person Assistance; Moderate Assistance (Assist required for controlled descent, VC's for hand placement)  Bed to Chair: Minimal assistance;2 Person Assistance (Stand pivot with walker x2 trials, assist required for navigating walker)  Ambulation  Device: Rolling Walker  Assistance: 2 Person assistance;Minimal assistance  Quality of Gait: Guarded posture, heavy support through B UE's, L foot drop, downward gaze, slow and hesitent  Gait Deviations: Slow Talya;Decreased step length;Decreased step height  Distance: 2-3 steps x2  Comments: Pt required VC's for upright posture and demonstrated difficulty navigating walker with turns. Pt reports nausea and dizziness t/o mobility. BP dropped from pre-activity 138/77 to post-activity 106/68 and up 115/69. Pt requesting to return to bed upon sitting upright in bedside chair. Balance  Posture: Fair (Guarded)  Sitting - Static: Fair (CGA)  Sitting - Dynamic: Fair (Min assist)  Standing - Static: Poor (Min assist x2 with walker)  Standing - Dynamic: Poor (Min assist x2 with walker)           OutComes Score                                                  AM-PAC Score  AM-PAC Inpatient Mobility Raw Score : 12 (04/22/22 1021)  AM-PAC Inpatient T-Scale Score : 35.33 (04/22/22 1021)  Mobility Inpatient CMS 0-100% Score: 68.66 (04/22/22 1021)  Mobility Inpatient CMS G-Code Modifier : CL (04/22/22 1021)          Goals  Short Term Goals  Time Frame for Short term goals: D/C  Short term goal 1: Perform supine to sit transfer via log roll with mod assist x1  Short term goal 2: Perform STS transfer with mod assist x1 with LRAD  Short term goal 3: Amb x25 ft with min assist x1 and LRAD  Short term goal 4: Ascend/descend 4 steps with mod assist x1 and LRAD - if going home  Patient Goals   Patient goals :  To return home       Therapy Time   Individual Concurrent Group Co-treatment   Time In 0923         Time Out 1018         Minutes 55               Timed Code Treatment Minutes:   40    Total Treatment Minutes:  Marsha Vincent 272, SPT     Therapist was present, directed the patient's care, made skilled judgement, and was responsible for assessment and treatment of the patient.   Abby Sue, [address] [place] [address]

## 2022-04-22 NOTE — PROGRESS NOTES
Pharmacy Note - Renal Dosing    Per [facility] Renal Dose Adjustment Policy, metoclopramide will be changed to 5 mg IV q6h PRN. Estimated Creatinine Clearance: Estimated Creatinine Clearance: 45 mL/min (based on SCr of 1.1 mg/dL). Dialysis Status, LISY, CKD: N/A  BMI: Body mass index is 36.63 kg/m². Rationale for Adjustment: Agent is renally eliminated. Pharmacy will continue to monitor renal function and adjust dose as necessary. Please call with any questions.     Johnny Gay, PharmD, BCCCP  Wireless: [phone]  4/22/2022 3:47 PM

## 2022-04-22 NOTE — PROGRESS NOTES
Pt started to develop nausea after roxicodone administration. PRN zofran given. Pt still nausea with 1 episode of emesis. Reglan ordered. Will continue to monitor.

## 2022-04-22 NOTE — PROGRESS NOTES
Pt admitted to room 4506 from PACU. Pt drowsy, responds to voice. Daughter at bedside. Pt states pain is tolerable at this time. Surgical dressings clean/dry/intact. 2 hema vaccs in place. Assessment complete. VSS. Will continue to monitor.

## 2022-04-22 NOTE — PLAN OF CARE
Problem: Discharge Planning  Goal: Discharge to home or other facility with appropriate resources  Outcome: Progressing     Problem: Pain  Goal: Verbalizes/displays adequate comfort level or baseline comfort level  4/22/2022 0727 by Griffin Lazaro RN  Outcome: Progressing  4/22/2022 0140 by Mery Hoover RN  Outcome: Progressing     Problem: Safety - Adult  Goal: Free from fall injury  4/22/2022 0727 by Griffin Lazaro RN  Outcome: Progressing  4/22/2022 0140 by Mery Hoover RN  Outcome: Progressing     Problem: Skin/Tissue Integrity  Goal: Absence of new skin breakdown  Description: 1. Monitor for areas of redness and/or skin breakdown  2. Assess vascular access sites hourly  3. Every 4-6 hours minimum:  Change oxygen saturation probe site  4. Every 4-6 hours:  If on nasal continuous positive airway pressure, respiratory therapy assess nares and determine need for appliance change or resting period.   4/22/2022 0727 by Griffin Lazaro RN  Outcome: Progressing  4/22/2022 0140 by Mery Hoover RN  Outcome: Progressing     Problem: ABCDS Injury Assessment  Goal: Absence of physical injury  4/22/2022 0727 by Griffin Lazaro RN  Outcome: Progressing  4/22/2022 0140 by Mery Hoover RN  Outcome: Progressing

## 2022-04-22 NOTE — CARE COORDINATION
Patient here today for L1-2, L2-3 EXTREME LATERAL INTERBODY FUSION, T10-PELVIS DECOMPRESSION, FIXATION AND FUSION, ILIAC WING SCREWS. Patient is post op- POD#1. Today evals- PT=12, OT pending at this time. CM will continue to follow patient until discharge. Electronically signed by Radhika Elizalde RN on 4/22/2022 at 11:46 AM     Addendum:  Will have lumbar drains for 2 more days, then assess. Patient lives with daughter and grandson. They provide assistance. Patient uses a cane, RW, she is mostly independent and grandson is home during the time daughter works in day. CM will continue to follow patient until discharge.   Electronically signed by Radhika Elizalde RN on 4/22/2022 at 5:54 PM

## 2022-04-22 NOTE — PROGRESS NOTES
Occupational Therapy  Facility/Department: [facility] ICU  Occupational Therapy Initial Assessment/Treatment    Name: Milagros Brito  : 1940  MRN: [id number]  Date of Service: 2022    Discharge Recommendations: Milagros Brito scored a 14/24 on the AM-PAC ADL Inpatient form. Current research shows that an AM-PAC score of 17 or less is typically not associated with a discharge to the patient's home setting. Based on the patient's AM-PAC score and their current ADL deficits, it is recommended that the patient have 3-5 sessions per week of Occupational Therapy at d/c to increase the patient's independence. Please see assessment section for further patient specific details. If patient discharges prior to next session this note will serve as a discharge summary. Please see below for the latest assessment towards goals. OT Equipment Recommendations  Equipment Needed: Yes  Mobility Devices: ADL Assistive Devices  ADL Assistive Devices: Sock-Aid Soft;Reacher       Patient Diagnosis(es): There were no encounter diagnoses. Past Medical History:  has a past medical history of Anxiety, Chronic kidney disease, stage 3b ([address] Chronic midline low back pain without sciatica, Coronary artery disease involving native coronary artery without angina pectoris, Essential hypertension, Foot drop, left, GERD without esophagitis, History of blood transfusion, Internal hemorrhoids, Intrinsic eczema, Lesion of lateral popliteal nerve, Osteoporosis of multiple sites, Paroxysmal supraventricular tachycardia ([address] Primary osteoarthritis involving multiple joints, Prolonged emergence from general anesthesia, PUD (peptic ulcer disease), and Restless leg syndrome. Past Surgical History:  has a past surgical history that includes Cholecystectomy; Hysterectomy; lumbar laminectomy; [place] tooth extraction; Abdominal exploration surgery (); Colonoscopy; cyst removal; Shoulder arthroscopy (Right); Breast surgery (Right);  Total knee arthroplasty (Left, 02/03/2015); Total hip arthroplasty (Bilateral); Hemorrhoid surgery (02/24/2017); Upper gastrointestinal endoscopy (N/A, 03/10/2020); Carpal tunnel release (Right, 11/03/2020); Cataract removal with implant (Right, 08/24/2021); Intracapsular cataract extraction (Right, 08/24/2021); Cataract removal with implant (Left, 09/07/2021); Intracapsular cataract extraction (Left, 09/07/2021); Pain management procedure (Bilateral, 12/08/2021); eye surgery; joint replacement; and Lumbar spine surgery (04/21/2022). Treatment Diagnosis: Impaired ADL and functional mobility      Assessment   Performance deficits / Impairments: Decreased functional mobility ; Decreased ADL status; Decreased ROM; Decreased endurance;Decreased balance;Decreased strength  Assessment: Pt presents with a decline in functional independence s/p back surgery. Pt is from home with family and was independent. Currently, pt req assist of 2 for mobililty and max assist for ADL. Feel pt would benefit from further OT services. Treatment Diagnosis: Impaired ADL and functional mobility  Prognosis: Good  Decision Making: Medium Complexity  REQUIRES OT FOLLOW-UP: Yes  Activity Tolerance  Activity Tolerance: Patient limited by pain; Patient limited by fatigue        Plan   Plan  Times per Week: 5-7  Current Treatment Recommendations: Strengthening,ROM,Balance training,Functional mobility training,Endurance training,Self-Care / ADL     Restrictions  Position Activity Restriction  Other position/activity restrictions: Activity as tolerated, ambulate pt    Subjective   General  Chart Reviewed: Yes  Additional Pertinent Hx: Pt admitted 4.21.22 for procedure:  Left-sided far lateral approach to the L2-3 interspace for far lateral discectomy. 2. Placement of lateral Synthes [place] LS PEEK interbody cages packed with Infuse/Fibergraft into the L2-3 interspace3. Left-sided far lateral approach to the L1-2 interspace for far lateral discectomy. 4. Placement of lateral Synthes [place] LS PEEK interbody cages packed with Infuse/Fibergraft into the L1-2 interspace5. Intraoperative monitoring for sensory potentials, stimulation of lumbosacral plexus  Family / Caregiver Present: Yes (daughter)  Referring Practitioner: Dr. Janelle Reyes  Diagnosis: Severe spondylosis T10-S1, adult degenerative kyphoscoliosis  Subjective  Subjective: Pt in bed upon entry. I don't want to do anything until the doctor sees me.   Pain Assessment  Pain Assessment: None - 7/10, nurse aware  Social/Functional History  Social/Functional History  Lives With: Daughter John Emanuel)  Type of Home: House  Home Layout: Two level,Laundry in basement (Bedroom and bathroom on 1st floor)  Home Access: Stairs to enter without rails  Entrance Stairs - Number of Steps: 6 KENNETH  Bathroom Shower/Tub: Walk-in shower  Bathroom Equipment: Grab bars in shower,Shower chair,Toilet raiser  Bathroom Accessibility: Accessible  Home Equipment: Cane,Walker, 4 wheeled,Walker, standard  Has the patient had two or more falls in the past year or any fall with injury in the past year?: Yes (1 fall d/t drop foot and tripping while walking)  Receives Help From: Family  ADL Assistance: [address]: Needs assistance (Daughter and grandson perform laundry)  Homemaking Responsibilities: Yes (Performs cooking and cleaning)  Ambulation Assistance: Independent (With cane)  Transfer Assistance: Independent  Active : Yes (Able to drive however daughter usually drives)  Occupation: Retired  Type of Occupation: Medical lab technician  Additional Comments: Daughter works during the day however grandson is able to assist pt when daughter not home upon d/c.       Objective              Balance  Sitting Balance: Contact guard assistance (posterior lean)  Standing Balance:  (min assist of 2)  Standing Balance  Activity: Transfer  ADL  UE Dressing: Maximum assistance (to don/doff TLSO)  LE Dressing: Maximum assistance  Toileting:  (catheter)     Activity Tolerance  Activity Tolerance: Patient limited by fatigue;Patient limited by pain; Patient limited by endurance  Bed mobility  Rolling to Left: Minimal assistance (Use of rail)  Supine to Sit: Maximum assistance;2 Person assistance (Via log roll, VC's for proper technique, assist required for B LE and trunk support)  Sit to Supine: Maximum assistance;2 Person assistance (Via reverse log roll, VC's for proper technique, assist required for B LE and trunk support)  Scooting: Maximal assistance (To EOB)  Transfers  Stand Step Transfers: 2 Person assistance (min assist of 2 bed to chair with use of walker)  Sit to stand: 2 Person assistance (mod assist of 2)  Stand to sit: Moderate assistance     Cognition  Overall Cognitive Status: WFL                  LUE AROM (degrees)  LUE AROM : WFL  RUE AROM (degrees)  RUE AROM : Exceptions  R Shoulder Flexion 0-180: ~60             Treatment included ADL and transfer training.          AM-PAC Score        AM-[facility] Inpatient Daily Activity Raw Score: 14 (04/22/22 1049)  -PAC Inpatient ADL T-Scale Score : 33.39 (04/22/22 1049)  ADL Inpatient CMS 0-100% Score: 59.67 (04/22/22 1049)  ADL Inpatient CMS G-Code Modifier : CK (04/22/22 1049)    Goals                             No goals met  Short Term Goals  Time Frame for Short term goals: Discharge  Short Term Goal 1: Transfer to/from toilet with min assist  Short Term Goal 2: Stance with CG x4 mn while engaging in ADL/functional mobility  Short Term Goal 3: Don/doff TLSO with min assist  Patient Goals   Patient goals : Go home       Therapy Time   Individual Concurrent Group Co-treatment   Time In 8634         Time Out 1019         Minutes 54         Timed Code Treatment Minutes: [address] TOMMI[address] Antunez/L [id number]

## 2022-04-22 NOTE — PROGRESS NOTES
The [facility]   After review, this patient is felt to be:       [x]  Appropriate for Acute Inpatient Rehab- Pt decided to go home at discharge.     []  Appropriate for Acute Inpatient Rehab Pending Insurance Authorization    []  Not appropriate for Acute Inpatient Rehab    []  Referral received and ARU reviewing patient        Will notify CM/SW with further updates.  Thank you for the referral.    Marcio RUIBE, OTR/L  Clinical Liaison- [facility]   (P): [phone] (F): [phone]

## 2022-04-23 LAB
ANION GAP SERPL CALCULATED.3IONS-SCNC: 13 MMOL/L (ref 3–16)
BASOPHILS ABSOLUTE: 0.1 K/UL (ref 0–0.2)
BASOPHILS RELATIVE PERCENT: 1 %
BLOOD BANK DISPENSE STATUS: NORMAL
BLOOD BANK PRODUCT CODE: NORMAL
BPU ID: NORMAL
BUN BLDV-MCNC: 19 MG/DL (ref 7–20)
CALCIUM SERPL-MCNC: 8.7 MG/DL (ref 8.3–10.6)
CHLORIDE BLD-SCNC: 103 MMOL/L (ref 99–110)
CO2: 22 MMOL/L (ref 21–32)
CREAT SERPL-MCNC: 1.2 MG/DL (ref 0.6–1.2)
DESCRIPTION BLOOD BANK: NORMAL
EOSINOPHILS ABSOLUTE: 0.1 K/UL (ref 0–0.6)
EOSINOPHILS RELATIVE PERCENT: 1.1 %
GFR AFRICAN AMERICAN: 52
GFR NON-AFRICAN AMERICAN: 43
GLUCOSE BLD-MCNC: 104 MG/DL (ref 70–99)
HCT VFR BLD CALC: 28.2 % (ref 36–48)
HCT VFR BLD CALC: 28.4 % (ref 36–48)
HCT VFR BLD CALC: 29.8 % (ref 36–48)
HEMOGLOBIN: 9.5 G/DL (ref 12–16)
HEMOGLOBIN: 9.7 G/DL (ref 12–16)
HEMOGLOBIN: 9.7 G/DL (ref 12–16)
LYMPHOCYTES ABSOLUTE: 1.2 K/UL (ref 1–5.1)
LYMPHOCYTES RELATIVE PERCENT: 10.9 %
MCH RBC QN AUTO: 28.5 PG (ref 26–34)
MCHC RBC AUTO-ENTMCNC: 34 G/DL (ref 31–36)
MCV RBC AUTO: 83.8 FL (ref 80–100)
MONOCYTES ABSOLUTE: 0.9 K/UL (ref 0–1.3)
MONOCYTES RELATIVE PERCENT: 8.7 %
NEUTROPHILS ABSOLUTE: 8.2 K/UL (ref 1.7–7.7)
NEUTROPHILS RELATIVE PERCENT: 78.3 %
PDW BLD-RTO: 15.3 % (ref 12.4–15.4)
PLATELET # BLD: 106 K/UL (ref 135–450)
PMV BLD AUTO: 9.1 FL (ref 5–10.5)
POTASSIUM SERPL-SCNC: 3.5 MMOL/L (ref 3.5–5.1)
RBC # BLD: 3.39 M/UL (ref 4–5.2)
SODIUM BLD-SCNC: 138 MMOL/L (ref 136–145)
WBC # BLD: 10.5 K/UL (ref 4–11)

## 2022-04-23 PROCEDURE — 6370000000 HC RX 637 (ALT 250 FOR IP): Performed by: NEUROLOGICAL SURGERY

## 2022-04-23 PROCEDURE — 2580000003 HC RX 258: Performed by: PHYSICIAN ASSISTANT

## 2022-04-23 PROCEDURE — 36430 TRANSFUSION BLD/BLD COMPNT: CPT

## 2022-04-23 PROCEDURE — 51701 INSERT BLADDER CATHETER: CPT

## 2022-04-23 PROCEDURE — 97530 THERAPEUTIC ACTIVITIES: CPT

## 2022-04-23 PROCEDURE — 6370000000 HC RX 637 (ALT 250 FOR IP): Performed by: NURSE PRACTITIONER

## 2022-04-23 PROCEDURE — 80048 BASIC METABOLIC PNL TOTAL CA: CPT

## 2022-04-23 PROCEDURE — 51798 US URINE CAPACITY MEASURE: CPT

## 2022-04-23 PROCEDURE — 2580000003 HC RX 258: Performed by: NURSE PRACTITIONER

## 2022-04-23 PROCEDURE — 85018 HEMOGLOBIN: CPT

## 2022-04-23 PROCEDURE — 6360000002 HC RX W HCPCS: Performed by: NURSE PRACTITIONER

## 2022-04-23 PROCEDURE — 6360000002 HC RX W HCPCS: Performed by: NEUROLOGICAL SURGERY

## 2022-04-23 PROCEDURE — 1200000000 HC SEMI PRIVATE

## 2022-04-23 PROCEDURE — 85014 HEMATOCRIT: CPT

## 2022-04-23 PROCEDURE — 6360000002 HC RX W HCPCS: Performed by: PHYSICIAN ASSISTANT

## 2022-04-23 PROCEDURE — 85025 COMPLETE CBC W/AUTO DIFF WBC: CPT

## 2022-04-23 PROCEDURE — 2580000003 HC RX 258: Performed by: NEUROLOGICAL SURGERY

## 2022-04-23 PROCEDURE — 36415 COLL VENOUS BLD VENIPUNCTURE: CPT

## 2022-04-23 RX ADMIN — LATANOPROST 1 DROP: 50 SOLUTION OPHTHALMIC at 20:47

## 2022-04-23 RX ADMIN — METHOCARBAMOL 750 MG: 750 TABLET ORAL at 18:11

## 2022-04-23 RX ADMIN — SODIUM CHLORIDE, PRESERVATIVE FREE 5 ML: 5 INJECTION INTRAVENOUS at 09:00

## 2022-04-23 RX ADMIN — ONDANSETRON 4 MG: 2 INJECTION INTRAMUSCULAR; INTRAVENOUS at 08:47

## 2022-04-23 RX ADMIN — SENNOSIDES AND DOCUSATE SODIUM 1 TABLET: 50; 8.6 TABLET ORAL at 09:12

## 2022-04-23 RX ADMIN — ACETAMINOPHEN 1000 MG: 500 TABLET ORAL at 20:40

## 2022-04-23 RX ADMIN — ACETAMINOPHEN 1000 MG: 500 TABLET ORAL at 09:12

## 2022-04-23 RX ADMIN — SENNOSIDES AND DOCUSATE SODIUM 1 TABLET: 50; 8.6 TABLET ORAL at 20:41

## 2022-04-23 RX ADMIN — PANTOPRAZOLE SODIUM 40 MG: 40 TABLET, DELAYED RELEASE ORAL at 05:58

## 2022-04-23 RX ADMIN — TRIAMTERENE AND HYDROCHLOROTHIAZIDE 1 TABLET: 37.5; 25 TABLET ORAL at 09:12

## 2022-04-23 RX ADMIN — ENOXAPARIN SODIUM 40 MG: 100 INJECTION SUBCUTANEOUS at 09:12

## 2022-04-23 RX ADMIN — DOCUSATE SODIUM 100 MG: 100 CAPSULE, LIQUID FILLED ORAL at 09:12

## 2022-04-23 RX ADMIN — METOPROLOL TARTRATE 50 MG: 50 TABLET, FILM COATED ORAL at 20:40

## 2022-04-23 RX ADMIN — SODIUM CHLORIDE, PRESERVATIVE FREE 10 ML: 5 INJECTION INTRAVENOUS at 08:47

## 2022-04-23 RX ADMIN — METOPROLOL TARTRATE 50 MG: 50 TABLET, FILM COATED ORAL at 09:12

## 2022-04-23 RX ADMIN — SODIUM CHLORIDE, PRESERVATIVE FREE 10 ML: 5 INJECTION INTRAVENOUS at 20:42

## 2022-04-23 RX ADMIN — CEFAZOLIN SODIUM 2000 MG: 10 INJECTION, POWDER, FOR SOLUTION INTRAVENOUS at 05:50

## 2022-04-23 RX ADMIN — SODIUM CHLORIDE, PRESERVATIVE FREE 10 ML: 5 INJECTION INTRAVENOUS at 20:43

## 2022-04-23 RX ADMIN — TIMOLOL MALEATE 1 DROP: 5 SOLUTION OPHTHALMIC at 20:47

## 2022-04-23 RX ADMIN — METHOCARBAMOL 750 MG: 750 TABLET ORAL at 05:58

## 2022-04-23 RX ADMIN — DOCUSATE SODIUM 100 MG: 100 CAPSULE, LIQUID FILLED ORAL at 20:41

## 2022-04-23 RX ADMIN — VERAPAMIL HYDROCHLORIDE 120 MG: 120 TABLET, FILM COATED, EXTENDED RELEASE ORAL at 09:13

## 2022-04-23 RX ADMIN — TIMOLOL MALEATE 1 DROP: 5 SOLUTION OPHTHALMIC at 10:00

## 2022-04-23 RX ADMIN — ACETAMINOPHEN 1000 MG: 500 TABLET ORAL at 16:52

## 2022-04-23 RX ADMIN — CEFAZOLIN SODIUM 2000 MG: 10 INJECTION, POWDER, FOR SOLUTION INTRAVENOUS at 20:44

## 2022-04-23 RX ADMIN — CEFAZOLIN SODIUM 2000 MG: 10 INJECTION, POWDER, FOR SOLUTION INTRAVENOUS at 14:38

## 2022-04-23 ASSESSMENT — PAIN SCALES - GENERAL
PAINLEVEL_OUTOF10: 3
PAINLEVEL_OUTOF10: 5
PAINLEVEL_OUTOF10: 0
PAINLEVEL_OUTOF10: 0
PAINLEVEL_OUTOF10: 5
PAINLEVEL_OUTOF10: 3

## 2022-04-23 ASSESSMENT — PAIN DESCRIPTION - PAIN TYPE: TYPE: SURGICAL PAIN;ACUTE PAIN

## 2022-04-23 ASSESSMENT — PAIN - FUNCTIONAL ASSESSMENT: PAIN_FUNCTIONAL_ASSESSMENT: PREVENTS OR INTERFERES SOME ACTIVE ACTIVITIES AND ADLS

## 2022-04-23 ASSESSMENT — PAIN DESCRIPTION - ORIENTATION
ORIENTATION: RIGHT;LEFT
ORIENTATION: LOWER
ORIENTATION: LOWER
ORIENTATION: MID

## 2022-04-23 ASSESSMENT — PAIN DESCRIPTION - ONSET: ONSET: ON-GOING

## 2022-04-23 ASSESSMENT — PAIN DESCRIPTION - DESCRIPTORS
DESCRIPTORS: ACHING
DESCRIPTORS: ACHING;DISCOMFORT
DESCRIPTORS: ACHING;THROBBING
DESCRIPTORS: ACHING

## 2022-04-23 ASSESSMENT — PAIN DESCRIPTION - LOCATION
LOCATION: BACK
LOCATION: BACK
LOCATION: HIP

## 2022-04-23 ASSESSMENT — PAIN DESCRIPTION - FREQUENCY: FREQUENCY: CONTINUOUS

## 2022-04-23 NOTE — PLAN OF CARE
Problem: Discharge Planning  Goal: Discharge to home or other facility with appropriate resources  4/23/2022 0730 by Desiree Ellis RN  Outcome: Progressing  Note: PT has referral for ARU upon discharge currently in progress. Will continue to collaborate with PT/OT and CM for d/c planning. Problem: Pain  Goal: Verbalizes/displays adequate comfort level or baseline comfort level  4/23/2022 0730 by Desiree Ellis RN  Outcome: Progressing  Flowsheets (Taken 4/23/2022 0730)  Verbalizes/displays adequate comfort level or baseline comfort level:   Encourage patient to monitor pain and request assistance   Assess pain using appropriate pain scale   Administer analgesics based on type and severity of pain and evaluate response   Implement non-pharmacological measures as appropriate and evaluate response   Consider cultural and social influences on pain and pain management   Notify Licensed Independent Practitioner if interventions unsuccessful or patient reports new pain  Note: Pt participates in pain management plan. Will continue to monitor. Problem: Safety - Adult  Goal: Free from fall injury  4/23/2022 0730 by Desiree Ellis RN  Outcome: Progressing  Note: Pt is a fall risk; has history of falls and pre-existing L foot drop. Fall risk protocol in place. See Alannah Hung Fall Score. Pt bed is in low position, bed alarm is on, side rails up, fall risk bracelet applied, non-skid footwear in use. Patient/family educated on fall risk protocol, instructed to call for assistance when needed and belongings are in reach. Will continue with hourly rounds for po intake, pain needs, toileting and repositioning as needed. Will continue to monitor for needs. Problem: Skin/Tissue Integrity  Goal: Absence of new skin breakdown  Description: 1. Monitor for areas of redness and/or skin breakdown  2. Assess vascular access sites hourly  3. Every 4-6 hours minimum:  Change oxygen saturation probe site  4.   Every 4-6 hours:  If on nasal continuous positive airway pressure, respiratory therapy assess nares and determine need for appliance change or resting period. 4/23/2022 0730 by Brendon Acharya RN  Outcome: Progressing  Note: Pt skin is warm, dry and color consistent with ethnicity. Surgical incisions are present, well approximated with clean/dry dressings in place. Sacral heart for prevention. Assisting patient with q2hr turns for prevention. Will continue to monitor. Problem: ABCDS Injury Assessment  Goal: Absence of physical injury  4/23/2022 0730 by Brendon Acharya RN  Outcome: Progressing       Problem: Musculoskeletal - Adult  Goal: Return mobility to safest level of function  4/23/2022 0730 by Brendon Acharya RN  Outcome: Progressing    Goal: Maintain proper alignment of affected body part  4/23/2022 0730 by Brendon Acharya RN  Outcome: Progressing    Goal: Return ADL status to a safe level of function  4/23/2022 0730 by Brendon Acharya RN  Outcome: Progressing       Problem: Gastrointestinal - Adult  Goal: Minimal or absence of nausea and vomiting  4/23/2022 0730 by Brendon Acharya RN  Outcome: Progressing    Goal: Maintains or returns to baseline bowel function  4/23/2022 0730 by Brendon Acharya RN  Outcome: Progressing  Flowsheets (Taken 4/23/2022 0730)  Maintains or returns to baseline bowel function:   Assess bowel function   Encourage mobilization and activity   Encourage oral fluids to ensure adequate hydration   Administer ordered medications as needed  Note: PT reports no flatus/no BM. Bowel regiment in place. Will continue to monitor. Goal: Maintains adequate nutritional intake  4/23/2022 0730 by Brendon Acharya RN  Outcome: Progressing      Problem: Genitourinary - Adult  Goal: Absence of urinary retention  4/23/2022 0730 by Brendon Acharya RN  Outcome: Progressing  Note: Pt had urinary retention overnight per night shift KORINA Ruiz.  Was straight catheterized once. Pt currently reports no urge to urinate. Will continue to monitor for urinary output via external catheter currently in place. Problem: Hematologic - Adult  Goal: Maintains hematologic stability  4/23/2022 0730 by Nicol Koroma RN  Outcome: Progressing  Note: Pt rec'd 2units [facility] this am for hgb: 7.6. post transfusion h&h 9.7. will continue to monitor q6h&h per order.

## 2022-04-23 NOTE — PLAN OF CARE
Problem: Discharge Planning  Goal: Discharge to home or other facility with appropriate resources  Outcome: Progressing     Problem: Pain  Goal: Verbalizes/displays adequate comfort level or baseline comfort level  Outcome: Progressing     Problem: Safety - Adult  Goal: Free from fall injury  Outcome: Progressing     Problem: Skin/Tissue Integrity  Goal: Absence of new skin breakdown  Description: 1. Monitor for areas of redness and/or skin breakdown  2. Assess vascular access sites hourly  3. Every 4-6 hours minimum:  Change oxygen saturation probe site  4. Every 4-6 hours:  If on nasal continuous positive airway pressure, respiratory therapy assess nares and determine need for appliance change or resting period.   Outcome: Progressing     Problem: ABCDS Injury Assessment  Goal: Absence of physical injury  Outcome: Progressing     Problem: Musculoskeletal - Adult  Goal: Return mobility to safest level of function  Outcome: Progressing  Goal: Maintain proper alignment of affected body part  Outcome: Progressing  Goal: Return ADL status to a safe level of function  Outcome: Progressing     Problem: Gastrointestinal - Adult  Goal: Minimal or absence of nausea and vomiting  Outcome: Progressing  Goal: Maintains or returns to baseline bowel function  Outcome: Progressing  Goal: Maintains adequate nutritional intake  Outcome: Progressing  Goal: Establish and maintain optimal ostomy function  Outcome: Progressing     Problem: Genitourinary - Adult  Goal: Absence of urinary retention  Outcome: Progressing     Problem: Hematologic - Adult  Goal: Maintains hematologic stability  Outcome: Progressing

## 2022-04-23 NOTE — PROGRESS NOTES
Physical Therapy  Facility/Department: [facility] ICU  Daily Treatment Note  NAME: Junior Gale  : 1940  MRN: [id number]    Date of Service: 2022    Discharge Recommendations: Junior Gale scored a 12/24 on the AM-PAC short mobility form. Current research shows that an AM-PAC score of 17 or less is typically not associated with a discharge to the patient's home setting. Based on the patient's AM-PAC score and their current functional mobility deficits, it is recommended that the patient have 3-5 sessions per week of Physical Therapy at d/c to increase the patient's independence. Please see assessment section for further patient specific details. If patient discharges prior to next session this note will serve as a discharge summary. Please see below for the latest assessment towards goals. Patient Diagnosis(es): There were no encounter diagnoses. Assessment   Activity Tolerance: Patient limited by fatigue;Patient limited by pain; Patient limited by endurance     Plan   5-7 x/week, functional mobility, transfers, gait, TLSO use. Pain management        Subjective    Subjective  Subjective: RN asked for assist getting pt OOB, needed assist with TLSO  Pain: Pt notes moderate pain, not specifically rated     Objective      Bed Mobility Training  Bed Mobility Training: Yes  Overall Level of Assistance: Moderate assistance;Minimum assistance;Assist X2  Rolling: Moderate assistance  Supine to Sit: Moderate assistance;Minimum assistance  Scooting: Moderate assistance  Balance  Sitting: Without support  Standing: With support  Transfer Training  Transfer Training: Yes  Overall Level of Assistance: Moderate assistance;Maximum assistance;Assist X2  Interventions: Safety awareness training  Sit to Stand:  Moderate assistance;Maximum assistance;Assist X2  Stand to Sit: Moderate assistance;Assist X1  Bed to Chair: Moderate assistance;Assist X1  Gait Training  Gait Training: Yes  Gait  Overall Level of Assistance: Moderate assistance  Gait Abnormalities: Foot drop  Distance (ft): 3 Feet  Assistive Device: Walker      Assisted RN with shelley brace, reviewed with pt as well. Patient Education  Education Provided: Role of Therapy;Plan of Care  Education Provided Comments: educated on log rolling for safety, and donning tlso  Education Outcome: Verbalized understanding;Continued education needed    Goals  Short Term Goals  Time Frame for Short term goals: D/C  Short term goal 1: Perform supine to sit transfer via log roll with mod assist x1  Short term goal 2: Perform STS transfer with mod assist x1 with LRAD  Short term goal 3: Amb x25 ft with min assist x1 and LRAD  Short term goal 4: Ascend/descend 4 steps with mod assist x1 and LRAD - if going home  Patient Goals   Patient goals :  To return home      Therapy Time   Individual Concurrent Group Co-treatment   Time In  0900        Time Out  0915         Minutes  15              Timed Code Treatment Minutes:   15    Total Treatment Minutes:  [facility] [id number]

## 2022-04-23 NOTE — PROGRESS NOTES
NEUROSURGERY POST-OP PROGRESS NOTE    Patient Name: Sebastián Delgado YOB: 1940   Sex: Female Age: 80 yrs     Medical Record Number: [id number] Acct Number: [de-identified]   Room Number: 7747/3425-89 Hospital Day: Hospital Day: 3     Interval History:  Post-operative Day# 2 s/p  L1-2, L2-3 EXTREME LATERAL INTERBODY FUSION, T10-PELVIS DECOMPRESSION, FIXATION AND FUSION, ILIAC WING SCREWS with Dr. Goodwin Clarity    Subjective:      Objective:    VITAL SIGNS   BP (!) 142/64   Pulse 73   Temp 97.8 °F (36.6 °C) (Temporal)   Resp 24   Ht 5' 4\" (1.626 m)   Wt 196 lb 10.4 oz (89.2 kg)   SpO2 95%   BMI 33.76 kg/m²    Height Height: 5' 4\" (162.6 cm)   Weight Weight: 196 lb 10.4 oz (89.2 kg)        Allergies Allergies   Allergen Reactions    Latex Itching and Rash    Bactrim Rash      NPO Status ADULT DIET; Regular   Isolation No active isolations     LABS   Basic Metabolic Profile Recent Labs     04/22/22  0100 04/23/22  0536    138    103   CO2 22 22   BUN 19 19   CREATININE 1.1 1.2   GLUCOSE 124* 104*      Complete Blood Count Recent Labs     04/22/22  0345 04/23/22  0537   WBC 12.3* 10.5   RBC 3.09* 3.39*      Coagulation Studies No results for input(s): PTT, INR in the last 72 hours.     Invalid input(s): PLATELETS, PROA, PT, PTTA     MEDICATIONS   Inpatient Medications     sodium chloride flush, 5-40 mL, IntraVENous, 2 times per day    acetaminophen, 1,000 mg, Oral, Q6H    lidocaine, 1 patch, TransDERmal, Daily    ceFAZolin, 2,000 mg, IntraVENous, Q8H    docusate sodium, 100 mg, Oral, BID    latanoprost, 1 drop, Both Eyes, Nightly    metoprolol tartrate, 50 mg, Oral, BID    pantoprazole, 40 mg, Oral, QAM AC    triamterene-hydroCHLOROthiazide, 1 tablet, Oral, Daily    verapamil, 120 mg, Oral, Daily    sodium chloride flush, 5-40 mL, IntraVENous, 2 times per day    sennosides-docusate sodium, 1 tablet, Oral, BID    enoxaparin, 40 mg, SubCUTAneous, Daily    timolol, 1 drop, Both Eyes, BID   Infusions    sodium chloride      sodium chloride      sodium chloride        Antibiotics   Recent Abx Admin                   ceFAZolin (ANCEF) 2000 mg in dextrose 5 % 50 mL IVPB (mg) 2,000 mg New Bag 04/23/22 0550     2,000 mg New Bag 04/22/22 2233     2,000 mg New Bag  1500                 Neurologic Exam:    Mental status: awake/alert, oriented x 4    Musculoskeletal:   Gait: Not tested   Tone: normal   Sensory: intact to light touch   Motor strength:    Right  Left    Right  Left    Deltoid  5 5  Hip Flex  4- 4-   Biceps  5 5  Knee Extensors  4- 4-   Triceps  5 5  Knee Flexors  4- 4-   Wrist Ext  5 5  Ankle Dorsiflex. 4 1   Wrist Flex  5 5  Ankle Plantarflex. 4 3   Handgrip  5 5        Thumb Ext  5 5         Incision: staples c/d/i   Drain: Left: 150/20: 170ml             Right: 250/100: 350ml    Respiratory:  Unlabored respiratory pattern    Abdomen:   Soft, ND   Last BM pre op    Cardiovascular:  Warm, well perfused    Assessment   79 yo female POD 2 s/p L1-2, L2-3 EXTREME LATERAL INTERBODY FUSION, T10-PELVIS DECOMPRESSION, FIXATION AND FUSION, ILIAC WING SCREWS with Dr. Willy Toledo:  1. Neurologic exam frequency: q 4   2. Mobility: PT/OT, activity as tolerated   3. Diet: ADAT  4. Antibiotics: ancef post op while drains in place   5. Drain: continue to monitor output q shift  6. [place]: remove today, monitor for urinary retention   7. DVT Prophylaxis: SCDs and  SQ lovenox   8. Bowel Regimen: colace and senokot    9. Pain control: tylenol, dilaudid, roxicodone, lidocaine patch  10. Valium/robaxin for muscle spasms   11. TLSO brace when OOB    Patient can be transferred to floor, 5T. Transfused last evening for blood loss anemia, responded appropriately. Plan for ARU vs home therapy. Likely disposition Monday.     Neema Coleman MD  [phone]

## 2022-04-24 LAB
ANION GAP SERPL CALCULATED.3IONS-SCNC: 13 MMOL/L (ref 3–16)
BASOPHILS ABSOLUTE: 0 K/UL (ref 0–0.2)
BASOPHILS RELATIVE PERCENT: 0.5 %
BUN BLDV-MCNC: 18 MG/DL (ref 7–20)
CALCIUM SERPL-MCNC: 8.8 MG/DL (ref 8.3–10.6)
CHLORIDE BLD-SCNC: 101 MMOL/L (ref 99–110)
CO2: 23 MMOL/L (ref 21–32)
CREAT SERPL-MCNC: 1.1 MG/DL (ref 0.6–1.2)
EOSINOPHILS ABSOLUTE: 0.2 K/UL (ref 0–0.6)
EOSINOPHILS RELATIVE PERCENT: 2.2 %
GFR AFRICAN AMERICAN: 58
GFR NON-AFRICAN AMERICAN: 48
GLUCOSE BLD-MCNC: 90 MG/DL (ref 70–99)
HCT VFR BLD CALC: 27 % (ref 36–48)
HEMOGLOBIN: 9.3 G/DL (ref 12–16)
LYMPHOCYTES ABSOLUTE: 1.1 K/UL (ref 1–5.1)
LYMPHOCYTES RELATIVE PERCENT: 12.2 %
MCH RBC QN AUTO: 28.9 PG (ref 26–34)
MCHC RBC AUTO-ENTMCNC: 34.3 G/DL (ref 31–36)
MCV RBC AUTO: 84.1 FL (ref 80–100)
MONOCYTES ABSOLUTE: 0.8 K/UL (ref 0–1.3)
MONOCYTES RELATIVE PERCENT: 8.9 %
NEUTROPHILS ABSOLUTE: 6.9 K/UL (ref 1.7–7.7)
NEUTROPHILS RELATIVE PERCENT: 76.2 %
PDW BLD-RTO: 15.3 % (ref 12.4–15.4)
PLATELET # BLD: 101 K/UL (ref 135–450)
PMV BLD AUTO: 9.5 FL (ref 5–10.5)
POTASSIUM SERPL-SCNC: 3.3 MMOL/L (ref 3.5–5.1)
RBC # BLD: 3.21 M/UL (ref 4–5.2)
SODIUM BLD-SCNC: 137 MMOL/L (ref 136–145)
WBC # BLD: 9.1 K/UL (ref 4–11)

## 2022-04-24 PROCEDURE — 6360000002 HC RX W HCPCS: Performed by: NEUROLOGICAL SURGERY

## 2022-04-24 PROCEDURE — 6370000000 HC RX 637 (ALT 250 FOR IP): Performed by: NEUROLOGICAL SURGERY

## 2022-04-24 PROCEDURE — 2580000003 HC RX 258: Performed by: NEUROLOGICAL SURGERY

## 2022-04-24 PROCEDURE — 80048 BASIC METABOLIC PNL TOTAL CA: CPT

## 2022-04-24 PROCEDURE — 1200000000 HC SEMI PRIVATE

## 2022-04-24 PROCEDURE — 6370000000 HC RX 637 (ALT 250 FOR IP): Performed by: INTERNAL MEDICINE

## 2022-04-24 PROCEDURE — 36415 COLL VENOUS BLD VENIPUNCTURE: CPT

## 2022-04-24 PROCEDURE — 85025 COMPLETE CBC W/AUTO DIFF WBC: CPT

## 2022-04-24 RX ORDER — POTASSIUM CHLORIDE 20 MEQ/1
60 TABLET, EXTENDED RELEASE ORAL ONCE
Status: COMPLETED | OUTPATIENT
Start: 2022-04-24 | End: 2022-04-24

## 2022-04-24 RX ORDER — POTASSIUM CHLORIDE 20 MEQ/1
40 TABLET, EXTENDED RELEASE ORAL 2 TIMES DAILY WITH MEALS
Status: DISCONTINUED | OUTPATIENT
Start: 2022-04-24 | End: 2022-04-24

## 2022-04-24 RX ORDER — POTASSIUM CHLORIDE 20 MEQ/1
40 TABLET, EXTENDED RELEASE ORAL ONCE
Status: DISCONTINUED | OUTPATIENT
Start: 2022-04-24 | End: 2022-04-24

## 2022-04-24 RX ADMIN — METOPROLOL TARTRATE 50 MG: 50 TABLET, FILM COATED ORAL at 09:29

## 2022-04-24 RX ADMIN — ACETAMINOPHEN 1000 MG: 500 TABLET ORAL at 20:25

## 2022-04-24 RX ADMIN — DOCUSATE SODIUM 100 MG: 100 CAPSULE, LIQUID FILLED ORAL at 09:22

## 2022-04-24 RX ADMIN — VERAPAMIL HYDROCHLORIDE 120 MG: 120 TABLET, FILM COATED, EXTENDED RELEASE ORAL at 09:22

## 2022-04-24 RX ADMIN — METOPROLOL TARTRATE 50 MG: 50 TABLET, FILM COATED ORAL at 20:26

## 2022-04-24 RX ADMIN — OXYCODONE 5 MG: 5 TABLET ORAL at 23:57

## 2022-04-24 RX ADMIN — SODIUM CHLORIDE, PRESERVATIVE FREE 10 ML: 5 INJECTION INTRAVENOUS at 10:31

## 2022-04-24 RX ADMIN — POTASSIUM CHLORIDE 60 MEQ: 1500 TABLET, EXTENDED RELEASE ORAL at 11:06

## 2022-04-24 RX ADMIN — ACETAMINOPHEN 1000 MG: 500 TABLET ORAL at 09:22

## 2022-04-24 RX ADMIN — TIMOLOL MALEATE 1 DROP: 5 SOLUTION OPHTHALMIC at 20:26

## 2022-04-24 RX ADMIN — DOCUSATE SODIUM 100 MG: 100 CAPSULE, LIQUID FILLED ORAL at 20:26

## 2022-04-24 RX ADMIN — OXYCODONE 10 MG: 5 TABLET ORAL at 05:09

## 2022-04-24 RX ADMIN — TRIAMTERENE AND HYDROCHLOROTHIAZIDE 1 TABLET: 37.5; 25 TABLET ORAL at 09:22

## 2022-04-24 RX ADMIN — ENOXAPARIN SODIUM 40 MG: 100 INJECTION SUBCUTANEOUS at 09:21

## 2022-04-24 RX ADMIN — ONDANSETRON 4 MG: 2 INJECTION INTRAMUSCULAR; INTRAVENOUS at 10:32

## 2022-04-24 RX ADMIN — TIMOLOL MALEATE 1 DROP: 5 SOLUTION OPHTHALMIC at 09:30

## 2022-04-24 RX ADMIN — LATANOPROST 1 DROP: 50 SOLUTION OPHTHALMIC at 20:26

## 2022-04-24 RX ADMIN — PANTOPRAZOLE SODIUM 40 MG: 40 TABLET, DELAYED RELEASE ORAL at 05:09

## 2022-04-24 RX ADMIN — SODIUM CHLORIDE, PRESERVATIVE FREE 5 ML: 5 INJECTION INTRAVENOUS at 20:26

## 2022-04-24 RX ADMIN — SENNOSIDES AND DOCUSATE SODIUM 1 TABLET: 50; 8.6 TABLET ORAL at 09:22

## 2022-04-24 RX ADMIN — ACETAMINOPHEN 1000 MG: 500 TABLET ORAL at 15:40

## 2022-04-24 RX ADMIN — OXYCODONE 10 MG: 5 TABLET ORAL at 12:23

## 2022-04-24 RX ADMIN — SENNOSIDES AND DOCUSATE SODIUM 1 TABLET: 50; 8.6 TABLET ORAL at 20:26

## 2022-04-24 ASSESSMENT — PAIN SCALES - GENERAL
PAINLEVEL_OUTOF10: 0
PAINLEVEL_OUTOF10: 4

## 2022-04-24 ASSESSMENT — PAIN DESCRIPTION - DESCRIPTORS: DESCRIPTORS: ACHING

## 2022-04-24 ASSESSMENT — PAIN DESCRIPTION - LOCATION: LOCATION: OTHER (COMMENT)

## 2022-04-24 NOTE — PLAN OF CARE
Problem: Discharge Planning  Goal: Discharge to home or other facility with appropriate resources  4/23/2022 2119 by Tanya George RN  Outcome: Progressing  4/23/2022 0730 by Franck Vallecillo RN  Outcome: Progressing  Note: PT has referral for ARU upon discharge currently in progress. Will continue to collaborate with PT/OT and CM for d/c planning. Problem: Pain  Goal: Verbalizes/displays adequate comfort level or baseline comfort level  4/23/2022 2119 by Tanya George RN  Outcome: Progressing  4/23/2022 0730 by Franck Vallecillo RN  Outcome: Progressing  Flowsheets (Taken 4/23/2022 0730)  Verbalizes/displays adequate comfort level or baseline comfort level:   Encourage patient to monitor pain and request assistance   Assess pain using appropriate pain scale   Administer analgesics based on type and severity of pain and evaluate response   Implement non-pharmacological measures as appropriate and evaluate response   Consider cultural and social influences on pain and pain management   Notify Licensed Independent Practitioner if interventions unsuccessful or patient reports new pain  Note: Pt participates in pain management plan. Will continue to monitor. Problem: Safety - Adult  Goal: Free from fall injury  4/23/2022 2119 by Tanya George RN  Outcome: Progressing  4/23/2022 0730 by Franck Vallecillo RN  Outcome: Progressing  Note: Pt is a fall risk; has history of falls and pre-existing L foot drop. Fall risk protocol in place. See Scott Paris Fall Score. Pt bed is in low position, bed alarm is on, side rails up, fall risk bracelet applied, non-skid footwear in use. Patient/family educated on fall risk protocol, instructed to call for assistance when needed and belongings are in reach. Will continue with hourly rounds for po intake, pain needs, toileting and repositioning as needed. Will continue to monitor for needs.       Problem: Skin/Tissue Integrity  Goal: Absence of new skin breakdown  Description: 1. Monitor for areas of redness and/or skin breakdown  2. Assess vascular access sites hourly  3. Every 4-6 hours minimum:  Change oxygen saturation probe site  4. Every 4-6 hours:  If on nasal continuous positive airway pressure, respiratory therapy assess nares and determine need for appliance change or resting period. 4/23/2022 2119 by Moe Nguyen RN  Outcome: Progressing  4/23/2022 0730 by Denver Mccann RN  Outcome: Progressing  Note: Pt skin is warm, dry and color consistent with ethnicity. Surgical incisions are present, well approximated with clean/dry dressings in place. Sacral heart for prevention. Assisting patient with q2hr turns for prevention. Will continue to monitor.       Problem: ABCDS Injury Assessment  Goal: Absence of physical injury  4/23/2022 2119 by Moe Nguyen RN  Outcome: Progressing  4/23/2022 0730 by Denver Mccann RN  Outcome: Progressing     Problem: Musculoskeletal - Adult  Goal: Return mobility to safest level of function  4/23/2022 2119 by Moe Nguyen RN  Outcome: Progressing  4/23/2022 0730 by Denver Mccann RN  Outcome: Progressing  Goal: Maintain proper alignment of affected body part  4/23/2022 2119 by Moe Nguyen RN  Outcome: Progressing  4/23/2022 0730 by Denver Mccann RN  Outcome: Progressing  Goal: Return ADL status to a safe level of function  4/23/2022 2119 by Moe Nguyen RN  Outcome: Progressing  4/23/2022 0730 by Denver Mccann RN  Outcome: Progressing     Problem: Gastrointestinal - Adult  Goal: Minimal or absence of nausea and vomiting  4/23/2022 2119 by Moe Nguyen RN  Outcome: Progressing  4/23/2022 0730 by Denver Mccann RN  Outcome: Progressing  Goal: Maintains or returns to baseline bowel function  4/23/2022 2119 by Moe Nguyen RN  Outcome: Progressing  4/23/2022 0730 by Denver Mccann RN  Outcome: Progressing  Flowsheets (Taken 4/23/2022 0730)  Maintains or returns to baseline bowel function:   Assess bowel function   Encourage mobilization and activity   Encourage oral fluids to ensure adequate hydration   Administer ordered medications as needed  Note: PT reports no flatus/no BM. Bowel regiment in place. Will continue to monitor. Goal: Maintains adequate nutritional intake  4/23/2022 2119 by Aditi Glynn RN  Outcome: Progressing  4/23/2022 0730 by Adonis Chang RN  Outcome: Progressing  Goal: Establish and maintain optimal ostomy function  4/23/2022 2119 by Aditi Glynn RN  Outcome: Progressing  4/23/2022 0730 by Adonis Chang RN  Outcome: Progressing     Problem: Genitourinary - Adult  Goal: Absence of urinary retention  4/23/2022 2119 by Aditi Glynn RN  Outcome: Progressing  4/23/2022 0730 by Adonis Chang RN  Outcome: Progressing  Note: Pt had urinary retention overnight per night shift KORINA Vega. Was straight catheterized once. Pt currently reports no urge to urinate. Will continue to monitor for urinary output via external catheter currently in place. Problem: Hematologic - Adult  Goal: Maintains hematologic stability  4/23/2022 2119 by Aditi Glynn RN  Outcome: Progressing  4/23/2022 0730 by Adonis Chang RN  Outcome: Progressing  Note: Pt rec'd 2units [facility] this am for hgb: 7.6. post transfusion h&h 9.7. will continue to monitor q6h&h per order.

## 2022-04-24 NOTE — PROGRESS NOTES
Hospitalist Progress Note      PCP: Joselo Bullock DO    Date of Admission: 4/21/2022    Chief Complaint:     No chief complaint on file. Low back pain    Subjective:  Patient seen and examined at the bedside. No complaints at this time.   No acute events overnight  K 3.3, will replete  BP improved, no changes in medications  Received 1u pRBC yesterday, w/ appropriate increase    PFHS: reviewed as documented 4/21/2022, no changes unless noted above    Medications:  Reviewed    Infusion Medications    sodium chloride      sodium chloride      sodium chloride       Scheduled Medications    potassium chloride  40 mEq Oral Once    sodium chloride flush  5-40 mL IntraVENous 2 times per day    acetaminophen  1,000 mg Oral Q6H    lidocaine  1 patch TransDERmal Daily    docusate sodium  100 mg Oral BID    latanoprost  1 drop Both Eyes Nightly    metoprolol tartrate  50 mg Oral BID    pantoprazole  40 mg Oral QAM AC    triamterene-hydroCHLOROthiazide  1 tablet Oral Daily    verapamil  120 mg Oral Daily    sodium chloride flush  5-40 mL IntraVENous 2 times per day    sennosides-docusate sodium  1 tablet Oral BID    enoxaparin  40 mg SubCUTAneous Daily    timolol  1 drop Both Eyes BID     PRN Meds: sodium chloride flush, sodium chloride, ondansetron **OR** ondansetron, methocarbamol, metoclopramide, sodium chloride, sodium chloride flush, sodium chloride, oxyCODONE **OR** oxyCODONE, diazePAM      Intake/Output Summary (Last 24 hours) at 4/24/2022 1003  Last data filed at 4/24/2022 0630  Gross per 24 hour   Intake --   Output 1365 ml   Net -1365 ml       Physical Exam    BP (!) 131/58   Pulse 79   Temp 96.8 °F (36 °C) (Temporal)   Resp 20   Ht 5' 4\" (1.626 m)   Wt 196 lb 10.4 oz (89.2 kg)   SpO2 97%   BMI 33.76 kg/m²     General appearance:  No acute distress, appears stated age  Eyes: Pupils equal, round, reactive to light, conjunctiva/corneas clear  Ears/Nose/Mouth/Throat: No external bilateral iliac transpedicular screws with posterior rods in satisfactory alignment and positioning of intermittent vertebral spacers   4. L5 left transpedicular screw resides slightly lateral to the vertebral body cortex into a surgery the left L4 screw. CT GUIDED STEREOTACTIC LOCALIZATION   Final Result   1. No intra-abdominal or pelvic abnormalities   2. Expected postoperative retroperitoneal and subcutaneous air   3. T10-S1 and bilateral iliac transpedicular screws with posterior rods in satisfactory alignment and positioning of intermittent vertebral spacers   4. L5 left transpedicular screw resides slightly lateral to the vertebral body cortex into a surgery the left L4 screw. CT GUIDED STEREOTACTIC LOCALIZATION   Final Result   1. No intra-abdominal or pelvic abnormalities   2. Expected postoperative retroperitoneal and subcutaneous air   3. T10-S1 and bilateral iliac transpedicular screws with posterior rods in satisfactory alignment and positioning of intermittent vertebral spacers   4. L5 left transpedicular screw resides slightly lateral to the vertebral body cortex into a surgery the left L4 screw. XR LUMBAR SPINE (2-3 VIEWS)   Final Result      2 coned-down frontal and lateral views demonstrate fusion rods and lateral mass screws with radiolucent interbody spacers. Fluoroscopy time 2 minutes 15 seconds      FLUORO FOR SURGICAL PROCEDURES   Final Result      2 coned-down frontal and lateral views demonstrate fusion rods and lateral mass screws with radiolucent interbody spacers.       Fluoroscopy time 2 minutes 15 seconds          Assessment/Plan:    Active Hospital Problems    Diagnosis Date Noted    Scoliosis of lumbar region due to degenerative disease of spine in adult [M41.86] 04/21/2022     Priority: Medium       Plan:    # L1-2, L2-3 EXTREME LATERAL INTERBODY FUSION, T10-PELVIS DECOMPRESSION, FIXATION AND FUSION, Mireya Dias  -management as per primary    # Hypokalemia  repleted  Keep K>4, Mg>2    # paroxysmal SVT  Single episode yesterday due to caffeine, nothing since  -continue home rate control medications     # HTN  On home BP medications, BP improved today, no changes     # GERD  # CAD  # HTN  # CKD3  # Other chronic conditions  -continue home management    # Remainder of medical conditions  -home management except as above    DVT Prophylaxis: Lovenox  Diet: ADULT DIET; Regular  Code Status: Full Code    PT/OT Eval Status: Ongoing    Dispo: when cleared by neurosurgery.  No barriers from internal medicine perspective    Jose Edwards MD

## 2022-04-24 NOTE — PLAN OF CARE
Problem: Discharge Planning  Goal: Discharge to home or other facility with appropriate resources  Outcome: Progressing  Note: Patient is eager to progress to rehab vs home pending pt/ot and ARU referral to DR Pittman. Problem: Pain  Goal: Verbalizes/displays adequate comfort level or baseline comfort level  Outcome: Progressing  Note: Patient participates in pain control plan. Will continue to monitor. Problem: Safety - Adult  Goal: Free from fall injury  Outcome: Progressing  Note: Pt is a fall risk. Fall risk protocol in place. See Shauna Dominguez Fall Score. Pt bed is in low position, bed alarm is on, side rails up, fall risk bracelet applied, non-skid footwear in use. Patient/family educated on fall risk protocol, instructed to call for assistance when needed and belongings are in reach. Will continue with hourly rounds for po intake, pain needs, toileting and repositioning as needed. Will continue to monitor for needs. Problem: Skin/Tissue Integrity  Goal: Absence of new skin breakdown  Description: 1. Monitor for areas of redness and/or skin breakdown  2. Assess vascular access sites hourly  3. Every 4-6 hours minimum:  Change oxygen saturation probe site  4. Every 4-6 hours:  If on nasal continuous positive airway pressure, respiratory therapy assess nares and determine need for appliance change or resting period. Outcome: Progressing  Note: With the exception of surgical incisions to back and L hip areas, patient skin is intact. Will continue to monitor.       Problem: ABCDS Injury Assessment  Goal: Absence of physical injury  Outcome: Progressing     Problem: Musculoskeletal - Adult  Goal: Return mobility to safest level of function  Outcome: Progressing  Goal: Maintain proper alignment of affected body part  Outcome: Progressing  Goal: Return ADL status to a safe level of function  Outcome: Progressing     Problem: Gastrointestinal - Adult  Goal: Minimal or absence of nausea and vomiting  Outcome: Progressing  Goal: Maintains or returns to baseline bowel function  Outcome: Progressing  Note: Patient made multiple trips to restroom to attempt BM. Passing increasing amounts of flatus per rectum, no BM yet. On bowel regimen currently, patient declines further interventions at this time, fearing incontinence of bowel. Will continue to monitor.    Goal: Maintains adequate nutritional intake  Outcome: Progressing  Goal: Establish and maintain optimal ostomy function  Outcome: Progressing     Problem: Genitourinary - Adult  Goal: Absence of urinary retention  Outcome: Progressing     Problem: Hematologic - Adult  Goal: Maintains hematologic stability  Outcome: Progressing

## 2022-04-25 LAB
ANION GAP SERPL CALCULATED.3IONS-SCNC: 12 MMOL/L (ref 3–16)
BASOPHILS ABSOLUTE: 0 K/UL (ref 0–0.2)
BASOPHILS RELATIVE PERCENT: 0.5 %
BUN BLDV-MCNC: 19 MG/DL (ref 7–20)
CALCIUM SERPL-MCNC: 8.8 MG/DL (ref 8.3–10.6)
CHLORIDE BLD-SCNC: 102 MMOL/L (ref 99–110)
CO2: 22 MMOL/L (ref 21–32)
CREAT SERPL-MCNC: 1 MG/DL (ref 0.6–1.2)
EOSINOPHILS ABSOLUTE: 0.3 K/UL (ref 0–0.6)
EOSINOPHILS RELATIVE PERCENT: 3.8 %
GFR AFRICAN AMERICAN: >60
GFR NON-AFRICAN AMERICAN: 53
GLUCOSE BLD-MCNC: 88 MG/DL (ref 70–99)
HCT VFR BLD CALC: 26.9 % (ref 36–48)
HEMOGLOBIN: 9 G/DL (ref 12–16)
LYMPHOCYTES ABSOLUTE: 1 K/UL (ref 1–5.1)
LYMPHOCYTES RELATIVE PERCENT: 13.8 %
MAGNESIUM: 1.9 MG/DL (ref 1.8–2.4)
MCH RBC QN AUTO: 28.3 PG (ref 26–34)
MCHC RBC AUTO-ENTMCNC: 33.5 G/DL (ref 31–36)
MCV RBC AUTO: 84.7 FL (ref 80–100)
MONOCYTES ABSOLUTE: 0.7 K/UL (ref 0–1.3)
MONOCYTES RELATIVE PERCENT: 9.6 %
NEUTROPHILS ABSOLUTE: 5.4 K/UL (ref 1.7–7.7)
NEUTROPHILS RELATIVE PERCENT: 72.3 %
PDW BLD-RTO: 15.1 % (ref 12.4–15.4)
PLATELET # BLD: 123 K/UL (ref 135–450)
PMV BLD AUTO: 9.5 FL (ref 5–10.5)
POTASSIUM SERPL-SCNC: 3.9 MMOL/L (ref 3.5–5.1)
RBC # BLD: 3.18 M/UL (ref 4–5.2)
SODIUM BLD-SCNC: 136 MMOL/L (ref 136–145)
WBC # BLD: 7.4 K/UL (ref 4–11)

## 2022-04-25 PROCEDURE — 85025 COMPLETE CBC W/AUTO DIFF WBC: CPT

## 2022-04-25 PROCEDURE — 97530 THERAPEUTIC ACTIVITIES: CPT

## 2022-04-25 PROCEDURE — 99222 1ST HOSP IP/OBS MODERATE 55: CPT | Performed by: PHYSICAL MEDICINE & REHABILITATION

## 2022-04-25 PROCEDURE — 6370000000 HC RX 637 (ALT 250 FOR IP): Performed by: NURSE PRACTITIONER

## 2022-04-25 PROCEDURE — 1200000000 HC SEMI PRIVATE

## 2022-04-25 PROCEDURE — 6370000000 HC RX 637 (ALT 250 FOR IP): Performed by: NEUROLOGICAL SURGERY

## 2022-04-25 PROCEDURE — 80048 BASIC METABOLIC PNL TOTAL CA: CPT

## 2022-04-25 PROCEDURE — 36415 COLL VENOUS BLD VENIPUNCTURE: CPT

## 2022-04-25 PROCEDURE — 97535 SELF CARE MNGMENT TRAINING: CPT

## 2022-04-25 PROCEDURE — 6360000002 HC RX W HCPCS: Performed by: NEUROLOGICAL SURGERY

## 2022-04-25 PROCEDURE — 83735 ASSAY OF MAGNESIUM: CPT

## 2022-04-25 PROCEDURE — 97116 GAIT TRAINING THERAPY: CPT

## 2022-04-25 PROCEDURE — 2580000003 HC RX 258: Performed by: NEUROLOGICAL SURGERY

## 2022-04-25 RX ORDER — BISACODYL 10 MG
10 SUPPOSITORY, RECTAL RECTAL DAILY PRN
Status: DISCONTINUED | OUTPATIENT
Start: 2022-04-25 | End: 2022-04-26

## 2022-04-25 RX ORDER — BISACODYL 10 MG
10 SUPPOSITORY, RECTAL RECTAL ONCE
Status: DISCONTINUED | OUTPATIENT
Start: 2022-04-25 | End: 2022-04-25

## 2022-04-25 RX ORDER — BISACODYL 10 MG
10 SUPPOSITORY, RECTAL RECTAL DAILY PRN
Status: DISCONTINUED | OUTPATIENT
Start: 2022-04-25 | End: 2022-04-25

## 2022-04-25 RX ADMIN — TIMOLOL MALEATE 1 DROP: 5 SOLUTION OPHTHALMIC at 08:52

## 2022-04-25 RX ADMIN — LATANOPROST 1 DROP: 50 SOLUTION OPHTHALMIC at 20:21

## 2022-04-25 RX ADMIN — TRIAMTERENE AND HYDROCHLOROTHIAZIDE 1 TABLET: 37.5; 25 TABLET ORAL at 08:49

## 2022-04-25 RX ADMIN — VERAPAMIL HYDROCHLORIDE 120 MG: 120 TABLET, FILM COATED, EXTENDED RELEASE ORAL at 08:49

## 2022-04-25 RX ADMIN — TIMOLOL MALEATE 1 DROP: 5 SOLUTION OPHTHALMIC at 20:21

## 2022-04-25 RX ADMIN — ENOXAPARIN SODIUM 40 MG: 100 INJECTION SUBCUTANEOUS at 08:49

## 2022-04-25 RX ADMIN — OXYCODONE 5 MG: 5 TABLET ORAL at 23:15

## 2022-04-25 RX ADMIN — ACETAMINOPHEN 1000 MG: 500 TABLET ORAL at 04:48

## 2022-04-25 RX ADMIN — SENNOSIDES AND DOCUSATE SODIUM 1 TABLET: 50; 8.6 TABLET ORAL at 08:47

## 2022-04-25 RX ADMIN — DOCUSATE SODIUM 100 MG: 100 CAPSULE, LIQUID FILLED ORAL at 20:20

## 2022-04-25 RX ADMIN — DOCUSATE SODIUM 100 MG: 100 CAPSULE, LIQUID FILLED ORAL at 08:47

## 2022-04-25 RX ADMIN — MAGNESIUM HYDROXIDE 30 ML: 400 SUSPENSION ORAL at 12:11

## 2022-04-25 RX ADMIN — SODIUM CHLORIDE, PRESERVATIVE FREE 10 ML: 5 INJECTION INTRAVENOUS at 08:50

## 2022-04-25 RX ADMIN — OXYCODONE 5 MG: 5 TABLET ORAL at 15:27

## 2022-04-25 RX ADMIN — ACETAMINOPHEN 1000 MG: 500 TABLET ORAL at 15:27

## 2022-04-25 RX ADMIN — SENNOSIDES AND DOCUSATE SODIUM 1 TABLET: 50; 8.6 TABLET ORAL at 20:20

## 2022-04-25 RX ADMIN — METOPROLOL TARTRATE 50 MG: 50 TABLET, FILM COATED ORAL at 08:48

## 2022-04-25 RX ADMIN — METOPROLOL TARTRATE 50 MG: 50 TABLET, FILM COATED ORAL at 20:20

## 2022-04-25 RX ADMIN — SODIUM CHLORIDE, PRESERVATIVE FREE 10 ML: 5 INJECTION INTRAVENOUS at 20:20

## 2022-04-25 RX ADMIN — PANTOPRAZOLE SODIUM 40 MG: 40 TABLET, DELAYED RELEASE ORAL at 07:18

## 2022-04-25 ASSESSMENT — PAIN DESCRIPTION - DESCRIPTORS
DESCRIPTORS: ACHING

## 2022-04-25 ASSESSMENT — PAIN DESCRIPTION - PAIN TYPE
TYPE: SURGICAL PAIN

## 2022-04-25 ASSESSMENT — PAIN SCALES - GENERAL
PAINLEVEL_OUTOF10: 6
PAINLEVEL_OUTOF10: 6
PAINLEVEL_OUTOF10: 0
PAINLEVEL_OUTOF10: 5
PAINLEVEL_OUTOF10: 4

## 2022-04-25 ASSESSMENT — PAIN DESCRIPTION - ONSET
ONSET: ON-GOING
ONSET: ON-GOING

## 2022-04-25 ASSESSMENT — PAIN DESCRIPTION - FREQUENCY
FREQUENCY: INTERMITTENT
FREQUENCY: CONTINUOUS

## 2022-04-25 ASSESSMENT — PAIN DESCRIPTION - ORIENTATION
ORIENTATION: MID
ORIENTATION: RIGHT;LEFT
ORIENTATION: RIGHT;LEFT
ORIENTATION: RIGHT

## 2022-04-25 ASSESSMENT — PAIN - FUNCTIONAL ASSESSMENT
PAIN_FUNCTIONAL_ASSESSMENT: PREVENTS OR INTERFERES WITH ALL ACTIVE AND SOME PASSIVE ACTIVITIES
PAIN_FUNCTIONAL_ASSESSMENT: PREVENTS OR INTERFERES SOME ACTIVE ACTIVITIES AND ADLS
PAIN_FUNCTIONAL_ASSESSMENT: PREVENTS OR INTERFERES SOME ACTIVE ACTIVITIES AND ADLS

## 2022-04-25 ASSESSMENT — PAIN DESCRIPTION - LOCATION
LOCATION: BACK
LOCATION: OTHER (COMMENT)
LOCATION: OTHER (COMMENT)

## 2022-04-25 NOTE — PROGRESS NOTES
Patient assisted to chair with front wheeled walker X1 assist with back brace in place. Patient tolerated very well. Call light and bedside table within reach. Will monitor.

## 2022-04-25 NOTE — CARE COORDINATION
Case Management Assessment           Daily Note                 Date/ Time of Note: 4/25/2022 1:31 PM         Note completed by: Dannis Goldberg, RN    Patient Name: Kyra Orr  YOB: 1940    Diagnosis:Kyphosis, unspecified kyphosis type, unspecified spinal region [M40.209]  Scoliosis of lumbar region due to degenerative disease of spine in adult [M41.86]  Patient Admission Status: Inpatient    Date of Admission:4/21/2022  6:58 AM Length of Stay: 4 GLOS: GMLOS: 2.4    Current Plan of Care: PT/OT  ________________________________________________________________________________________  PT AM-PAC: 17 / 24 per last evaluation on: 4/25    OT AM-PAC: 16 / 24 per last evaluation on: 4/25    Discharge Plan: Plan to go home with family. Family will provide 24/7 assistance. Home Healthcare PT/OT desired. A list of agencies was placed at the bedside. Tentative discharge date: today v. tomorrow    Case Management Notes: Chart reviewed. A referral was made to the [address] [place] [address]. Ms. Manolo Smith does not want to go to rehab. She wants to return home. Spoke with her daughter, Gerard Siemens, who stated she was comfortable with the pt returning home with [facility]. No preference of agency. A list was provided for her to choose. Explained the pt may be ready for DC today or tomorrow. She verbalized understanding. Dave and her family were provided with choice of provider; she and her family are in agreement with the discharge plan.     Care Transition Patient: Yes    Dannis Goldberg, RN  [facility] [employer]  Case Management Department  [phone]

## 2022-04-25 NOTE — PROGRESS NOTES
Progress Note        Date:4/25/2022       Room:4506/4506-01  Patient Red Oneill     YOB: 1940     Age:82 y.o. Back pain          Subjective   Interval History Status: improved. Sitting up in a chair  Ambulated in the hallway  Pain is controlled  Drain is still in place        Review of Systems   ROS as mentioned above.     Medications   Scheduled Meds:    sodium chloride flush  5-40 mL IntraVENous 2 times per day    acetaminophen  1,000 mg Oral Q6H    lidocaine  1 patch TransDERmal Daily    docusate sodium  100 mg Oral BID    latanoprost  1 drop Both Eyes Nightly    metoprolol tartrate  50 mg Oral BID    pantoprazole  40 mg Oral QAM AC    [Held by provider] triamterene-hydroCHLOROthiazide  1 tablet Oral Daily    verapamil  120 mg Oral Daily    sodium chloride flush  5-40 mL IntraVENous 2 times per day    sennosides-docusate sodium  1 tablet Oral BID    enoxaparin  40 mg SubCUTAneous Daily    timolol  1 drop Both Eyes BID     Continuous Infusions:    sodium chloride      sodium chloride      sodium chloride       PRN Meds: magnesium hydroxide, bisacodyl, sodium chloride flush, sodium chloride, ondansetron **OR** ondansetron, methocarbamol, metoclopramide, sodium chloride, sodium chloride flush, sodium chloride, oxyCODONE **OR** oxyCODONE, diazePAM    Past History    Past Medical History:   has a past medical history of Anxiety, Chronic kidney disease, stage 3b (HCC), Chronic midline low back pain without sciatica, Coronary artery disease involving native coronary artery without angina pectoris, Essential hypertension, Foot drop, left, GERD without esophagitis, History of blood transfusion, Internal hemorrhoids, Intrinsic eczema, Lesion of lateral popliteal nerve, Osteoporosis of multiple sites, Paroxysmal supraventricular tachycardia ([facility] [address] Primary osteoarthritis involving multiple joints, Prolonged emergence from general anesthesia, PUD (peptic ulcer disease), and Restless leg syndrome. Social History:   reports that she quit smoking about 25 years ago. Her smoking use included cigarettes. She smoked 1.00 pack per day. She has never used smokeless tobacco. She reports current alcohol use of about 2.0 standard drinks of alcohol per week. She reports that she does not use drugs. Family History:   Family History   Problem Relation Age of Onset    High Blood Pressure Mother     Stroke Father     High Blood Pressure Father     Depression Daughter     Cancer Son         Colorectal     Depression Son     Diabetes Maternal Aunt     Cancer Maternal Uncle         Throat    Alcohol Abuse Maternal Uncle     Asthma Maternal Uncle     Alcohol Abuse Maternal Uncle        Physical Examination      Vitals:  BP (!) 96/55   Pulse 74   Temp 98.4 °F (36.9 °C) (Temporal)   Resp 16   Ht 5' 4\" (1.626 m)   Wt 196 lb 10.4 oz (89.2 kg)   SpO2 97%   BMI 33.76 kg/m²   Temp (24hrs), Av.8 °F (36.6 °C), Min:96.7 °F (35.9 °C), Max:99.1 °F (37.3 °C)      I/O (24Hr): Intake/Output Summary (Last 24 hours) at 2022 1553  Last data filed at 2022 0725  Gross per 24 hour   Intake 1300 ml   Output 550 ml   Net 750 ml       Physical Exam  Constitutional:       General: She is not in acute distress. Appearance: Normal appearance. She is not ill-appearing. HENT:      Head: Normocephalic and atraumatic. Cardiovascular:      Rate and Rhythm: Normal rate and regular rhythm. Pulses: Normal pulses. Heart sounds: Normal heart sounds. Pulmonary:      Effort: Pulmonary effort is normal.      Breath sounds: Normal breath sounds. Abdominal:      General: Abdomen is flat. Palpations: Abdomen is soft. Musculoskeletal:      Comments: Dressing applied on the lower back  Drain in place   Skin:     General: Skin is warm. Capillary Refill: Capillary refill takes less than 2 seconds. Neurological:      General: No focal deficit present.       Mental Status: She is alert and oriented to person, place, and time. Psychiatric:         Mood and Affect: Mood normal.         Behavior: Behavior normal.                 Assessment      Principal Problem:    Scoliosis of lumbar region due to degenerative disease of spine in adult  Resolved Problems:    * No resolved hospital problems.  *       Plan:        79 yo female POD 4 s/p L1-2, L2-3 EXTREME LATERAL INTERBODY FUSION, T10-PELVIS DECOMPRESSION, FIXATION AND FUSION, ILIAC WING SCREWS with Dr. Mk Taylor wound care per neurosurgery  DVT prophylaxis: Lovenox  PT OT  Drain management per neurosurgery    Hypertension  Continue home metoprolol  Hold diuretics, BP on the lower side    Hypokalemia  Repleted    Acute blood loss anemia  S/p 1 unit PRBC through this hospitalization  Hemoglobin stable    Disposition  Per neurosurgery     Mia Koroma MD  3:53 PM  04/25/22

## 2022-04-25 NOTE — PLAN OF CARE
Problem: Discharge Planning  Goal: Discharge to home or other facility with appropriate resources  4/24/2022 2240 by Alessandro Tejada RN  Outcome: Progressing  4/24/2022 1850 by Berna Beckwith RN  Outcome: Progressing  Note: Patient is eager to progress to rehab vs home pending pt/ot and ARU referral to DR Pittman. Problem: Pain  Goal: Verbalizes/displays adequate comfort level or baseline comfort level  4/24/2022 2240 by Alessandro Tejada RN  Outcome: Progressing  4/24/2022 1850 by Berna Beckwith RN  Outcome: Progressing  Note: Patient participates in pain control plan. Will continue to monitor. Problem: Safety - Adult  Goal: Free from fall injury  4/24/2022 2240 by Alessandro Tejada RN  Outcome: Progressing  4/24/2022 1850 by Berna Beckwith RN  Outcome: Progressing  Note: Pt is a fall risk. Fall risk protocol in place. See Katantonino Bellow Fall Score. Pt bed is in low position, bed alarm is on, side rails up, fall risk bracelet applied, non-skid footwear in use. Patient/family educated on fall risk protocol, instructed to call for assistance when needed and belongings are in reach. Will continue with hourly rounds for po intake, pain needs, toileting and repositioning as needed. Will continue to monitor for needs. Problem: Skin/Tissue Integrity  Goal: Absence of new skin breakdown  Description: 1. Monitor for areas of redness and/or skin breakdown  2. Assess vascular access sites hourly  3. Every 4-6 hours minimum:  Change oxygen saturation probe site  4. Every 4-6 hours:  If on nasal continuous positive airway pressure, respiratory therapy assess nares and determine need for appliance change or resting period. 4/24/2022 2240 by Alessandro Tejada RN  Outcome: Progressing  4/24/2022 1850 by Berna Beckwith RN  Outcome: Progressing  Note: With the exception of surgical incisions to back and L hip areas, patient skin is intact. Will continue to monitor.       Problem: ABCDS Injury Assessment  Goal: Absence of physical injury  4/24/2022 2240 by Ita Byers RN  Outcome: Progressing  4/24/2022 1850 by Jules Rivero RN  Outcome: Progressing     Problem: Musculoskeletal - Adult  Goal: Return mobility to safest level of function  4/24/2022 2240 by Ita Byers RN  Outcome: Progressing  4/24/2022 1850 by Jules Rivero RN  Outcome: Progressing  Goal: Maintain proper alignment of affected body part  4/24/2022 2240 by Ita Byers RN  Outcome: Progressing  4/24/2022 1850 by Jules Rivero RN  Outcome: Progressing  Goal: Return ADL status to a safe level of function  4/24/2022 2240 by Ita Byers RN  Outcome: Progressing  4/24/2022 1850 by Jules Rivero RN  Outcome: Progressing     Problem: Gastrointestinal - Adult  Goal: Minimal or absence of nausea and vomiting  4/24/2022 2240 by Ita Byers RN  Outcome: Progressing  4/24/2022 1850 by Jules Rivero RN  Outcome: Progressing  Goal: Maintains or returns to baseline bowel function  4/24/2022 2240 by Ita Byers RN  Outcome: Progressing  4/24/2022 1850 by Jules Rivero RN  Outcome: Progressing  Note: Patient made multiple trips to restroom to attempt BM. Passing increasing amounts of flatus per rectum, no BM yet. On bowel regimen currently, patient declines further interventions at this time, fearing incontinence of bowel. Will continue to monitor.    Goal: Maintains adequate nutritional intake  4/24/2022 2240 by Ita Byers RN  Outcome: Progressing  4/24/2022 1850 by Jules Rivero RN  Outcome: Progressing  Goal: Establish and maintain optimal ostomy function  4/24/2022 2240 by Ita Byers RN  Outcome: Progressing  4/24/2022 1850 by Jules Rivero RN  Outcome: Progressing     Problem: Hematologic - Adult  Goal: Maintains hematologic stability  4/24/2022 2240 by Ita Byers RN  Outcome: Progressing  4/24/2022 1850 by Jules Rivero RN  Outcome: Progressing

## 2022-04-25 NOTE — PROGRESS NOTES
Occupational Therapy  Facility/Department: [facility] ICU  Daily Treatment Note  NAME: Colleen Hartman  : 1940  MRN: [id number]    Date of Service: 2022    Discharge Recommendations: Colleen Hartman scored a 16/24 on the AM-PAC ADL Inpatient form. Current research shows that an AM-PAC score of 18 or greater is typically associated with a discharge to the patient's home setting. Based on the patient's AM-PAC score, and their current ADL deficits, it is recommended that the patient have 2-3 sessions per week of Occupational Therapy at d/c to increase the patient's independence. At this time, this patient demonstrates the endurance and safety to discharge  home with home services and a follow up treatment frequency of 2-3x/wk. Please see assessment section for further patient specific details. If patient discharges prior to next session this note will serve as a discharge summary. Please see below for the latest assessment towards goals. OT Equipment Recommendations  Equipment Needed: No      Patient Diagnosis(es): There were no encounter diagnoses. Assessment    Assessment: Pt appears to be feeling better today. Pt is requiring CG for most mobility. Pt mod-max assist for lower body dressing, but was educated on use of assistive devices (she has a reacher and sock aid), and pt verbalized understanding. Pt is planning on going home at dischargeand will have assist from her daughter. Activity Tolerance: Patient tolerated treatment well  Equipment Needed: No      Plan   Plan  Times per Week: 5-7  Current Treatment Recommendations: Strengthening;ROM;Balance training;Functional mobility training; Endurance training;Self-Care / ADL     Subjective   Subjective  Subjective: Pt up in chair upon entry. Pt would like to go home at discharge.   Cognition  Overall Cognitive Status: WFL        Objective    Vitals     Balance  Standing:  (CG-SBA)  Transfer Training  Transfer Training: Yes  Overall Level of Assistance: Contact-guard assistance  Sit to Stand: Contact-guard assistance (+cues)  Stand to Sit: Contact-guard assistance (+cues)  Toilet Transfer: Contact-guard assistance (3 in 1 commode over toilet)     ADL  Grooming: Stand by assistance (to wash hands, standing at sink. Pt brushed teeth while sitting on the toilet.)  LE Dressing:  Moderate assistance (to don brief)  Toileting: Contact guard assistance           Patient Education  Education Given To: Patient  Education Provided: Role of Therapy;Plan of Care;Precautions;Transfer Training;ADL Adaptive Strategies  Education Method: Verbal;Demonstration  Education Outcome: Verbalized understanding    Goals  Short Term Goals  Time Frame for Short term goals: Discharge  Short Term Goal 1: Transfer to/from toilet with min assist    -MET 4/25/22-  Short Term Goal 2: Stance with CG x4 mn while engaging in ADL/functional mobility  Short Term Goal 3: Don/doff TLSO with min assist  Patient Goals   Patient goals : Go home       Therapy Time   Individual Concurrent Group Co-treatment   Time In 1012         Time Out 1056         Minutes 44         Timed Code Treatment Minutes: [address]vo OTR/L 439[address]

## 2022-04-25 NOTE — PROGRESS NOTES
Physical Therapy  Facility/Department: [facility] ICU  Daily Treatment Note    Name: Rachele Rodriguez  : 1940  MRN: [id number]  Date of Service: 2022    Discharge Recommendations: Rachele Rodriguez scored a 17/24 on the AM-PAC short mobility form. Current research shows that an AM-PAC score of 18 or greater is typically associated with a discharge to the patient's home setting. Based on the patient's AM-PAC score and their current functional mobility deficits, it is recommended that the patient have 2-3 sessions per week of Physical Therapy at d/c to increase the patient's independence. At this time, this patient demonstrates the endurance and safety to discharge home with 24 hr assist and home PT and a follow up treatment frequency of 2-3x/wk. Please see assessment section for further patient specific details. If patient discharges prior to next session this note will serve as a discharge summary. Please see below for the latest assessment towards goals. PT Equipment Recommendations  Equipment Needed: Yes (Rolling walker)      Patient Diagnosis(es): There were no encounter diagnoses. Past Medical History:  has a past medical history of Anxiety, Chronic kidney disease, stage 3b ([address] Chronic midline low back pain without sciatica, Coronary artery disease involving native coronary artery without angina pectoris, Essential hypertension, Foot drop, left, GERD without esophagitis, History of blood transfusion, Internal hemorrhoids, Intrinsic eczema, Lesion of lateral popliteal nerve, Osteoporosis of multiple sites, Paroxysmal supraventricular tachycardia ([address] Primary osteoarthritis involving multiple joints, Prolonged emergence from general anesthesia, PUD (peptic ulcer disease), and Restless leg syndrome. Past Surgical History:  has a past surgical history that includes Cholecystectomy; Hysterectomy; lumbar laminectomy; Lewis tooth extraction; Abdominal exploration surgery ();  Colonoscopy; cyst removal; Shoulder arthroscopy (Right); Breast surgery (Right); Total knee arthroplasty (Left, 02/03/2015); Total hip arthroplasty (Bilateral); Hemorrhoid surgery (02/24/2017); Upper gastrointestinal endoscopy (N/A, 03/10/2020); Carpal tunnel release (Right, 11/03/2020); Cataract removal with implant (Right, 08/24/2021); Intracapsular cataract extraction (Right, 08/24/2021); Cataract removal with implant (Left, 09/07/2021); Intracapsular cataract extraction (Left, 09/07/2021); Pain management procedure (Bilateral, 12/08/2021); eye surgery; joint replacement; Lumbar spine surgery (04/21/2022); lumbar fusion (Left, 4/21/2022); and lumbar fusion (N/A, 4/21/2022). Assessment   Body Structures, Functions, Activity Limitations Requiring Skilled Therapeutic Intervention: Decreased functional mobility ; Decreased ROM; Decreased strength;Decreased safe awareness;Decreased endurance;Decreased balance;Decreased coordination; Increased pain;Decreased posture  Assessment: Pt demonstrated improved mobility and endurance requiring decreased assist with transfers/gait. Pt reports decreased pain and is able to tolerate stair assessment. Pt currently requiring CGA with transfers/gait and min assist x1 for stairs. Pt requires VC's for safety t/o session. Pt remains a fall risk and not safe to stand alone. Safety concerns for pt returning home however would benefit from 24hr assist which daughter can provide and home PT. Pt would benefit from continued skilled PT to improve strength and mobility.   Treatment Diagnosis: Decreased functional mobility associated with kyphosis  Therapy Prognosis: Good  Requires PT Follow-Up: Yes  Activity Tolerance  Activity Tolerance: Patient limited by fatigue;Patient limited by endurance     Plan   Plan  Plan: 5-7 times per week  Current Treatment Recommendations: Strengthening,ROM,Balance training,Functional mobility training,Transfer training,Endurance training,Gait training,Stair training,Neuromuscular re-education,Pain management,Safety education & training,Patient/Caregiver education & training,Therapeutic activities  Plan Comment: Further assess gait as able  Safety Devices  Type of Devices: Call light within reach,Chair alarm in place,Left in chair,Nurse notified     Restrictions  Position Activity Restriction  Other position/activity restrictions: Activity as tolerated, ambulate pt; TLSO brace when OOB     Subjective   General  Chart Reviewed: Yes  Additional Pertinent Hx: Ms. Tami Ibarra is a 79 y/o female presenting s/p  L1-2, L2-3 EXTREME LATERAL INTERBODY FUSION, T10-PELVIS DECOMPRESSION, FIXATION AND FUSION, ILIAC WING SCREWS (Left Spine Lumbar) on 4/22 per Dr. Subhash Glynn. Pt was dx with kyphosis. CT-L spine ordered. CT guided stereotactic: T10-S1 and bilateral iliac transpedicular screws with posterior rods in satisfactory alignment and positioning of intermittent vertebral spacers. PMH: CKD III, CAD, HTN, foot drop L, osteoporosis, paroxysmal supraventricular tachycardia, PUD. Family / Caregiver Present: No  Referring Practitioner: Bekah Monterroso. Subhash Glynn MD  Referral Date : 04/21/22  Diagnosis: Kyphosis  General Comment  Comments: Pt presents with BP cuff, SpO2 monitor, telemetry, purwick catheter, and TLSO brace. Subjective  Subjective: Pt presents sitting upright in bedside chair. Pt pleasant and agreeable to PT. Pt states \"I talked to my daughter and I think I would like to go home\".   Pain: Denies          Social/Functional History  Social/Functional History  Lives With: Daughter Haydee Ball)  Type of Home: House  Home Layout: Two level,Laundry in basement (Bedroom and bathroom on 1st floor)  Home Access: Stairs to enter without rails  Entrance Stairs - Number of Steps: 6 KENNETH  Bathroom Shower/Tub: Walk-in shower  Bathroom Equipment: Grab bars in shower,Shower chair,Toilet raiser  Bathroom Accessibility: Accessible  Home Equipment: Cane,Walker, 4 wheeled,Walker, standard  Has the patient had two or more falls in the past year or any fall with injury in the past year?: Yes (1 fall d/t drop foot and tripping while walking)  Receives Help From: Family  ADL Assistance: Independent  Homemaking Assistance: Needs assistance (Daughter and grandson perform laundry)  Homemaking Responsibilities: Yes (Performs cooking and cleaning)  Ambulation Assistance: Independent (With cane)  Transfer Assistance: Independent  Active : Yes (Able to drive however daughter usually drives)  Occupation: Retired  Type of Occupation: Medical lab technician  Additional Comments: Daughter works during the day however grandson is able to assist pt when daughter not home upon d/c. Vision/Hearing       Cognition   Orientation  Overall Orientation Status: Within Functional Limits     Objective               Transfers  Sit to Stand: Contact guard assistance (VC's for hand placement)  Stand to sit: Contact guard assistance (VC's for hand placement, demonstrates controlled descent)  Ambulation  Device: Rolling Walker  Assistance: Contact guard assistance  Quality of Gait: Guarded posture, heavy support through B UE's, L foot drop, slow and hesitent  Gait Deviations: Slow Talya;Decreased step length;Decreased step height  Distance: 5'x2, 50'  Comments: Pt required for VC's for upright posture.   Stairs/Curb  Stairs?: Yes  Stairs  # Steps : 5  Stairs Height: 6\"  Rails: Right ascending  Device: Hand Held Assist  Assistance: Minimal assistance  Comment: Demonstrates non-reciprocal step pattern     Balance  Posture: Fair (Guarded)  Sitting - Static: Good (Supervision)  Sitting - Dynamic: Good (SBA)  Standing - Static: Fair (CGA with walker)  Standing - Dynamic: Fair (CGA with walker)           OutComes Score                                                  AM-PAC Score  AM-PAC Inpatient Mobility Raw Score : 17 (04/25/22 1144)  AM-PAC Inpatient T-Scale Score : 42.13 (04/25/22 1144)  Mobility Inpatient CMS 0-100% Score: 50.57 (04/25/22 1144)  Mobility Inpatient CMS G-Code Modifier : CK (04/25/22 1144)          Goals  Short Term Goals  Time Frame for Short term goals: D/C  Short term goal 1: Perform supine to sit transfer via log roll with mod assist x1 - ongoing  Short term goal 2: Perform STS transfer with mod assist x1 with LRAD. MET 4/25 - Revised goal: Perform STS transfer with supervision and LRAD  Short term goal 3: Amb x25 ft with min assist x1 and LRAD - MET 4/25 - Revised goal: Amb >100 ft with supervision and LRAD  Short term goal 4: Ascend/descend 4 steps with mod assist x1 and LRAD - if going home; MET 4/25 - Revised goal: Ascend/descend 4 steps with SBA and LRAD  Patient Goals   Patient goals : To return home       Therapy Time   Individual Concurrent Group Co-treatment   Time In 1012         Time Out 1057         Minutes 45               Timed Code Treatment Minutes:   45    Total Treatment Minutes:  66 MIKI Carranza       Therapist was present, directed the patient's care, made skilled judgement, and was responsible for assessment and treatment of the patient.   Magaly Becerra, [address] [place] [address]

## 2022-04-25 NOTE — PROGRESS NOTES
NEUROSURGERY POST-OP PROGRESS NOTE    Patient Name: Antonio Mayfield YOB: 1940   Sex: Female Age: 80 yrs     Medical Record Number: [id number] Acct Number: [de-identified]   Room Number: 0643/5628-19 Hospital Day: Hospital Day: 5     Interval History:  Post-operative Day# 5 s/p  L1-2, L2-3 EXTREME LATERAL INTERBODY FUSION, T10-PELVIS DECOMPRESSION, FIXATION AND FUSION, ILIAC WING SCREWS with Dr. Alvino Summers    Subjective: Patient resting in bed, she is very anxious to get home. She did develop heart palpitations w/ SVT overnight, discussed that we are waiting on cardiology to see her this morning. Objective:    VITAL SIGNS   /60   Pulse 81   Temp 97 °F (36.1 °C) (Temporal)   Resp 19   Ht 5' 4\" (1.626 m)   Wt 196 lb 10.4 oz (89.2 kg)   SpO2 97%   BMI 33.76 kg/m²    Height Height: 5' 4\" (162.6 cm)   Weight Weight: 196 lb 10.4 oz (89.2 kg)        Allergies Allergies   Allergen Reactions    Latex Itching and Rash    Bactrim Rash      NPO Status ADULT DIET; Regular   Isolation No active isolations     LABS   Basic Metabolic Profile Recent Labs     04/23/22  0536 04/24/22  0447 04/25/22  0450    137 136    101 102   CO2 22 23 22   BUN 19 18 19   CREATININE 1.2 1.1 1.0   GLUCOSE 104* 90 88   MG  --   --  1.90      Complete Blood Count Recent Labs     04/23/22  0537 04/24/22  0447 04/25/22  0450   WBC 10.5 9.1 7.4   RBC 3.39* 3.21* 3.18*      Coagulation Studies No results for input(s): PTT, INR in the last 72 hours.     Invalid input(s): PLATELETS, PROA, PT, PTTA     MEDICATIONS   Inpatient Medications     sodium chloride flush, 5-40 mL, IntraVENous, 2 times per day    acetaminophen, 1,000 mg, Oral, Q6H    lidocaine, 1 patch, TransDERmal, Daily    docusate sodium, 100 mg, Oral, BID    latanoprost, 1 drop, Both Eyes, Nightly    metoprolol tartrate, 50 mg, Oral, BID    pantoprazole, 40 mg, Oral, QAM AC    triamterene-hydroCHLOROthiazide, 1 tablet, Oral, Daily    verapamil, 120 mg, Oral, Daily    sodium chloride flush, 5-40 mL, IntraVENous, 2 times per day    sennosides-docusate sodium, 1 tablet, Oral, BID    enoxaparin, 40 mg, SubCUTAneous, Daily    timolol, 1 drop, Both Eyes, BID   Infusions    sodium chloride      sodium chloride      sodium chloride        Antibiotics   Recent Abx Admin      No antibiotic orders with administrations found. Neurologic Exam:    Mental status: awake/alert, oriented x 4    Musculoskeletal:   Gait: Not tested   Tone: normal   Sensory: intact to light touch   Motor strength:    Right  Left    Right  Left    Deltoid  5 5  Hip Flex  4 4   Biceps  5 5  Knee Extensors  4+ 4+   Triceps  5 5  Knee Flexors  4+ 4+   Wrist Ext  5 5  Ankle Dorsiflex. 4+ 1   Wrist Flex  5 5  Ankle Plantarflex. 4+ 3   Handgrip  5 5        Thumb Ext  5 5         Incision: staples c/d/i   Drain: Right: 75/40: 115ml  Left removed    Respiratory:  Unlabored respiratory pattern    Abdomen:   Soft, ND   Last BM 4/26/22    Cardiovascular:  Warm, well perfused    Assessment   79 yo female POD 5 s/p L1-2, L2-3 EXTREME LATERAL INTERBODY FUSION, T10-PELVIS DECOMPRESSION, FIXATION AND FUSION, ILIAC WING SCREWS with Dr. Yamile Jennings:  1. Neurologic exam frequency: q 4   2. Cardiology consult placed for SVT   3. Mobility: PT/OT, activity as tolerated   4. Diet: ADAT  5. Drain: continue to monitor output q shift, possible removal this afternoon  6. DVT Prophylaxis: SCDs and  SQ lovenox   7. Bowel Regimen: colace and senokot, dulcolax suppository prn     8. Pain control: tylenol, roxicodone, lidocaine patch  9. Ice to incision site prn  10. Post op blood loss anemia, H/H remains stable, check daily   11. Valium/robaxin for muscle spasms   12.  TLSO brace when OOB    DISPO: awaiting cardiology consult- possible discharge home late today vs tomorrow       MARIANO Rubio-CNP  Neurosurgery Nurse Practitioner  [phone]  Patient was seen and examined with Dr. Sammy Segura who agrees with above assessment and plan. Electronically signed by:  MARIANO Whyte NP, 4/25/2022 11:46 AM

## 2022-04-25 NOTE — CONSULTS
Consult Note  Physical Medicine and Rehabilitation    Patient: Mario Steel  [id number]  Date: 4/25/2022      Chief Complaint: Low back pain    History of Present Illness/Hospital Course:    Ms. Judy Lindquist is an 80year-old female with past medical history of hypertension, CAD, GERD, CKD 3 and low back pain who presented to the [facility]. for a L1-2, L2-3 EXTREME LATERAL INTERBODY FUSION, T10-PELVIS DECOMPRESSION, FIXATION AND FUSION, ILIAC WING SCREWS after imaging showed disc extrusion at multiple levels. Patient states her pain is controlled at the moment. Still has a drain in place. Working with PT/OT and working on climbing stairs.   This is her 4th back surgery    Prior Level of Function:  Independent for mobility, ADLs, and IADLs    Current Level of Function:  Below baseline    Pertinent Social History:  Support: Family that lives with her  Home set-up: 5-6 stairs to enter    Past Medical History:   Diagnosis Date    Anxiety     Chronic kidney disease, stage 3b ([address]     Chronic midline low back pain without sciatica     Coronary artery disease involving native coronary artery without angina pectoris     Essential hypertension     Foot drop, left     GERD without esophagitis     History of blood transfusion     Internal hemorrhoids     Intrinsic eczema     Lesion of lateral popliteal nerve     Osteoporosis of multiple sites     Paroxysmal supraventricular tachycardia ([address]     Primary osteoarthritis involving multiple joints     Prolonged emergence from general anesthesia     PUD (peptic ulcer disease)     Restless leg syndrome        Past Surgical History:   Procedure Laterality Date    ABDOMINAL EXPLORATION SURGERY  2000    Lysis of Adhesions    BREAST SURGERY Right     Biopsy    CARPAL TUNNEL RELEASE Right 11/03/2020    RIGHT CARPAL TUNNEL RELEASE performed by Claudetta Harrier, MD at [address] Right 08/24/2021    CATARACT REMOVAL WITH IMPLANT Left 09/07/2021    Dr. Belle Suarez      from back    EYE SURGERY      HEMORRHOID SURGERY  02/24/2017    HYSTERECTOMY      Complete    INTRACAPSULAR CATARACT EXTRACTION Right 08/24/2021    PHACOEMULSIFICATION WITH INTRAOCULAR LENS IMPLANT RIGHT EYE, performed by Stephanie Nelson MD at [place] [id number] Left 09/07/2021    PHACOEMULSIFICATION WITH INTRAOCULAR LENS IMPLANT - LEFT EYE performed by Stephanie Nelson MD at [address] [facility] Left 4/21/2022    L1-2, L2-3 EXTREME LATERAL INTERBODY FUSION, T10-PELVIS DECOMPRESSION, FIXATION AND FUSION, ILIAC WING SCREWS performed by Nadeem Nicholas. Bard Brooks MD at [facility] N/A 4/21/2022    . performed by Nadeem Nicholas.  Bard Brooks MD at [facility] [address]      x 3 with fusion   [place] [address]  04/21/2022    Discectomy with cage at 2 levels / Dr. Yadi Acosta Bilateral 12/08/2021    BILATERAL L4 TRANSFORMANIAL EPIDURAL STEROID INJECTION WITH FLUOROSCOPY performed by Floridalma Murphy MD at [facility] ARTHROSCOPY Right     Right shoulder scope 2) Right shoulder labral debridement 3) Right shoulder Open Miguel 4) Right shoulder rotator cuff repair    TOTAL HIP ARTHROPLASTY Bilateral     TOTAL KNEE ARTHROPLASTY Left 02/03/2015    Dr. Marleny Kamara UPPER GASTROINTESTINAL ENDOSCOPY N/A 03/10/2020    EGD ESOPHAGOGASTRODUODENOSCOPY performed by Catalina Armando MD at [address] [facility]         Family History   Problem Relation Age of Onset    High Blood Pressure Mother     Stroke Father     High Blood Pressure Father     Depression Daughter     Cancer Son         Colorectal     Depression Son     Diabetes Maternal Aunt     Cancer Maternal Uncle         Throat    Alcohol Abuse Maternal Uncle     Asthma Maternal Uncle     Alcohol Abuse Maternal Uncle Social History     Socioeconomic History    Marital status:      Spouse name: None    Number of children: 2    Years of education: None    Highest education level: None   Occupational History    Occupation: Retired    Tobacco Use    Smoking status: Former Smoker     Packs/day: 1.00     Types: Cigarettes     Quit date: 1997     Years since quittin.3    Smokeless tobacco: Never Used   Vaping Use    Vaping Use: Never used   Substance and Sexual Activity    Alcohol use: Yes     Alcohol/week: 2.0 standard drinks     Types: 1 Glasses of wine, 1 Cans of beer per week     Comment: Rare    Drug use: Never    Sexual activity: Yes     Partners: Male   Other Topics Concern    None   Social History Narrative    None     Social Determinants of Health     Financial Resource Strain: Low Risk     Difficulty of Paying Living Expenses: Not hard at all   Food Insecurity: No Food Insecurity    Worried About Running Out of Food in the Last Year: Never true    Brandy of Food in the Last Year: Never true   Transportation Needs:     Lack of Transportation (Medical): Not on file    Lack of Transportation (Non-Medical):  Not on file   Physical Activity:     Days of Exercise per Week: Not on file    Minutes of Exercise per Session: Not on file   Stress:     Feeling of Stress : Not on file   Social Connections:     Frequency of Communication with Friends and Family: Not on file    Frequency of Social Gatherings with Friends and Family: Not on file    Attends Gnosticism Services: Not on file    Active Member of Clubs or Organizations: Not on file    Attends Club or Organization Meetings: Not on file    Marital Status: Not on file   Intimate Partner Violence:     Fear of Current or Ex-Partner: Not on file    Emotionally Abused: Not on file    Physically Abused: Not on file    Sexually Abused: Not on file   Housing Stability:     Unable to Pay for Housing in the Last Year: Not on file    Number of Places Lived in the Last Year: Not on file    Unstable Housing in the Last Year: Not on file           REVIEW OF SYSTEMS:   CONSTITUTIONAL: negative for fevers, chills, diaphoresis, appetite change, night sweats, unexpected weight change, fatigue  EYES: negative for blurred vision, eye discharge, visual disturbance and icterus  HEENT: negative for hearing loss, tinnitus, ear drainage, sinus pressure, nasal congestion, epistaxis and snoring  RESPIRATORY: Negative for hemoptysis, cough, sputum production  CARDIOVASCULAR: negative for chest pain, palpitations, exertional chest pressure/discomfort, syncope, edema  GASTROINTESTINAL: negative for nausea, vomiting, diarrhea, blood in stool, abdominal pain, constipation  GENITOURINARY: negative for frequency, dysuria, urinary incontinence, decreased urine volume, and hematuria  HEMATOLOGIC/LYMPHATIC: negative for easy bruising, bleeding and lymphadenopathy  ALLERGIC/IMMUNOLOGIC: negative for recurrent infections, angioedema, anaphylaxis and drug reactions  ENDOCRINE: negative for weight changes and diabetic symptoms including polyuria, polydipsia and polyphagia  MUSCULOSKELETAL: negative for pain, joint swelling, decreased range of motion  NEUROLOGICAL: negative for headaches, slurred speech, unilateral weakness  PSYCHIATRIC/BEHAVIORAL: negative for hallucinations, behavioral problems, confusion and agitation.      Physical Examination:  Vitals:   Patient Vitals for the past 24 hrs:   BP Temp Temp src Pulse Resp SpO2   04/25/22 0900 -- -- -- 82 20 --   04/25/22 0800 124/67 98.2 °F (36.8 °C) Temporal 80 19 98 %   04/25/22 0700 -- -- -- 81 19 --   04/25/22 0600 -- -- -- 74 14 --   04/25/22 0500 -- -- -- 71 16 --   04/25/22 0400 119/60 97 °F (36.1 °C) Temporal 75 17 97 %   04/25/22 0300 -- -- -- 78 15 --   04/25/22 0200 -- -- -- 75 15 --   04/25/22 0100 -- -- -- 73 15 --   04/25/22 0027 -- -- -- -- 14 --   04/25/22 0000 100/74 97.1 °F (36.2 °C) Temporal 80 18 98 %   04/24/22 2357 -- -- -- -- 15 --   04/24/22 2300 -- -- -- 65 15 --   04/24/22 2200 125/62 -- -- 83 21 --   04/24/22 2143 -- -- -- 75 -- 96 %   04/24/22 2100 (!) 125/59 -- -- 75 18 --   04/24/22 2000 (!) 127/56 99.1 °F (37.3 °C) Temporal 94 20 96 %   04/24/22 1943 -- -- -- 89 -- --   04/24/22 1900 -- -- -- 90 18 --   04/24/22 1800 -- -- -- 69 14 --   04/24/22 1700 -- -- -- 69 18 --   04/24/22 1600 (!) 118/53 96.7 °F (35.9 °C) Temporal 71 14 98 %   04/24/22 1500 -- -- -- 72 15 --   04/24/22 1400 -- -- -- 73 17 --   04/24/22 1300 -- -- -- 71 22 --   04/24/22 1223 -- -- -- -- 17 --   04/24/22 1200 113/66 96.8 °F (36 °C) Temporal 72 17 98 %   04/24/22 1100 -- -- -- 75 15 --   04/24/22 1000 -- -- -- 76 15 --     Const: Alert. WDWN. No distress  Eyes: Conjunctiva noninjected, no icterus noted; pupils equal, round, and reactive to light. HENT: Atraumatic, normocephalic; Oral mucosa moist  Neck: Trachea midline, neck supple. No thyromegaly noted. CV: Regular rate and rhythm, no murmur rub or gallop noted  Resp: Lungs clear to auscultation bilaterally, no rales wheezes or ronchi, no retractions. Respirations unlabored. GI: Soft, nontender, nondistended. Normal bowel sounds. No palpable masses. Skin: Normal temperature and turgor. No rashes or breakdown noted. Ext: No significant edema appreciated. No varicosities. MSK: No joint tenderness, erythema, warmth noted. AROM intact. Neuro: Alert, oriented, appropriate. No cranial nerve deficits appreciated. Sensation intact to light touch. Motor examination reveals normal strength in all four limbs diffusely. No abnormalities with finger/nose or heel/shin noted. Reflexes normal and symmetric.   Psych: Stable mood, normal judgement, normal affect     Lab Results   Component Value Date    WBC 7.4 04/25/2022    HGB 9.0 (L) 04/25/2022    HCT 26.9 (L) 04/25/2022    MCV 84.7 04/25/2022     (L) 04/25/2022     Lab Results   Component Value Date    INR 1.04 04/05/2022    INR 0.97 05/07/2015    INR 0.90 01/21/2015    PROTIME 11.8 04/05/2022    PROTIME 10.5 05/07/2015    PROTIME 9.7 01/21/2015     Lab Results   Component Value Date    CREATININE 1.0 04/25/2022    BUN 19 04/25/2022     04/25/2022    K 3.9 04/25/2022     04/25/2022    CO2 22 04/25/2022     Lab Results   Component Value Date    ALT 16 09/26/2012    AST 19 09/26/2012       Most recent echocardiogram revealed:  N/A    Most recent EKG revealed:  Sinus    Most recent imaging studies revealed:  CT Lumbar Spine  1. Postop fusion T11-T12 cement and transpedicular screws with posterior rods and bony posterior fusion satisfactory position and alignment   2. Intact hardware extending into the S1 and sacral wings. 3. Facet spurring and right foraminal stenosis at L5-S1           CT LUMBAR SPINE WO CONTRAST   Final Result   1. Postop fusion T11-T12 cement and transpedicular screws with posterior rods and bony posterior fusion satisfactory position and alignment   2. Intact hardware extending into the S1 and sacral wings. 3. Facet spurring and right foraminal stenosis at L5-S1           CT GUIDED STEREOTACTIC LOCALIZATION   Final Result   1. No intra-abdominal or pelvic abnormalities   2. Expected postoperative retroperitoneal and subcutaneous air   3. T10-S1 and bilateral iliac transpedicular screws with posterior rods in satisfactory alignment and positioning of intermittent vertebral spacers   4. L5 left transpedicular screw resides slightly lateral to the vertebral body cortex into a surgery the left L4 screw. CT GUIDED STEREOTACTIC LOCALIZATION   Final Result   1. No intra-abdominal or pelvic abnormalities   2. Expected postoperative retroperitoneal and subcutaneous air   3. T10-S1 and bilateral iliac transpedicular screws with posterior rods in satisfactory alignment and positioning of intermittent vertebral spacers   4.  L5 left transpedicular screw resides slightly lateral to the vertebral body cortex into a surgery the left L4 screw. CT GUIDED STEREOTACTIC LOCALIZATION   Final Result   1. No intra-abdominal or pelvic abnormalities   2. Expected postoperative retroperitoneal and subcutaneous air   3. T10-S1 and bilateral iliac transpedicular screws with posterior rods in satisfactory alignment and positioning of intermittent vertebral spacers   4. L5 left transpedicular screw resides slightly lateral to the vertebral body cortex into a surgery the left L4 screw. XR LUMBAR SPINE (2-3 VIEWS)   Final Result      2 coned-down frontal and lateral views demonstrate fusion rods and lateral mass screws with radiolucent interbody spacers. Fluoroscopy time 2 minutes 15 seconds      FLUORO FOR SURGICAL PROCEDURES   Final Result      2 coned-down frontal and lateral views demonstrate fusion rods and lateral mass screws with radiolucent interbody spacers. Fluoroscopy time 2 minutes 15 seconds            Assessment:  1. Low back pain  L1-2, L2-3 EXTREME LATERAL INTERBODY FUSION, T10-PELVIS DECOMPRESSION, FIXATION AND FUSION, ILIAC WING SCREWS secondary to bulging disc  -This is her 4th back surgery.  -Pain control  -PT/OT     2. Hypertension  -Hold blood pressure medicines    3. Paroxysmal SVT  -Heart rate controlled    4. CKD 3  -Avoid nephrotoxic agent    Impairments- Decreased functional mobility, Decreased ADLs    Recommendations:    She is not sure if she wants to come to ARU. Will accept if she is agreeable. Precert with [facility]. Patient with new functional deficits and ongoing medical complexity. Demonstrates ability to tolerate 3 hours therapy/day. Ms. Debbie Liu  is a good candidate for acute inpatient rehab when medically appropriate. Thank you for this consult. Please contact me with any questions or concerns. Asim Malone MD  Internal Medicine Resident, PGY-3  4/25/2022  9:41 AM    This patient has been seen, examined, and discussed with the resident.  This note has been altered to reflect my own examination findings, impression, and recommendations.      Emilia Chapman D.O. M.P.H  PM&R  4/25/2022  12:07 PM

## 2022-04-26 VITALS
SYSTOLIC BLOOD PRESSURE: 83 MMHG | TEMPERATURE: 97.5 F | BODY MASS INDEX: 33.57 KG/M2 | OXYGEN SATURATION: 98 % | HEART RATE: 76 BPM | RESPIRATION RATE: 18 BRPM | WEIGHT: 196.65 LBS | HEIGHT: 64 IN | DIASTOLIC BLOOD PRESSURE: 69 MMHG

## 2022-04-26 PROBLEM — Z98.1 S/P SPINAL FUSION: Status: ACTIVE | Noted: 2022-04-26

## 2022-04-26 LAB
ALBUMIN SERPL-MCNC: 3.5 G/DL (ref 3.4–5)
ANION GAP SERPL CALCULATED.3IONS-SCNC: 13 MMOL/L (ref 3–16)
ANION GAP SERPL CALCULATED.3IONS-SCNC: 15 MMOL/L (ref 3–16)
BASOPHILS ABSOLUTE: 0 K/UL (ref 0–0.2)
BASOPHILS RELATIVE PERCENT: 0.4 %
BUN BLDV-MCNC: 19 MG/DL (ref 7–20)
BUN BLDV-MCNC: 20 MG/DL (ref 7–20)
CALCIUM SERPL-MCNC: 9.1 MG/DL (ref 8.3–10.6)
CALCIUM SERPL-MCNC: 9.2 MG/DL (ref 8.3–10.6)
CHLORIDE BLD-SCNC: 100 MMOL/L (ref 99–110)
CHLORIDE BLD-SCNC: 100 MMOL/L (ref 99–110)
CO2: 22 MMOL/L (ref 21–32)
CO2: 24 MMOL/L (ref 21–32)
CREAT SERPL-MCNC: 1 MG/DL (ref 0.6–1.2)
CREAT SERPL-MCNC: 1 MG/DL (ref 0.6–1.2)
EKG ATRIAL RATE: 178 BPM
EKG ATRIAL RATE: 79 BPM
EKG DIAGNOSIS: NORMAL
EKG DIAGNOSIS: NORMAL
EKG P AXIS: 38 DEGREES
EKG P-R INTERVAL: 152 MS
EKG Q-T INTERVAL: 260 MS
EKG Q-T INTERVAL: 408 MS
EKG QRS DURATION: 68 MS
EKG QRS DURATION: 82 MS
EKG QTC CALCULATION (BAZETT): 447 MS
EKG QTC CALCULATION (BAZETT): 467 MS
EKG R AXIS: -13 DEGREES
EKG R AXIS: -14 DEGREES
EKG T AXIS: 169 DEGREES
EKG T AXIS: 37 DEGREES
EKG VENTRICULAR RATE: 178 BPM
EKG VENTRICULAR RATE: 79 BPM
EOSINOPHILS ABSOLUTE: 0.2 K/UL (ref 0–0.6)
EOSINOPHILS RELATIVE PERCENT: 4.2 %
GFR AFRICAN AMERICAN: >60
GFR AFRICAN AMERICAN: >60
GFR NON-AFRICAN AMERICAN: 53
GFR NON-AFRICAN AMERICAN: 53
GLUCOSE BLD-MCNC: 96 MG/DL (ref 70–99)
GLUCOSE BLD-MCNC: 97 MG/DL (ref 70–99)
HCT VFR BLD CALC: 27.4 % (ref 36–48)
HEMOGLOBIN: 8.9 G/DL (ref 12–16)
LYMPHOCYTES ABSOLUTE: 0.7 K/UL (ref 1–5.1)
LYMPHOCYTES RELATIVE PERCENT: 12.4 %
MAGNESIUM: 1.9 MG/DL (ref 1.8–2.4)
MCH RBC QN AUTO: 27.9 PG (ref 26–34)
MCHC RBC AUTO-ENTMCNC: 32.5 G/DL (ref 31–36)
MCV RBC AUTO: 85.8 FL (ref 80–100)
MONOCYTES ABSOLUTE: 0.7 K/UL (ref 0–1.3)
MONOCYTES RELATIVE PERCENT: 11.8 %
NEUTROPHILS ABSOLUTE: 4.2 K/UL (ref 1.7–7.7)
NEUTROPHILS RELATIVE PERCENT: 71.2 %
PDW BLD-RTO: 15.4 % (ref 12.4–15.4)
PHOSPHORUS: 2.3 MG/DL (ref 2.5–4.9)
PLATELET # BLD: 142 K/UL (ref 135–450)
PMV BLD AUTO: 8.7 FL (ref 5–10.5)
POTASSIUM SERPL-SCNC: 4.3 MMOL/L (ref 3.5–5.1)
POTASSIUM SERPL-SCNC: 4.3 MMOL/L (ref 3.5–5.1)
RBC # BLD: 3.2 M/UL (ref 4–5.2)
SODIUM BLD-SCNC: 137 MMOL/L (ref 136–145)
SODIUM BLD-SCNC: 137 MMOL/L (ref 136–145)
TROPONIN: <0.01 NG/ML
WBC # BLD: 5.9 K/UL (ref 4–11)

## 2022-04-26 PROCEDURE — 99232 SBSQ HOSP IP/OBS MODERATE 35: CPT | Performed by: PHYSICAL MEDICINE & REHABILITATION

## 2022-04-26 PROCEDURE — 93005 ELECTROCARDIOGRAM TRACING: CPT | Performed by: NURSE PRACTITIONER

## 2022-04-26 PROCEDURE — 85025 COMPLETE CBC W/AUTO DIFF WBC: CPT

## 2022-04-26 PROCEDURE — 93005 ELECTROCARDIOGRAM TRACING: CPT | Performed by: INTERNAL MEDICINE

## 2022-04-26 PROCEDURE — 6370000000 HC RX 637 (ALT 250 FOR IP): Performed by: NEUROLOGICAL SURGERY

## 2022-04-26 PROCEDURE — 83735 ASSAY OF MAGNESIUM: CPT

## 2022-04-26 PROCEDURE — 6360000002 HC RX W HCPCS: Performed by: NEUROLOGICAL SURGERY

## 2022-04-26 PROCEDURE — 2500000003 HC RX 250 WO HCPCS

## 2022-04-26 PROCEDURE — 6370000000 HC RX 637 (ALT 250 FOR IP): Performed by: INTERNAL MEDICINE

## 2022-04-26 PROCEDURE — 84484 ASSAY OF TROPONIN QUANT: CPT

## 2022-04-26 PROCEDURE — 80069 RENAL FUNCTION PANEL: CPT

## 2022-04-26 PROCEDURE — 99223 1ST HOSP IP/OBS HIGH 75: CPT | Performed by: INTERNAL MEDICINE

## 2022-04-26 PROCEDURE — 2580000003 HC RX 258: Performed by: NEUROLOGICAL SURGERY

## 2022-04-26 PROCEDURE — 97116 GAIT TRAINING THERAPY: CPT

## 2022-04-26 PROCEDURE — 93010 ELECTROCARDIOGRAM REPORT: CPT | Performed by: INTERNAL MEDICINE

## 2022-04-26 PROCEDURE — 97530 THERAPEUTIC ACTIVITIES: CPT

## 2022-04-26 PROCEDURE — 36415 COLL VENOUS BLD VENIPUNCTURE: CPT

## 2022-04-26 RX ORDER — OXYCODONE HYDROCHLORIDE 5 MG/1
5-10 TABLET ORAL EVERY 6 HOURS PRN
Qty: 42 TABLET | Refills: 0 | Status: SHIPPED | OUTPATIENT
Start: 2022-04-26 | End: 2022-05-03

## 2022-04-26 RX ORDER — DIAZEPAM 5 MG/1
5 TABLET ORAL EVERY 6 HOURS PRN
Qty: 40 TABLET | Refills: 0 | Status: SHIPPED | OUTPATIENT
Start: 2022-04-26 | End: 2022-05-06

## 2022-04-26 RX ORDER — METHOCARBAMOL 750 MG/1
750 TABLET, FILM COATED ORAL EVERY 6 HOURS PRN
Qty: 40 TABLET | Refills: 0 | Status: SHIPPED | OUTPATIENT
Start: 2022-04-26 | End: 2022-05-06

## 2022-04-26 RX ORDER — SENNA PLUS 8.6 MG/1
2 TABLET ORAL 2 TIMES DAILY
Qty: 40 TABLET | Refills: 0 | Status: SHIPPED | OUTPATIENT
Start: 2022-04-26 | End: 2022-04-26 | Stop reason: SDUPTHER

## 2022-04-26 RX ORDER — SENNA PLUS 8.6 MG/1
2 TABLET ORAL 2 TIMES DAILY
Qty: 40 TABLET | Refills: 0 | Status: SHIPPED | OUTPATIENT
Start: 2022-04-26 | End: 2022-05-06

## 2022-04-26 RX ORDER — LIDOCAINE 4 G/G
1 PATCH TOPICAL DAILY
Qty: 10 PATCH | Refills: 0 | Status: SHIPPED | OUTPATIENT
Start: 2022-04-26 | End: 2022-04-26

## 2022-04-26 RX ORDER — LIDOCAINE 4 G/G
1 PATCH TOPICAL DAILY
Qty: 10 PATCH | Refills: 0 | Status: SHIPPED | OUTPATIENT
Start: 2022-04-26 | End: 2022-05-06

## 2022-04-26 RX ORDER — OXYCODONE HYDROCHLORIDE 5 MG/1
5-10 TABLET ORAL EVERY 6 HOURS PRN
Qty: 42 TABLET | Refills: 0 | Status: SHIPPED | OUTPATIENT
Start: 2022-04-26 | End: 2022-04-26

## 2022-04-26 RX ORDER — DIPHENHYDRAMINE HCL 25 MG
12.5 TABLET ORAL EVERY 6 HOURS PRN
Status: DISCONTINUED | OUTPATIENT
Start: 2022-04-26 | End: 2022-04-26 | Stop reason: HOSPADM

## 2022-04-26 RX ORDER — METOPROLOL TARTRATE 5 MG/5ML
INJECTION INTRAVENOUS
Status: COMPLETED
Start: 2022-04-26 | End: 2022-04-26

## 2022-04-26 RX ORDER — DIAZEPAM 5 MG/1
5 TABLET ORAL EVERY 6 HOURS PRN
Qty: 40 TABLET | Refills: 0 | Status: SHIPPED | OUTPATIENT
Start: 2022-04-26 | End: 2022-04-26

## 2022-04-26 RX ORDER — BISACODYL 10 MG
10 SUPPOSITORY, RECTAL RECTAL ONCE
Status: COMPLETED | OUTPATIENT
Start: 2022-04-26 | End: 2022-04-26

## 2022-04-26 RX ORDER — METOPROLOL TARTRATE 5 MG/5ML
5 INJECTION INTRAVENOUS ONCE
Status: COMPLETED | OUTPATIENT
Start: 2022-04-26 | End: 2022-04-26

## 2022-04-26 RX ORDER — METHOCARBAMOL 750 MG/1
750 TABLET, FILM COATED ORAL EVERY 6 HOURS PRN
Qty: 40 TABLET | Refills: 0 | Status: SHIPPED | OUTPATIENT
Start: 2022-04-26 | End: 2022-04-26

## 2022-04-26 RX ADMIN — METOPROLOL TARTRATE 50 MG: 50 TABLET, FILM COATED ORAL at 08:44

## 2022-04-26 RX ADMIN — OXYCODONE 5 MG: 5 TABLET ORAL at 12:42

## 2022-04-26 RX ADMIN — SODIUM CHLORIDE, PRESERVATIVE FREE 10 ML: 5 INJECTION INTRAVENOUS at 08:45

## 2022-04-26 RX ADMIN — DIPHENHYDRAMINE HYDROCHLORIDE 12.5 MG: 25 TABLET ORAL at 01:23

## 2022-04-26 RX ADMIN — SENNOSIDES AND DOCUSATE SODIUM 1 TABLET: 50; 8.6 TABLET ORAL at 08:44

## 2022-04-26 RX ADMIN — DOCUSATE SODIUM 100 MG: 100 CAPSULE, LIQUID FILLED ORAL at 08:45

## 2022-04-26 RX ADMIN — TIMOLOL MALEATE 1 DROP: 5 SOLUTION OPHTHALMIC at 08:46

## 2022-04-26 RX ADMIN — METOPROLOL TARTRATE 5 MG: 5 INJECTION INTRAVENOUS at 09:27

## 2022-04-26 RX ADMIN — ENOXAPARIN SODIUM 40 MG: 100 INJECTION SUBCUTANEOUS at 08:45

## 2022-04-26 RX ADMIN — VERAPAMIL HYDROCHLORIDE 120 MG: 120 TABLET, FILM COATED, EXTENDED RELEASE ORAL at 08:45

## 2022-04-26 RX ADMIN — ACETAMINOPHEN 1000 MG: 500 TABLET ORAL at 09:39

## 2022-04-26 RX ADMIN — DIPHENHYDRAMINE HYDROCHLORIDE 12.5 MG: 25 TABLET ORAL at 16:05

## 2022-04-26 RX ADMIN — ACETAMINOPHEN 1000 MG: 500 TABLET ORAL at 04:48

## 2022-04-26 RX ADMIN — METOPROLOL TARTRATE 5 MG: 1 INJECTION, SOLUTION INTRAVENOUS at 09:27

## 2022-04-26 RX ADMIN — BISACODYL 10 MG: 10 SUPPOSITORY RECTAL at 11:11

## 2022-04-26 RX ADMIN — PANTOPRAZOLE SODIUM 40 MG: 40 TABLET, DELAYED RELEASE ORAL at 06:23

## 2022-04-26 ASSESSMENT — PAIN SCALES - GENERAL
PAINLEVEL_OUTOF10: 6
PAINLEVEL_OUTOF10: 0
PAINLEVEL_OUTOF10: 4
PAINLEVEL_OUTOF10: 1

## 2022-04-26 ASSESSMENT — PAIN - FUNCTIONAL ASSESSMENT
PAIN_FUNCTIONAL_ASSESSMENT: PREVENTS OR INTERFERES SOME ACTIVE ACTIVITIES AND ADLS
PAIN_FUNCTIONAL_ASSESSMENT: ACTIVITIES ARE NOT PREVENTED

## 2022-04-26 ASSESSMENT — PAIN DESCRIPTION - ORIENTATION
ORIENTATION: RIGHT;LEFT
ORIENTATION: RIGHT

## 2022-04-26 ASSESSMENT — PAIN DESCRIPTION - LOCATION
LOCATION: OTHER (COMMENT)
LOCATION: LEG

## 2022-04-26 ASSESSMENT — PAIN DESCRIPTION - PAIN TYPE: TYPE: SURGICAL PAIN

## 2022-04-26 ASSESSMENT — PAIN DESCRIPTION - DESCRIPTORS: DESCRIPTORS: ACHING

## 2022-04-26 ASSESSMENT — PAIN DESCRIPTION - FREQUENCY: FREQUENCY: CONTINUOUS

## 2022-04-26 NOTE — PROGRESS NOTES
Progress Note  Physical Medicine and Rehabilitation    Patient: Lashonda Penaloza  [id number]  Date: 4/26/2022      Chief Complaint: Low back pain    History of Present Illness/Hospital Course:    Ms. Roylene Hamman is an 80year-old female with past medical history of hypertension, CAD, GERD, CKD 3 and low back pain who presented to the [facility], [facility]. for a L1-2, L2-3 EXTREME LATERAL INTERBODY FUSION, T10-PELVIS DECOMPRESSION, FIXATION AND FUSION, ILIAC WING SCREWS after imaging showed disc extrusion at multiple levels. Patient states her pain is controlled at the moment. Still has a drain in place. Working with PT/OT and working on climbing stairs. This is her 4th back surgery    Interval History    Had some fluttering in her chest overnight. Had SVT. To be seen by cardiology. Patient preference is still to go home.     Prior Level of Function:  Independent for mobility, ADLs, and IADLs    Current Level of Function:  Below baseline    Pertinent Social History:  Support: Family that lives with her  Home set-up: 5-6 stairs to enter    Past Medical History:   Diagnosis Date    Anxiety     Chronic kidney disease, stage 3b ([address]     Chronic midline low back pain without sciatica     Coronary artery disease involving native coronary artery without angina pectoris     Essential hypertension     Foot drop, left     GERD without esophagitis     History of blood transfusion     Internal hemorrhoids     Intrinsic eczema     Lesion of lateral popliteal nerve     Osteoporosis of multiple sites     Paroxysmal supraventricular tachycardia (HCC)     Primary osteoarthritis involving multiple joints     Prolonged emergence from general anesthesia     PUD (peptic ulcer disease)     Restless leg syndrome        Past Surgical History:   Procedure Laterality Date    ABDOMINAL EXPLORATION SURGERY  2000    Lysis of Adhesions    BREAST SURGERY Right     Biopsy    CARPAL TUNNEL RELEASE Right 11/03/2020    RIGHT CARPAL TUNNEL RELEASE performed by Davey Mon MD at [address] [facility] Right 08/24/2021    CATARACT REMOVAL WITH IMPLANT Left 09/07/2021    Dr. Niya Olmos      from back   [facility] SURGERY  02/24/2017    HYSTERECTOMY      Complete    INTRACAPSULAR CATARACT EXTRACTION Right 08/24/2021    PHACOEMULSIFICATION WITH INTRAOCULAR LENS IMPLANT RIGHT EYE, performed by Beni Aguero MD at [place] [address] Left 09/07/2021    PHACOEMULSIFICATION WITH INTRAOCULAR LENS IMPLANT - LEFT EYE performed by Beni Aguero MD at [address] Left 4/21/2022    L1-2, L2-3 EXTREME LATERAL INTERBODY FUSION, T10-PELVIS DECOMPRESSION, FIXATION AND FUSION, ILIAC WING SCREWS performed by Jennifer Panda. Alvino Summers MD at [facility] N/A 4/21/2022    . performed by Jennifer Panda.  Alvino Summers MD at [facility] [address]      x 3 with fusion   [facility]  04/21/2022    Discectomy with cage at 2 levels / Dr. Oscar Oconnor Bilateral 12/08/2021    BILATERAL L4 TRANSFORMANIAL EPIDURAL STEROID INJECTION WITH FLUOROSCOPY performed by Sherry Thomas MD at [facility] ARTHROSCOPY Right     Right shoulder scope 2) Right shoulder labral debridement 3) Right shoulder Open Miguel 4) Right shoulder rotator cuff repair    TOTAL HIP ARTHROPLASTY Bilateral     TOTAL KNEE ARTHROPLASTY Left 02/03/2015    Dr. Ino Palma UPPER GASTROINTESTINAL ENDOSCOPY N/A 03/10/2020    EGD ESOPHAGOGASTRODUODENOSCOPY performed by Kailey Kwon MD at [facility]         Family History   Problem Relation Age of Onset    High Blood Pressure Mother     Stroke Father     High Blood Pressure Father     Depression Daughter     Cancer Son         Colorectal     Depression Son     Diabetes Maternal Aunt  Cancer Maternal Uncle         Throat    Alcohol Abuse Maternal Uncle     Asthma Maternal Uncle     Alcohol Abuse Maternal Uncle        Social History     Socioeconomic History    Marital status:      Spouse name: None    Number of children: 2    Years of education: None    Highest education level: None   Occupational History    Occupation: Retired    Tobacco Use    Smoking status: Former Smoker     Packs/day: 1.00     Types: Cigarettes     Quit date: 1997     Years since quittin.3    Smokeless tobacco: Never Used   Vaping Use    Vaping Use: Never used   Substance and Sexual Activity    Alcohol use: Yes     Alcohol/week: 2.0 standard drinks     Types: 1 Glasses of wine, 1 Cans of beer per week     Comment: Rare    Drug use: Never    Sexual activity: Yes     Partners: Male   Other Topics Concern    None   Social History Narrative    None     Social Determinants of Health     Financial Resource Strain: Low Risk     Difficulty of Paying Living Expenses: Not hard at all   Food Insecurity: No Food Insecurity    Worried About Running Out of Food in the Last Year: Never true    Brandy of Food in the Last Year: Never true   Transportation Needs:     Lack of Transportation (Medical): Not on file    Lack of Transportation (Non-Medical):  Not on file   Physical Activity:     Days of Exercise per Week: Not on file    Minutes of Exercise per Session: Not on file   Stress:     Feeling of Stress : Not on file   Social Connections:     Frequency of Communication with Friends and Family: Not on file    Frequency of Social Gatherings with Friends and Family: Not on file    Attends Christian Services: Not on file    Active Member of Clubs or Organizations: Not on file    Attends Club or Organization Meetings: Not on file    Marital Status: Not on file   Intimate Partner Violence:     Fear of Current or Ex-Partner: Not on file    Emotionally Abused: Not on file   [facility] Physically Abused: Not on file    Sexually Abused: Not on file   Housing Stability:     Unable to Pay for Housing in the Last Year: Not on file    Number of Places Lived in the Last Year: Not on file    Unstable Housing in the Last Year: Not on file           REVIEW OF SYSTEMS:   CONSTITUTIONAL: negative for fevers, chills, diaphoresis, appetite change, night sweats, unexpected weight change, fatigue  EYES: negative for blurred vision, eye discharge, visual disturbance and icterus  HEENT: negative for hearing loss, tinnitus, ear drainage, sinus pressure, nasal congestion, epistaxis and snoring  RESPIRATORY: Negative for hemoptysis, cough, sputum production  CARDIOVASCULAR: negative for chest pain, palpitations, exertional chest pressure/discomfort, syncope, edema  GASTROINTESTINAL: negative for nausea, vomiting, diarrhea, blood in stool, abdominal pain, constipation  GENITOURINARY: negative for frequency, dysuria, urinary incontinence, decreased urine volume, and hematuria  HEMATOLOGIC/LYMPHATIC: negative for easy bruising, bleeding and lymphadenopathy  ALLERGIC/IMMUNOLOGIC: negative for recurrent infections, angioedema, anaphylaxis and drug reactions  ENDOCRINE: negative for weight changes and diabetic symptoms including polyuria, polydipsia and polyphagia  MUSCULOSKELETAL: negative for pain, joint swelling, decreased range of motion  NEUROLOGICAL: negative for headaches, slurred speech, unilateral weakness  PSYCHIATRIC/BEHAVIORAL: negative for hallucinations, behavioral problems, confusion and agitation.      Physical Examination:  Vitals:   Patient Vitals for the past 24 hrs:   BP Temp Temp src Pulse Resp SpO2   04/26/22 0700 -- -- -- 79 14 98 %   04/26/22 0600 -- -- -- 75 16 98 %   04/26/22 0500 -- -- -- 81 20 96 %   04/26/22 0400 126/65 97.2 °F (36.2 °C) Temporal 83 16 96 %   04/26/22 0300 -- -- -- 77 16 --   04/26/22 0200 -- -- -- 75 16 --   04/26/22 0100 -- -- -- 78 17 --   04/26/22 0000 114/61 99.5 °F Lab Results   Component Value Date    ALT 16 09/26/2012    AST 19 09/26/2012       Most recent echocardiogram revealed:  N/A    Most recent EKG revealed:  Sinus    Most recent imaging studies revealed:  CT Lumbar Spine  1. Postop fusion T11-T12 cement and transpedicular screws with posterior rods and bony posterior fusion satisfactory position and alignment   2. Intact hardware extending into the S1 and sacral wings. 3. Facet spurring and right foraminal stenosis at L5-S1           CT LUMBAR SPINE WO CONTRAST   Final Result   1. Postop fusion T11-T12 cement and transpedicular screws with posterior rods and bony posterior fusion satisfactory position and alignment   2. Intact hardware extending into the S1 and sacral wings. 3. Facet spurring and right foraminal stenosis at L5-S1           CT GUIDED STEREOTACTIC LOCALIZATION   Final Result   1. No intra-abdominal or pelvic abnormalities   2. Expected postoperative retroperitoneal and subcutaneous air   3. T10-S1 and bilateral iliac transpedicular screws with posterior rods in satisfactory alignment and positioning of intermittent vertebral spacers   4. L5 left transpedicular screw resides slightly lateral to the vertebral body cortex into a surgery the left L4 screw. CT GUIDED STEREOTACTIC LOCALIZATION   Final Result   1. No intra-abdominal or pelvic abnormalities   2. Expected postoperative retroperitoneal and subcutaneous air   3. T10-S1 and bilateral iliac transpedicular screws with posterior rods in satisfactory alignment and positioning of intermittent vertebral spacers   4. L5 left transpedicular screw resides slightly lateral to the vertebral body cortex into a surgery the left L4 screw. CT GUIDED STEREOTACTIC LOCALIZATION   Final Result   1. No intra-abdominal or pelvic abnormalities   2. Expected postoperative retroperitoneal and subcutaneous air   3.  T10-S1 and bilateral iliac transpedicular screws with posterior rods in satisfactory alignment and positioning of intermittent vertebral spacers   4. L5 left transpedicular screw resides slightly lateral to the vertebral body cortex into a surgery the left L4 screw. XR LUMBAR SPINE (2-3 VIEWS)   Final Result      2 coned-down frontal and lateral views demonstrate fusion rods and lateral mass screws with radiolucent interbody spacers. Fluoroscopy time 2 minutes 15 seconds      FLUORO FOR SURGICAL PROCEDURES   Final Result      2 coned-down frontal and lateral views demonstrate fusion rods and lateral mass screws with radiolucent interbody spacers. Fluoroscopy time 2 minutes 15 seconds            Assessment:  1. Low back pain  L1-2, L2-3 EXTREME LATERAL INTERBODY FUSION, T10-PELVIS DECOMPRESSION, FIXATION AND FUSION, ILIAC WING SCREWS secondary to bulging disc  -This is her 4th back surgery.  -Pain control  -PT/OT     2. Hypertension  -Hold blood pressure medicines    3. Paroxysmal SVT  -Heart rate controlled    4. CKD 3  -Avoid nephrotoxic agent    Impairments- Decreased functional mobility, Decreased ADLs    Recommendations:    Plan to discharge home but after last night she might be agreeable to a short stay in the ARU. Will options discuss later today. Patient with new functional deficits and ongoing medical complexity. Demonstrates ability to tolerate 3 hours therapy/day. Ms. Tami Ibarra  is a good candidate for acute inpatient rehab when medically appropriate. Thank you for this consult. Please contact me with any questions or concerns. Israel Godinez MD  Internal Medicine Resident, PGY-3  4/26/2022  9:59 AM      This patient has been seen, examined, and discussed with the resident. This note has been altered to reflect my own examination findings, impression, and recommendations.      Gris Naranjo D.O. M.P.H  PM&R  4/26/2022  12:22 PM

## 2022-04-26 NOTE — PROGRESS NOTES
Physical Therapy  Facility/Department: [facility]  Daily Treatment Note    Name: Quincy Goodson  : 1940  MRN: [id number]  Date of Service: 2022    Discharge Recommendations: Quincy Goodson scored a 17/24 on the AM-PAC short mobility form. Current research shows that an AM-PAC score of 18 or greater is typically associated with a discharge to the patient's home setting. Based on the patient's AM-PAC score and their current functional mobility deficits, it is recommended that the patient have 2-3 sessions per week of Physical Therapy at d/c to increase the patient's independence. At this time, this patient demonstrates the endurance and safety to discharge home with 24hr assist and home PT and a follow up treatment frequency of 2-3x/wk. Please see assessment section for further patient specific details. If patient discharges prior to next session this note will serve as a discharge summary. Please see below for the latest assessment towards goals. PT Equipment Recommendations  Equipment Needed: Yes (Rolling walker)      Patient Diagnosis(es): The encounter diagnosis was S/P spinal fusion. Past Medical History:  has a past medical history of Anxiety, Chronic kidney disease, stage 3b ([address] Chronic midline low back pain without sciatica, Coronary artery disease involving native coronary artery without angina pectoris, Essential hypertension, Foot drop, left, GERD without esophagitis, History of blood transfusion, Internal hemorrhoids, Intrinsic eczema, Lesion of lateral popliteal nerve, Osteoporosis of multiple sites, Paroxysmal supraventricular tachycardia ([address] Primary osteoarthritis involving multiple joints, Prolonged emergence from general anesthesia, PUD (peptic ulcer disease), and Restless leg syndrome. Past Surgical History:  has a past surgical history that includes Cholecystectomy; Hysterectomy; lumbar laminectomy; [place] tooth extraction;  Abdominal exploration surgery (); Colonoscopy; cyst removal; Shoulder arthroscopy (Right); Breast surgery (Right); Total knee arthroplasty (Left, 02/03/2015); Total hip arthroplasty (Bilateral); Hemorrhoid surgery (02/24/2017); Upper gastrointestinal endoscopy (N/A, 03/10/2020); Carpal tunnel release (Right, 11/03/2020); Cataract removal with implant (Right, 08/24/2021); Intracapsular cataract extraction (Right, 08/24/2021); Cataract removal with implant (Left, 09/07/2021); Intracapsular cataract extraction (Left, 09/07/2021); Pain management procedure (Bilateral, 12/08/2021); eye surgery; joint replacement; Lumbar spine surgery (04/21/2022); lumbar fusion (Left, 4/21/2022); and lumbar fusion (N/A, 4/21/2022). Assessment   Body Structures, Functions, Activity Limitations Requiring Skilled Therapeutic Intervention: Decreased functional mobility ; Decreased ROM; Decreased strength;Decreased safe awareness;Decreased endurance;Decreased balance;Decreased coordination; Increased pain;Decreased posture  Assessment: Pt demonstrated improved endurance with mobility this session. Pt requiring similar assist for mobility: CGA for transfers/gait. Pt demonstrated SVT earlier in the day however vitals remained stable t/o mobility. Expect pt to continue to progress well. Safety concerns for pt returning home however would benefit from 24hr assist which daughter can provide and home PT. Pt would benefit from continued skilled PT to improve strength and mobility.   Treatment Diagnosis: Decreased functional mobility associated with kyphosis  Requires PT Follow-Up: Yes  Activity Tolerance  Activity Tolerance: Patient tolerated treatment well     Plan   Plan  Plan: 5-7 times per week  Current Treatment Recommendations: Strengthening,ROM,Balance training,Functional mobility training,Transfer training,Endurance training,Gait training,Stair training,Neuromuscular re-education,Pain management,Safety education & training,Patient/Caregiver education & training,Therapeutic activities  Plan Comment: Further assess gait as able  Safety Devices  Type of Devices: Nurse notified,Bed alarm in place,Call light within reach,Left in bed     Restrictions  Position Activity Restriction  Other position/activity restrictions: Activity as tolerated, ambulate pt; TLSO brace when OOB     Subjective   General  Chart Reviewed: Yes  Additional Pertinent Hx: Ms. Tami Ibarra is a 81 y/o female presenting s/p  L1-2, L2-3 EXTREME LATERAL INTERBODY FUSION, T10-PELVIS DECOMPRESSION, FIXATION AND FUSION, ILIAC WING SCREWS (Left Spine Lumbar) on 4/22 per Dr. Subhash Glynn. Pt was dx with kyphosis. CT-L spine ordered. CT guided stereotactic: T10-S1 and bilateral iliac transpedicular screws with posterior rods in satisfactory alignment and positioning of intermittent vertebral spacers. PMH: CKD III, CAD, HTN, foot drop L, osteoporosis, paroxysmal supraventricular tachycardia, PUD. Family / Caregiver Present: No  Referring Practitioner: Bekah Monterroso. Subhash Glynn MD  Referral Date : 04/21/22  Diagnosis: Kyphosis  General Comment  Comments: Pt presents with BP cuff, SpO2 monitor, telemetry, purwick catheter, and TLSO brace. Subjective  Subjective: Pt presents sitting upright in bedside chair. Pt pleasant and agreeable to PT. Pt states \"I feel much better than I did this morning\".   Pain: Denies          Social/Functional History  Social/Functional History  Lives With: Daughter Haydee Ball)  Type of Home: House  Home Layout: Two level,Laundry in basement (Bedroom and bathroom on 1st floor)  Home Access: Stairs to enter without rails  Entrance Stairs - Number of Steps: 6 KENNETH  Bathroom Shower/Tub: Walk-in shower  Bathroom Equipment: Grab bars in shower,Shower chair,Toilet raiser  Bathroom Accessibility: Accessible  Home Equipment: Cane,Walker, 4 wheeled,Walker, standard  Has the patient had two or more falls in the past year or any fall with injury in the past year?: Yes (1 fall d/t drop foot and tripping while walking)  [facility] Help From: Family  ADL Assistance: Independent  Homemaking Assistance: Needs assistance (Daughter and grandson perform laundry)  Homemaking Responsibilities: Yes (Performs cooking and cleaning)  Ambulation Assistance: Independent (With cane)  Transfer Assistance: Independent  Active : Yes (Able to drive however daughter usually drives)  Occupation: Retired  Type of Occupation: Medical lab technician  Additional Comments: Daughter works during the day however grandson is able to assist pt when daughter not home upon d/c. Vision/Hearing       Cognition   Orientation  Overall Orientation Status: Within Functional Limits     Objective                              Bed mobility  Sit to Supine: Moderate assistance (Via reverse log roll, assist required for B LE's)  Transfers  Sit to Stand: Contact guard assistance (x2 trials with walker, proper hand placement)  Stand to sit: Contact guard assistance (Demonstrated proper hand placement and controlled descent)  Ambulation  Device: Rolling Walker  Assistance: Contact guard assistance  Quality of Gait: Guarded posture, heavy support through B UE's, L foot drop, slow and hesitent  Gait Deviations: Slow Talya;Decreased step length;Decreased step height  Distance: 75'x2  Comments: Pt required for VC's for upright posture. Sitting rest break x3 min between trials.    Stairs/Curb  Stairs?: No (Pt requested to hold on practicing stairs this session)     Balance  Posture: Fair (Guarded)  Sitting - Static: Good (Supervision)  Sitting - Dynamic: Good (SBA)  Standing - Static: Fair (CGA with walker)  Standing - Dynamic: Fair (CGA with walker)           OutComes Score                                                  AM-PAC Score  AM-PAC Inpatient Mobility Raw Score : 17 (04/26/22 1441)  AM-PAC Inpatient T-Scale Score : 42.13 (04/26/22 1441)  Mobility Inpatient CMS 0-100% Score: 50.57 (04/26/22 1441)  Mobility Inpatient CMS G-Code Modifier : CK (04/26/22 1441) Goals  Short Term Goals  Time Frame for Short term goals: D/C  Short term goal 1: Perform supine to sit transfer via log roll with mod assist x1 - ongoing  Short term goal 2: Perform STS transfer with mod assist x1 with LRAD. MET 4/25 - Revised goal: Perform STS transfer with supervision and LRAD  Short term goal 3: Amb x25 ft with min assist x1 and LRAD - MET 4/25 - Revised goal: Amb >100 ft with supervision and LRAD - Partially MET 4/26, revised goal: Amb 250 ft with supervision and LRAD  Short term goal 4: Ascend/descend 4 steps with mod assist x1 and LRAD - if going home; MET 4/25 - Revised goal: Ascend/descend 4 steps with SBA and LRAD  Patient Goals   Patient goals : To return home       Therapy Time   Individual Concurrent Group Co-treatment   Time In 1400         Time Out 1440         Minutes 40               Timed Code Treatment Minutes:   40    Total Treatment Minutes:  40    [facility]     Therapist was present, directed the patient's care, made skilled judgement, and was responsible for assessment and treatment of the patient.   [facility], [address] [place] [address]

## 2022-04-26 NOTE — PROGRESS NOTES
Patient assisted back from bathroom and placed back on cardiac monitor. Noted patient to be in SVT on monitor with rate in 170's. BP stable at 123/81. Patient denies any SOB or chest pain, just some chest fluttering. Patient practiced some deep breathing without any successful return to NSR. Zee Alvarez NP ordered EKG stat and this RN performed EKG. See results in chart. Patient sustained in SVT. Dr. Higinio Bar with ICU resident team called to bedside in which he called Dr. Pebbles Vargas (hospitalist). Patient also bared down which was also unsuccessful. Patient had just received her PO metoprolol. 5mg IV metoprolol given per orders. Patient returned to NSR at 0930 with PAC's and PVC's. BP stable at 122/68. Cardiology called to come see patient. Will monitor closely.

## 2022-04-26 NOTE — PROGRESS NOTES
Pt had 2 unwitnessed episodes of SVT that lasted very briefly. Pt reports immediate resolution after taking a few deep breaths. Pt however states that she has not had episodes of SVT this often for years until this hospitalization. Will continue to closely monitor.

## 2022-04-26 NOTE — CONSULTS
[address]   Cardiac Electrophysiology Consultation   Date: 4/26/2022  Admit Date:  4/21/2022  Reason for Consultation: SVT  Consult Requesting Physician: Marge Garcia. Steffen Zayas MD     Attestation  I have seen,interviewed and examined the patient with the resident. Pertinent medical data and imaging studies reviewed. Refer to the residents note for details of clinical findings  I agree with his assessment and plan with following addendum:    Atrial tachycardia with a ventricular rate of 170 to 180 bpm  Got terminated with IV metoprolol  Continue her current medications including metoprolol and Cardizem  No new medications  Follow-up with my colleague Dr. Joi De La Torre as an outpatient  Okay to discharge from cardiology standpoint    [facility] [phone]      No chief complaint on file. HPI: Milagros Brito is a 80 y.o. with a past medical history of SVT, presumed A. fib, CAD, hypertension, CKD 3B, who presented to the hospital for lumbar spinal surgery with Dr. Steffen Zayas which she underwent on 4/21. Cardiology was consulted for prolonged run of SVT this a.m. At home she is on 50 mg metoprolol twice daily and verapamil 120 mg daily which she continues to be on here in the hospital.  Patient states that she has a history of PSVT and recently changed cardiologist to doctors to Dr. Sulema Langford. According to the charts there is no confirmed history of atrial fibrillation and patient is not on any anticoagulation. Patient states that she rarely feels palpitations at home however whenever she comes to the hospital she seems to get palpitations and go into this fast rhythm. When it occurs at home she holds her breath and slowly exhales and it usually resolves the palpitations. At home she is on the above medication. She drinks 3 cups daily of folders 50/50 coffee which is half caffeine.   She does not drink any alcohol or smoke. Patient states that this a.m. around 5:00 she was on the toilet when she started feeling palpitations. She tried holding her breath and slowly exhaling without any improvement in her symptoms. She went back to her bed and called the nurse. Patient had an EKG performed which showed SVT. Patient was given an additional 5 mg of IV metoprolol with conversion back to normal sinus rhythm. She does endorse fatigue once the fast rhythm resolved however, she denied any shortness of breath or chest pain during or after. She otherwise has no complaints.       Past Medical History:   Diagnosis Date    Anxiety     Chronic kidney disease, stage 3b (HCC)     Chronic midline low back pain without sciatica     Coronary artery disease involving native coronary artery without angina pectoris     Essential hypertension     Foot drop, left     GERD without esophagitis     History of blood transfusion     Internal hemorrhoids     Intrinsic eczema     Lesion of lateral popliteal nerve     Osteoporosis of multiple sites     Paroxysmal supraventricular tachycardia (HCC)     Primary osteoarthritis involving multiple joints     Prolonged emergence from general anesthesia     PUD (peptic ulcer disease)     Restless leg syndrome         Past Surgical History:   Procedure Laterality Date    ABDOMINAL EXPLORATION SURGERY  2000    Lysis of Adhesions    BREAST SURGERY Right     Biopsy    CARPAL TUNNEL RELEASE Right 11/03/2020    RIGHT CARPAL TUNNEL RELEASE performed by Donovan Gonsalves MD at [address] [place] [address] Right 08/24/2021    CATARACT REMOVAL WITH IMPLANT Left 09/07/2021    Dr. Dusty Berger      from back    EYE SURGERY      HEMORRHOID SURGERY  02/24/2017    HYSTERECTOMY      Complete    INTRACAPSULAR CATARACT EXTRACTION Right 08/24/2021    PHACOEMULSIFICATION WITH INTRAOCULAR LENS IMPLANT RIGHT EYE, performed by Batsheva Cornelius MD at [facility]    INTRACAPSULAR CATARACT EXTRACTION Left 09/07/2021    PHACOEMULSIFICATION WITH INTRAOCULAR LENS IMPLANT - LEFT EYE performed by Steve Roca MD at [facility] Left 4/21/2022    L1-2, L2-3 EXTREME LATERAL INTERBODY FUSION, T10-PELVIS DECOMPRESSION, FIXATION AND FUSION, ILIAC WING SCREWS performed by Buster Nath. Janice Britt MD at [facility] N/A 4/21/2022    . performed by Buster Nath. Janice Britt MD at [facility] [address]      x 3 with fusion   [address]  04/21/2022    Discectomy with cage at 2 levels / Dr. Martínez Inman Bilateral 12/08/2021    BILATERAL L4 TRANSFORMANIAL EPIDURAL STEROID INJECTION WITH FLUOROSCOPY performed by Hamlet Flores MD at [facility] ARTHROSCOPY Right     Right shoulder scope 2) Right shoulder labral debridement 3) Right shoulder Open Miguel 4) Right shoulder rotator cuff repair    TOTAL HIP ARTHROPLASTY Bilateral     TOTAL KNEE ARTHROPLASTY Left 02/03/2015    Dr. Mitchell Harris UPPER GASTROINTESTINAL ENDOSCOPY N/A 03/10/2020    EGD ESOPHAGOGASTRODUODENOSCOPY performed by Horace Garcia MD at [facility] EXTRACTION         Allergies   Allergen Reactions    Latex Itching and Rash    Bactrim Rash       Social History:  Reviewed. reports that she quit smoking about 25 years ago. Her smoking use included cigarettes. She smoked 1.00 pack per day. She has never used smokeless tobacco. She reports current alcohol use of about 2.0 standard drinks of alcohol per week. She reports that she does not use drugs. Family History:  Reviewed. family history includes Alcohol Abuse in her maternal uncle and maternal uncle; Asthma in her maternal uncle; Cancer in her maternal uncle and son; Depression in her daughter and son; Diabetes in her maternal aunt; High Blood Pressure in her father and mother; Stroke in her father. No premature CAD. Review of System:  All other systems reviewed except for that noted above. Pertinent negatives and positives are:     Objective      · General: Positive for fatigue. Negative for fever, chills   · Ophthalmic ROS: negative for - eye pain or loss of vision  · ENT ROS: negative for - headaches, sore throat   · Respiratory: negative for - cough, sputum  · Cardiovascular: Reviewed in HPI  · Gastrointestinal: negative for - abdominal pain, diarrhea, N/V  · Hematology: negative for - bleeding, blood clots, bruising or jaundice  · Genito-Urinary:  negative for - Dysuria or incontinence  · Musculoskeletal: negative for - Joint swelling, muscle pain  · Neurological: negative for - confusion, dizziness, headaches   · Psychiatric: No anxiety, no depression. · Dermatological: negative for - rash    Physical Examination:  Vitals:    22 0700   BP:    Pulse: 79   Resp: 14   Temp:    SpO2: 98%        Intake/Output Summary (Last 24 hours) at 2022 0931  Last data filed at 2022 0641  Gross per 24 hour   Intake 450 ml   Output 115 ml   Net 335 ml     In: 480 [P.O.:480]  Out: 115    Wt Readings from Last 3 Encounters:   22 196 lb 10.4 oz (89.2 kg)   22 228 lb (103.4 kg)   22 226 lb 9.6 oz (102.8 kg)     Temp  Av.6 °F (37 °C)  Min: 97.2 °F (36.2 °C)  Max: 99.7 °F (37.6 °C)  Pulse  Av.3  Min: 67  Max: 95  BP  Min: 90/78  Max: 134/89  SpO2  Av.3 %  Min: 96 %  Max: 98 %    · Telemetry: Currently in normal sinus rhythm with PACs. · Constitutional: Alert. Oriented to person, place, and time. No distress. · Head: Normocephalic and atraumatic. · Mouth/Throat: Lips appear moist. Oropharynx is clear and moist.  · Eyes: Conjunctivae normal. EOM are normal.   · Neck: Neck supple. No lymphadenopathy. No rigidity. No JVD present. · Cardiovascular: Normal rate, regular rhythm. Normal S1&S2. Carotid pulse 2+ bilaterally. · Pulmonary/Chest: Bilateral respiratory sounds present.  No respiratory accessory muscle use. No wheezes, No rhonchi. · Abdominal: Soft. Normal bowel sounds present. No distension, No tenderness. No splenomegaly. No hernia. · Musculoskeletal: No tenderness. No edema    · Lymphadenopathy: Has no cervical adenopathy. · Neurological: Alert and oriented. Cranial nerve II-XII grossly intact, No gross deficit to touch. · Skin: Skin is warm and dry. No rash, lesions, ulcerations noted. · Psychiatric: No anxiety nor agitation. Labs:  Reviewed. Recent Labs     22    136 137   K 3.3* 3.9 4.3    102 100   CO2  24   BUN 18 19 19   CREATININE 1.1 1.0 1.0     Recent Labs     22   WBC 9.1 7.4 5.9   HGB 9.3* 9.0* 8.9*   HCT 27.0* 26.9* 27.4*   MCV 84.1 84.7 85.8   * 123* 142     Lab Results   Component Value Date    TROPONINI <0.01 2022     No results found for: BNP  Lab Results   Component Value Date    PROTIME 11.8 2022    PROTIME 10.5 2015    PROTIME 9.7 2015    INR 1.04 2022    INR 0.97 2015    INR 0.90 2015     Lab Results   Component Value Date    CHOL 221 2022    HDL 73 2022    TRIG 101 2022       Diagnostic and imaging results reviewed. EC:08 AM 2022. SVT with a rate of 178. Echo: No previous  Cath: No previous    I independently reviewed the ECG and telemetry.      Scheduled Meds:   metoprolol        sodium chloride flush  5-40 mL IntraVENous 2 times per day    acetaminophen  1,000 mg Oral Q6H    lidocaine  1 patch TransDERmal Daily    docusate sodium  100 mg Oral BID    latanoprost  1 drop Both Eyes Nightly    metoprolol tartrate  50 mg Oral BID    pantoprazole  40 mg Oral QAM AC    [Held by provider] triamterene-hydroCHLOROthiazide  1 tablet Oral Daily    verapamil  120 mg Oral Daily    sodium chloride flush  5-40 mL IntraVENous 2 times per day    sennosides-docusate sodium  1 tablet Oral BID    enoxaparin  40 mg SubCUTAneous Daily    timolol  1 drop Both Eyes BID     Continuous Infusions:   sodium chloride      sodium chloride      sodium chloride       PRN Meds:.diphenhydrAMINE, magnesium hydroxide, bisacodyl, sodium chloride flush, sodium chloride, ondansetron **OR** ondansetron, methocarbamol, metoclopramide, sodium chloride, sodium chloride flush, sodium chloride, oxyCODONE **OR** oxyCODONE, diazePAM     Assessment:   Patient Active Problem List    Diagnosis Date Noted    S/P spinal fusion 04/26/2022    Scoliosis of lumbar region due to degenerative disease of spine in adult 04/21/2022    Osteoporosis of multiple sites 03/30/2022    Chronic kidney disease, stage 3b (HCC)     Positive PPD     Internal hemorrhoids     Glaucoma     Grade IV hemorrhoids     Anesthesia complication     Lesion of lateral popliteal nerve     Postmenopausal status     Seborrheic keratosis     Chronic midline low back pain without sciatica     Intrinsic eczema     Essential hypertension     GERD without esophagitis     Primary osteoarthritis involving multiple joints     Non morbid obesity due to excess calories     Primary insomnia     Coronary artery disease involving native coronary artery without angina pectoris     Restless leg syndrome     Foot drop, left     Anxiety     PUD (peptic ulcer disease)     Paroxysmal supraventricular tachycardia ([facility] [address]       Active Hospital Problems    Diagnosis Date Noted    S/P spinal fusion [Z98.1] 04/26/2022     Priority: Medium    Scoliosis of lumbar region due to degenerative disease of spine in adult [M41.86] 04/21/2022     Priority: Medium         Recommendation (s):    SVT  Likely AT. In setting of stress 2/2 lumbar fusion surgery. Remained hemodynamically stable. Currently in NSR with frequent PACs. Was given 5mg IV metoprolol with resolution of SVT.   - Continue home metoprolol 50mg BID, Verapamil 120mg daily  - Continue to monitor on telemetry    Thank you for allowing me to participate in the care of Palmer James . If you have any questions/comments, please do not hesitate to contact us. The patient will be discussed with Maritza Adrian MD  Internal Medicine  PGY-2  Perfect Serve    For any EP related issues after 5 PM, [facility] call [facility] through .

## 2022-04-26 NOTE — CARE COORDINATION
Case Management Assessment            Discharge Note                    Date / Time of Note: 4/26/2022 4:59 PM                  Discharge Note Completed by: Jimmy Wallace RN     Met with pt and daughter at the bedside to discuss discharge plan. Ms. Femi Harper will be returning home. Her daughter will be staying with her 24/7 while she recovers. They are open to home care. Ms. Debbie Liu stated she wanted to go home and review the agencies on her computer. She will call CM tomorrow with choice. CM will send the referral tomorrow morning. Pt's daughter will transport home via private car. Patient Name: Antonio Mayfield   YOB: 1940  Diagnosis: Kyphosis, unspecified kyphosis type, unspecified spinal region [M40.209]  Scoliosis of lumbar region due to degenerative disease of spine in adult [M41.86]   Date / Time: 4/21/2022  6:58 AM    Current PCP: Carly Olmos DO    Advance Directives:  Code Status: Full Code    Financial:  Payor: Boom Moore / Plan: Giuseppe Beltran PLUS HMO / Product Type: *No Product type* /      Pharmacy:    [address] [place] [address]  Phone: [phone] Fax: [facility] [phone] - [phone]  [address] [place] [address]  Phone: [phone] Fax: [phone]    ADLS: Assistance with ADL's. Daughter will be staying with pt 24/7 until she is recovered. Current PT AM-PAC Score: 17 /24  Current OT AM-PAC Score: 16 /24      DISCHARGE Disposition: Home with Home Health Care: Pt requested time to review the [place] for [place] [address] agencies at home     IMM Completed:   Yes, Case management has presented and reviewed IMM letter #2 to the patient and/or family/ POA. Patient and/or family/POA verbalized understanding of their medicare rights and appeal process if needed.  Patient and/or family/POA has signed, initialed and placed today's date (4/26/2022) and time (1630) on IMM letter #2 on the the appropriate lines. Patient and/or family/POA, copy of letter offered and they are aware that this original copy of IMM letter #2 is available prior to discharge from the paper chart on the unit. Electronic documentation has been entered into epic for IMM letter #2 and original paper copy has been added to the paper chart at the nurses station. Transportation:  Transportation PLAN for discharge: family   Mode of Transport: Private Car    Home Care:  [address] ordered at discharge: Yes  Pt requested time to review the list when she returns home. She stated she will call CM tomorrow with choice. Durable Medical Equipment:  Equipment obtained during hospitalization: There is an order noted for a walker at discharge. However, Ms. Rimma Saleh stated she has 3 walkers at home and does not need a 4th. The Patient and/or patient representative Iftikhar Swanson and her family were provided with a choice of provider and agrees with the discharge plan Yes    Freedom of choice list was provided with basic dialogue that supports the patient's individualized plan of care/goals and shares the quality data associated with the providers.  Yes    Care Transitions patient: Yes    Srini Britt RN  [facility] [employer]  Case Management Department  [phone]

## 2022-04-26 NOTE — PLAN OF CARE
Problem: Discharge Planning  Goal: Discharge to home or other facility with appropriate resources  Outcome: Completed     Problem: Pain  Goal: Verbalizes/displays adequate comfort level or baseline comfort level  Outcome: Completed     Problem: Safety - Adult  Goal: Free from fall injury  Outcome: Completed     Problem: Skin/Tissue Integrity  Goal: Absence of new skin breakdown  Description: 1. Monitor for areas of redness and/or skin breakdown  2. Assess vascular access sites hourly  3. Every 4-6 hours minimum:  Change oxygen saturation probe site  4. Every 4-6 hours:  If on nasal continuous positive airway pressure, respiratory therapy assess nares and determine need for appliance change or resting period.   Outcome: Completed     Problem: ABCDS Injury Assessment  Goal: Absence of physical injury  Outcome: Completed     Problem: Musculoskeletal - Adult  Goal: Return mobility to safest level of function  Outcome: Completed  Goal: Maintain proper alignment of affected body part  Outcome: Completed  Goal: Return ADL status to a safe level of function  Outcome: Completed     Problem: Gastrointestinal - Adult  Goal: Minimal or absence of nausea and vomiting  Outcome: Completed  Goal: Maintains or returns to baseline bowel function  Outcome: Completed  Goal: Maintains adequate nutritional intake  Outcome: Completed  Goal: Establish and maintain optimal ostomy function  Outcome: Completed     Problem: Hematologic - Adult  Goal: Maintains hematologic stability  Outcome: Completed

## 2022-04-26 NOTE — PROGRESS NOTES
Progress Note        Date:4/26/2022       Room:[facility]/4506-01  Patient David Waller     YOB: 1940     Age:82 y.o. Back pain          Subjective   Interval History Status: improved. Patient went to bathroom and became tachycardic  Was in SVT with HR in 170s for 40 mins     Review of Systems   ROS as mentioned above.     Medications   Scheduled Meds:    sodium chloride flush  5-40 mL IntraVENous 2 times per day    acetaminophen  1,000 mg Oral Q6H    lidocaine  1 patch TransDERmal Daily    docusate sodium  100 mg Oral BID    latanoprost  1 drop Both Eyes Nightly    metoprolol tartrate  50 mg Oral BID    pantoprazole  40 mg Oral QAM AC    [Held by provider] triamterene-hydroCHLOROthiazide  1 tablet Oral Daily    verapamil  120 mg Oral Daily    sodium chloride flush  5-40 mL IntraVENous 2 times per day    sennosides-docusate sodium  1 tablet Oral BID    enoxaparin  40 mg SubCUTAneous Daily    timolol  1 drop Both Eyes BID     Continuous Infusions:    sodium chloride      sodium chloride      sodium chloride       PRN Meds: diphenhydrAMINE, magnesium hydroxide, sodium chloride flush, sodium chloride, ondansetron **OR** ondansetron, methocarbamol, metoclopramide, sodium chloride, sodium chloride flush, sodium chloride, oxyCODONE **OR** oxyCODONE, diazePAM    Past History    Past Medical History:   has a past medical history of Anxiety, Chronic kidney disease, stage 3b (HCC), Chronic midline low back pain without sciatica, Coronary artery disease involving native coronary artery without angina pectoris, Essential hypertension, Foot drop, left, GERD without esophagitis, History of blood transfusion, Internal hemorrhoids, Intrinsic eczema, Lesion of lateral popliteal nerve, Osteoporosis of multiple sites, Paroxysmal supraventricular tachycardia ([facility] [address] Primary osteoarthritis involving multiple joints, Prolonged emergence from general anesthesia, PUD (peptic ulcer disease), and alert and oriented to person, place, and time. Psychiatric:         Mood and Affect: Mood normal.         Behavior: Behavior normal.                 Assessment      Principal Problem:    Scoliosis of lumbar region due to degenerative disease of spine in adult  Active Problems:    S/P spinal fusion  Resolved Problems:    * No resolved hospital problems. *       Plan:        79 yo female POD 4 s/p L1-2, L2-3 EXTREME LATERAL INTERBODY FUSION, T10-PELVIS DECOMPRESSION, FIXATION AND FUSION, ILIAC WING SCREWS with Dr. Naheed Coles wound care per neurosurgery  DVT prophylaxis: Lovenox  PT OT  Drain management per neurosurgery    SVT  Lytes WNL  Has hx of SVT  Improved with Metoprolol IV  Low like schwartz for PE as no dyspnea, CP  Cardiology consulted.      Hypertension  Continue home metoprolol  Hold diuretics, BP on the lower side    Hypokalemia  Repleted    Acute blood loss anemia  S/p 1 unit PRBC through this hospitalization  Hemoglobin stable    Disposition  Per neurosurgery     Danelle Young MD  12:36 PM  04/26/22

## 2022-04-26 NOTE — PROGRESS NOTES
Pt currently having fluttering feeling in chest since 0430 after ambulating from bed to bathroom and back to bed that has not resolved but waxes and wanes in severity. Pt tries holding breath and blowing through straw and is unresolved. Pt's heart rhythm during activity and currently is in normal sinus rhythm with some PACs. She denies chest pain, headache, or dizziness at this time. MD aware, Mg, Troponin, and EKG ordered.

## 2022-04-26 NOTE — PROGRESS NOTES
Patient discharged home per orders. Medications sent to patients preferred pharmacy. All discharge instructions discussed throughout with patient and patients daughter. All questions answered. R sided drain removed per orders and PIV removed. Patient wheeled out to car.

## 2022-04-26 NOTE — DISCHARGE SUMMARY
Discharge Summary    Date of Admission: 4/21/2022  6:58 AM  Date of Discharge: 4/26/2022  Admission Diagnosis: Kyphosis, unspecified kyphosis type, unspecified spinal region [M40.209]  Discharge Diagnosis: Same   Condition on Discharge: good  Attending for Admission: William Guerra. Carolyn Busby MD  Procedures: Procedure(s) (LRB):  L1-2, L2-3 EXTREME LATERAL INTERBODY FUSION, T10-PELVIS DECOMPRESSION, FIXATION AND FUSION, ILIAC WING SCREWS (Left)  . (N/A)  Consults: IP CONSULT TO PHYSICAL MEDICINE REHAB  IP CONSULT TO HOSPITALIST  IP CONSULT TO CARDIOLOGY  IP CONSULT TO HOME CARE NEEDS    Reason for Admission:  Skip Espinal is a 80 y.o. female patient who was admitted to the hospital for complaints of chronic lumbar pain, radiating distally into BLE, right worse than left. She underwent the procedure listed above on 4/21/2022. Hospital Course:  After surgery, Her pre-operative chronic lumbar pain, radiating distally into BLE, right worse than left was Absent . She complained of post-op pain. The pain was well-controlled on oral medications. Her brace was in place. The incision was clean, dry and intact. There was no erythema or edema around the surgical site. Prior to discharge She was eating well, urinating and ambulating with a steady gait. Pain:  Lidocaine Patch & PRN Oxycodone    Muscle Spasms:  Robaxin & PRN Valium    Hypertension:  Continue home metoprolol  Hold diuretics, BP on the lower side     Hypokalemia:  Repleted     Acute blood loss anemia:  S/p 1 unit PRBC through this hospitalization  Hemoglobin stable    SVT:  Likely AT. In setting of stress 2/2 lumbar fusion surgery. Remained hemodynamically stable. Currently in NSR with frequent PACs. Was given 5mg IV metoprolol with resolution of SVT.   - Continue home metoprolol 50mg BID, Verapamil 120mg daily      Discharge Vitals/Labs:  /61   Pulse 68   Temp 97.5 °F (36.4 °C) (Temporal)   Resp 16   Ht 5' 4\" (1.626 m)   Wt 196 lb 10.4 oz (89.2 kg)   SpO2 98%   BMI 33.76 kg/m²   CBC:   Lab Results   Component Value Date    WBC 5.9 04/26/2022    RBC 3.20 04/26/2022    HGB 8.9 04/26/2022    HCT 27.4 04/26/2022    MCV 85.8 04/26/2022    MCH 27.9 04/26/2022    MCHC 32.5 04/26/2022    RDW 15.4 04/26/2022     04/26/2022    MPV 8.7 04/26/2022     BMP:    Lab Results   Component Value Date     04/26/2022     04/26/2022    K 4.3 04/26/2022    K 4.3 04/26/2022     04/26/2022     04/26/2022    CO2 24 04/26/2022    CO2 22 04/26/2022    BUN 19 04/26/2022    BUN 20 04/26/2022    LABALBU 3.5 04/26/2022    CREATININE 1.0 04/26/2022    CREATININE 1.0 04/26/2022    CALCIUM 9.2 04/26/2022    CALCIUM 9.1 04/26/2022    GFRAA >60 04/26/2022    GFRAA >60 04/26/2022    GFRAA 57 09/26/2012    LABGLOM 53 04/26/2022    LABGLOM 53 04/26/2022    GLUCOSE 97 04/26/2022    GLUCOSE 96 04/26/2022       Discharge Medications: The patient suffers from a major neurological surgery that pain cannot be managed within an average of 30 MED per day. Severe acute postoperative pain is the reason for exceeding the 30 MED average, and the prescription reflects the same dosage patient received while inpatient, which is the lowest dose consistent with the patients medical condition. Non-opioid treatment options have been considered prior to prescribing opioids, and the patient has been advised of the benefits and risks of the opioid (including the potential for addiction). Medication List      START taking these medications    diazePAM 5 MG tablet  Commonly known as: VALIUM  Take 1 tablet by mouth every 6 hours as needed (Muscle spasms) for up to 10 days.      lidocaine 4 % external patch  Place 1 patch onto the skin daily for 10 days     methocarbamol 750 MG tablet  Commonly known as: ROBAXIN  Take 1 tablet by mouth every 6 hours as needed (muscle spasms)     oxyCODONE 5 MG immediate release tablet  Commonly known as: ROXICODONE  Take 1-2 tablets by mouth every 6 hours as needed for Pain for up to 7 days. senna 8.6 MG tablet  Commonly known as: Senokot  Take 2 tablets by mouth 2 times daily for 10 days        CONTINUE taking these medications    alendronate 70 MG tablet  Commonly known as: Fosamax  Take 1 tablet by mouth every 7 days     CALTRATE 600+D PO     docusate sodium 100 MG capsule  Commonly known as: Colace  Take 1 capsule by mouth 2 times daily     LORazepam 1 MG tablet  Commonly known as: ATIVAN     Lumigan 0.01 % Soln ophthalmic drops  Generic drug: bimatoprost     metoprolol tartrate 50 MG tablet  Commonly known as: LOPRESSOR  TAKE 1 TABLET TWICE DAILY     omeprazole 20 MG delayed release capsule  Commonly known as: PRILOSEC  TAKE 1 CAPSULE EVERY DAY     timolol 0.5 % ophthalmic solution  Commonly known as: BETIMOL     triamterene-hydroCHLOROthiazide 37.5-25 MG per tablet  Commonly known as: MAXZIDE-25  TAKE 1 TABLET EVERY DAY     verapamil 120 MG extended release tablet  Commonly known as: CALAN SR  TAKE 1 TABLET EVERY DAY        STOP taking these medications    HYDROcodone-acetaminophen 7.5-325 MG per tablet  Commonly known as: NORCO     meloxicam 15 MG tablet  Commonly known as: MOBIC     vitamin E 1000 units capsule           Where to Get Your Medications      These medications were sent to [address] [place] [address] [phone]  [address] [place] [address]    Phone: [phone]   · diazePAM 5 MG tablet  · lidocaine 4 % external patch  · methocarbamol 750 MG tablet  · oxyCODONE 5 MG immediate release tablet  · senna 8.6 MG tablet         Discharge Destination:  The patient was discharged to Home. Follow-up:  The patient is to follow-up with Rachna Will. Eren Lzu MD in the office in 2 weeks      Discharge Instructions:   Verbal and written discharge instructions were given to the patient at the time of discharge.     Electronically signed by: MARIANO De Leon CNP, MARIS, 4/26/2022 4:44 PM  [phone]

## 2022-04-27 ENCOUNTER — TELEPHONE (OUTPATIENT)
Dept: CARDIOLOGY CLINIC | Age: 82
End: 2022-04-27

## 2022-04-27 ENCOUNTER — CARE COORDINATION (OUTPATIENT)
Dept: CASE MANAGEMENT | Age: 82
End: 2022-04-27

## 2022-04-27 NOTE — TELEPHONE ENCOUNTER
Noretta Penton called in this morning stating that she was just d/c from [email or web address] yesterday. She had a spinal infusion last Thursday 4/21/22. She said that cardiology had to be consulted for prolonged run of SVT yesterday morning. Per Dr. Carlos Lopez:  Atrial tachycardia with a ventricular rate of 170 to 180 bpm  Got terminated with IV metoprolol  Continue her current medications including metoprolol and Cardizem  No new medications  Follow-up with my colleague Dr. Kamala Saha as an outpatient  Okay to discharge from cardiology standpoint    Tono Fofana would like Dr. Weston Reynaga to look at her cardiology consult notes and give her a call back.     You can reach Tono Fofana at #[phone]

## 2022-04-27 NOTE — CARE COORDINATION
[facility] Trans[facility] Initial Follow Up Call    Call within 2 business days of discharge: Yes    Patient: Belle Loaiza Patient : 1940   MRN: [id number]  Reason for Admission: spinal fusion  Discharge Date: 22 RARS: Readmission Risk Score: 13.4 ( )      Last Discharge [facility]       Complaint Diagnosis Description Type Department Provider    22  S/P spinal fusion Admission (Discharged) [facility] Jose Keys MD           Spoke with: Chente Kaur    Non-face-to-face services provided:  Obtained and reviewed discharge summary and/or continuity of care documents  Education of patient/family/caregiver/guardian to support self-management-   Assessment and support for treatment adherence and medication management-        Challenges to be reviewed by the provider   Additional needs identified to be addressed with provider: No  none             Method of communication with provider : none    Was this a readmission? No    Care Transition Nurse (CTN) contacted the patient by telephone to perform post hospital discharge assessment. Verified name and  with patient as identifiers. Provided introduction to self, and explanation of the CTN role. CTN reviewed discharge instructions, medical action plan and red flags with patient who verbalized understanding. Patient given an opportunity to ask questions and does not have any further questions or concerns at this time. Were discharge instructions available to patient? Yes. Reviewed appropriate site of care based on symptoms and resources available to patient including: PCP  Specialist  Home health  When to call 911. The patient agrees to contact the PCP office for questions related to their healthcare. Covid Risk Education     Educated patient about risk for severe COVID-19 due to risk factors according to CDC guidelines.  CTN reviewed discharge instructions, medical action plan and red flag symptoms with the patient who verbalized understanding. Discussed COVID vaccination status: No. Education provided on COVID-19 vaccination as appropriate. Discussed exposure protocols and quarantine with CDC Guidelines. Patient was given an opportunity to verbalize any questions and concerns and agrees to contact CTN or health care provider for questions related to their healthcare. Reviewed and educated patient on any new and changed medications related to discharge diagnosis. CTN provided contact information. Plan for follow-up call in 3-5 days based on severity of symptoms and risk factors. Plan for next call: symptom management-\   self management-         States she's doing well. Reports some soreness and rates her pain a 3 out of 10. She took 1 roxicodone when she woke up this morning. Denies s/s of infection or any other symptoms or concerns. Reports urinating regularly but has not yet had a BM. She is taking colace and miralax. Follow up appts scheduled. Reports taking medications as prescribed but declines to review them at this time. States she still has not picked a home care agency but agrees to look at the list and call the  today with her selection. She denies questions or concerns at this time.    Follow Up  Future Appointments   Date Time Provider Alberta Hussein   5/23/2022  1:00 PM Edson Curtis DO [facility]       Thermon Serene

## 2022-04-28 NOTE — TELEPHONE ENCOUNTER
Called and spoke with Kiersten Friedman, she denies any recurrent symptoms since being home. Instructed to call with any recurrent episodes and this episode was likely related to her surgery and anemia. She v/u and states Dr. Britton Aguilera was not overly concerned. She states she can typically break the rhythm but was unsuccessful during her admission. She will return call with any recurrence or worsening symptoms. She states she lives 10 minutes away and instructed she can always come to the office for and ECG if needed.

## 2022-05-02 NOTE — CARE COORDINATION
[facility]    Patient d/c was a week ago, but patient has just now decided to accept home care services. Patient aware and agreeable to services.  Faxed orders to [facility] for [facility] by 5/4    Fredi Olsen LPN  Care Transition Nurse  [address]  [phone]

## 2022-05-02 NOTE — DISCHARGE INSTR - COC
Continuity of Care Form    Patient Name: Stefani Torres   :  1940  MRN:  [id number]    Admit date:  4/15/2022  Discharge date:  ***    Code Status Order: Prior   Advance Directives:      Admitting Physician:  No admitting provider for patient encounter. PCP: Maureen De Paz DO    Discharging Nurse: [facility] Unit/Room#: No information available for this encounter. Discharging Unit Phone Number: ***    Emergency Contact:   Extended Emergency Contact Information  Primary Emergency Contact: Davide Shields  Address: [facility], [address] [place] [address],[address] Phone: [phone]  Mobile Phone: [phone]  Relation: Child    Past Surgical History:  Past Surgical History:   Procedure Laterality Date    ABDOMINAL EXPLORATION SURGERY      Lysis of Adhesions    BREAST SURGERY Right     Biopsy    CARPAL TUNNEL RELEASE Right 2020    RIGHT CARPAL TUNNEL RELEASE performed by Julian Mc MD at [place] [address] Right 2021    CATARACT REMOVAL WITH IMPLANT Left 2021    Dr. [facility]      from [facility] SURGERY  2017    HYSTERECTOMY      Complete    INTRACAPSULAR CATARACT EXTRACTION Right 2021    PHACOEMULSIFICATION WITH INTRAOCULAR LENS IMPLANT RIGHT EYE, performed by Jose Vee MD at [place] [address] Left 2021    PHACOEMULSIFICATION WITH INTRAOCULAR LENS IMPLANT - LEFT EYE performed by Jose Vee MD at [address] [place] [address] Left 2022    L1-2, L2-3 EXTREME LATERAL INTERBODY FUSION, T10-PELVIS DECOMPRESSION, FIXATION AND FUSION, ILIAC WING SCREWS performed by Deniz Holcomb. Steffen Zayas MD at [address] [place] [address] N/A 2022    . performed by Deniz Holcomb.  Steffen Zayas MD at [place] [address] with fusion    LUMBAR SPINE SURGERY  04/21/2022    Discectomy with cage at 2 levels / Dr. Ramon Knapp Bilateral 12/08/2021    BILATERAL L4 TRANSFORMANIAL EPIDURAL STEROID INJECTION WITH FLUOROSCOPY performed by Gonzalo Sewell MD at [address] [place] [address] ARTHROSCOPY Right     Right shoulder scope 2) Right shoulder labral debridement 3) Right shoulder Open Miguel 4) Right shoulder rotator cuff repair    TOTAL HIP ARTHROPLASTY Bilateral     TOTAL KNEE ARTHROPLASTY Left 02/03/2015    Dr. Reece Madsen N/A 03/10/2020    EGD ESOPHAGOGASTRODUODENOSCOPY performed by Cleopatra Vicente MD at [place] [address] EXTRACTION         Immunization History:   Immunization History   Administered Date(s) Administered    COVID-19, Pfizer Purple top, DILUTE for use, 12+ yrs, 30mcg/0.3mL dose 01/21/2021, 02/11/2021, 10/02/2021    Influenza A ([id number]) Vaccine PF IM 01/26/2010    Influenza Virus Vaccine 12/15/2010, 09/26/2012, 09/30/2013, 10/13/2014, 10/22/2015, 09/04/2016    Influenza, High Dose (Fluzone 65 yrs and older) 09/26/2012, 09/30/2013, 10/13/2014, 10/22/2015, 09/14/2016, 10/30/2017, 10/04/2018    Influenza, High-dose, Barbara Spies, 65 yrs +, IM (Fluzone) 09/19/2020    Influenza, Quadv, adjuvanted, 65 yrs +, IM, PF (Fluad) 10/25/2021    Influenza, Triv, inactivated, subunit, adjuvanted, IM (Fluad 65 yrs and older) 10/17/2019    Pneumococcal Conjugate 13-valent (Jybnerp92) 10/22/2015    Pneumococcal Polysaccharide (Abovakhmk50) 02/27/2007, 12/15/2010    Tdap (Boostrix, Adacel) 01/21/2009, 09/19/2020    Zoster Live (Zostavax) 11/04/2015    Zoster Recombinant (Shingrix) 09/19/2020, 03/18/2021       Active Problems:  Patient Active Problem List   Diagnosis Code    Paroxysmal supraventricular tachycardia (HCC) I47.1    Foot drop, left M21.372    Anxiety F41.9    PUD (peptic ulcer disease) K27.9    Restless leg syndrome G25.81    GERD without esophagitis K21.9    Primary osteoarthritis involving multiple joints M89.49    Non morbid obesity due to excess calories E66.09    Primary insomnia F51.01    Coronary artery disease involving native coronary artery without angina pectoris I25.10    Essential hypertension I10    Intrinsic eczema L20.84    Chronic midline low back pain without sciatica M54.50, G89.29    Anesthesia complication X96.62MK    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 Last documented pain score (0-10 scale):    Last Weight:   Wt Readings from Last 1 Encounters:   04/23/22 196 lb 10.4 oz (89.2 kg)     Mental Status:  {IP PT MENTAL STATUS:28467}    IV Access:  { ANIBAL IV ACCESS:754690573}    Nursing Mobility/ADLs:  Walking   {CHP DME KGBX:460777362}  Transfer  {CHP DME UHUT:483280271}  Bathing  {CHP DME BVUW:216540707}  Dressing  {CHP DME NEXU:305177300}  Toileting  {CHP DME JBML:077902575}  Feeding  {CHP DME ZJGQ:125113812}  Med Admin  {CHP DME JSMZ:819529703}  Med Delivery   { ANIBAL MED Delivery:374382545}    Wound Care Documentation and Therapy:  Incision 04/21/22 Abdomen Lateral;Left;Lower (Active)   Dressing Status Clean;Dry; Intact 04/26/22 1200   Incision Cleansed Not Cleansed 04/26/22 1200   Dressing/Treatment Open to air;Surgical glue 04/26/22 1200   Closure Surgical glue 04/26/22 1200   Margins Approximated 04/26/22 1200   Incision Assessment Dry 04/26/22 1200   Drainage Amount None 04/26/22 1200   Odor None 04/26/22 1200 Number of days: 11       Incision 22 Back Medial (Active)   Dressing Status Clean;Dry; Intact; New dressing applied 22 1200   Incision Cleansed Other (Comment) 22 1200   Dressing/Treatment Silver dressing 22 0600   Incision Assessment Dry 22 1200   Drainage Amount None 22 1200   Drainage Description Sanguinous 22 1600   Odor None 22 1200   Sulma-incision Assessment Intact; Warm 22 1200   Number of days: 10        Elimination:  Continence: Bowel: {YES / AV:94082}  Bladder: {YES / TV:75783}  Urinary Catheter: {Urinary Catheter:124535353}   Colostomy/Ileostomy/Ileal Conduit: {YES / LJ:81189}       Date of Last BM: ***  No intake or output data in the 24 hours ending 22 1452  No intake/output data recorded.     Safety Concerns:     [address] [employer] Safety Concerns:060083657}    Impairments/Disabilities:      [address] [employer] Impairments/Disabilities:257629711}    Nutrition Therapy:  Current Nutrition Therapy:   [address] [employer] Diet List:287802652}    Routes of Feeding: {CHP DME Other Feedings:811020274}  Liquids: {Slp liquid thickness:00678}  Daily Fluid Restriction: {CHP DME Yes amt example:542347624}  Last Modified Barium Swallow with Video (Video Swallowing Test): {Done Not Done VRWF:414750753}    Treatments at the Time of Hospital Discharge:   Respiratory Treatments: ***  Oxygen Therapy:  {Therapy; copd oxygen:09743}  Ventilator:    { CC Vent TCWP:488626532}    Rehab Therapies: {THERAPEUTIC INTERVENTION:4311850172}  Weight Bearing Status/Restrictions: [address] [employer] Weight Bearin}  Other Medical Equipment (for information only, NOT a DME order):  {EQUIPMENT:169215961}  Other Treatments: ***    Patient's personal belongings (please select all that are sent with patient):  {CHP DME Belongings:931776038}    RN SIGNATURE:  {Esignature:298433542}    CASE MANAGEMENT/SOCIAL WORK SECTION    Inpatient Status Date: ***    Readmission Risk Assessment Score:  Readmission Risk Risk of Unplanned Readmission:  0           Discharging to Facility/ Agency   Name:   Address:  Phone:  Fax:    Dialysis Facility (if applicable)   Name:  Address:  Dialysis Schedule:  Phone:  Fax:    / signature: {Esignature:951169979}    PHYSICIAN SECTION    Prognosis: {Prognosis:9776625822}    Condition at Discharge: Huma Ghosh Patient Condition:172490330}    Rehab Potential (if transferring to Rehab): {Prognosis:9753533162}    Recommended Labs or Other Treatments After Discharge: ***    Physician Certification: I certify the above information and transfer of Rosmery Decker  is necessary for the continuing treatment of the diagnosis listed and that she requires {Admit to Appropriate Level of Care:15228} for {GREATER/LESS:052454472} 30 days.      Update Admission H&P: {CHP DME Changes in DLLIE:798483456}    PHYSICIAN SIGNATURE:  {Esignature:850993258}

## 2022-05-04 ENCOUNTER — CARE COORDINATION (OUTPATIENT)
Dept: CASE MANAGEMENT | Age: 82
End: 2022-05-04

## 2022-05-04 NOTE — CARE COORDINATION
[facility] Transitions Follow Up Call    2022    Patient: Monique Monsalve  Patient : 1940   MRN: [id number]   Reason for Admission: Kyphosis, S/P Spinal Fusion  Discharge Date: 22 RARS: Readmission Risk Score: 13.4 ( )         Spoke with: Monique Monsalve     CTN spoke with patient this afternoon for follow up CTN call. Patient states she is doing well, improving slowly, but see's improvement. No reports of any fever, chills, nausea, vomiting, chest pain, SOB or cough. Patient with no congestion, LE edema, feeling lightheaded, dizziness, and heart palpitations. Patient does report having some pain, is tolerable and controlled with current pain regimen. Patient states incision is healing well, no drainage, swelling, redness or warmth to touch. Patient did states she feels her appetite could be better, BM's are about every 4 days, states this is regular for her, instructed to continue to take stool softeners. No other issues or concerns, instructed to continue to monitor for any of the above listed s/s, reporting to MD immediately. [facility] with [facility], were in home today for evaluation, per patient. Care Transitions Follow Up Call    Needs to be reviewed by the provider   Additional needs identified to be addressed with provider: No  none             Method of communication with provider : none      Verified name and  with patient as identifiers. Addressed changes since last contact: none  Discussed follow-up appointments. If no appointment was previously scheduled, appointment scheduling offered: Yes. Is follow up appointment scheduled within 7 days of discharge? No.    Advance Care Planning:   Does patient have an Advance Directive: not on file. CTN reviewed discharge instructions, medical action plan and red flags with patient and discussed any barriers to care and/or understanding of plan of care after discharge.  Discussed appropriate site of care based on symptoms and resources available to patient including: PCP  Specialist  Urgent care clinics  [facility]  When to call 911. The patient agrees to contact the PCP office for questions related to their healthcare. Patients top risk factors for readmission: functional physical ability  medical condition-S/P Spinal Fusion  multiple health system providers  polypharmacy  Interventions to address risk factors: Education of patient/family/caregiver/guardian to support self-management-Patient to continue to monitor for any of issues or concerns. CTN provided contact information for future needs. Plan for follow-up call in 5-7 days based on severity of symptoms and risk factors. Plan for next call: symptom management-Any increased pain, worsening constipation. Care Transitions Subsequent and Final Call    Schedule Follow Up Appointment with PCP: Declined  Subsequent and Final Calls  Do you have any ongoing symptoms?: Yes  Onset of Patient-reported symptoms: Other  Patient-reported symptoms: Pain, Constipation  Have your medications changed?: No  Do you have any questions related to your medications?: No  Do you currently have any active services?: Yes  Are you currently active with any services?: Home Health  Do you have any needs or concerns that I can assist you with?: No  Identified Barriers: None  Care Transitions Interventions  No Identified Needs  Other Interventions:            Follow Up  Future Appointments   Date Time Provider Department Center   6/6/2022  4:45 PM Sonya Reilly DO [address]     Thank You,    Holley Zurita RN  Care Transition Coordinator  Contact [facility]:[phone]

## 2022-05-09 ENCOUNTER — CARE COORDINATION (OUTPATIENT)
Dept: CASE MANAGEMENT | Age: 82
End: 2022-05-09

## 2022-05-09 NOTE — CARE COORDINATION
[facility] Transitions Follow Up Call    2022    Patient: Palmer James  Patient : 1940   MRN: [id number]   Reason for Admission: Kyphosis, S/P Spinal Fusion  Discharge Date: 22 RARS: Readmission Risk Score: 13.4 ( )         Spoke with: Palmer James     CTN spoke with patient this afternoon for follow up CTN call. Patient states she is doing okay, had some diarrhea over the weekend, none today. Patient denies having any fever, chills, nausea, vomiting, chest pain, SOB or cough. Patient instructed to continue to monitor for any returning diarrhea, making sure to keep herself hydrated. Also instructed patient to continue to monitor for any of the other above listed s/s, reporting to MD immediately. Patient states both PT/OT with [facility] agency were in home today. Care Transitions Follow Up Call    Needs to be reviewed by the provider   Additional needs identified to be addressed with provider: No  none             Method of communication with provider : none      Verified name and  with patient as identifiers. Addressed changes since last contact: none  Discussed follow-up appointments. If no appointment was previously scheduled, appointment scheduling offered: Yes. Is follow up appointment scheduled within 7 days of discharge? Yes. Advance Care Planning:   Does patient have an Advance Directive: not on file. CTN reviewed discharge instructions, medical action plan and red flags with patient and discussed any barriers to care and/or understanding of plan of care after discharge. Discussed appropriate site of care based on symptoms and resources available to patient including: PCP  Specialist  Urgent care clinics  [address] [facility]  When to call 911. The patient agrees to contact the PCP office for questions related to their healthcare. Patients top risk factors for readmission: medical condition-Kyphosis, S/P Spinal Fusion.   medication management  multiple health system providers  polypharmacy  Interventions to address risk factors: Education of patient/family/caregiver/guardian to support self-management-Instructed to continue to monitor for any issues or concerns. CTN provided contact information for future needs. Plan for follow-up call in 5-7 days based on severity of symptoms and risk factors. Plan for next call: symptom management-Any returning diarrhea. Care Transitions Subsequent and Final Call    Schedule Follow Up Appointment with PCP: Declined  Subsequent and Final Calls  Do you have any ongoing symptoms?: Yes  Onset of Patient-reported symptoms: Today  Patient-reported symptoms: Weakness, Fatigue  Have your medications changed?: No  Do you have any questions related to your medications?: No  Do you currently have any active services?: Yes  Are you currently active with any services?: Home Health  Do you have any needs or concerns that I can assist you with?: No  Identified Barriers: None  Care Transitions Interventions  No Identified Needs  Other Interventions:            Follow Up  Future Appointments   Date Time Provider Department Center   6/6/2022  4:45 PM Deven Donaldson DO [address]     Thank You,    Briana Vieira RN  Care Transition Coordinator  Contact [facility]KDFL[facility]:[phone]

## 2022-05-09 NOTE — CARE COORDINATION
[facility] Transitions Initial Follow Up Call    Call within 2 business days of discharge: Yes    Patient:  Keaton Saleem   Patient :  1940  MRN:  [id number]    Reason for Admission:  Kyphosis, S/P Spinal Fusion  Discharge Date:  22   RARS:       Non-face-to-face services provided:    1ST CTC attempt to reach Pt regarding recent hospital discharge. CTC left voice recording with call back number requesting a call back.     Follow up appointments:    Future Appointments   Date Time Provider Department Center   2022  4:45 PM Teresa De Santiago DO [address]       Thank Niyah Santamaria RN  Care Transition Coordinator  Contact [facility]:[phone]

## 2022-05-16 ENCOUNTER — CARE COORDINATION (OUTPATIENT)
Dept: CASE MANAGEMENT | Age: 82
End: 2022-05-16

## 2022-05-16 NOTE — CARE COORDINATION
[facility] Transitions Follow Up Call    2022    Patient: Wang Yates  Patient : 1940   MRN: [id number]   Reason for Admission: Kyphosis, Spinal Fusion  Discharge Date: 22 RARS: Readmission Risk Score: 13.4 ( )         Spoke with: Wang Yates     CTN spoke with patient this afternoon for follow up CTN call. Patient states she is not having any fever, chills, nausea, vomiting, chest pain, SOB or cough. States incision is healing well, no signs of infection. Feels she is getting stronger everyday, has walker and is using with all ambulation. PT with [facility] agency to be in home tomorrow, per patient this will be her last PT visit, as she has met all goals. No other issues or concerns, at this time. Care Transitions Follow Up Call    Needs to be reviewed by the provider   Additional needs identified to be addressed with provider: No  none             Method of communication with provider : none      Verified name and  with patient as identifiers. Addressed changes since last contact: none  Discussed follow-up appointments. If no appointment was previously scheduled, appointment scheduling offered: Yes. Is follow up appointment scheduled within 7 days of discharge? Yes. Advance Care Planning:   Does patient have an Advance Directive: not on file. CTN reviewed discharge instructions, medical action plan and red flags with patient and discussed any barriers to care and/or understanding of plan of care after discharge. Discussed appropriate site of care based on symptoms and resources available to patient including: PCP  Specialist  Urgent care clinics  [address] [facility]  When to call 911. The patient agrees to contact the PCP office for questions related to their healthcare.      Patients top risk factors for readmission: functional physical ability  medical condition-S/P Spinal Fusion  medication management  multiple health system providers  Interventions to address risk factors: Education of patient/family/caregiver/guardian to support self-management-CTN encouraged patient to continue to monitor for any s/s, continuing to monitor incision site as well. CTN provided contact information for future needs. Plan for follow-up call in 5-7 days based on severity of symptoms and risk factors. Plan for next call: Any isssues or concerns that may arise. Care Transitions Subsequent and Final Call    Schedule Follow Up Appointment with PCP: Declined  Subsequent and Final Calls  Do you have any ongoing symptoms?: No  Have your medications changed?: No  Do you have any questions related to your medications?: No  Do you currently have any active services?: Yes  Are you currently active with any services?: Home Health  Do you have any needs or concerns that I can assist you with?: No  Identified Barriers: None  Care Transitions Interventions  No Identified Needs  Other Interventions:          Follow Up  Future Appointments   Date Time Provider Department Center   6/6/2022  4:45 PM Ann-Marie Reyes DO [address]     Thank You,    Swapna Barba RN  Care Transition Coordinator  Contact [facility]DY:[phone]

## 2022-05-24 ENCOUNTER — OFFICE VISIT (OUTPATIENT)
Dept: ORTHOPEDIC SURGERY | Age: 82
End: 2022-05-24
Payer: MEDICARE

## 2022-05-24 DIAGNOSIS — N18.31 STAGE 3A CHRONIC KIDNEY DISEASE (HCC): ICD-10-CM

## 2022-05-24 DIAGNOSIS — M67.911 DISORDER OF RIGHT ROTATOR CUFF: Primary | ICD-10-CM

## 2022-05-24 PROCEDURE — G8417 CALC BMI ABV UP PARAM F/U: HCPCS | Performed by: PHYSICIAN ASSISTANT

## 2022-05-24 PROCEDURE — 1111F DSCHRG MED/CURRENT MED MERGE: CPT | Performed by: PHYSICIAN ASSISTANT

## 2022-05-24 PROCEDURE — 1036F TOBACCO NON-USER: CPT | Performed by: PHYSICIAN ASSISTANT

## 2022-05-24 PROCEDURE — 20610 DRAIN/INJ JOINT/BURSA W/O US: CPT | Performed by: PHYSICIAN ASSISTANT

## 2022-05-24 PROCEDURE — 99213 OFFICE O/P EST LOW 20 MIN: CPT | Performed by: PHYSICIAN ASSISTANT

## 2022-05-24 PROCEDURE — 1090F PRES/ABSN URINE INCON ASSESS: CPT | Performed by: PHYSICIAN ASSISTANT

## 2022-05-24 PROCEDURE — 1123F ACP DISCUSS/DSCN MKR DOCD: CPT | Performed by: PHYSICIAN ASSISTANT

## 2022-05-24 PROCEDURE — G8399 PT W/DXA RESULTS DOCUMENT: HCPCS | Performed by: PHYSICIAN ASSISTANT

## 2022-05-24 PROCEDURE — G8427 DOCREV CUR MEDS BY ELIG CLIN: HCPCS | Performed by: PHYSICIAN ASSISTANT

## 2022-05-25 PROBLEM — N18.30 CHRONIC RENAL DISEASE, STAGE III (HCC): Status: ACTIVE | Noted: 2022-05-25

## 2022-05-25 NOTE — PROGRESS NOTES
This dictation was done with Dragon dictation and may contain mechanical errors related to translation. This is a very pleasant 19-year-old female who has ongoing pain in her right shoulder. She has been injected successfully with cortisone shot for the rotator cuff tendinitis and impingement in her shoulder she is actually complaining of some sternoclavicular joint pain currently she has some swelling and soreness in that area as well. Last cortisone injection was done in January. She comes in for reevaluation. On examination she still has pain with crossover and impingement she has weakness in supraspinatus strength testing positive impingement and crossover test.  She is negative to a sulcus sign or speeds test.  She has palp tenderness over the sternoclavicular joint as well she has good symmetric motion through the neck with a negative Spurling's test.    My impression is right shoulder rotator cuff strain and impingement syndrome. And mild strain of the sternoclavicular joint. I suggested using a cream like Voltaren gel or Biofreeze for the sternoclavicular joint with her consent she was injected with 1 cc Kenalog and 2 cc of Marcaine into the right subacromial space for the rotator cuff tendinitis.   We reviewed the exercises for her rotator cuff and she will follow-up with us on a as needed basis

## 2022-05-26 ENCOUNTER — CARE COORDINATION (OUTPATIENT)
Dept: CASE MANAGEMENT | Age: 82
End: 2022-05-26

## 2022-06-01 ASSESSMENT — ENCOUNTER SYMPTOMS: SHORTNESS OF BREATH: 0

## 2022-06-05 RX ORDER — METOPROLOL TARTRATE 50 MG/1
TABLET, FILM COATED ORAL
Qty: 180 TABLET | Refills: 0 | Status: SHIPPED | OUTPATIENT
Start: 2022-06-05

## 2022-06-05 RX ORDER — TRIAMTERENE AND HYDROCHLOROTHIAZIDE 37.5; 25 MG/1; MG/1
TABLET ORAL
Qty: 90 TABLET | Refills: 0 | Status: SHIPPED | OUTPATIENT
Start: 2022-06-05

## 2022-06-06 ENCOUNTER — OFFICE VISIT (OUTPATIENT)
Dept: FAMILY MEDICINE CLINIC | Age: 82
End: 2022-06-06
Payer: MEDICARE

## 2022-06-06 VITALS
WEIGHT: 212.6 LBS | HEIGHT: 64 IN | BODY MASS INDEX: 36.29 KG/M2 | DIASTOLIC BLOOD PRESSURE: 64 MMHG | SYSTOLIC BLOOD PRESSURE: 132 MMHG

## 2022-06-06 DIAGNOSIS — I10 ESSENTIAL HYPERTENSION: Primary | ICD-10-CM

## 2022-06-06 DIAGNOSIS — N18.32 CHRONIC KIDNEY DISEASE, STAGE 3B (HCC): ICD-10-CM

## 2022-06-06 DIAGNOSIS — M54.50 CHRONIC MIDLINE LOW BACK PAIN WITHOUT SCIATICA: ICD-10-CM

## 2022-06-06 DIAGNOSIS — I47.1 PAROXYSMAL SUPRAVENTRICULAR TACHYCARDIA (HCC): ICD-10-CM

## 2022-06-06 DIAGNOSIS — K21.9 GERD WITHOUT ESOPHAGITIS: ICD-10-CM

## 2022-06-06 DIAGNOSIS — M81.0 OSTEOPOROSIS OF MULTIPLE SITES: ICD-10-CM

## 2022-06-06 DIAGNOSIS — M15.9 PRIMARY OSTEOARTHRITIS INVOLVING MULTIPLE JOINTS: ICD-10-CM

## 2022-06-06 DIAGNOSIS — E66.09 NON MORBID OBESITY DUE TO EXCESS CALORIES: ICD-10-CM

## 2022-06-06 DIAGNOSIS — G89.29 CHRONIC MIDLINE LOW BACK PAIN WITHOUT SCIATICA: ICD-10-CM

## 2022-06-06 DIAGNOSIS — F41.9 ANXIETY: ICD-10-CM

## 2022-06-06 PROCEDURE — G8427 DOCREV CUR MEDS BY ELIG CLIN: HCPCS | Performed by: FAMILY MEDICINE

## 2022-06-06 PROCEDURE — 1036F TOBACCO NON-USER: CPT | Performed by: FAMILY MEDICINE

## 2022-06-06 PROCEDURE — G8417 CALC BMI ABV UP PARAM F/U: HCPCS | Performed by: FAMILY MEDICINE

## 2022-06-06 PROCEDURE — G8399 PT W/DXA RESULTS DOCUMENT: HCPCS | Performed by: FAMILY MEDICINE

## 2022-06-06 PROCEDURE — 99213 OFFICE O/P EST LOW 20 MIN: CPT | Performed by: FAMILY MEDICINE

## 2022-06-06 PROCEDURE — 1090F PRES/ABSN URINE INCON ASSESS: CPT | Performed by: FAMILY MEDICINE

## 2022-06-06 PROCEDURE — 1123F ACP DISCUSS/DSCN MKR DOCD: CPT | Performed by: FAMILY MEDICINE

## 2022-06-06 RX ORDER — ALENDRONATE SODIUM 70 MG/1
70 TABLET ORAL
Qty: 12 TABLET | Refills: 1 | Status: SHIPPED | OUTPATIENT
Start: 2022-06-06

## 2022-06-06 RX ORDER — HYDROCODONE BITARTRATE AND ACETAMINOPHEN 7.5; 325 MG/1; MG/1
1 TABLET ORAL EVERY 8 HOURS PRN
Qty: 90 TABLET | Refills: 0 | Status: SHIPPED | OUTPATIENT
Start: 2022-06-06 | End: 2022-08-03 | Stop reason: SDUPTHER

## 2022-06-06 RX ORDER — OMEPRAZOLE 20 MG/1
CAPSULE, DELAYED RELEASE ORAL
Qty: 90 CAPSULE | Refills: 1 | Status: SHIPPED | OUTPATIENT
Start: 2022-06-06

## 2022-06-06 RX ORDER — LORAZEPAM 1 MG/1
1 TABLET ORAL EVERY 8 HOURS PRN
Qty: 90 TABLET | Refills: 0 | Status: SHIPPED | OUTPATIENT
Start: 2022-06-06 | End: 2022-09-14 | Stop reason: SDUPTHER

## 2022-06-06 RX ORDER — METHOCARBAMOL 750 MG/1
750 TABLET, FILM COATED ORAL 4 TIMES DAILY
COMMUNITY
End: 2022-09-06

## 2022-08-03 DIAGNOSIS — G89.29 CHRONIC MIDLINE LOW BACK PAIN WITHOUT SCIATICA: ICD-10-CM

## 2022-08-03 DIAGNOSIS — M54.50 CHRONIC MIDLINE LOW BACK PAIN WITHOUT SCIATICA: ICD-10-CM

## 2022-08-03 DIAGNOSIS — M15.9 PRIMARY OSTEOARTHRITIS INVOLVING MULTIPLE JOINTS: ICD-10-CM

## 2022-08-03 RX ORDER — HYDROCODONE BITARTRATE AND ACETAMINOPHEN 7.5; 325 MG/1; MG/1
1 TABLET ORAL EVERY 8 HOURS PRN
Qty: 90 TABLET | Refills: 0 | Status: SHIPPED | OUTPATIENT
Start: 2022-08-03 | End: 2022-09-14 | Stop reason: SDUPTHER

## 2022-08-17 ENCOUNTER — TELEPHONE (OUTPATIENT)
Dept: ORTHOPEDIC SURGERY | Age: 82
End: 2022-08-17

## 2022-09-01 ENCOUNTER — TELEPHONE (OUTPATIENT)
Dept: ORTHOPEDIC SURGERY | Age: 82
End: 2022-09-01

## 2022-09-01 NOTE — TELEPHONE ENCOUNTER
Other PATIENT SENT A MESSAGE THROUGH [employer] THAT SHE NEEDS TO BE SEEN FOR LEFT SHOULDER AND RIGHT KNEE WILL DR Maya Mayfield AND GASPER SEE HER FOR BOTH

## 2022-09-03 NOTE — PROGRESS NOTES
Subjective:      Patient ID: Kenny Morales is a 80 y.o. female. HPI    Anxiety:  Patient takes Ativan 1 mg every 8 hrs as needed for anxiety. She generally takes it once daily and feels that it works well to control her anxiety symptoms. Review of Systems   Constitutional:  Negative for chills and fever. Psychiatric/Behavioral:  Negative for suicidal ideas. The patient is nervous/anxious. /74   Pulse 62   Ht 5' 4\" (1.626 m)   Wt 210 lb (95.3 kg)   BMI 36.05 kg/m²    Objective:   Physical Exam  Constitutional:       General: She is not in acute distress. Appearance: She is well-developed. HENT:      Head: Normocephalic. Right Ear: External ear normal.      Left Ear: External ear normal.      Mouth/Throat:      Pharynx: No oropharyngeal exudate. Neck:      Thyroid: No thyromegaly. Vascular: No JVD. Cardiovascular:      Rate and Rhythm: Normal rate and regular rhythm. Heart sounds: Normal heart sounds. No murmur heard. Pulmonary:      Effort: Pulmonary effort is normal.      Breath sounds: Normal breath sounds. No wheezing or rales. Lymphadenopathy:      Cervical: No cervical adenopathy. Neurological:      Mental Status: She is alert and oriented to person, place, and time. Assessment:      Anxiety       Plan:      OARRS report was reviewed  Medications refilled   Flu Shot Given  I recommended a second COVID vaccine  RTO 3 months for Hypertension / Chronic Kidney Disease / GERD / Anxiety      Controlled Substance Monitoring:    Acute and Chronic Pain Monitoring:   RX Monitoring 9/6/2022   Attestation -   Acute Pain Prescriptions Severe pain not adequately treated with lower dose. Periodic Controlled Substance Monitoring No signs of potential drug abuse or diversion identified.    Chronic Pain > 80 MEDD -                   ENRICO VORA DO

## 2022-09-06 ENCOUNTER — OFFICE VISIT (OUTPATIENT)
Dept: FAMILY MEDICINE CLINIC | Age: 82
End: 2022-09-06
Payer: MEDICARE

## 2022-09-06 VITALS
WEIGHT: 210 LBS | SYSTOLIC BLOOD PRESSURE: 133 MMHG | HEIGHT: 64 IN | BODY MASS INDEX: 35.85 KG/M2 | HEART RATE: 62 BPM | DIASTOLIC BLOOD PRESSURE: 74 MMHG

## 2022-09-06 DIAGNOSIS — F41.9 ANXIETY: Primary | ICD-10-CM

## 2022-09-06 DIAGNOSIS — Z23 NEED FOR INFLUENZA VACCINATION: ICD-10-CM

## 2022-09-06 PROCEDURE — G8427 DOCREV CUR MEDS BY ELIG CLIN: HCPCS | Performed by: FAMILY MEDICINE

## 2022-09-06 PROCEDURE — 90694 VACC AIIV4 NO PRSRV 0.5ML IM: CPT | Performed by: FAMILY MEDICINE

## 2022-09-06 PROCEDURE — 1123F ACP DISCUSS/DSCN MKR DOCD: CPT | Performed by: FAMILY MEDICINE

## 2022-09-06 PROCEDURE — 1090F PRES/ABSN URINE INCON ASSESS: CPT | Performed by: FAMILY MEDICINE

## 2022-09-06 PROCEDURE — 1036F TOBACCO NON-USER: CPT | Performed by: FAMILY MEDICINE

## 2022-09-06 PROCEDURE — G8399 PT W/DXA RESULTS DOCUMENT: HCPCS | Performed by: FAMILY MEDICINE

## 2022-09-06 PROCEDURE — G0008 ADMIN INFLUENZA VIRUS VAC: HCPCS | Performed by: FAMILY MEDICINE

## 2022-09-06 PROCEDURE — 99213 OFFICE O/P EST LOW 20 MIN: CPT | Performed by: FAMILY MEDICINE

## 2022-09-06 PROCEDURE — G8417 CALC BMI ABV UP PARAM F/U: HCPCS | Performed by: FAMILY MEDICINE

## 2022-09-06 RX ORDER — CYCLOBENZAPRINE HCL 10 MG
10 TABLET ORAL 3 TIMES DAILY PRN
Qty: 270 TABLET | Refills: 0 | Status: SHIPPED | OUTPATIENT
Start: 2022-09-06

## 2022-09-14 DIAGNOSIS — G89.29 CHRONIC MIDLINE LOW BACK PAIN WITHOUT SCIATICA: ICD-10-CM

## 2022-09-14 DIAGNOSIS — M15.9 PRIMARY OSTEOARTHRITIS INVOLVING MULTIPLE JOINTS: ICD-10-CM

## 2022-09-14 DIAGNOSIS — M54.50 CHRONIC MIDLINE LOW BACK PAIN WITHOUT SCIATICA: ICD-10-CM

## 2022-09-14 DIAGNOSIS — F41.9 ANXIETY: ICD-10-CM

## 2022-09-14 RX ORDER — HYDROCODONE BITARTRATE AND ACETAMINOPHEN 7.5; 325 MG/1; MG/1
1 TABLET ORAL EVERY 8 HOURS PRN
Qty: 90 TABLET | Refills: 0 | Status: SHIPPED | OUTPATIENT
Start: 2022-09-14 | End: 2022-10-14

## 2022-09-14 RX ORDER — LORAZEPAM 1 MG/1
1 TABLET ORAL EVERY 8 HOURS PRN
Qty: 90 TABLET | Refills: 0 | Status: SHIPPED | OUTPATIENT
Start: 2022-09-14 | End: 2022-10-14

## 2022-09-15 ENCOUNTER — OFFICE VISIT (OUTPATIENT)
Dept: ORTHOPEDIC SURGERY | Age: 82
End: 2022-09-15
Payer: MEDICARE

## 2022-09-15 VITALS — WEIGHT: 210 LBS | RESPIRATION RATE: 16 BRPM | BODY MASS INDEX: 35.85 KG/M2 | HEIGHT: 64 IN

## 2022-09-15 DIAGNOSIS — M70.51 PES ANSERINUS BURSITIS OF RIGHT KNEE: ICD-10-CM

## 2022-09-15 DIAGNOSIS — M25.512 LEFT SHOULDER PAIN, UNSPECIFIED CHRONICITY: ICD-10-CM

## 2022-09-15 DIAGNOSIS — M25.561 RIGHT KNEE PAIN, UNSPECIFIED CHRONICITY: Primary | ICD-10-CM

## 2022-09-15 DIAGNOSIS — M17.11 PRIMARY OSTEOARTHRITIS OF RIGHT KNEE: ICD-10-CM

## 2022-09-15 DIAGNOSIS — M19.012 ARTHRITIS OF LEFT SHOULDER REGION: ICD-10-CM

## 2022-09-15 PROCEDURE — G8417 CALC BMI ABV UP PARAM F/U: HCPCS | Performed by: PHYSICIAN ASSISTANT

## 2022-09-15 PROCEDURE — 99213 OFFICE O/P EST LOW 20 MIN: CPT | Performed by: PHYSICIAN ASSISTANT

## 2022-09-15 PROCEDURE — 1123F ACP DISCUSS/DSCN MKR DOCD: CPT | Performed by: PHYSICIAN ASSISTANT

## 2022-09-15 PROCEDURE — G8399 PT W/DXA RESULTS DOCUMENT: HCPCS | Performed by: PHYSICIAN ASSISTANT

## 2022-09-15 PROCEDURE — 20610 DRAIN/INJ JOINT/BURSA W/O US: CPT | Performed by: PHYSICIAN ASSISTANT

## 2022-09-15 PROCEDURE — G8428 CUR MEDS NOT DOCUMENT: HCPCS | Performed by: PHYSICIAN ASSISTANT

## 2022-09-15 PROCEDURE — 1036F TOBACCO NON-USER: CPT | Performed by: PHYSICIAN ASSISTANT

## 2022-09-15 PROCEDURE — 1090F PRES/ABSN URINE INCON ASSESS: CPT | Performed by: PHYSICIAN ASSISTANT

## 2022-09-15 RX ORDER — BUPIVACAINE HYDROCHLORIDE 2.5 MG/ML
2 INJECTION, SOLUTION INFILTRATION; PERINEURAL ONCE
Status: COMPLETED | OUTPATIENT
Start: 2022-09-15 | End: 2022-09-15

## 2022-09-15 RX ORDER — TRIAMCINOLONE ACETONIDE 40 MG/ML
40 INJECTION, SUSPENSION INTRA-ARTICULAR; INTRAMUSCULAR ONCE
Status: COMPLETED | OUTPATIENT
Start: 2022-09-15 | End: 2022-09-15

## 2022-09-15 RX ADMIN — BUPIVACAINE HYDROCHLORIDE 5 MG: 2.5 INJECTION, SOLUTION INFILTRATION; PERINEURAL at 15:43

## 2022-09-15 RX ADMIN — BUPIVACAINE HYDROCHLORIDE 5 MG: 2.5 INJECTION, SOLUTION INFILTRATION; PERINEURAL at 15:42

## 2022-09-15 RX ADMIN — TRIAMCINOLONE ACETONIDE 40 MG: 40 INJECTION, SUSPENSION INTRA-ARTICULAR; INTRAMUSCULAR at 15:43

## 2022-09-15 SDOH — HEALTH STABILITY: PHYSICAL HEALTH: ON AVERAGE, HOW MANY DAYS PER WEEK DO YOU ENGAGE IN MODERATE TO STRENUOUS EXERCISE (LIKE A BRISK WALK)?: 2 DAYS

## 2022-09-15 SDOH — HEALTH STABILITY: PHYSICAL HEALTH: ON AVERAGE, HOW MANY MINUTES DO YOU ENGAGE IN EXERCISE AT THIS LEVEL?: 20 MIN

## 2022-09-19 NOTE — PROGRESS NOTES
This dictation was done with [employer] dictation and may contain mechanical errors related to translation. I have today reviewed with Kishor Terrazas the clinically relevant, past medical history, medications, allergies, family history, social history, and Review Of Systems form the patients most recent history form & I have documented any details relevant to today's presenting complaints in my history below. Ms. Eri Motta's self-reported past medical history, medications, allergies, family history, social history, and Review Of Systems form has been scanned into the chart under the \"Media\" tab. Subjective: Kishor Terrazas is a 80 y.o. who is here in follow-up for her shoulder and her knee. Her left shoulder had limited range of motion she rates it a 6 out of 10 pain score and over the last 3 weeks she has had some increasing swelling soreness and pain. She denies any injury or injections. She was sent for an AP transaxillary view and Y view x-ray of her left shoulder.   The right knee has had pain over the last 6 weeks she has had some increasing swelling and soreness she has a history of a total knee replacement on the left side she was sent for an AP transaxillary view and a Y view of her right knee    Patient Active Problem List   Diagnosis    Paroxysmal supraventricular tachycardia (HCC)    Foot drop, left    Anxiety    PUD (peptic ulcer disease)    Restless leg syndrome    GERD without esophagitis    Primary osteoarthritis involving multiple joints    Non morbid obesity due to excess calories    Primary insomnia    Coronary artery disease involving native coronary artery without angina pectoris    Essential hypertension    Intrinsic eczema    Chronic midline low back pain without sciatica    Anesthesia complication    Lesion of lateral popliteal nerve    Postmenopausal status    Seborrheic keratosis    Grade IV hemorrhoids    Glaucoma    Internal hemorrhoids    Positive PPD    Chronic kidney disease, stage 3b ([facility] [address]    Osteoporosis of multiple sites    Scoliosis of lumbar region due to degenerative disease of spine in adult    S/P spinal fusion    Chronic renal disease, stage III ([facility] [address] [[id number]]           Current Outpatient Medications on File Prior to Visit   Medication Sig Dispense Refill    LORazepam (ATIVAN) 1 MG tablet Take 1 tablet by mouth every 8 hours as needed for Anxiety for up to 30 days. 90 tablet 0    HYDROcodone-acetaminophen (NORCO) 7.5-325 MG per tablet Take 1 tablet by mouth every 8 hours as needed for Pain for up to 30 days. 90 tablet 0    cyclobenzaprine (FLEXERIL) 10 MG tablet Take 1 tablet by mouth 3 times daily as needed for Muscle spasms 270 tablet 0    omeprazole (PRILOSEC) 20 MG delayed release capsule TAKE 1 CAPSULE EVERY DAY 90 capsule 1    alendronate (FOSAMAX) 70 MG tablet Take 1 tablet by mouth every 7 days 12 tablet 1    triamterene-hydroCHLOROthiazide (MAXZIDE-25) 37.5-25 MG per tablet TAKE 1 TABLET EVERY DAY 90 tablet 0    verapamil (CALAN SR) 120 MG extended release tablet TAKE 1 TABLET EVERY DAY 90 tablet 0    metoprolol tartrate (LOPRESSOR) 50 MG tablet TAKE 1 TABLET TWICE DAILY . 180 tablet 0    Calcium Carbonate-Vitamin D (CALTRATE 600+D PO) Take 1 capsule by mouth in the morning, at noon, and at bedtime      timolol (BETIMOL) 0.5 % ophthalmic solution 1 drop 2 times daily      LUMIGAN 0.01 % SOLN ophthalmic drops Place 1 drop into both eyes       docusate sodium (COLACE) 100 MG capsule Take 1 capsule by mouth 2 times daily 40 capsule 0     No current facility-administered medications on file prior to visit. Objective:   Resp. rate 16, height 5' 4\" (1.626 m), weight 210 lb (95.3 kg), not currently breastfeeding. On examination is a very pleasant 70-year-old female who is alert and oriented x3. She has a hard time going from sitting to standing or pushing off with her left arm.   She has good symmetric motion to the neck and negative Spurling's test.  The left shoulder has some 160 degrees of forward flexion 100 degrees of abduction. There is no sulcus sign or relocation test.  There is pain with crossover and impingement testing. She has good motion through the neck. The right knee has 1+ edema with crepitus during flexion extension at the patellofemoral joint there is medial joint line tenderness pes bursitis tenderness. There is quad atrophy and the tightness to her hamstrings. Negative for Carlyle negative for Lachman. Neurologically she is intact to the right foot. Neuro exam grossly intact both lower extremities. Intact sensation to light touch. Motor exam 4+ to 5/5 in all major motor groups. Negative Whalen's sign. Skin is warm, dry and intact with out erythema or significant increased temperature around the knee joint(s). There are no cutaneous lesions or lymphadenopathy present. X-RAYS:  The x-rays were reviewed with the patient. The shoulder x-rays show very minimal degenerative changes through the glenohumeral joint slight lateral migration of the humeral head. There is a grade 1 acromial impingement but no fractures or other bone abnormalities are noted in the shoulder x-rays. X-rays of the knee show loss of joint space and subchondral sclerosis in all 3 compartments with osteoarthritis and no fracture seen. She is got decent patella tracking no other abnormalities was noted      Assessment:  Left shoulder glenohumeral joint arthritis and rotator cuff tendinitis, right knee pes bursitis and osteoarthritis through right knee joint    Plan:  During today's visit, there was approximately 30 minutes of face-to-face discussion in regards to the patient's current condition and treatment options. More than 50 % of the time was counseling and coordination of care as indicated above.   At this point we talked about short long-term expectations and she was injected with 1 cc Kenalog and 2 cc of Marcaine into the Pez anserine area of the right knee just below the joint line. After a discussion of the multiple options, they consented to a cortisone shot. 1 ml of 40mg/ml Kenalog and 2 ml's of 0.25%Marcaine were injected into the right knee joint space. The leg was slightly flexed and injected just lateral to the patella tendon to under the patella. This was done with sterile technique and they tolerated it well. After a discussion of the multiple options, they consented to a cortisone shot. 1 ml of 40mg/ml Kenalog and 2 ml's of 0.25%Marcaine were injected into the left shoulder sub acromial space. This was done with sterile technique and they tolerated it well.        PROCEDURE NOTE:  To knee injections for the right knee and a left shoulder injection were done we talked about physical therapy and she will follow-up with us in 3 months for coronary      They will schedule a follow up in prn

## 2022-09-27 ENCOUNTER — TELEPHONE (OUTPATIENT)
Dept: ORTHOPEDIC SURGERY | Age: 82
End: 2022-09-27

## 2022-09-27 NOTE — TELEPHONE ENCOUNTER
Left VM letting the patient know that her gel injection were approved and to call back and get them scheduled.

## 2022-09-29 ENCOUNTER — TELEPHONE (OUTPATIENT)
Dept: ORTHOPEDIC SURGERY | Age: 82
End: 2022-09-29

## 2022-09-29 NOTE — TELEPHONE ENCOUNTER
General Question     Subject: INJECTIONS QUESTIONS   Patient and /or Facility Request: [facility]  Contact Number: [phone]    PATIENT  CALLED IN HAVING A FEW QUESTIONS ABOUT THE GEL INJECTIONS. .. PATIENT WANTS TO KNOW IF SHE GOT THE EUFLEXXA INJ 2 WEEKS AGO. Niya Red HOW LONG DO SHE NEED TO WAIT FOR HER TO GET THE GEL INJ. Niya Red PLEASE CALL PATIENT BACK AT THE ABOVE NUMBER. ..

## 2022-10-20 ENCOUNTER — OFFICE VISIT (OUTPATIENT)
Dept: ORTHOPEDIC SURGERY | Age: 82
End: 2022-10-20
Payer: MEDICARE

## 2022-10-20 DIAGNOSIS — M17.11 PRIMARY OSTEOARTHRITIS OF RIGHT KNEE: Primary | ICD-10-CM

## 2022-10-20 PROCEDURE — 20610 DRAIN/INJ JOINT/BURSA W/O US: CPT | Performed by: ORTHOPAEDIC SURGERY

## 2022-10-20 NOTE — PROGRESS NOTES
[facility]  Office Visit    Chief Complaint: Right knee pain    HPI:  Malia Malin is a 80 y.o. who is here in follow-up of right knee pain due to osteoarthritis. She has been treated in the past with steroid injections and comes in today for her first viscosupplementation injection today. She is currently having right knee pain on a daily basis. She is here in a wheelchair today. She is unable to take NSAIDs due to chronic kidney disease. Patient Active Problem List   Diagnosis    Paroxysmal supraventricular tachycardia (HCC)    Foot drop, left    Anxiety    PUD (peptic ulcer disease)    Restless leg syndrome    GERD without esophagitis    Primary osteoarthritis involving multiple joints    Non morbid obesity due to excess calories    Primary insomnia    Coronary artery disease involving native coronary artery without angina pectoris    Essential hypertension    Intrinsic eczema    Chronic midline low back pain without sciatica    Anesthesia complication    Lesion of lateral popliteal nerve    Postmenopausal status    Seborrheic keratosis    Grade IV hemorrhoids    Glaucoma    Internal hemorrhoids    Positive PPD    Chronic kidney disease, stage 3b (HCC)    Osteoporosis of multiple sites    Scoliosis of lumbar region due to degenerative disease of spine in adult    S/P spinal fusion    Chronic renal disease, stage III ([address] [[id number]]       ROS:  Constitutional: denies fever, chills, weight loss  MSK: denies pain in other joints, muscle aches  Neurological: denies numbness, tingling, weakness    Exam:  Appearance: sitting in exam room chair, appears to be in no acute distress, awake and alert  Resp: unlabored breathing on room air  Skin: warm, dry and intact with out erythema or significant increased temperature  Neuro: grossly intact both lower extremities. Intact sensation to light touch. Motor exam 4+ to 5/5 in all major motor groups.   Right knee: Examination reveals that knee range of motion is 0 to 120 degrees. There is varus deformity, positive crepitus, positive joint line tenderness, positive antalgic gait. Neurologically, plantar flexion and dorsiflexion is intact. 5/5 strength. Imaging:  Prior right knee radiographs were reviewed and are significant for tricompartmental degenerative changes with bone-on-bone arthritis of the medial compartment. Assessment:  Severe right knee osteoarthritis    Plan:  We discussed the diagnosis and treatment options. She is here today for her first viscosupplementation injection. An Orthovisc injection was performed in the right knee today as described below. She will continue activity as tolerated and follow-up next week for her second injection. PROCEDURE NOTE:  After verbal consent was obtained, the patient's right knee was prepped with alcohol. Skin was anesthetized with ethyl chloride. The knee was then injected under sterile technique with the prepackaged dose of Orthovisc. A bandage was applied. The patient tolerated the procedure well and there were no complications. Total time spent on today's encounter was at least 23 minutes. This time included reviewing prior notes, radiographs, and lab results when available, reviewing history obtained by medical assistant, performing history and physical exam, reviewing tests/radiographs with the patient, counseling the patient, ordering medications or tests, documentation in the electronic health record, and coordination of care. This dictation was done with Dragon dictation and may contain mechanical errors related to translation.

## 2022-10-26 ENCOUNTER — OFFICE VISIT (OUTPATIENT)
Dept: ORTHOPEDIC SURGERY | Age: 82
End: 2022-10-26
Payer: MEDICARE

## 2022-10-26 DIAGNOSIS — M17.11 PRIMARY OSTEOARTHRITIS OF RIGHT KNEE: Primary | ICD-10-CM

## 2022-10-26 PROCEDURE — 99999 PR OFFICE/OUTPT VISIT,PROCEDURE ONLY: CPT | Performed by: ORTHOPAEDIC SURGERY

## 2022-10-26 PROCEDURE — 20610 DRAIN/INJ JOINT/BURSA W/O US: CPT | Performed by: ORTHOPAEDIC SURGERY

## 2022-11-03 ENCOUNTER — OFFICE VISIT (OUTPATIENT)
Dept: ORTHOPEDIC SURGERY | Age: 82
End: 2022-11-03
Payer: MEDICARE

## 2022-11-03 VITALS — RESPIRATION RATE: 16 BRPM | HEIGHT: 64 IN | BODY MASS INDEX: 35.85 KG/M2 | WEIGHT: 210 LBS

## 2022-11-03 DIAGNOSIS — M17.11 PRIMARY OSTEOARTHRITIS OF RIGHT KNEE: Primary | ICD-10-CM

## 2022-11-03 PROCEDURE — 99999 PR OFFICE/OUTPT VISIT,PROCEDURE ONLY: CPT | Performed by: ORTHOPAEDIC SURGERY

## 2022-11-03 PROCEDURE — 20610 DRAIN/INJ JOINT/BURSA W/O US: CPT | Performed by: ORTHOPAEDIC SURGERY

## 2022-11-03 NOTE — PROGRESS NOTES
[facility]  Office Visit    Chief Complaint: Right knee pain    HPI:  Krissy Roth is a 80 y.o. who is here in follow-up of right knee pain due to osteoarthritis. She has been treated in the past with steroid injections and comes in today for her third viscosupplementation injection. She is here in a wheelchair today. She reports mild relief of knee pain since last week's injection and denies adverse events. Exam:  Appearance: sitting in exam room chair, appears to be in no acute distress, awake and alert  Resp: unlabored breathing on room air  Skin: warm, dry and intact with out erythema or significant increased temperature  Neuro: grossly intact both lower extremities. Intact sensation to light touch. Motor exam 4+ to 5/5 in all major motor groups. Right knee: Examination reveals that knee range of motion is 0 to 120 degrees. There is varus deformity, positive crepitus, positive joint line tenderness, positive antalgic gait. Neurologically, plantar flexion and dorsiflexion is intact. 5/5 strength. Imaging:  Prior right knee radiographs were reviewed and are significant for tricompartmental degenerative changes with bone-on-bone arthritis of the medial compartment. Assessment:  Severe right knee osteoarthritis    Plan:  She is here today for her third viscosupplementation injection. An Orthovisc injection was performed in the right knee today as described below. She will continue activity as tolerated and follow-up as needed. PROCEDURE NOTE:  After verbal consent was obtained, the patient's right knee was prepped with alcohol. Skin was anesthetized with ethyl chloride. The knee was then injected under sterile technique with the prepackaged dose of Orthovisc. A bandage was applied. The patient tolerated the procedure well and there were no complications.      This dictation was done with [employer] dictation and may contain mechanical errors related to translation.

## 2022-12-04 ASSESSMENT — ENCOUNTER SYMPTOMS: SHORTNESS OF BREATH: 0

## 2022-12-04 NOTE — PROGRESS NOTES
Subjective:      Patient ID: Dusty Fortune is a 80 y.o. female. Hypertension  This is a chronic problem. The current episode started more than 1 year ago. The problem is unchanged. The problem is controlled. Associated symptoms include peripheral edema. Pertinent negatives include no chest pain, palpitations or shortness of breath. Risk factors for coronary artery disease include family history, obesity, post-menopausal state and sedentary lifestyle. Past treatments include beta blockers, calcium channel blockers and diuretics. The current treatment provides significant improvement. There are no compliance problems. Chronic Kidney Disease Stage 3b:  Patient takes Maxzide-25 once daily. Her renal function is checked regularly and has been stable. GERD:  Patient is tolerating and compliant with Omeprazole 20 mg daily. She feels that the medication completely controls her symptoms. She previously failed on Pepcid. Chronic Low Back Pain:  Patient had lumbar fusion with cage insertion at L1-2 and L2-3 on 4-21-22. She was taken off Mobic due to CKD. She takes Norco 7.5-325 every 8 hrs as needed for pain. Anxiety:  Patient takes Ativan 1 mg every 8 hrs as needed for anxiety. She generally takes it once daily and feels that it works well to control her anxiety symptoms. PSVT:  Patient sees Dr. Russell Larson and was taken off the Send Word Now on 4-26-18 and had an event monitor ordered by him. She had no events during the 30 day period. She continues to take Lopressor and Calan. Osteoporosis:  Patient is tolerating and compliant with once weekly Fosamax 70 mg. She also takes calcium supplements TID. Her last DEXA scan was on 3-18-22. Overactive Bladder:  Patient was put on Myrbetric 50 mg daily per Dr. Lauri Pandey and feels that the medication works well to control her bladder symptoms. Obesity:  Patient weighed 210 on 11-3-22. She has gained 12 lbs since then.        Review of Systems   Constitutional:  Negative for chills and fever. HENT:  Negative for ear discharge, ear pain and hearing loss. Respiratory:  Negative for shortness of breath. Cardiovascular:  Negative for chest pain, palpitations and leg swelling. BP Readings from Last 3 Encounters:   12/05/22 (!) 171/79   09/06/22 133/74   06/06/22 132/64        /68   Pulse 65   Ht 5' 4\" (1.626 m)   Wt 222 lb 9.6 oz (101 kg)   BMI 38.21 kg/m²    BP (!) 171/79 (Site: Right Upper Arm, Position: Sitting, Cuff Size: Medium Adult)   Pulse 65   Ht 5' 4\" (1.626 m)   Wt 222 lb 9.6 oz (101 kg)   BMI 38.21 kg/m²    Objective:   Physical Exam  Constitutional:       General: She is not in acute distress. Appearance: She is well-developed. HENT:      Head: Normocephalic. Right Ear: External ear normal.      Left Ear: External ear normal.      Mouth/Throat:      Pharynx: No oropharyngeal exudate. Neck:      Thyroid: No thyromegaly. Vascular: No JVD. Cardiovascular:      Rate and Rhythm: Normal rate and regular rhythm. Heart sounds: Normal heart sounds. No murmur heard. Pulmonary:      Effort: Pulmonary effort is normal.      Breath sounds: Normal breath sounds. No wheezing or rales. Lymphadenopathy:      Cervical: No cervical adenopathy. Neurological:      Mental Status: She is alert and oriented to person, place, and time. Assessment:      Hypertension  Chronic Kidney Disease Stage 3b  GERD  Chronic Low Back Pain  Anxiety  PSVT  Osteoporosis   Overactive Bladder   Obesity      Plan:       OARRS report was reviewed  Medications refilled   RTO 3 months for Low Back Pain / Anxiety         Controlled Substance Monitoring:    Acute and Chronic Pain Monitoring:   RX Monitoring 12/5/2022   Attestation -   Acute Pain Prescriptions Severe pain not adequately treated with lower dose. Periodic Controlled Substance Monitoring No signs of potential drug abuse or diversion identified.    Chronic Pain > 80 MEDD -

## 2022-12-05 ENCOUNTER — OFFICE VISIT (OUTPATIENT)
Dept: FAMILY MEDICINE CLINIC | Age: 82
End: 2022-12-05
Payer: MEDICARE

## 2022-12-05 VITALS
BODY MASS INDEX: 38 KG/M2 | HEIGHT: 64 IN | WEIGHT: 222.6 LBS | HEART RATE: 65 BPM | SYSTOLIC BLOOD PRESSURE: 124 MMHG | DIASTOLIC BLOOD PRESSURE: 68 MMHG

## 2022-12-05 DIAGNOSIS — E66.09 NON MORBID OBESITY DUE TO EXCESS CALORIES: ICD-10-CM

## 2022-12-05 DIAGNOSIS — M54.50 CHRONIC MIDLINE LOW BACK PAIN WITHOUT SCIATICA: ICD-10-CM

## 2022-12-05 DIAGNOSIS — F41.9 ANXIETY: ICD-10-CM

## 2022-12-05 DIAGNOSIS — M15.9 PRIMARY OSTEOARTHRITIS INVOLVING MULTIPLE JOINTS: ICD-10-CM

## 2022-12-05 DIAGNOSIS — G89.29 CHRONIC MIDLINE LOW BACK PAIN WITHOUT SCIATICA: ICD-10-CM

## 2022-12-05 DIAGNOSIS — N18.32 CHRONIC KIDNEY DISEASE, STAGE 3B (HCC): ICD-10-CM

## 2022-12-05 DIAGNOSIS — I47.1 PAROXYSMAL SUPRAVENTRICULAR TACHYCARDIA (HCC): ICD-10-CM

## 2022-12-05 DIAGNOSIS — I10 ESSENTIAL HYPERTENSION: Primary | ICD-10-CM

## 2022-12-05 DIAGNOSIS — M81.0 OSTEOPOROSIS OF MULTIPLE SITES: ICD-10-CM

## 2022-12-05 DIAGNOSIS — K21.9 GERD WITHOUT ESOPHAGITIS: ICD-10-CM

## 2022-12-05 DIAGNOSIS — N32.81 OVERACTIVE BLADDER: ICD-10-CM

## 2022-12-05 PROCEDURE — G8484 FLU IMMUNIZE NO ADMIN: HCPCS | Performed by: FAMILY MEDICINE

## 2022-12-05 PROCEDURE — G8427 DOCREV CUR MEDS BY ELIG CLIN: HCPCS | Performed by: FAMILY MEDICINE

## 2022-12-05 PROCEDURE — 99213 OFFICE O/P EST LOW 20 MIN: CPT | Performed by: FAMILY MEDICINE

## 2022-12-05 PROCEDURE — 1090F PRES/ABSN URINE INCON ASSESS: CPT | Performed by: FAMILY MEDICINE

## 2022-12-05 PROCEDURE — 1123F ACP DISCUSS/DSCN MKR DOCD: CPT | Performed by: FAMILY MEDICINE

## 2022-12-05 PROCEDURE — G8417 CALC BMI ABV UP PARAM F/U: HCPCS | Performed by: FAMILY MEDICINE

## 2022-12-05 PROCEDURE — G8399 PT W/DXA RESULTS DOCUMENT: HCPCS | Performed by: FAMILY MEDICINE

## 2022-12-05 PROCEDURE — 3078F DIAST BP <80 MM HG: CPT | Performed by: FAMILY MEDICINE

## 2022-12-05 PROCEDURE — 1036F TOBACCO NON-USER: CPT | Performed by: FAMILY MEDICINE

## 2022-12-05 PROCEDURE — 3074F SYST BP LT 130 MM HG: CPT | Performed by: FAMILY MEDICINE

## 2022-12-05 RX ORDER — CYCLOBENZAPRINE HCL 10 MG
10 TABLET ORAL 3 TIMES DAILY PRN
Qty: 270 TABLET | Refills: 1 | Status: SHIPPED | OUTPATIENT
Start: 2022-12-05

## 2022-12-05 RX ORDER — MIRABEGRON 50 MG/1
TABLET, FILM COATED, EXTENDED RELEASE ORAL
COMMUNITY
Start: 2022-11-17

## 2022-12-05 RX ORDER — OMEPRAZOLE 20 MG/1
CAPSULE, DELAYED RELEASE ORAL
Qty: 90 CAPSULE | Refills: 1 | Status: SHIPPED | OUTPATIENT
Start: 2022-12-05

## 2022-12-05 RX ORDER — ALENDRONATE SODIUM 70 MG/1
70 TABLET ORAL
Qty: 12 TABLET | Refills: 1 | Status: SHIPPED | OUTPATIENT
Start: 2022-12-05

## 2022-12-05 RX ORDER — HYDROCODONE BITARTRATE AND ACETAMINOPHEN 7.5; 325 MG/1; MG/1
1 TABLET ORAL EVERY 8 HOURS PRN
Qty: 270 TABLET | Refills: 0 | Status: SHIPPED | OUTPATIENT
Start: 2022-12-05 | End: 2023-03-05

## 2022-12-05 RX ORDER — METOPROLOL TARTRATE 50 MG/1
50 TABLET, FILM COATED ORAL 2 TIMES DAILY
Qty: 180 TABLET | Refills: 1 | Status: SHIPPED | OUTPATIENT
Start: 2022-12-05

## 2022-12-05 RX ORDER — TRIAMTERENE AND HYDROCHLOROTHIAZIDE 37.5; 25 MG/1; MG/1
1 TABLET ORAL DAILY
Qty: 90 TABLET | Refills: 1 | Status: SHIPPED | OUTPATIENT
Start: 2022-12-05

## 2023-01-05 ENCOUNTER — OFFICE VISIT (OUTPATIENT)
Dept: ORTHOPEDIC SURGERY | Age: 83
End: 2023-01-05

## 2023-01-05 VITALS — RESPIRATION RATE: 16 BRPM | WEIGHT: 222 LBS | HEIGHT: 64 IN | BODY MASS INDEX: 37.9 KG/M2

## 2023-01-05 DIAGNOSIS — M70.51 PES ANSERINUS BURSITIS OF RIGHT KNEE: Primary | ICD-10-CM

## 2023-01-05 DIAGNOSIS — M25.512 LEFT SHOULDER PAIN, UNSPECIFIED CHRONICITY: ICD-10-CM

## 2023-01-05 RX ORDER — TRIAMCINOLONE ACETONIDE 40 MG/ML
40 INJECTION, SUSPENSION INTRA-ARTICULAR; INTRAMUSCULAR ONCE
Status: COMPLETED | OUTPATIENT
Start: 2023-01-05 | End: 2023-01-05

## 2023-01-05 RX ORDER — BUPIVACAINE HYDROCHLORIDE 2.5 MG/ML
2 INJECTION, SOLUTION INFILTRATION; PERINEURAL ONCE
Status: COMPLETED | OUTPATIENT
Start: 2023-01-05 | End: 2023-01-05

## 2023-01-05 RX ADMIN — BUPIVACAINE HYDROCHLORIDE 5 MG: 2.5 INJECTION, SOLUTION INFILTRATION; PERINEURAL at 10:25

## 2023-01-05 RX ADMIN — TRIAMCINOLONE ACETONIDE 40 MG: 40 INJECTION, SUSPENSION INTRA-ARTICULAR; INTRAMUSCULAR at 10:26

## 2023-01-05 RX ADMIN — BUPIVACAINE HYDROCHLORIDE 5 MG: 2.5 INJECTION, SOLUTION INFILTRATION; PERINEURAL at 10:26

## 2023-01-13 ENCOUNTER — TELEPHONE (OUTPATIENT)
Dept: FAMILY MEDICINE CLINIC | Age: 83
End: 2023-01-13

## 2023-01-13 ENCOUNTER — OFFICE VISIT (OUTPATIENT)
Dept: CARDIOLOGY CLINIC | Age: 83
End: 2023-01-13
Payer: MEDICARE

## 2023-01-13 VITALS
SYSTOLIC BLOOD PRESSURE: 128 MMHG | OXYGEN SATURATION: 98 % | HEART RATE: 58 BPM | DIASTOLIC BLOOD PRESSURE: 70 MMHG | WEIGHT: 216.6 LBS | HEIGHT: 64 IN | BODY MASS INDEX: 36.98 KG/M2

## 2023-01-13 DIAGNOSIS — I10 ESSENTIAL HYPERTENSION: ICD-10-CM

## 2023-01-13 DIAGNOSIS — I47.1 PAROXYSMAL SUPRAVENTRICULAR TACHYCARDIA (HCC): Primary | ICD-10-CM

## 2023-01-13 PROCEDURE — G8417 CALC BMI ABV UP PARAM F/U: HCPCS | Performed by: INTERNAL MEDICINE

## 2023-01-13 PROCEDURE — 3074F SYST BP LT 130 MM HG: CPT | Performed by: INTERNAL MEDICINE

## 2023-01-13 PROCEDURE — 1123F ACP DISCUSS/DSCN MKR DOCD: CPT | Performed by: INTERNAL MEDICINE

## 2023-01-13 PROCEDURE — 1090F PRES/ABSN URINE INCON ASSESS: CPT | Performed by: INTERNAL MEDICINE

## 2023-01-13 PROCEDURE — G8428 CUR MEDS NOT DOCUMENT: HCPCS | Performed by: INTERNAL MEDICINE

## 2023-01-13 PROCEDURE — 93000 ELECTROCARDIOGRAM COMPLETE: CPT | Performed by: INTERNAL MEDICINE

## 2023-01-13 PROCEDURE — 3078F DIAST BP <80 MM HG: CPT | Performed by: INTERNAL MEDICINE

## 2023-01-13 PROCEDURE — G8399 PT W/DXA RESULTS DOCUMENT: HCPCS | Performed by: INTERNAL MEDICINE

## 2023-01-13 PROCEDURE — G8484 FLU IMMUNIZE NO ADMIN: HCPCS | Performed by: INTERNAL MEDICINE

## 2023-01-13 PROCEDURE — 1036F TOBACCO NON-USER: CPT | Performed by: INTERNAL MEDICINE

## 2023-01-13 PROCEDURE — 99214 OFFICE O/P EST MOD 30 MIN: CPT | Performed by: INTERNAL MEDICINE

## 2023-01-13 NOTE — TELEPHONE ENCOUNTER
Last time pt was in dr Gaurang Bunch  changed  the instruction from morning to nite for the verapamil. Pt said she has heart palpataions for about 2 hrs after she takes the med at [employer]. Said she did not have this issue when she took it in the morning. Pt would like to know why the dosing was change.    Pls call to advise

## 2023-01-13 NOTE — TELEPHONE ENCOUNTER
This was not intentional.  She can take it anytime, as long as she takes it about the same time every day.

## 2023-01-13 NOTE — PROGRESS NOTES
[facility]      Cardiology Consult    Author Samantha  1940 January 13, 2023    Primary Physician: Dr. Jacki Gongora   Reason for Referral: Possible atrial fibrillation    CC: \"Feeling fine. \"     HPI:  The patient is 80 y.o. female with a past medical history significant for a SVT and hypertension who presents for chronic management of presumed atrial fibrillation. She previously followed with Dr. Debbie Palumbo for the arrhythmia. Unfortunately I am unable to find the actual ECG of the event and there is some inconsistencies in documentation. She says the arrhythmia was diagnosed >15 years ago at [facility] when post-op from back surgery. She denied any documented recurrence of the arrhythmia. She endorsed rare brief palpitations of 3-4 times over the past 5-10 years but the events were not sustained and she did not seek medical attention. The oldest notes from her prior cardiologist lists PSVT as the diagnosis. Without another known event, years ago the diagnosis was changed to PAF. She was never on anticoagulation. She was previously on propafenone. She underwent an epidural steroid injection 12/8/21. She was on telemetry and was instructed to bring in a copy of the monitor strips which showed normal sinus rhythm with PACs. She underwent went a lumbar fusion 4/2022 and atrial tachycardia was documented on telemetry but no documented atrial fibrillation. Today, she states overall she is doing well. She denies any new cardiac sounding complaints. She denies any chest pains, palpitations, or worsening shortness of breath. She reports compliance with her medications and tolerating but notes occasional palpitations when taking the verapamil at night. She denies any abnormal bleeding or bruising. Patient denies exertional chest pain/pressure, dyspnea at rest, worsening BAER, PND, orthopnea, palpitations, lightheadedness, weight changes, changes in LE edema, and syncope.      Past Medical History: Diagnosis Date    Anxiety     Chronic kidney disease, stage 3b (HCC)     Chronic midline low back pain without sciatica     Coronary artery disease involving native coronary artery without angina pectoris     Essential hypertension     Foot drop, left     GERD without esophagitis     History of blood transfusion     Internal hemorrhoids     Intrinsic eczema     Lesion of lateral popliteal nerve     Osteoporosis of multiple sites     Overactive bladder     Paroxysmal supraventricular tachycardia (HCC)     Primary osteoarthritis involving multiple joints     Prolonged emergence from general anesthesia     PUD (peptic ulcer disease)     Restless leg syndrome      Past Surgical History:   Procedure Laterality Date    ABDOMINAL EXPLORATION SURGERY  2000    Lysis of Adhesions    BREAST SURGERY Right     Biopsy    CARPAL TUNNEL RELEASE Right 11/03/2020    RIGHT CARPAL TUNNEL RELEASE performed by Gerry Burciaga MD at [place] [address] Right 08/24/2021    CATARACT REMOVAL WITH IMPLANT Left 09/07/2021    Dr. Ryan Grier      from [address]  02/24/2017    HYSTERECTOMY (CERVIX STATUS UNKNOWN)      Complete    INTRACAPSULAR CATARACT EXTRACTION Right 08/24/2021    PHACOEMULSIFICATION WITH INTRAOCULAR LENS IMPLANT RIGHT EYE, performed by Nori Pedro MD at [address] Left 09/07/2021    PHACOEMULSIFICATION WITH INTRAOCULAR LENS IMPLANT - LEFT EYE performed by Nori Pedro MD at [address] [place] [address] Left 4/21/2022    L1-2, L2-3 EXTREME LATERAL INTERBODY FUSION, T10-PELVIS DECOMPRESSION, FIXATION AND FUSION, ILIAC WING SCREWS performed by Janis Lambert. Terry Downing MD at [address] [place] [address] N/A 4/21/2022    . performed by Janis Lambert.  Terry Downing MD at [place] [address] with fusion    LUMBAR SPINE SURGERY  04/21/2022 Discectomy with cage at 2 levels / Dr. Parisa Pruitt Bilateral 2021    BILATERAL L4 TRANSFORMANIAL EPIDURAL STEROID INJECTION WITH FLUOROSCOPY performed by Huey Guzman MD at [address] [place] [address] ARTHROSCOPY Right     Right shoulder scope 2) Right shoulder labral debridement 3) Right shoulder Open Miguel 4) Right shoulder rotator cuff repair    TOTAL HIP ARTHROPLASTY Bilateral     TOTAL KNEE ARTHROPLASTY Left 2015    Dr. Emiliano Casey N/A 03/10/2020    EGD ESOPHAGOGASTRODUODENOSCOPY performed by Alphonso Alexander MD at [place] [address] EXTRACTION       Family History   Problem Relation Age of Onset    High Blood Pressure Mother     Stroke Father     High Blood Pressure Father     Depression Daughter     Cancer Son         Colorectal     Depression Son     Diabetes Maternal Aunt     Cancer Maternal Uncle         Throat    Alcohol Abuse Maternal Uncle     Asthma Maternal Uncle     Alcohol Abuse Maternal Uncle      Social History     Tobacco Use    Smoking status: Former     Packs/day: 1.00     Types: Cigarettes     Quit date: 1997     Years since quittin.0    Smokeless tobacco: Never   Vaping Use    Vaping Use: Never used   Substance Use Topics    Alcohol use:  Yes     Alcohol/week: 2.0 standard drinks     Types: 1 Glasses of wine, 1 Cans of beer per week     Comment: Rare    Drug use: Never       Allergies   Allergen Reactions    Latex Itching and Rash    Bactrim Rash     Current Outpatient Medications   Medication Sig Dispense Refill    MYRBETRIQ 50 MG TB24       omeprazole (PRILOSEC) 20 MG delayed release capsule TAKE 1 CAPSULE EVERY DAY 90 capsule 1    alendronate (FOSAMAX) 70 MG tablet Take 1 tablet by mouth every 7 days 12 tablet 1    triamterene-hydroCHLOROthiazide (MAXZIDE-25) 37.5-25 MG per tablet Take 1 tablet by mouth daily 90 tablet 1    verapamil (CALAN SR) 120 MG extended release tablet Take 1 tablet by mouth nightly 90 tablet 1    metoprolol tartrate (LOPRESSOR) 50 MG tablet Take 1 tablet by mouth 2 times daily 180 tablet 1    HYDROcodone-acetaminophen (NORCO) 7.5-325 MG per tablet Take 1 tablet by mouth every 8 hours as needed for Pain for up to 90 days. 270 tablet 0    Calcium Carbonate-Vitamin D (CALTRATE 600+D PO) Take 1 capsule by mouth in the morning, at noon, and at bedtime      timolol (BETIMOL) 0.5 % ophthalmic solution 1 drop 2 times daily      LUMIGAN 0.01 % SOLN ophthalmic drops Place 1 drop into both eyes       docusate sodium (COLACE) 100 MG capsule Take 1 capsule by mouth 2 times daily 40 capsule 0     No current facility-administered medications for this visit. Review of Systems:  Constitutional: no unanticipated weight loss. There's been no change in energy level, sleep pattern, or activity level. No fevers, chills. Eyes: No visual changes or diplopia. No scleral icterus. ENT: No Headaches, hearing loss or vertigo. No mouth sores or sore throat. Cardiovascular: as reviewed in HPI  Respiratory: No cough or wheezing, no sputum production. No hematemesis. Gastrointestinal: No abdominal pain, appetite loss, blood in stools. No change in bowel or bladder habits. Genitourinary: No dysuria, trouble voiding, or hematuria. Musculoskeletal:  No gait disturbance, no joint complaints. Integumentary: No rash or pruritis. Neurological: No headache, diplopia, change in muscle strength, numbness or tingling. Psychiatric: No anxiety or depression. Endocrine: No temperature intolerance. No excessive thirst, fluid intake, or urination. No tremor. Hematologic/Lymphatic: No abnormal bruising or bleeding, blood clots or swollen lymph nodes. Allergic/Immunologic: No nasal congestion or hives.     Physical Exam:   /70   Pulse 58   Ht 5' 4\" (1.626 m)   Wt 216 lb 9.6 oz (98.2 kg)   SpO2 98%   BMI 37.18 kg/m²   Wt Readings from Last 3 Encounters:   01/13/23 216 lb 9.6 oz (98.2 kg)   01/05/23 222 lb (100.7 kg)   12/05/22 222 lb 9.6 oz (101 kg)     Constitutional: She is oriented to person, place, and time. She appears well-developed and well-nourished. In no acute distress. Head: Normocephalic and atraumatic. Pupils equal and round. Neck: Neck supple. No JVP or carotid bruit appreciated. No mass and no thyromegaly present. No lymphadenopathy present. Cardiovascular: Normal rate with ectopy. Normal heart sounds. Exam reveals no gallop and no friction rub. No murmur heard. Pulmonary/Chest: Effort normal and breath sounds normal. No respiratory distress. She has no wheezes, rhonchi or rales. Abdominal: Soft, non-tender. Bowel sounds are normal. She exhibits no organomegaly, mass or bruit. Extremities: No edema, cyanosis, or clubbing. Pulses are 2+ radial/carotid bilaterally. Neurological: No gross cranial nerve deficit. Coordination normal.   Skin: Skin is warm and dry. There is no rash or diaphoresis. Psychiatric: She has a normal mood and affect. Her speech is normal and behavior is normal.     Lab Review:   FLP:    Lab Results   Component Value Date/Time    TRIG 101 01/24/2022 11:17 AM    HDL 73 01/24/2022 11:17 AM    LDLCALC 128 01/24/2022 11:17 AM    LABVLDL 20 01/24/2022 11:17 AM     BUN/Creatinine:    Lab Results   Component Value Date/Time    BUN 19 04/26/2022 06:20 AM    BUN 20 04/26/2022 06:20 AM    CREATININE 1.0 04/26/2022 06:20 AM    CREATININE 1.0 04/26/2022 06:20 AM     EKG Interpretation: 4/26/18 Normal sinus rhythm. Nonspecific T-abnormality. 5/10/21 Sinus rhythm with PACs and nonspecific T wave abnormality. 1/7/22 Sinus rhythm. Voltage criteria for LVH. Nonspecific T wave abnormality. 4/5/22 Sinus bradycardia, occasional PAC.     1/13/23 Sinus bradycardia. Image Review:   Stress test 11/2013  Normal myocardial perfusion study with no evidence of ischemia or scar.    There is a slight decrease in tracer uptake in the distal anterior wall at   rest and with stress that appears likely due to breast attenuation. No evidence of inducible ischemia by ECG criteria during Lexiscan infusion. Normal left ventricular systolic function. Event Monitor 6/2018  Baseline sinus rhythm  No episodes of atrial fibrillation  No reported symptoms    Event monitor 6/11/21   Baseline SR, frequent PACs (14%) but not sustained arrhythmias and no Afib. Assessment/Plan:   1) Possible paroxysmal atrial fibrillation/Paroxysmal supraventricular tachycardia. Cardiac event monitor from 2018 and 2021 showed NSR with no episodes of atrial fibrillation or reported symptoms. She had atrial tachycardia during hospitalization in 4/2022. Discussed anticoagulation options but she is OK remaining off a DOAC as she has had no documented atrial fibrillation in >15 years and was not previously on anticoagulation. 2) Essential hypertension. Controlled. Goal BP <130/80. Continue medical therapy. Follow up 1 year. Thank you very much for allowing me to participate in the care of your patient. Please do not hesitate to contact me if you have any questions. Sincerely,  Tomas Butler. Chris Esquivel, [address] [place] [address]  Ph: [phone]  Fax: [phone]    This note was scribed in the presence of Dr Chris Esquivel MD by Nancy Ramírez RN. Physician Attestation: The scribes documentation has been prepared under my direction and personally reviewed by me in its entirety. I confirm that the note above accurately reflects all work, treatment, procedures, and medical decision making performed by me. All portions of the note including but not limited to the chief complaint, history of present illness, physical exam, assessment and plan/medical decision making were personally reviewed, edited, and updated on the day of the visit.

## 2023-01-14 NOTE — PROGRESS NOTES
This dictation was done with [employer] dictation and may contain mechanical errors related to translation. I have today reviewed with Alexandra Thompson the clinically relevant, past medical history, medications, allergies, family history, social history, and Review Of Systems form the patients most recent history form & I have documented any details relevant to today's presenting complaints in my history below. Ms. Yojana Motta's self-reported past medical history, medications, allergies, family history, social history, and Review Of Systems form has been scanned into the chart under the \"Media\" tab. Subjective: Alexandra Thompson is a 80 y.o. who is here complaining of return of pain in her right knee and her left shoulder. She had an Orthovisc solution injection on 11/3/2022 which did provide a lot of relief and a cortisone shot prior to that in September. The left shoulder has returned to hurting and is a 6 out of 10 pain that has developed over the last few weeks. The right knee had the Orthovisc injection and she is requesting a cortisone shot for the pes bursitis. The left shoulder had relief with a cortisone shot back in September and now the pain is returned in the evenings making it harder to sleep and meeting her range of motion.       Patient Active Problem List   Diagnosis    Paroxysmal supraventricular tachycardia (HCC)    Foot drop, left    Anxiety    PUD (peptic ulcer disease)    Restless leg syndrome    GERD without esophagitis    Primary osteoarthritis involving multiple joints    Non morbid obesity due to excess calories    Primary insomnia    Coronary artery disease involving native coronary artery without angina pectoris    Essential hypertension    Intrinsic eczema    Chronic midline low back pain without sciatica    Anesthesia complication    Lesion of lateral popliteal nerve    Postmenopausal status    Seborrheic keratosis    Grade IV hemorrhoids    Glaucoma    Internal hemorrhoids Positive PPD    Chronic kidney disease, stage 3b ([facility])    Osteoporosis of multiple sites    Scoliosis of lumbar region due to degenerative disease of spine in adult    S/P spinal fusion    Chronic renal disease, stage III ([facility]) [959057]    Overactive bladder           Current Outpatient Medications on File Prior to Visit   Medication Sig Dispense Refill    MYRBETRIQ 50 MG TB24       omeprazole (PRILOSEC) 20 MG delayed release capsule TAKE 1 CAPSULE EVERY DAY 90 capsule 1    alendronate (FOSAMAX) 70 MG tablet Take 1 tablet by mouth every 7 days 12 tablet 1    triamterene-hydroCHLOROthiazide (MAXZIDE-25) 37.5-25 MG per tablet Take 1 tablet by mouth daily 90 tablet 1    verapamil (CALAN SR) 120 MG extended release tablet Take 1 tablet by mouth nightly 90 tablet 1    metoprolol tartrate (LOPRESSOR) 50 MG tablet Take 1 tablet by mouth 2 times daily 180 tablet 1    HYDROcodone-acetaminophen (NORCO) 7.5-325 MG per tablet Take 1 tablet by mouth every 8 hours as needed for Pain for up to 90 days. 270 tablet 0    Calcium Carbonate-Vitamin D (CALTRATE 600+D PO) Take 1 capsule by mouth in the morning, at noon, and at bedtime      timolol (BETIMOL) 0.5 % ophthalmic solution 1 drop 2 times daily      LUMIGAN 0.01 % SOLN ophthalmic drops Place 1 drop into both eyes       docusate sodium (COLACE) 100 MG capsule Take 1 capsule by mouth 2 times daily 40 capsule 0     No current facility-administered medications on file prior to visit. Objective:   Resp. rate 16, height 5' 4\" (1.626 m), weight 222 lb (100.7 kg), not currently breastfeeding. On examination is a very pleasant 6year-old female who is alert and oriented x3 she has some weakness supraspinous strength testing of the left shoulder with pain with crossover and impingement testing. She has good  strength good wrist extension strength no neurologic deficits.     Her right knee shows close to full extension and bends easily to calf on thigh there is some crepitus during flexion extension through the patellofemoral joint some medial joint line tenderness but negative for Carlyle negative for Lachman. She has medial knee pain below the joint line consistent with pes bursitis on palpation. Neuro exam grossly intact both lower extremities. Intact sensation to light touch. Motor exam 4+ to 5/5 in all major motor groups. Negative Whalen's sign. Skin is warm, dry and intact with out erythema or significant increased temperature around the knee joint(s). There are no cutaneous lesions or lymphadenopathy present. X-RAYS:  X-rays done previously were discussed with patient      Assessment:  Left shoulder rotator cuff tendinitis and impingement and right knee pes bursitis    Plan:  During today's visit, there was approximately 30 minutes of face-to-face discussion in regards to the patient's current condition and treatment options. More than 50 % of the time was counseling and coordination of care as indicated above. We talked about short long-term expectations after examination and discussion she consented to the injection for the left shoulder and the right knee. With her consent 1 cc Kenalog and 2 cc of Marcaine were injected into the Pez anserine area of the right knee. She tolerated well we talked about postinjection care. After a discussion of the multiple options, they consented to a cortisone shot. 1 ml of 40mg/ml Kenalog and 2 ml's of 0.25%Marcaine were injected into the left shoulder sub acromial space. This was done with sterile technique and they tolerated it well. PROCEDURE NOTE:  We went through stretching strengthening exercise program she will follow-up with us as needed      They will schedule a follow up in.

## 2023-02-22 DIAGNOSIS — F41.9 ANXIETY: ICD-10-CM

## 2023-02-22 RX ORDER — LORAZEPAM 1 MG/1
TABLET ORAL
Qty: 90 TABLET | Refills: 0 | Status: SHIPPED | OUTPATIENT
Start: 2023-02-22 | End: 2023-03-24

## 2023-02-22 NOTE — TELEPHONE ENCOUNTER
Last office visit 12/5/2022     Last written      Next office visit scheduled 3/6/2023    Requested Prescriptions     Pending Prescriptions Disp Refills    LORazepam (ATIVAN) 1 MG tablet [Pharmacy Med Name: LORAZEPAM 1 MG Tablet] 90 tablet      Sig: TAKE 1 TABLET EVERY 8 HOURS AS NEEDED FOR ANXIETY FOR UP TO 30 DAYS

## 2023-02-23 ASSESSMENT — ENCOUNTER SYMPTOMS: BACK PAIN: 1

## 2023-02-23 NOTE — PROGRESS NOTES
Subjective:      Patient ID: Sindy Parada is a 80 y.o. female. HPI    Chronic Low Back Pain:  Patient had lumbar fusion with cage insertion at L1-2 and L2-3 on 4-21-22. She was taken off Mobic due to CKD. She takes Norco 7.5-325 every 8 hrs as needed for pain. She generally takes only one daily. Anxiety:  Patient takes Ativan 1 mg every 8 hrs as needed for anxiety. She generally takes it once daily and feels that it works well to control her anxiety symptoms. Review of Systems   Constitutional:  Negative for chills and fever. Musculoskeletal:  Positive for back pain. Psychiatric/Behavioral:  The patient is nervous/anxious. /78   Ht 5' 4.5\" (1.638 m)   Wt 217 lb 9.6 oz (98.7 kg)   BMI 36.77 kg/m²    Objective:   Physical Exam  Constitutional:       General: She is not in acute distress. Appearance: She is well-developed. HENT:      Head: Normocephalic. Right Ear: External ear normal.      Left Ear: External ear normal.      Mouth/Throat:      Pharynx: No oropharyngeal exudate. Neck:      Thyroid: No thyromegaly. Vascular: No JVD. Cardiovascular:      Rate and Rhythm: Normal rate and regular rhythm. Heart sounds: Normal heart sounds. No murmur heard. Pulmonary:      Effort: Pulmonary effort is normal.      Breath sounds: Normal breath sounds. No wheezing or rales. Lymphadenopathy:      Cervical: No cervical adenopathy. Neurological:      Mental Status: She is alert and oriented to person, place, and time. Assessment:      Chronic Low Back Pain   Anxiety       Plan:      OARRS report was reviewed  Medications refilled   Recommended that patient have an AWV. RTO 3 months for Hypertension / CKD / GERD / Back Pain / Anxiety       Controlled Substance Monitoring:    Acute and Chronic Pain Monitoring:   RX Monitoring 3/6/2023   Attestation -   Acute Pain Prescriptions Severe pain not adequately treated with lower dose.    Periodic Controlled Substance Monitoring No signs of potential drug abuse or diversion identified.    Chronic Pain > 80 MEDD -                   ENRICO VORA DO

## 2023-03-03 SDOH — ECONOMIC STABILITY: FOOD INSECURITY: WITHIN THE PAST 12 MONTHS, THE FOOD YOU BOUGHT JUST DIDN'T LAST AND YOU DIDN'T HAVE MONEY TO GET MORE.: NEVER TRUE

## 2023-03-03 SDOH — ECONOMIC STABILITY: HOUSING INSECURITY
IN THE LAST 12 MONTHS, WAS THERE A TIME WHEN YOU DID NOT HAVE A STEADY PLACE TO SLEEP OR SLEPT IN A SHELTER (INCLUDING NOW)?: NO

## 2023-03-03 SDOH — ECONOMIC STABILITY: INCOME INSECURITY: HOW HARD IS IT FOR YOU TO PAY FOR THE VERY BASICS LIKE FOOD, HOUSING, MEDICAL CARE, AND HEATING?: NOT VERY HARD

## 2023-03-03 SDOH — ECONOMIC STABILITY: TRANSPORTATION INSECURITY
IN THE PAST 12 MONTHS, HAS LACK OF TRANSPORTATION KEPT YOU FROM MEETINGS, WORK, OR FROM GETTING THINGS NEEDED FOR DAILY LIVING?: NO

## 2023-03-03 SDOH — ECONOMIC STABILITY: FOOD INSECURITY: WITHIN THE PAST 12 MONTHS, YOU WORRIED THAT YOUR FOOD WOULD RUN OUT BEFORE YOU GOT MONEY TO BUY MORE.: NEVER TRUE

## 2023-03-06 ENCOUNTER — OFFICE VISIT (OUTPATIENT)
Dept: FAMILY MEDICINE CLINIC | Age: 83
End: 2023-03-06
Payer: MEDICARE

## 2023-03-06 VITALS
BODY MASS INDEX: 36.25 KG/M2 | HEIGHT: 65 IN | DIASTOLIC BLOOD PRESSURE: 78 MMHG | SYSTOLIC BLOOD PRESSURE: 122 MMHG | WEIGHT: 217.6 LBS

## 2023-03-06 DIAGNOSIS — F41.9 ANXIETY: ICD-10-CM

## 2023-03-06 DIAGNOSIS — M15.9 PRIMARY OSTEOARTHRITIS INVOLVING MULTIPLE JOINTS: ICD-10-CM

## 2023-03-06 DIAGNOSIS — G89.29 CHRONIC MIDLINE LOW BACK PAIN WITHOUT SCIATICA: Primary | ICD-10-CM

## 2023-03-06 DIAGNOSIS — M54.50 CHRONIC MIDLINE LOW BACK PAIN WITHOUT SCIATICA: Primary | ICD-10-CM

## 2023-03-06 PROCEDURE — G8399 PT W/DXA RESULTS DOCUMENT: HCPCS | Performed by: FAMILY MEDICINE

## 2023-03-06 PROCEDURE — 1123F ACP DISCUSS/DSCN MKR DOCD: CPT | Performed by: FAMILY MEDICINE

## 2023-03-06 PROCEDURE — 1036F TOBACCO NON-USER: CPT | Performed by: FAMILY MEDICINE

## 2023-03-06 PROCEDURE — 1090F PRES/ABSN URINE INCON ASSESS: CPT | Performed by: FAMILY MEDICINE

## 2023-03-06 PROCEDURE — 3078F DIAST BP <80 MM HG: CPT | Performed by: FAMILY MEDICINE

## 2023-03-06 PROCEDURE — 99213 OFFICE O/P EST LOW 20 MIN: CPT | Performed by: FAMILY MEDICINE

## 2023-03-06 PROCEDURE — G8417 CALC BMI ABV UP PARAM F/U: HCPCS | Performed by: FAMILY MEDICINE

## 2023-03-06 PROCEDURE — G8427 DOCREV CUR MEDS BY ELIG CLIN: HCPCS | Performed by: FAMILY MEDICINE

## 2023-03-06 PROCEDURE — G8484 FLU IMMUNIZE NO ADMIN: HCPCS | Performed by: FAMILY MEDICINE

## 2023-03-06 PROCEDURE — 3074F SYST BP LT 130 MM HG: CPT | Performed by: FAMILY MEDICINE

## 2023-03-06 RX ORDER — TRIAMTERENE AND HYDROCHLOROTHIAZIDE 37.5; 25 MG/1; MG/1
1 TABLET ORAL DAILY
Qty: 90 TABLET | Refills: 0 | Status: SHIPPED | OUTPATIENT
Start: 2023-03-06

## 2023-03-06 RX ORDER — HYDROCODONE BITARTRATE AND ACETAMINOPHEN 7.5; 325 MG/1; MG/1
1 TABLET ORAL EVERY 8 HOURS PRN
Qty: 270 TABLET | Refills: 0 | Status: SHIPPED | OUTPATIENT
Start: 2023-03-06 | End: 2023-03-08 | Stop reason: SDUPTHER

## 2023-03-06 ASSESSMENT — PATIENT HEALTH QUESTIONNAIRE - PHQ9
2. FEELING DOWN, DEPRESSED OR HOPELESS: 0
1. LITTLE INTEREST OR PLEASURE IN DOING THINGS: 0
SUM OF ALL RESPONSES TO PHQ QUESTIONS 1-9: 0
SUM OF ALL RESPONSES TO PHQ9 QUESTIONS 1 & 2: 0

## 2023-03-08 ENCOUNTER — TELEPHONE (OUTPATIENT)
Dept: FAMILY MEDICINE CLINIC | Age: 83
End: 2023-03-08

## 2023-03-08 DIAGNOSIS — G89.29 CHRONIC MIDLINE LOW BACK PAIN WITHOUT SCIATICA: ICD-10-CM

## 2023-03-08 DIAGNOSIS — M15.9 PRIMARY OSTEOARTHRITIS INVOLVING MULTIPLE JOINTS: ICD-10-CM

## 2023-03-08 DIAGNOSIS — M54.50 CHRONIC MIDLINE LOW BACK PAIN WITHOUT SCIATICA: ICD-10-CM

## 2023-03-08 RX ORDER — HYDROCODONE BITARTRATE AND ACETAMINOPHEN 7.5; 325 MG/1; MG/1
1 TABLET ORAL EVERY 8 HOURS PRN
Qty: 90 TABLET | Refills: 0 | Status: SHIPPED | OUTPATIENT
Start: 2023-03-08 | End: 2023-04-07

## 2023-03-08 NOTE — TELEPHONE ENCOUNTER
[facility] called to ask Dr Raissa Kruse to send in a new rx for the Hydrocodone 7.5-325 mg for a 30 day supply quantity of 90 pills. Due to Medicare guidelines they will only fill control medication for 30 day at a time.   If any questions give Lex a call [phone]

## 2023-03-10 DIAGNOSIS — H69.82 EUSTACHIAN TUBE DYSFUNCTION, LEFT: ICD-10-CM

## 2023-03-10 RX ORDER — FLUTICASONE PROPIONATE 50 MCG
SPRAY, SUSPENSION (ML) NASAL
Qty: 48 G | Refills: 2 | Status: SHIPPED | OUTPATIENT
Start: 2023-03-10

## 2023-03-13 RX ORDER — MELOXICAM 15 MG/1
TABLET ORAL
Qty: 90 TABLET | Refills: 0 | OUTPATIENT
Start: 2023-03-13

## 2023-03-14 RX ORDER — MELOXICAM 15 MG/1
TABLET ORAL
Qty: 10 TABLET | Refills: 0 | OUTPATIENT
Start: 2023-03-14

## 2023-03-20 PROBLEM — I73.9 PERIPHERAL ARTERIAL DISEASE (HCC): Status: ACTIVE | Noted: 2023-03-20

## 2023-03-21 RX ORDER — MELOXICAM 15 MG/1
15 TABLET ORAL DAILY
Qty: 90 TABLET | Refills: 0 | OUTPATIENT
Start: 2023-03-21

## 2023-03-23 ENCOUNTER — OFFICE VISIT (OUTPATIENT)
Dept: ORTHOPEDIC SURGERY | Age: 83
End: 2023-03-23

## 2023-03-23 VITALS — BODY MASS INDEX: 36.15 KG/M2 | HEIGHT: 65 IN | WEIGHT: 217 LBS | RESPIRATION RATE: 18 BRPM

## 2023-03-23 DIAGNOSIS — M19.011 ARTHRITIS OF RIGHT SHOULDER REGION: ICD-10-CM

## 2023-03-23 DIAGNOSIS — M25.511 RIGHT SHOULDER PAIN, UNSPECIFIED CHRONICITY: Primary | ICD-10-CM

## 2023-03-23 DIAGNOSIS — M17.11 PRIMARY OSTEOARTHRITIS OF RIGHT KNEE: ICD-10-CM

## 2023-03-23 DIAGNOSIS — M19.012 ARTHRITIS OF LEFT SHOULDER REGION: ICD-10-CM

## 2023-03-23 RX ORDER — BUPIVACAINE HYDROCHLORIDE 2.5 MG/ML
2 INJECTION, SOLUTION INFILTRATION; PERINEURAL ONCE
Status: COMPLETED | OUTPATIENT
Start: 2023-03-23 | End: 2023-03-23

## 2023-03-23 RX ORDER — TRIAMCINOLONE ACETONIDE 40 MG/ML
40 INJECTION, SUSPENSION INTRA-ARTICULAR; INTRAMUSCULAR ONCE
Status: COMPLETED | OUTPATIENT
Start: 2023-03-23 | End: 2023-03-23

## 2023-03-23 RX ADMIN — TRIAMCINOLONE ACETONIDE 40 MG: 40 INJECTION, SUSPENSION INTRA-ARTICULAR; INTRAMUSCULAR at 13:28

## 2023-03-23 RX ADMIN — TRIAMCINOLONE ACETONIDE 40 MG: 40 INJECTION, SUSPENSION INTRA-ARTICULAR; INTRAMUSCULAR at 13:29

## 2023-03-23 RX ADMIN — BUPIVACAINE HYDROCHLORIDE 5 MG: 2.5 INJECTION, SOLUTION INFILTRATION; PERINEURAL at 13:27

## 2023-03-23 RX ADMIN — BUPIVACAINE HYDROCHLORIDE 5 MG: 2.5 INJECTION, SOLUTION INFILTRATION; PERINEURAL at 13:28

## 2023-03-25 NOTE — PROGRESS NOTES
This dictation was done with [employer] dictation and may contain mechanical errors related to translation. I have today reviewed with Mack Kemp the clinically relevant, past medical history, medications, allergies, family history, social history, and Review Of Systems form the patients most recent history form & I have documented any details relevant to today's presenting complaints in my history below. Ms. Loren Motta's self-reported past medical history, medications, allergies, family history, social history, and Review Of Systems form has been scanned into the chart under the \"Media\" tab. Subjective: Mack Kemp is a 80 y.o. who is here as an established patient complaining of pain in her right knee and left shoulder. She had a cortisone shot both of those areas in January left shoulder continues to do well but the right shoulder is hurting her so I sent her for AP transaxial view and Y view of her right shoulder. .  She has had a previous rotator cuff repair and extensive bony debridement in the right shoulder.   Previous x-rays include new x-rays today that          Patient Active Problem List   Diagnosis    Paroxysmal supraventricular tachycardia (HCC)    Foot drop, left    Anxiety    PUD (peptic ulcer disease)    Restless leg syndrome    GERD without esophagitis    Primary osteoarthritis involving multiple joints    Non morbid obesity due to excess calories    Primary insomnia    Coronary artery disease involving native coronary artery without angina pectoris    Essential hypertension    Intrinsic eczema    Chronic midline low back pain without sciatica    Anesthesia complication    Lesion of lateral popliteal nerve    Postmenopausal status    Seborrheic keratosis    Grade IV hemorrhoids    Glaucoma    Internal hemorrhoids    Positive PPD    Chronic kidney disease, stage 3b (HCC)    Osteoporosis of multiple sites    Scoliosis of lumbar region due to degenerative disease of spine in adult    S/P

## 2023-03-27 RX ORDER — MIRABEGRON 50 MG/1
50 TABLET, FILM COATED, EXTENDED RELEASE ORAL DAILY
Qty: 30 TABLET | Refills: 2 | Status: SHIPPED | OUTPATIENT
Start: 2023-03-27

## 2023-03-27 RX ORDER — MELOXICAM 15 MG/1
15 TABLET ORAL DAILY
Qty: 90 TABLET | Refills: 0 | OUTPATIENT
Start: 2023-03-27

## 2023-04-20 LAB — URINE CULTURE, ROUTINE: NORMAL

## 2023-05-12 ENCOUNTER — HOSPITAL ENCOUNTER (OUTPATIENT)
Dept: WOMENS IMAGING | Age: 83
Discharge: HOME | End: 2023-05-12
Payer: MEDICARE

## 2023-05-12 DIAGNOSIS — Z12.31 BREAST CANCER SCREENING BY MAMMOGRAM: ICD-10-CM

## 2023-05-12 PROCEDURE — 77063 BREAST TOMOSYNTHESIS BI: CPT

## 2023-06-05 ASSESSMENT — ENCOUNTER SYMPTOMS: SHORTNESS OF BREATH: 0

## 2023-06-05 NOTE — PROGRESS NOTES
Chronic Pain Monitoring:   RX Monitoring 6/6/2023   Attestation -   Acute Pain Prescriptions Severe pain not adequately treated with lower dose. Periodic Controlled Substance Monitoring No signs of potential drug abuse or diversion identified.    Chronic Pain > 80 MEDD -

## 2023-06-06 ENCOUNTER — OFFICE VISIT (OUTPATIENT)
Dept: FAMILY MEDICINE CLINIC | Age: 83
End: 2023-06-06

## 2023-06-06 VITALS
SYSTOLIC BLOOD PRESSURE: 134 MMHG | DIASTOLIC BLOOD PRESSURE: 78 MMHG | HEIGHT: 65 IN | WEIGHT: 228.2 LBS | BODY MASS INDEX: 38.02 KG/M2

## 2023-06-06 DIAGNOSIS — I73.9 PERIPHERAL ARTERIAL DISEASE (HCC): ICD-10-CM

## 2023-06-06 DIAGNOSIS — H69.82 EUSTACHIAN TUBE DYSFUNCTION, LEFT: ICD-10-CM

## 2023-06-06 DIAGNOSIS — N32.81 OVERACTIVE BLADDER: ICD-10-CM

## 2023-06-06 DIAGNOSIS — I47.1 PAROXYSMAL SUPRAVENTRICULAR TACHYCARDIA (HCC): ICD-10-CM

## 2023-06-06 DIAGNOSIS — M81.0 OSTEOPOROSIS OF MULTIPLE SITES: ICD-10-CM

## 2023-06-06 DIAGNOSIS — F41.9 ANXIETY: ICD-10-CM

## 2023-06-06 DIAGNOSIS — G89.29 CHRONIC MIDLINE LOW BACK PAIN WITHOUT SCIATICA: ICD-10-CM

## 2023-06-06 DIAGNOSIS — N18.32 CHRONIC KIDNEY DISEASE, STAGE 3B (HCC): ICD-10-CM

## 2023-06-06 DIAGNOSIS — M54.50 CHRONIC MIDLINE LOW BACK PAIN WITHOUT SCIATICA: ICD-10-CM

## 2023-06-06 DIAGNOSIS — K21.9 GERD WITHOUT ESOPHAGITIS: ICD-10-CM

## 2023-06-06 DIAGNOSIS — I10 ESSENTIAL HYPERTENSION: Primary | ICD-10-CM

## 2023-06-06 DIAGNOSIS — E66.09 NON MORBID OBESITY DUE TO EXCESS CALORIES: ICD-10-CM

## 2023-06-06 LAB
ANION GAP SERPL CALCULATED.3IONS-SCNC: 12 MMOL/L (ref 3–16)
BUN SERPL-MCNC: 26 MG/DL (ref 7–20)
CALCIUM SERPL-MCNC: 9.8 MG/DL (ref 8.3–10.6)
CHLORIDE SERPL-SCNC: 99 MMOL/L (ref 99–110)
CHOLEST SERPL-MCNC: 231 MG/DL (ref 0–199)
CO2 SERPL-SCNC: 29 MMOL/L (ref 21–32)
CREAT SERPL-MCNC: 1.4 MG/DL (ref 0.6–1.2)
GFR SERPLBLD CREATININE-BSD FMLA CKD-EPI: 37 ML/MIN/{1.73_M2}
GLUCOSE SERPL-MCNC: 83 MG/DL (ref 70–99)
HDLC SERPL-MCNC: 95 MG/DL (ref 40–60)
LDLC SERPL CALC-MCNC: 121 MG/DL
POTASSIUM SERPL-SCNC: 4.6 MMOL/L (ref 3.5–5.1)
SODIUM SERPL-SCNC: 140 MMOL/L (ref 136–145)
TRIGL SERPL-MCNC: 76 MG/DL (ref 0–150)
VLDLC SERPL CALC-MCNC: 15 MG/DL

## 2023-06-06 RX ORDER — ALENDRONATE SODIUM 70 MG/1
70 TABLET ORAL
Qty: 12 TABLET | Refills: 1 | Status: SHIPPED | OUTPATIENT
Start: 2023-06-06

## 2023-06-06 RX ORDER — MIRABEGRON 50 MG/1
50 TABLET, FILM COATED, EXTENDED RELEASE ORAL DAILY
Qty: 90 TABLET | Refills: 1 | Status: SHIPPED | OUTPATIENT
Start: 2023-06-06

## 2023-06-06 RX ORDER — ATORVASTATIN CALCIUM 10 MG/1
10 TABLET, FILM COATED ORAL DAILY
Qty: 90 TABLET | Refills: 1 | Status: SHIPPED | OUTPATIENT
Start: 2023-06-06

## 2023-06-06 RX ORDER — OMEPRAZOLE 20 MG/1
CAPSULE, DELAYED RELEASE ORAL
Qty: 90 CAPSULE | Refills: 1 | Status: SHIPPED | OUTPATIENT
Start: 2023-06-06

## 2023-06-06 RX ORDER — FLUTICASONE PROPIONATE 50 MCG
SPRAY, SUSPENSION (ML) NASAL
Qty: 48 G | Refills: 1 | Status: SHIPPED | OUTPATIENT
Start: 2023-06-06

## 2023-06-06 RX ORDER — LORAZEPAM 1 MG/1
TABLET ORAL
Qty: 90 TABLET | Refills: 0 | Status: SHIPPED | OUTPATIENT
Start: 2023-06-06 | End: 2023-07-06

## 2023-06-06 RX ORDER — METOPROLOL TARTRATE 50 MG/1
50 TABLET, FILM COATED ORAL 2 TIMES DAILY
Qty: 180 TABLET | Refills: 1 | Status: SHIPPED | OUTPATIENT
Start: 2023-06-06

## 2023-06-06 RX ORDER — TRIAMTERENE AND HYDROCHLOROTHIAZIDE 37.5; 25 MG/1; MG/1
1 TABLET ORAL DAILY
Qty: 90 TABLET | Refills: 1 | Status: SHIPPED | OUTPATIENT
Start: 2023-06-06

## 2023-06-26 ENCOUNTER — OFFICE VISIT (OUTPATIENT)
Dept: ORTHOPEDIC SURGERY | Age: 83
End: 2023-06-26
Payer: MEDICARE

## 2023-06-26 VITALS — BODY MASS INDEX: 38.76 KG/M2 | WEIGHT: 227 LBS | HEIGHT: 64 IN

## 2023-06-26 DIAGNOSIS — Z96.642 STATUS POST TOTAL REPLACEMENT OF LEFT HIP: ICD-10-CM

## 2023-06-26 DIAGNOSIS — M70.62 TROCHANTERIC BURSITIS OF LEFT HIP: ICD-10-CM

## 2023-06-26 DIAGNOSIS — M19.012 ARTHRITIS OF LEFT SHOULDER REGION: Primary | ICD-10-CM

## 2023-06-26 PROCEDURE — G8417 CALC BMI ABV UP PARAM F/U: HCPCS | Performed by: PHYSICIAN ASSISTANT

## 2023-06-26 PROCEDURE — G8427 DOCREV CUR MEDS BY ELIG CLIN: HCPCS | Performed by: PHYSICIAN ASSISTANT

## 2023-06-26 PROCEDURE — 20610 DRAIN/INJ JOINT/BURSA W/O US: CPT | Performed by: PHYSICIAN ASSISTANT

## 2023-06-26 PROCEDURE — G8399 PT W/DXA RESULTS DOCUMENT: HCPCS | Performed by: PHYSICIAN ASSISTANT

## 2023-06-26 PROCEDURE — 99213 OFFICE O/P EST LOW 20 MIN: CPT | Performed by: PHYSICIAN ASSISTANT

## 2023-06-26 PROCEDURE — 1123F ACP DISCUSS/DSCN MKR DOCD: CPT | Performed by: PHYSICIAN ASSISTANT

## 2023-06-26 PROCEDURE — 1036F TOBACCO NON-USER: CPT | Performed by: PHYSICIAN ASSISTANT

## 2023-06-26 PROCEDURE — 1090F PRES/ABSN URINE INCON ASSESS: CPT | Performed by: PHYSICIAN ASSISTANT

## 2023-06-26 RX ORDER — TRIAMCINOLONE ACETONIDE 40 MG/ML
40 INJECTION, SUSPENSION INTRA-ARTICULAR; INTRAMUSCULAR ONCE
Status: COMPLETED | OUTPATIENT
Start: 2023-06-26 | End: 2023-06-26

## 2023-06-26 RX ORDER — BUPIVACAINE HYDROCHLORIDE 2.5 MG/ML
2 INJECTION, SOLUTION INFILTRATION; PERINEURAL ONCE
Status: COMPLETED | OUTPATIENT
Start: 2023-06-26 | End: 2023-06-26

## 2023-06-26 RX ADMIN — TRIAMCINOLONE ACETONIDE 40 MG: 40 INJECTION, SUSPENSION INTRA-ARTICULAR; INTRAMUSCULAR at 09:41

## 2023-06-26 RX ADMIN — BUPIVACAINE HYDROCHLORIDE 5 MG: 2.5 INJECTION, SOLUTION INFILTRATION; PERINEURAL at 09:40

## 2023-07-10 ENCOUNTER — TELEPHONE (OUTPATIENT)
Dept: FAMILY MEDICINE CLINIC | Age: 83
End: 2023-07-10

## 2023-07-10 NOTE — TELEPHONE ENCOUNTER
I have no availability today. Have her increase her diuretic to twice daily for 7 days, then resume once daily. She should elevate her legs when possible and avoid salt in the diet.

## 2023-07-10 NOTE — TELEPHONE ENCOUNTER
Pt called to get an appt for swollen and ankles. This started about 4 days ago. The left ankle is worse then the right ankle. She has been using ice and elevating them.  Please give pt a call [phone]

## 2023-08-31 ENCOUNTER — TELEMEDICINE (OUTPATIENT)
Dept: FAMILY MEDICINE CLINIC | Age: 83
End: 2023-08-31
Payer: MEDICARE

## 2023-08-31 DIAGNOSIS — Z00.00 MEDICARE ANNUAL WELLNESS VISIT, SUBSEQUENT: Primary | ICD-10-CM

## 2023-08-31 PROCEDURE — 1123F ACP DISCUSS/DSCN MKR DOCD: CPT | Performed by: FAMILY MEDICINE

## 2023-08-31 PROCEDURE — G0439 PPPS, SUBSEQ VISIT: HCPCS | Performed by: FAMILY MEDICINE

## 2023-08-31 ASSESSMENT — PATIENT HEALTH QUESTIONNAIRE - PHQ9
1. LITTLE INTEREST OR PLEASURE IN DOING THINGS: 0
SUM OF ALL RESPONSES TO PHQ QUESTIONS 1-9: 0
2. FEELING DOWN, DEPRESSED OR HOPELESS: 0
SUM OF ALL RESPONSES TO PHQ QUESTIONS 1-9: 0
SUM OF ALL RESPONSES TO PHQ QUESTIONS 1-9: 0
SUM OF ALL RESPONSES TO PHQ9 QUESTIONS 1 & 2: 0
SUM OF ALL RESPONSES TO PHQ QUESTIONS 1-9: 0

## 2023-08-31 ASSESSMENT — LIFESTYLE VARIABLES: HOW OFTEN DO YOU HAVE A DRINK CONTAINING ALCOHOL: NEVER

## 2023-08-31 NOTE — PROGRESS NOTES
Documentation:  I communicated with the patient and/or health care decision maker about (see below). Details of this discussion including any medical advice provided: (see below)    Total Time: minutes: 11-20 minutes    Milton Paulson was evaluated through a synchronous (real-time) audio encounter. Patient identification was verified at the start of the visit. She (or guardian if applicable) is aware that this is a billable service, which includes applicable co-pays. This visit was conducted with the patient's (and/or legal guardian's) verbal consent. She has not had a related appointment within my department in the past 7 days or scheduled within the next 24 hours. The patient was located at Home: [address] [address]. The provider was located at [facility] (Appt Dept): [address] [place] [address],  [address] [place] [address]. Note: not billable if this call serves to triage the patient into an appointment for the relevant concern    Milton Paulson is a 80 y.o. female evaluated via telephone on 2023 for No chief complaint on file. Lorna Drones Epifania Paget, DO         Medicare Annual Wellness Visit  Name: Iveth Roman Date: 2023   MRN: [id number] Sex: Female   Age: 80 y.o. Ethnicity: Non- / Non    : 1940 Race: Black /       Milton Paulson is here for No chief complaint on file. Screenings for behavioral, psychosocial and functional/safety risks, and cognitive dysfunction are all negative except as indicated below. These results, as well as otherpatient data from the Health Risk Assessment form, are documented in Flowsheets linked to this Encounter. Allergies   Allergen Reactions    Latex Itching and Rash    Bactrim Rash       Prior to Visit Medications    Medication Sig Taking?  Authorizing Provider   MYRBETRIQ 50 MG TB24 Take 50 mg by mouth daily  Epifania Paget, DO   fluticasone (FLONASE) 50 MCG/ACT nasal spray USE 2

## 2023-09-06 ENCOUNTER — OFFICE VISIT (OUTPATIENT)
Dept: FAMILY MEDICINE CLINIC | Age: 83
End: 2023-09-06
Payer: MEDICARE

## 2023-09-06 VITALS
HEIGHT: 64 IN | BODY MASS INDEX: 39.27 KG/M2 | WEIGHT: 230 LBS | DIASTOLIC BLOOD PRESSURE: 70 MMHG | SYSTOLIC BLOOD PRESSURE: 124 MMHG

## 2023-09-06 DIAGNOSIS — F41.9 ANXIETY: ICD-10-CM

## 2023-09-06 DIAGNOSIS — F41.9 ANXIETY: Primary | ICD-10-CM

## 2023-09-06 PROCEDURE — G8399 PT W/DXA RESULTS DOCUMENT: HCPCS | Performed by: FAMILY MEDICINE

## 2023-09-06 PROCEDURE — 1123F ACP DISCUSS/DSCN MKR DOCD: CPT | Performed by: FAMILY MEDICINE

## 2023-09-06 PROCEDURE — 1036F TOBACCO NON-USER: CPT | Performed by: FAMILY MEDICINE

## 2023-09-06 PROCEDURE — 3078F DIAST BP <80 MM HG: CPT | Performed by: FAMILY MEDICINE

## 2023-09-06 PROCEDURE — G8417 CALC BMI ABV UP PARAM F/U: HCPCS | Performed by: FAMILY MEDICINE

## 2023-09-06 PROCEDURE — 99213 OFFICE O/P EST LOW 20 MIN: CPT | Performed by: FAMILY MEDICINE

## 2023-09-06 PROCEDURE — 1090F PRES/ABSN URINE INCON ASSESS: CPT | Performed by: FAMILY MEDICINE

## 2023-09-06 PROCEDURE — 3074F SYST BP LT 130 MM HG: CPT | Performed by: FAMILY MEDICINE

## 2023-09-06 PROCEDURE — G8427 DOCREV CUR MEDS BY ELIG CLIN: HCPCS | Performed by: FAMILY MEDICINE

## 2023-09-06 RX ORDER — LORAZEPAM 1 MG/1
TABLET ORAL
Qty: 90 TABLET | Refills: 0 | Status: SHIPPED | OUTPATIENT
Start: 2023-09-06 | End: 2023-10-06

## 2023-09-21 ENCOUNTER — OFFICE VISIT (OUTPATIENT)
Dept: ORTHOPEDIC SURGERY | Age: 83
End: 2023-09-21
Payer: MEDICARE

## 2023-09-21 VITALS — HEIGHT: 64 IN | BODY MASS INDEX: 39.27 KG/M2 | WEIGHT: 230 LBS | RESPIRATION RATE: 16 BRPM

## 2023-09-21 DIAGNOSIS — M19.012 ARTHRITIS OF LEFT SHOULDER REGION: ICD-10-CM

## 2023-09-21 DIAGNOSIS — M70.62 TROCHANTERIC BURSITIS OF LEFT HIP: Primary | ICD-10-CM

## 2023-09-21 DIAGNOSIS — M19.011 ARTHRITIS OF RIGHT SHOULDER REGION: ICD-10-CM

## 2023-09-21 PROCEDURE — 1123F ACP DISCUSS/DSCN MKR DOCD: CPT | Performed by: PHYSICIAN ASSISTANT

## 2023-09-21 PROCEDURE — G8417 CALC BMI ABV UP PARAM F/U: HCPCS | Performed by: PHYSICIAN ASSISTANT

## 2023-09-21 PROCEDURE — G8399 PT W/DXA RESULTS DOCUMENT: HCPCS | Performed by: PHYSICIAN ASSISTANT

## 2023-09-21 PROCEDURE — 20610 DRAIN/INJ JOINT/BURSA W/O US: CPT | Performed by: PHYSICIAN ASSISTANT

## 2023-09-21 PROCEDURE — 1090F PRES/ABSN URINE INCON ASSESS: CPT | Performed by: PHYSICIAN ASSISTANT

## 2023-09-21 PROCEDURE — G8427 DOCREV CUR MEDS BY ELIG CLIN: HCPCS | Performed by: PHYSICIAN ASSISTANT

## 2023-09-21 PROCEDURE — 99213 OFFICE O/P EST LOW 20 MIN: CPT | Performed by: PHYSICIAN ASSISTANT

## 2023-09-21 PROCEDURE — 1036F TOBACCO NON-USER: CPT | Performed by: PHYSICIAN ASSISTANT

## 2023-09-21 RX ORDER — TRIAMCINOLONE ACETONIDE 40 MG/ML
40 INJECTION, SUSPENSION INTRA-ARTICULAR; INTRAMUSCULAR ONCE
Status: COMPLETED | OUTPATIENT
Start: 2023-09-21 | End: 2023-09-21

## 2023-09-21 RX ORDER — BUPIVACAINE HYDROCHLORIDE 2.5 MG/ML
2 INJECTION, SOLUTION INFILTRATION; PERINEURAL ONCE
Status: COMPLETED | OUTPATIENT
Start: 2023-09-21 | End: 2023-09-21

## 2023-09-21 RX ADMIN — BUPIVACAINE HYDROCHLORIDE 5 MG: 2.5 INJECTION, SOLUTION INFILTRATION; PERINEURAL at 14:13

## 2023-09-21 RX ADMIN — TRIAMCINOLONE ACETONIDE 40 MG: 40 INJECTION, SUSPENSION INTRA-ARTICULAR; INTRAMUSCULAR at 14:14

## 2023-09-21 RX ADMIN — TRIAMCINOLONE ACETONIDE 40 MG: 40 INJECTION, SUSPENSION INTRA-ARTICULAR; INTRAMUSCULAR at 14:13

## 2023-12-05 PROBLEM — E78.00 HYPERCHOLESTEROLEMIA: Status: ACTIVE | Noted: 2023-12-05

## 2023-12-05 ASSESSMENT — ENCOUNTER SYMPTOMS: SHORTNESS OF BREATH: 0

## 2023-12-05 NOTE — PROGRESS NOTES
Subjective:      Patient ID: Nicanor Chapman is a 80 y.o. female. Hypertension  This is a chronic problem. The current episode started more than 1 year ago. The problem is unchanged. The problem is controlled. Pertinent negatives include no chest pain, palpitations, peripheral edema or shortness of breath. Risk factors for coronary artery disease include family history, obesity, post-menopausal state and sedentary lifestyle. Past treatments include beta blockers, calcium channel blockers and diuretics. The current treatment provides significant improvement. There are no compliance problems. Hyperlipidemia:  Patient is tolerating and compliant with Lipitor 10 mg daily. She is due for a lipid recheck today. She complains of headache and nausea since starting the medication. Chronic Kidney Disease Stage 3b:  Patient takes Maxzide-25 once daily. Her GFR was 37 on 6-6-23. GERD:  Patient is tolerating and compliant with Omeprazole 20 mg daily. She feels that the medication completely controls her symptoms. She previously failed on Pepcid. Chronic Low Back Pain:  Patient had lumbar fusion with cage insertion at L1-2 and L2-3 on 4-21-22. She no longer complains of back pain. She was taken off Mobic due to CKD. She no longer takes Norco or anything for pain. Anxiety:  Patient takes Ativan 1 mg every 8 hrs as needed for anxiety. She generally takes it once daily and feels that it works well to control her anxiety symptoms. PSVT:  Patient sees Dr. Christiano Norwood and [facility] and had an event monitor ordered by him. She had no events during the 30 day period. She continues to take Lopressor and Calan. Osteoporosis:  Patient is tolerating and compliant with once weekly Fosamax 70 mg. She also takes calcium supplements TID. Her last DEXA scan was on 3-18-22. Overactive Bladder:  Patient was put on Myrbetric 50 mg daily per Dr. Molinda Dance.   She does

## 2023-12-07 ENCOUNTER — OFFICE VISIT (OUTPATIENT)
Dept: FAMILY MEDICINE CLINIC | Age: 83
End: 2023-12-07
Payer: MEDICARE

## 2023-12-07 VITALS
HEIGHT: 64 IN | DIASTOLIC BLOOD PRESSURE: 64 MMHG | BODY MASS INDEX: 39.2 KG/M2 | WEIGHT: 229.6 LBS | SYSTOLIC BLOOD PRESSURE: 120 MMHG

## 2023-12-07 DIAGNOSIS — M54.50 CHRONIC MIDLINE LOW BACK PAIN WITHOUT SCIATICA: ICD-10-CM

## 2023-12-07 DIAGNOSIS — E66.09 NON MORBID OBESITY DUE TO EXCESS CALORIES: ICD-10-CM

## 2023-12-07 DIAGNOSIS — K21.9 GERD WITHOUT ESOPHAGITIS: ICD-10-CM

## 2023-12-07 DIAGNOSIS — I10 ESSENTIAL HYPERTENSION: Primary | ICD-10-CM

## 2023-12-07 DIAGNOSIS — M81.0 OSTEOPOROSIS OF MULTIPLE SITES: ICD-10-CM

## 2023-12-07 DIAGNOSIS — I47.10 PAROXYSMAL SUPRAVENTRICULAR TACHYCARDIA: ICD-10-CM

## 2023-12-07 DIAGNOSIS — I73.9 PERIPHERAL ARTERIAL DISEASE (HCC): ICD-10-CM

## 2023-12-07 DIAGNOSIS — N32.81 OVERACTIVE BLADDER: ICD-10-CM

## 2023-12-07 DIAGNOSIS — N18.32 CHRONIC KIDNEY DISEASE, STAGE 3B (HCC): ICD-10-CM

## 2023-12-07 DIAGNOSIS — F41.9 ANXIETY: ICD-10-CM

## 2023-12-07 DIAGNOSIS — E78.00 HYPERCHOLESTEROLEMIA: ICD-10-CM

## 2023-12-07 DIAGNOSIS — G89.29 CHRONIC MIDLINE LOW BACK PAIN WITHOUT SCIATICA: ICD-10-CM

## 2023-12-07 LAB
ALT SERPL-CCNC: 8 U/L (ref 10–40)
ANION GAP SERPL CALCULATED.3IONS-SCNC: 12 MMOL/L (ref 3–16)
AST SERPL-CCNC: 16 U/L (ref 15–37)
BUN SERPL-MCNC: 21 MG/DL (ref 7–20)
CALCIUM SERPL-MCNC: 9.6 MG/DL (ref 8.3–10.6)
CHLORIDE SERPL-SCNC: 101 MMOL/L (ref 99–110)
CHOLEST SERPL-MCNC: 164 MG/DL (ref 0–199)
CO2 SERPL-SCNC: 28 MMOL/L (ref 21–32)
CREAT SERPL-MCNC: 1.4 MG/DL (ref 0.6–1.2)
GFR SERPLBLD CREATININE-BSD FMLA CKD-EPI: 37 ML/MIN/{1.73_M2}
GLUCOSE SERPL-MCNC: 90 MG/DL (ref 70–99)
HDLC SERPL-MCNC: 78 MG/DL (ref 40–60)
LDLC SERPL CALC-MCNC: 74 MG/DL
POTASSIUM SERPL-SCNC: 4.1 MMOL/L (ref 3.5–5.1)
SODIUM SERPL-SCNC: 141 MMOL/L (ref 136–145)
TRIGL SERPL-MCNC: 62 MG/DL (ref 0–150)
VLDLC SERPL CALC-MCNC: 12 MG/DL

## 2023-12-07 PROCEDURE — G8484 FLU IMMUNIZE NO ADMIN: HCPCS | Performed by: FAMILY MEDICINE

## 2023-12-07 PROCEDURE — 36415 COLL VENOUS BLD VENIPUNCTURE: CPT | Performed by: FAMILY MEDICINE

## 2023-12-07 PROCEDURE — 3078F DIAST BP <80 MM HG: CPT | Performed by: FAMILY MEDICINE

## 2023-12-07 PROCEDURE — G8399 PT W/DXA RESULTS DOCUMENT: HCPCS | Performed by: FAMILY MEDICINE

## 2023-12-07 PROCEDURE — 3074F SYST BP LT 130 MM HG: CPT | Performed by: FAMILY MEDICINE

## 2023-12-07 PROCEDURE — G8417 CALC BMI ABV UP PARAM F/U: HCPCS | Performed by: FAMILY MEDICINE

## 2023-12-07 PROCEDURE — 1090F PRES/ABSN URINE INCON ASSESS: CPT | Performed by: FAMILY MEDICINE

## 2023-12-07 PROCEDURE — 1036F TOBACCO NON-USER: CPT | Performed by: FAMILY MEDICINE

## 2023-12-07 PROCEDURE — 99214 OFFICE O/P EST MOD 30 MIN: CPT | Performed by: FAMILY MEDICINE

## 2023-12-07 PROCEDURE — G8427 DOCREV CUR MEDS BY ELIG CLIN: HCPCS | Performed by: FAMILY MEDICINE

## 2023-12-07 PROCEDURE — 1123F ACP DISCUSS/DSCN MKR DOCD: CPT | Performed by: FAMILY MEDICINE

## 2023-12-07 RX ORDER — METOPROLOL TARTRATE 50 MG/1
50 TABLET, FILM COATED ORAL 2 TIMES DAILY
Qty: 180 TABLET | Refills: 1 | Status: SHIPPED | OUTPATIENT
Start: 2023-12-07

## 2023-12-07 RX ORDER — OMEPRAZOLE 20 MG/1
CAPSULE, DELAYED RELEASE ORAL
Qty: 90 CAPSULE | Refills: 1 | Status: SHIPPED | OUTPATIENT
Start: 2023-12-07

## 2023-12-07 RX ORDER — TRIAMTERENE AND HYDROCHLOROTHIAZIDE 37.5; 25 MG/1; MG/1
1 TABLET ORAL DAILY
Qty: 90 TABLET | Refills: 1 | Status: SHIPPED | OUTPATIENT
Start: 2023-12-07

## 2023-12-07 RX ORDER — MIRABEGRON 50 MG/1
50 TABLET, FILM COATED, EXTENDED RELEASE ORAL DAILY
Qty: 90 TABLET | Refills: 1 | Status: CANCELLED | OUTPATIENT
Start: 2023-12-07

## 2023-12-07 RX ORDER — ROSUVASTATIN CALCIUM 5 MG/1
5 TABLET, COATED ORAL NIGHTLY
Qty: 90 TABLET | Refills: 1 | Status: SHIPPED | OUTPATIENT
Start: 2023-12-07

## 2023-12-07 RX ORDER — ALENDRONATE SODIUM 70 MG/1
70 TABLET ORAL
Qty: 12 TABLET | Refills: 1 | Status: SHIPPED | OUTPATIENT
Start: 2023-12-07

## 2023-12-07 RX ORDER — LORAZEPAM 1 MG/1
TABLET ORAL
Qty: 90 TABLET | Refills: 0 | Status: SHIPPED | OUTPATIENT
Start: 2023-12-07 | End: 2024-01-06

## 2023-12-28 ENCOUNTER — OFFICE VISIT (OUTPATIENT)
Dept: ORTHOPEDIC SURGERY | Age: 83
End: 2023-12-28
Payer: MEDICARE

## 2023-12-28 VITALS — BODY MASS INDEX: 39.09 KG/M2 | HEIGHT: 64 IN | WEIGHT: 229 LBS | RESPIRATION RATE: 15 BRPM

## 2023-12-28 DIAGNOSIS — M70.62 TROCHANTERIC BURSITIS OF LEFT HIP: Primary | ICD-10-CM

## 2023-12-28 DIAGNOSIS — M19.012 ARTHRITIS OF LEFT SHOULDER REGION: ICD-10-CM

## 2023-12-28 PROCEDURE — G8484 FLU IMMUNIZE NO ADMIN: HCPCS | Performed by: PHYSICIAN ASSISTANT

## 2023-12-28 PROCEDURE — 1123F ACP DISCUSS/DSCN MKR DOCD: CPT | Performed by: PHYSICIAN ASSISTANT

## 2023-12-28 PROCEDURE — G8427 DOCREV CUR MEDS BY ELIG CLIN: HCPCS | Performed by: PHYSICIAN ASSISTANT

## 2023-12-28 PROCEDURE — G8417 CALC BMI ABV UP PARAM F/U: HCPCS | Performed by: PHYSICIAN ASSISTANT

## 2023-12-28 PROCEDURE — G8399 PT W/DXA RESULTS DOCUMENT: HCPCS | Performed by: PHYSICIAN ASSISTANT

## 2023-12-28 PROCEDURE — 1090F PRES/ABSN URINE INCON ASSESS: CPT | Performed by: PHYSICIAN ASSISTANT

## 2023-12-28 PROCEDURE — 1036F TOBACCO NON-USER: CPT | Performed by: PHYSICIAN ASSISTANT

## 2023-12-28 PROCEDURE — 20610 DRAIN/INJ JOINT/BURSA W/O US: CPT | Performed by: PHYSICIAN ASSISTANT

## 2023-12-28 PROCEDURE — 99213 OFFICE O/P EST LOW 20 MIN: CPT | Performed by: PHYSICIAN ASSISTANT

## 2023-12-28 RX ORDER — TRIAMCINOLONE ACETONIDE 40 MG/ML
40 INJECTION, SUSPENSION INTRA-ARTICULAR; INTRAMUSCULAR ONCE
Status: COMPLETED | OUTPATIENT
Start: 2023-12-28 | End: 2023-12-28

## 2023-12-28 RX ORDER — BUPIVACAINE HYDROCHLORIDE 2.5 MG/ML
2 INJECTION, SOLUTION INFILTRATION; PERINEURAL ONCE
Status: COMPLETED | OUTPATIENT
Start: 2023-12-28 | End: 2023-12-28

## 2023-12-28 RX ADMIN — BUPIVACAINE HYDROCHLORIDE 5 MG: 2.5 INJECTION, SOLUTION INFILTRATION; PERINEURAL at 09:28

## 2023-12-28 RX ADMIN — TRIAMCINOLONE ACETONIDE 40 MG: 40 INJECTION, SUSPENSION INTRA-ARTICULAR; INTRAMUSCULAR at 09:29

## 2024-01-02 RX ORDER — MIRABEGRON 50 MG/1
50 TABLET, FILM COATED, EXTENDED RELEASE ORAL DAILY
Qty: 90 TABLET | Refills: 1 | Status: SHIPPED | OUTPATIENT
Start: 2024-01-02

## 2024-01-26 NOTE — PROGRESS NOTES
bruising or bleeding, blood clots or swollen lymph nodes.  Allergic/Immunologic: No nasal congestion or hives.    Physical Exam:   BP (!) 150/74 (Site: Right Upper Arm, Position: Sitting, Cuff Size: Large Adult)   Pulse 59   Ht 1.626 m (5' 4.02\")   Wt 104 kg (229 lb 3.2 oz)   SpO2 98%   BMI 39.32 kg/m²   Wt Readings from Last 3 Encounters:   01/29/24 104 kg (229 lb 3.2 oz)   12/28/23 103.9 kg (229 lb)   12/07/23 104.1 kg (229 lb 9.6 oz)     Constitutional: She is oriented to person, place, and time. She appears well-developed and well-nourished. In no acute distress.   Head: Normocephalic and atraumatic.  Pupils equal and round.  Neck: Neck supple. No JVP or carotid bruit appreciated. No mass and no thyromegaly present. No lymphadenopathy present.  Cardiovascular: Normal rate with ectopy. Normal heart sounds. Exam reveals no gallop and no friction rub. No murmur heard.  Pulmonary/Chest: Effort normal and breath sounds normal. No respiratory distress. She has no wheezes, rhonchi or rales.   Abdominal: Soft, non-tender. Bowel sounds are normal. She exhibits no organomegaly, mass or bruit.   Extremities: No edema, cyanosis, or clubbing. Pulses are 2+ radial/carotid bilaterally.  Neurological: No gross cranial nerve deficit. Coordination normal.   Skin: Skin is warm and dry. There is no rash or diaphoresis.   Psychiatric: She has a normal mood and affect. Her speech is normal and behavior is normal.     Lab Review:   FLP:    Lab Results   Component Value Date/Time    TRIG 62 12/07/2023 09:28 AM    HDL 78 12/07/2023 09:28 AM    LDLCALC 74 12/07/2023 09:28 AM     BUN/Creatinine:    Lab Results   Component Value Date/Time    BUN 21 12/07/2023 09:28 AM    CREATININE 1.4 12/07/2023 09:28 AM     EKG Interpretation: 4/26/18 Normal sinus rhythm. Nonspecific T-abnormality.   5/10/21 Sinus rhythm with PACs and nonspecific T wave abnormality.   1/7/22 Sinus rhythm. Voltage criteria for LVH. Nonspecific T wave

## 2024-01-29 ENCOUNTER — OFFICE VISIT (OUTPATIENT)
Dept: CARDIOLOGY CLINIC | Age: 84
End: 2024-01-29
Payer: MEDICARE

## 2024-01-29 VITALS
HEIGHT: 64 IN | BODY MASS INDEX: 39.13 KG/M2 | DIASTOLIC BLOOD PRESSURE: 74 MMHG | HEART RATE: 59 BPM | OXYGEN SATURATION: 98 % | SYSTOLIC BLOOD PRESSURE: 150 MMHG | WEIGHT: 229.2 LBS

## 2024-01-29 DIAGNOSIS — I10 ESSENTIAL HYPERTENSION: ICD-10-CM

## 2024-01-29 DIAGNOSIS — R00.2 PALPITATIONS: ICD-10-CM

## 2024-01-29 DIAGNOSIS — I47.10 PAROXYSMAL SUPRAVENTRICULAR TACHYCARDIA: Primary | ICD-10-CM

## 2024-01-29 PROCEDURE — G8484 FLU IMMUNIZE NO ADMIN: HCPCS | Performed by: INTERNAL MEDICINE

## 2024-01-29 PROCEDURE — 93000 ELECTROCARDIOGRAM COMPLETE: CPT | Performed by: INTERNAL MEDICINE

## 2024-01-29 PROCEDURE — 3077F SYST BP >= 140 MM HG: CPT | Performed by: INTERNAL MEDICINE

## 2024-01-29 PROCEDURE — 1090F PRES/ABSN URINE INCON ASSESS: CPT | Performed by: INTERNAL MEDICINE

## 2024-01-29 PROCEDURE — 3078F DIAST BP <80 MM HG: CPT | Performed by: INTERNAL MEDICINE

## 2024-01-29 PROCEDURE — 99214 OFFICE O/P EST MOD 30 MIN: CPT | Performed by: INTERNAL MEDICINE

## 2024-01-29 PROCEDURE — G8399 PT W/DXA RESULTS DOCUMENT: HCPCS | Performed by: INTERNAL MEDICINE

## 2024-01-29 PROCEDURE — 1123F ACP DISCUSS/DSCN MKR DOCD: CPT | Performed by: INTERNAL MEDICINE

## 2024-01-29 PROCEDURE — G8417 CALC BMI ABV UP PARAM F/U: HCPCS | Performed by: INTERNAL MEDICINE

## 2024-01-29 PROCEDURE — G8427 DOCREV CUR MEDS BY ELIG CLIN: HCPCS | Performed by: INTERNAL MEDICINE

## 2024-01-29 PROCEDURE — 1036F TOBACCO NON-USER: CPT | Performed by: INTERNAL MEDICINE

## 2024-03-01 DIAGNOSIS — F41.9 ANXIETY: ICD-10-CM

## 2024-03-01 RX ORDER — LORAZEPAM 1 MG/1
TABLET ORAL
Qty: 90 TABLET | Refills: 0 | Status: SHIPPED | OUTPATIENT
Start: 2024-03-01 | End: 2024-03-31

## 2024-03-04 DIAGNOSIS — F41.9 ANXIETY: ICD-10-CM

## 2024-03-04 RX ORDER — LORAZEPAM 1 MG/1
TABLET ORAL
Qty: 90 TABLET | Refills: 0 | OUTPATIENT
Start: 2024-03-04 | End: 2024-04-03

## 2024-03-04 NOTE — TELEPHONE ENCOUNTER
Last office visit 12/7/2023       Next office visit scheduled 3/7/2024    Requested Prescriptions     Pending Prescriptions Disp Refills    LORazepam (ATIVAN) 1 MG tablet 90 tablet 0     Sig: TAKE 1 TABLET EVERY 8 HOURS AS NEEDED FOR ANXIETY FOR UP TO 30 DAYS

## 2024-03-05 ASSESSMENT — ENCOUNTER SYMPTOMS: SHORTNESS OF BREATH: 0

## 2024-03-05 NOTE — PROGRESS NOTES
Subjective:      Patient ID: Emily Motta is a 84 y.o. female.    HPI    Anxiety:  Patient takes Ativan 1 mg every 8 hrs as needed for anxiety.  She generally takes it once daily and feels that it works well to control her anxiety symptoms.      Hyperlipidemia:  Patient was switched from Lipitor to Crestor 5 mg daily at her visit on 12-7-23, due to headache and nausea from Lipitor.   She is tolerating the Crestor well.  She is fasting today for a lipid recheck since starting the Crestor.      Review of Systems   Constitutional:  Negative for chills and fever.   Respiratory:  Negative for shortness of breath.    Cardiovascular:  Negative for chest pain, palpitations and leg swelling.   Psychiatric/Behavioral:  Negative for dysphoric mood and suicidal ideas. The patient is nervous/anxious.      /72   Wt 102.5 kg (226 lb)   BMI 38.77 kg/m²    Objective:   Physical Exam  Constitutional:       General: She is not in acute distress.     Appearance: She is well-developed.   HENT:      Head: Normocephalic.      Right Ear: External ear normal.      Left Ear: External ear normal.      Mouth/Throat:      Pharynx: No oropharyngeal exudate.   Neck:      Thyroid: No thyromegaly.      Vascular: No JVD.   Cardiovascular:      Rate and Rhythm: Normal rate and regular rhythm.      Heart sounds: Normal heart sounds. No murmur heard.  Pulmonary:      Effort: Pulmonary effort is normal.      Breath sounds: Normal breath sounds. No wheezing or rales.   Lymphadenopathy:      Cervical: No cervical adenopathy.   Neurological:      Mental Status: She is alert and oriented to person, place, and time.         Assessment:      Anxiety   Hyperlipidemia       Plan:      Lipid Panel, AST, ALT  OARRS report was reviewed  Medications refilled   I recommended the RSV shot at the pharmacy.   Barrie has already done her 2024 AWV  RTO 3 months for Hypertension / CKD / GERD / Anxiety       Controlled Substance Monitoring:    Acute and Chronic

## 2024-03-07 ENCOUNTER — OFFICE VISIT (OUTPATIENT)
Dept: FAMILY MEDICINE CLINIC | Age: 84
End: 2024-03-07
Payer: MEDICARE

## 2024-03-07 VITALS — DIASTOLIC BLOOD PRESSURE: 72 MMHG | WEIGHT: 226 LBS | BODY MASS INDEX: 38.77 KG/M2 | SYSTOLIC BLOOD PRESSURE: 124 MMHG

## 2024-03-07 DIAGNOSIS — E78.00 HYPERCHOLESTEROLEMIA: ICD-10-CM

## 2024-03-07 DIAGNOSIS — F41.9 ANXIETY: Primary | ICD-10-CM

## 2024-03-07 LAB
ALT SERPL-CCNC: 7 U/L (ref 10–40)
AST SERPL-CCNC: 15 U/L (ref 15–37)
CHOLEST SERPL-MCNC: 167 MG/DL (ref 0–199)
HDLC SERPL-MCNC: 71 MG/DL (ref 40–60)
LDLC SERPL CALC-MCNC: 82 MG/DL
TRIGL SERPL-MCNC: 70 MG/DL (ref 0–150)
VLDLC SERPL CALC-MCNC: 14 MG/DL

## 2024-03-07 PROCEDURE — 3078F DIAST BP <80 MM HG: CPT | Performed by: FAMILY MEDICINE

## 2024-03-07 PROCEDURE — G8484 FLU IMMUNIZE NO ADMIN: HCPCS | Performed by: FAMILY MEDICINE

## 2024-03-07 PROCEDURE — 99214 OFFICE O/P EST MOD 30 MIN: CPT | Performed by: FAMILY MEDICINE

## 2024-03-07 PROCEDURE — 36415 COLL VENOUS BLD VENIPUNCTURE: CPT | Performed by: FAMILY MEDICINE

## 2024-03-07 PROCEDURE — 1090F PRES/ABSN URINE INCON ASSESS: CPT | Performed by: FAMILY MEDICINE

## 2024-03-07 PROCEDURE — 1036F TOBACCO NON-USER: CPT | Performed by: FAMILY MEDICINE

## 2024-03-07 PROCEDURE — G8399 PT W/DXA RESULTS DOCUMENT: HCPCS | Performed by: FAMILY MEDICINE

## 2024-03-07 PROCEDURE — 1123F ACP DISCUSS/DSCN MKR DOCD: CPT | Performed by: FAMILY MEDICINE

## 2024-03-07 PROCEDURE — G8427 DOCREV CUR MEDS BY ELIG CLIN: HCPCS | Performed by: FAMILY MEDICINE

## 2024-03-07 PROCEDURE — 3074F SYST BP LT 130 MM HG: CPT | Performed by: FAMILY MEDICINE

## 2024-03-07 PROCEDURE — G8417 CALC BMI ABV UP PARAM F/U: HCPCS | Performed by: FAMILY MEDICINE

## 2024-04-11 ENCOUNTER — OFFICE VISIT (OUTPATIENT)
Dept: ORTHOPEDIC SURGERY | Age: 84
End: 2024-04-11
Payer: MEDICARE

## 2024-04-11 VITALS — HEIGHT: 64 IN | BODY MASS INDEX: 38.58 KG/M2 | RESPIRATION RATE: 16 BRPM | WEIGHT: 226 LBS

## 2024-04-11 DIAGNOSIS — M19.012 ARTHRITIS OF LEFT SHOULDER REGION: Primary | ICD-10-CM

## 2024-04-11 PROCEDURE — G8427 DOCREV CUR MEDS BY ELIG CLIN: HCPCS | Performed by: PHYSICIAN ASSISTANT

## 2024-04-11 PROCEDURE — 20610 DRAIN/INJ JOINT/BURSA W/O US: CPT | Performed by: PHYSICIAN ASSISTANT

## 2024-04-11 PROCEDURE — 1036F TOBACCO NON-USER: CPT | Performed by: PHYSICIAN ASSISTANT

## 2024-04-11 PROCEDURE — G8417 CALC BMI ABV UP PARAM F/U: HCPCS | Performed by: PHYSICIAN ASSISTANT

## 2024-04-11 PROCEDURE — 99213 OFFICE O/P EST LOW 20 MIN: CPT | Performed by: PHYSICIAN ASSISTANT

## 2024-04-11 PROCEDURE — 1090F PRES/ABSN URINE INCON ASSESS: CPT | Performed by: PHYSICIAN ASSISTANT

## 2024-04-11 PROCEDURE — G8399 PT W/DXA RESULTS DOCUMENT: HCPCS | Performed by: PHYSICIAN ASSISTANT

## 2024-04-11 PROCEDURE — 1123F ACP DISCUSS/DSCN MKR DOCD: CPT | Performed by: PHYSICIAN ASSISTANT

## 2024-04-11 RX ORDER — TRIAMCINOLONE ACETONIDE 40 MG/ML
40 INJECTION, SUSPENSION INTRA-ARTICULAR; INTRAMUSCULAR ONCE
Status: COMPLETED | OUTPATIENT
Start: 2024-04-11 | End: 2024-04-11

## 2024-04-11 RX ORDER — BUPIVACAINE HYDROCHLORIDE 2.5 MG/ML
2 INJECTION, SOLUTION INFILTRATION; PERINEURAL ONCE
Status: COMPLETED | OUTPATIENT
Start: 2024-04-11 | End: 2024-04-11

## 2024-04-11 RX ADMIN — BUPIVACAINE HYDROCHLORIDE 5 MG: 2.5 INJECTION, SOLUTION INFILTRATION; PERINEURAL at 15:34

## 2024-04-11 RX ADMIN — TRIAMCINOLONE ACETONIDE 40 MG: 40 INJECTION, SUSPENSION INTRA-ARTICULAR; INTRAMUSCULAR at 15:34

## 2024-04-11 NOTE — PROGRESS NOTES
.dtr  Resp. rate 16, height 1.626 m (5' 4\"), weight 102.5 kg (226 lb), not currently breastfeeding.    This dictation was done with [employer]on dictation and may contain mechanical errors related to translatio      Mrs. Motta is a very pleasant 84-year-old female who has ongoing pain from osteoarthritis in her left shoulder.  She was last seen at the end of December of last year had a cortisone shot which did provide relief.  Her pain radiates into the forearm and up into the neck very minimally.  She is already had a reverse shoulder on the  right side and its better but not 100%    This is a pleasant, well-developed patient in no acute distress. They are alert and oriented ×3. They have had continued pain in their left shoulder that's aggravated with overhead activities or sleeping on it. They deny any significant radicular pain or neuropathy. They've had injections in the past of cortisone and has helped with her symptoms. They noticed that there was some increasing pain and and swelling and disability over the last 7 to 10 days especially.  This increase led to them making an appointment.    On examination, there is a positive L[place] and Quinones's test. Impingement pain with crossover and weakness to supraspinatus strength testing. There is good distal pulses with good dorsiflexion and palmar flexion strength. There are no cutaneous lesions or lymphadenopathy present. There is approximately 170° of forward flexion and 100° of abduction.    My impression is left shoulder rotator cuff tendinitis with impingement syndrome.    After a discussion of the multiple options, they consented to a cortisone shot. 1 ml of 40mg/ml Kenalog and 2 ml's of 0.25%Marcaine were injected into the left shoulder sub acromial space.    This was done with sterile technique and they tolerated it well. During today's visit, there was approximately 20 minutes of face-to-face discussion in regards to the patient's current condition and

## 2024-05-31 RX ORDER — METOPROLOL TARTRATE 50 MG/1
50 TABLET, FILM COATED ORAL 2 TIMES DAILY
Qty: 180 TABLET | Refills: 0 | Status: SHIPPED | OUTPATIENT
Start: 2024-05-31

## 2024-05-31 RX ORDER — TRIAMTERENE AND HYDROCHLOROTHIAZIDE 37.5; 25 MG/1; MG/1
1 TABLET ORAL DAILY
Qty: 90 TABLET | Refills: 0 | Status: SHIPPED | OUTPATIENT
Start: 2024-05-31

## 2024-05-31 RX ORDER — OMEPRAZOLE 20 MG/1
CAPSULE, DELAYED RELEASE ORAL
Qty: 90 CAPSULE | Refills: 0 | Status: SHIPPED | OUTPATIENT
Start: 2024-05-31

## 2024-05-31 RX ORDER — ROSUVASTATIN CALCIUM 5 MG/1
5 TABLET, COATED ORAL NIGHTLY
Qty: 90 TABLET | Refills: 0 | Status: SHIPPED | OUTPATIENT
Start: 2024-05-31

## 2024-05-31 NOTE — TELEPHONE ENCOUNTER
Last office visit 3/7/2024     Last written     Next office visit scheduled 6/10/2024    Requested Prescriptions     Pending Prescriptions Disp Refills    rosuvastatin (CRESTOR) 5 MG tablet [Pharmacy Med Name: ROSUVASTATIN CALCIUM 5 MG Tablet] 90 tablet 3     Sig: TAKE 1 TABLET EVERY NIGHT    verapamil (CALAN SR) 120 MG extended release tablet [Pharmacy Med Name: VERAPAMIL HYDROCHLORIDE  MG Tablet Extended Release] 90 tablet 3     Sig: TAKE 1 TABLET EVERY NIGHT    metoprolol tartrate (LOPRESSOR) 50 MG tablet [Pharmacy Med Name: METOPROLOL TARTRATE 50 MG Tablet] 180 tablet 3     Sig: TAKE 1 TABLET TWICE DAILY    triamterene-hydroCHLOROthiazide (MAXZIDE-25) 37.5-25 MG per tablet [Pharmacy Med Name: TRIAMTERENE/HYDROCHLOROTHIAZIDE 37.5-25 MG Tablet] 90 tablet 3     Sig: TAKE 1 TABLET EVERY DAY    omeprazole (PRILOSEC) 20 MG delayed release capsule [Pharmacy Med Name: OMEPRAZOLE 20 MG Capsule Delayed Release] 90 capsule 3     Sig: TAKE 1 CAPSULE EVERY DAY

## 2024-06-06 NOTE — PROGRESS NOTES
Subjective:      Patient ID: Emily Motta is a 84 y.o. female.    Hypertension  This is a chronic problem. The current episode started more than 1 year ago. The problem is unchanged. The problem is controlled. Associated symptoms include peripheral edema. Pertinent negatives include no chest pain, palpitations or shortness of breath. Risk factors for coronary artery disease include family history, obesity, post-menopausal state and sedentary lifestyle. Past treatments include beta blockers, calcium channel blockers and diuretics. The current treatment provides significant improvement. There are no compliance problems.      Hyperlipidemia:  Patient is tolerating and compliant with Crestor 5 mg daily.  Her last lipid check was on 3-7-24 and was at goal for LDL.     Chronic Kidney Disease Stage 3b:  Patient takes Maxzide-25 once daily.  Her GFR was 37 on 12-7-23.       GERD:  Patient is tolerating and compliant with Omeprazole 20 mg daily.  She feels that the medication completely controls her symptoms.  She previously failed on Pepcid.      Chronic Low Back Pain:  Patient had lumbar fusion with cage insertion at L1-2 and L2-3 on 4-21-22.  She no longer complains of back pain.  She was taken off Mobic due to CKD.  She no longer takes Norco or anything for pain.  She would like to try taking Neurontin for neuropathic pain.       Anxiety:  Patient takes Ativan 1 mg every 8 hrs as needed for anxiety.  She generally takes it once daily and feels that it works well to control her anxiety symptoms.       PSVT:  Patient sees Dr. Boyd and was taken off the Rythmol on 4-26-18 and had an event monitor ordered by him.   She had no events during the 30 day period.  She continues to take Lopressor and Calan.      Osteoporosis:  Patient is tolerating and compliant with once weekly Fosamax 70 mg.  She also takes calcium supplements TID.  She is now due for a DEXA scan.       Overactive Bladder:  Patient was put on Myrbetric 50 mg

## 2024-06-07 SDOH — ECONOMIC STABILITY: FOOD INSECURITY: WITHIN THE PAST 12 MONTHS, THE FOOD YOU BOUGHT JUST DIDN'T LAST AND YOU DIDN'T HAVE MONEY TO GET MORE.: NEVER TRUE

## 2024-06-07 SDOH — ECONOMIC STABILITY: INCOME INSECURITY: HOW HARD IS IT FOR YOU TO PAY FOR THE VERY BASICS LIKE FOOD, HOUSING, MEDICAL CARE, AND HEATING?: NOT VERY HARD

## 2024-06-07 SDOH — ECONOMIC STABILITY: FOOD INSECURITY: WITHIN THE PAST 12 MONTHS, YOU WORRIED THAT YOUR FOOD WOULD RUN OUT BEFORE YOU GOT MONEY TO BUY MORE.: NEVER TRUE

## 2024-06-10 ENCOUNTER — OFFICE VISIT (OUTPATIENT)
Dept: FAMILY MEDICINE CLINIC | Age: 84
End: 2024-06-10
Payer: MEDICARE

## 2024-06-10 VITALS
WEIGHT: 237 LBS | BODY MASS INDEX: 40.46 KG/M2 | HEIGHT: 64 IN | DIASTOLIC BLOOD PRESSURE: 74 MMHG | SYSTOLIC BLOOD PRESSURE: 132 MMHG

## 2024-06-10 DIAGNOSIS — I73.9 PERIPHERAL ARTERIAL DISEASE (HCC): ICD-10-CM

## 2024-06-10 DIAGNOSIS — G89.29 CHRONIC MIDLINE LOW BACK PAIN WITHOUT SCIATICA: ICD-10-CM

## 2024-06-10 DIAGNOSIS — N32.81 OVERACTIVE BLADDER: ICD-10-CM

## 2024-06-10 DIAGNOSIS — E66.09 NON MORBID OBESITY DUE TO EXCESS CALORIES: ICD-10-CM

## 2024-06-10 DIAGNOSIS — M54.50 CHRONIC MIDLINE LOW BACK PAIN WITHOUT SCIATICA: ICD-10-CM

## 2024-06-10 DIAGNOSIS — K21.9 GERD WITHOUT ESOPHAGITIS: ICD-10-CM

## 2024-06-10 DIAGNOSIS — F41.9 ANXIETY: ICD-10-CM

## 2024-06-10 DIAGNOSIS — N18.32 CHRONIC KIDNEY DISEASE, STAGE 3B (HCC): ICD-10-CM

## 2024-06-10 DIAGNOSIS — E78.00 HYPERCHOLESTEROLEMIA: ICD-10-CM

## 2024-06-10 DIAGNOSIS — I47.10 PAROXYSMAL SUPRAVENTRICULAR TACHYCARDIA (HCC): ICD-10-CM

## 2024-06-10 DIAGNOSIS — M81.0 OSTEOPOROSIS OF MULTIPLE SITES: ICD-10-CM

## 2024-06-10 DIAGNOSIS — Z78.0 POST-MENOPAUSAL: ICD-10-CM

## 2024-06-10 DIAGNOSIS — I10 ESSENTIAL HYPERTENSION: Primary | ICD-10-CM

## 2024-06-10 PROCEDURE — 1123F ACP DISCUSS/DSCN MKR DOCD: CPT | Performed by: FAMILY MEDICINE

## 2024-06-10 PROCEDURE — 3078F DIAST BP <80 MM HG: CPT | Performed by: FAMILY MEDICINE

## 2024-06-10 PROCEDURE — G2211 COMPLEX E/M VISIT ADD ON: HCPCS | Performed by: FAMILY MEDICINE

## 2024-06-10 PROCEDURE — 99214 OFFICE O/P EST MOD 30 MIN: CPT | Performed by: FAMILY MEDICINE

## 2024-06-10 PROCEDURE — G8417 CALC BMI ABV UP PARAM F/U: HCPCS | Performed by: FAMILY MEDICINE

## 2024-06-10 PROCEDURE — 3075F SYST BP GE 130 - 139MM HG: CPT | Performed by: FAMILY MEDICINE

## 2024-06-10 PROCEDURE — 1090F PRES/ABSN URINE INCON ASSESS: CPT | Performed by: FAMILY MEDICINE

## 2024-06-10 PROCEDURE — 1036F TOBACCO NON-USER: CPT | Performed by: FAMILY MEDICINE

## 2024-06-10 PROCEDURE — G8427 DOCREV CUR MEDS BY ELIG CLIN: HCPCS | Performed by: FAMILY MEDICINE

## 2024-06-10 PROCEDURE — G8399 PT W/DXA RESULTS DOCUMENT: HCPCS | Performed by: FAMILY MEDICINE

## 2024-06-10 RX ORDER — GABAPENTIN 100 MG/1
100 CAPSULE ORAL 3 TIMES DAILY
Qty: 270 CAPSULE | Refills: 1 | Status: SHIPPED | OUTPATIENT
Start: 2024-06-10 | End: 2024-12-07

## 2024-06-10 RX ORDER — LORAZEPAM 1 MG/1
TABLET ORAL
Qty: 90 TABLET | Refills: 0 | Status: SHIPPED | OUTPATIENT
Start: 2024-06-10 | End: 2024-07-10

## 2024-07-05 ENCOUNTER — HOSPITAL ENCOUNTER (OUTPATIENT)
Dept: WOMENS IMAGING | Age: 84
Discharge: HOME | End: 2024-07-05
Payer: MEDICARE

## 2024-07-05 DIAGNOSIS — Z12.31 SCREENING MAMMOGRAM FOR BREAST CANCER: ICD-10-CM

## 2024-07-05 PROCEDURE — 77063 BREAST TOMOSYNTHESIS BI: CPT

## 2024-07-06 SDOH — HEALTH STABILITY: PHYSICAL HEALTH: ON AVERAGE, HOW MANY DAYS PER WEEK DO YOU ENGAGE IN MODERATE TO STRENUOUS EXERCISE (LIKE A BRISK WALK)?: 4 DAYS

## 2024-07-06 SDOH — HEALTH STABILITY: PHYSICAL HEALTH: ON AVERAGE, HOW MANY MINUTES DO YOU ENGAGE IN EXERCISE AT THIS LEVEL?: 20 MIN

## 2024-07-09 ENCOUNTER — OFFICE VISIT (OUTPATIENT)
Dept: ORTHOPEDIC SURGERY | Age: 84
End: 2024-07-09
Payer: MEDICARE

## 2024-07-09 VITALS — WEIGHT: 237 LBS | HEIGHT: 64 IN | BODY MASS INDEX: 40.46 KG/M2

## 2024-07-09 DIAGNOSIS — M19.012 ARTHRITIS OF LEFT SHOULDER REGION: Primary | ICD-10-CM

## 2024-07-09 PROCEDURE — 1036F TOBACCO NON-USER: CPT | Performed by: ORTHOPAEDIC SURGERY

## 2024-07-09 PROCEDURE — 1090F PRES/ABSN URINE INCON ASSESS: CPT | Performed by: ORTHOPAEDIC SURGERY

## 2024-07-09 PROCEDURE — G8427 DOCREV CUR MEDS BY ELIG CLIN: HCPCS | Performed by: ORTHOPAEDIC SURGERY

## 2024-07-09 PROCEDURE — 99203 OFFICE O/P NEW LOW 30 MIN: CPT | Performed by: ORTHOPAEDIC SURGERY

## 2024-07-09 PROCEDURE — G8417 CALC BMI ABV UP PARAM F/U: HCPCS | Performed by: ORTHOPAEDIC SURGERY

## 2024-07-09 PROCEDURE — G8399 PT W/DXA RESULTS DOCUMENT: HCPCS | Performed by: ORTHOPAEDIC SURGERY

## 2024-07-09 PROCEDURE — 1123F ACP DISCUSS/DSCN MKR DOCD: CPT | Performed by: ORTHOPAEDIC SURGERY

## 2024-07-09 NOTE — PROGRESS NOTES
[facility]  History and Physical  Shoulder Pain    Date:  2024    Name:  Emily Motta  Address:  [address] [address]    :  1940      Age:   84 y.o.    SSN:  [id number]      Medical Record Number:  [id number]    Reason for Visit:    Shoulder Pain (NP LEFT SHOULDER)      HPI:   Emily Motta is a 84 y.o. female who presents to our office today for evaluation of left shoulder pain of 1 to 1.5 years duration.  She has been seen FREDRICK Mejias for this and has had multiple intra-articular corticosteroid injections.  She states that originally she was seeing him every 3 months as her injections were lasting this long.  However, she states that the last few injections only lasted a few weeks and therefore she was referred to Dr. Lacey for more definitive management.  She states she has daily pain that often affects her function.  She has occasional problems sleeping at night which really bothers her.  She has a history of bilateral total hip arthroplasty and a left total knee arthroplasty.  She also has a history of dropfoot and requires a cane for ambulation.  She lives with her daughter at home and has a history of a previous right rotator cuff repair performed by Dr. Good in 2014.    Her daughter accompanies her today.      Pain Assessment  Location of Pain: Shoulder  Location Modifiers: Left  Severity of Pain: 4  Quality of Pain: Dull, Sharp  Duration of Pain: Persistent  Frequency of Pain: Constant  Aggravating Factors: Straightening, Stretching  Limiting Behavior: Yes  Relieving Factors: Rest, Other (Comment), Ice, Heat  Work-Related Injury: No  Are there other pain locations you wish to document?: No    No notes on file    Review of Systems:  A 14 point review of systems available in the scanned medical record as documented by the patient and reviewed on 2024.  The review is negative with the exception of those things mentioned

## 2024-07-11 ENCOUNTER — TELEPHONE (OUTPATIENT)
Dept: ORTHOPEDIC SURGERY | Age: 84
End: 2024-07-11

## 2024-07-16 ENCOUNTER — PREP FOR PROCEDURE (OUTPATIENT)
Dept: ORTHOPEDIC SURGERY | Age: 84
End: 2024-07-16

## 2024-07-16 DIAGNOSIS — M19.012 ARTHRITIS OF LEFT SHOULDER REGION: Primary | ICD-10-CM

## 2024-07-18 ENCOUNTER — HOSPITAL ENCOUNTER (OUTPATIENT)
Dept: GENERAL RADIOLOGY | Age: 84
Discharge: HOME | End: 2024-07-18
Attending: FAMILY MEDICINE
Payer: MEDICARE

## 2024-07-18 DIAGNOSIS — Z78.0 POST-MENOPAUSAL: ICD-10-CM

## 2024-07-18 PROCEDURE — 77080 DXA BONE DENSITY AXIAL: CPT

## 2024-07-28 RX ORDER — MIRABEGRON 50 MG/1
50 TABLET, FILM COATED, EXTENDED RELEASE ORAL DAILY
Qty: 90 TABLET | Refills: 1 | Status: SHIPPED | OUTPATIENT
Start: 2024-07-28

## 2024-08-02 ENCOUNTER — TELEPHONE (OUTPATIENT)
Dept: CARDIOLOGY CLINIC | Age: 84
End: 2024-08-02

## 2024-08-02 ENCOUNTER — TELEPHONE (OUTPATIENT)
Dept: ORTHOPEDIC SURGERY | Age: 84
End: 2024-08-02

## 2024-08-02 NOTE — TELEPHONE ENCOUNTER
CARDIAC CLEARANCE     What type of procedure are you having?  L shoulder total arthroplasty reverse  Which physician is performing your procedure?  Dr. Yazmin Lacey   When is your procedure scheduled?  8/30  Where are you having this procedure?  [facility]   Are you taking Blood Thinners?   If so what? (Name/dose/frequesncy)  NONE   Does the surgeon want you to stop your blood thinner?  If so for how long?  N/A  Are you having any new or worsening cardiac symptoms?   No  Phone Number and Contact Name for Physicians office:  [phone] - [place]  Fax number to send information:  [phone]  Cardiac History:  Last office visit with Cardiologist:  1/29/2024  Cardiac Procedures:    Cardiac Testing:

## 2024-08-02 NOTE — TELEPHONE ENCOUNTER
General Question     Subject: LT SHOULDER CT SCAN AND SX QUESTION  Patient and /or Facility Request: Emily Motta   Contact Number: [phone]       PATIENT CALLED IN TO SEE IF SHE CAN SPEAK TO SOMEONE IN THE OFFICE ABOUT GETTING CT SCAN FROM [facility]. HAE NOT HEARD ANYTHING FROM [facility]. WHEN WOULD SHOULD SHE GET HER BLOOD WORK DONE FOR HER SX ON AUG 30, 2024... PATIENT REQ A CALL BACK...    PLEASE ADVISE

## 2024-08-05 NOTE — TELEPHONE ENCOUNTER
Pre-operative risk assessment. Patient is low to intermediate cardiac risk based on RCRI score of 0 but with advanced age.  Patient's risk should not preclude her from proceeding with surgery. Suggest continuation of B-blocker and statin in the nancy-operative period.

## 2024-08-05 NOTE — TELEPHONE ENCOUNTER
Dr Lacey is requesting advisement on Emily Motta to undergo Left shoulder total arthroplasty reverse surgery. No medications requested to be held.     Patient last seen in office on 1/29/24 for management of SVT and hypertension who presents for chronic management of presumed atrial fibrillation.    Possible paroxysmal atrial fibrillation/Paroxysmal supraventricular tachycardia/Palpitations. Cardiac event monitor from 2018 and 2021 showed NSR with no episodes of atrial fibrillation or reported symptoms. She had atrial tachycardia during hospitalization in 4/2022. Reports occasional palpitations. EKG today shows sinus rhythm. Discussed wearing a cardiac event monitor or continued observation. She prefers to monitor for now and call with worsening symptoms. Discussed anticoagulation options, but she prefers to continue to remain off a DOAC as she has had no documented atrial fibrillation in >15 years and was not previously on anticoagulation.     Please advise.

## 2024-08-14 ENCOUNTER — OFFICE VISIT (OUTPATIENT)
Dept: ORTHOPEDIC SURGERY | Age: 84
End: 2024-08-14

## 2024-08-14 ENCOUNTER — TELEPHONE (OUTPATIENT)
Dept: ORTHOPEDIC SURGERY | Age: 84
End: 2024-08-14

## 2024-08-14 VITALS — WEIGHT: 237 LBS | HEIGHT: 64 IN | BODY MASS INDEX: 40.46 KG/M2

## 2024-08-14 DIAGNOSIS — M19.012 ARTHRITIS OF LEFT SHOULDER REGION: Primary | ICD-10-CM

## 2024-08-14 RX ORDER — LATANOPROST 50 UG/ML
SOLUTION/ DROPS OPHTHALMIC
COMMUNITY
Start: 2024-08-08

## 2024-08-14 RX ORDER — TIMOLOL MALEATE 5 MG/ML
SOLUTION/ DROPS OPHTHALMIC
COMMUNITY
Start: 2024-08-08

## 2024-08-14 NOTE — TELEPHONE ENCOUNTER
Orthopedic Nurse Navigator Summary    Patient Name:  Emily Motta  Anticipated Date of Surgery:  08/30/24  Attended Pre-op Education Class:  Video sent to patient email 08/02/24  PCP: Konstantin Motta DO  Date of PCP visit for H&P: 08/19/24  Is patient in a Pain Management program:  Review of Medical history reveals history of: HTN, Hypercholesterolemia, CAD, PSVT, Peripheral arterial disease, PUD, CKD Stage 3b, Overactive bladder, Lumbar fusion, Anxiety, Osteoporosis, Eczema, Restless leg syndrome, Urinary incontinence, Left foot drop- uses cane on right side    Critical Lab Values  - Hemoglobin (g/dL):  Date: 08/19/24 Value 10.9  - Hematocrit(%): Date: 08/19/24  Value 33.4  - HgbA1C:  Date: 08/19/24 Value 5.5  - Albumin:  Date: 08/19/24   Value 4.3  - BUN:  Date: 08/19/24  Value 25  - Creatinine:  Date: 08/19/24  Value 1.4    08/19/24 MRSA swab- negative    Coronary Artery Disease/HTN/CHF history: Yes  Does the patient see a Cardiologist: Kale Boyd MD  Date of most recent cardiac appt: 08/02/24  On any anticoagulation:  No    Diabetes History:  No  Most recent HgbA1C:  Pulmonary:  COPD/Emphysema/Use of home oxygen: No  Alcohol use: No    BMI greater than 40 at time of scheduling:    Additional medical concerns:  Additional recommendations for above concerns:  Attended Pre-Hab program:    Anticipated Discharge Disposition:  Home with OPT  Who will be with patient at home following discharge:  Her daughter lives with her and will provide care  Equipment patient already has:  recliner in her bedroom, bars on either side of toilet  Bedroom on first or second floor:  first  Bathroom on first or second floor:  first  Weight bearing status:  nwb LUE  Pre-op ambulatory status: Uses cane in right hand for left foot drop  Number of entry steps:  5  Caregiver assistance:  full time    Patrizia Kam RN  Date:  08/14/24

## 2024-08-14 NOTE — H&P (VIEW-ONLY)
[facility]  History and Physical  Shoulder Pain    Date:  2024    Name:  Emily Motta  Address:  [address] [address]    :  1940      Age:   84 y.o.    SSN:  [id number]      Medical Record Number:  [id number]    Reason for Visit:    Shoulder Pain (PREOP LEFT SHOULDER)      HPI:   Emily Motta is a 84 y.o. female who presents to our office today for evaluation of left shoulder pain of 1 to 1.5 years duration.  She has been seen FREDRICK Mejias for this and has had multiple intra-articular corticosteroid injections.  She states that originally she was seeing him every 3 months as her injections were lasting this long.  However, she states that the last few injections only lasted a few weeks and therefore she was referred to Dr. Lacey for more definitive management.  She states she has daily pain that often affects her function.  She has occasional problems sleeping at night which really bothers her.  She has a history of bilateral total hip arthroplasty and a left total knee arthroplasty.  She also has a history of dropfoot and requires a cane for ambulation.  She lives with her daughter at home and has a history of a previous right rotator cuff repair performed by Dr. Good in 2014. Her daughter accompanies her today.    HPI:   24: Patient returns today for a preoperative visit for her left shoulder replacement.  She is still accompanied by her daughter, there have been no changes in regards to her left shoulder, she feels like she is ready for surgery      Pain Assessment  Location of Pain: Shoulder  Location Modifiers: Left  Severity of Pain: 3  Quality of Pain: Dull  Frequency of Pain: Intermittent  Aggravating Factors: Other (Comment), Straightening, Stretching  Limiting Behavior: Yes  Relieving Factors: Rest, Other (Comment), Nsaids  Work-Related Injury: No  Are there other pain locations you wish to document?: No    No notes  for evaluation of left glenohumeral osteoarthritis and a preoperative visit.  Her pain bothers her daily and causes her to wake up multiple times throughout the night.  She has tried physical therapy and multiple intra-articular injections that have been failing to provide relief.  She presents today for definitive treatment and is an excellent candidate for reverse total shoulder arthroplasty.      We explained to her the risk, benefits and alternative treatment options as well as the postoperative restrictions and therapy required.  We would plan for likely a same-day discharge versus 1 overnight stay with discharge to home where she lives with her daughter.  We did discuss that she has a commode with railings to help her assist with going to the bathroom.  She should start to practice getting on and off the commode with 1 arm since she will be in a brace.  We also discussed she will be evaluated by physical therapist at the hospital to make sure she is safe for discharge.  She plans on seeing her PCP on Monday for clearance.  She was fitted with UltraSling brace today.  She plans on getting the preoperative CT scan this week on Saturday.    Risks, benefits and potential complications of reverse shoulder arthroplasty surgery were discussed with the patient.  Risks discussed include but are not limited to bleeding, infection, anesthetic risk, injury to nerves and blood vessels, deep vein thrombosis, residual stiffness and weakness, and the need for revision surgery. The patient also understands that anesthetic risks include cardiopulmonary issues, drug reactions and even death. The patient voices an understanding of the importance of physical therapy and home exercises after surgery.   All questions were answered and written informed consent for surgery was obtained today.     Emily Motta will follow up after surgery. She and her daughter were in agreement with this plan and all questions were answered to the  patient's satisfaction. They were encouraged to call with any questions.     8/14/2024  9:59 AM      Peter Lucas MD  Orthopedic Surgery Sports Medicine Fellow    This dictation was performed with a verbal recognition program (DRAGON) and it was checked for errors.  It is possible that there are still dictated errors within this office note.  If so, please bring any errors to my attention for an addendum.  All efforts were made to ensure that this office note is accurate.   ________________  I was physically present and personally supervised the Orthopaedic Sports Medicine Fellow in the evaluation and development of a treatment plan for this patient. I personally interviewed the patient and performed a physical examination. In addition, I discussed the patient's condition and treatment options with them. I have also reviewed and agree with the past medical, family and social history unless otherwise noted. All of the patient's questions were answered.         Yazmin Lacey MD, PhD  8/14/2024

## 2024-08-14 NOTE — PROGRESS NOTES
intact, no motor deficits, palpable radial pulses 2+    Additional Examinations:    Examination of the contralateral extremity does not show any tenderness, deformity or injury. Range of motion is unremarkable. There is no gross instability. There are no rashes, ulcerations or lesions. Strength and tone are normal.      Radiographic:  3 xray views of the left  shoulder including True AP in internal and external and axillary lateral were reviewed again in the office today reveal no fractures, dislocations, visible tumors, or signs of acute trauma.      Radiographic Impression: Left shoulder radiographs show moderate to severe joint space narrowing indicative of glenohumeral osteoarthritis with a moderately sized bone spur along the humeral neck.  No fracture/dislocation      Assessment:  Emily Motta is a 84 y.o. female end-stage left glenohumeral osteoarthritis. She is a candidate for reverse shoulder arthroplasty. Her prognosis is excellent.    Impression:  Encounter Diagnosis   Name Primary?    Arthritis of left shoulder region Yes       Office Procedures:  Orders Placed This Encounter   Procedures    DJO ultrasling Shoulder Sling     Patient was prescribed a DJO Ultrasling IV Shoulder Brace.   The left shoulder will require stabilization / immobilization from this orthosis.  The orthosis will assist in protecting the affected area, provide functional support and facilitate healing.  The device was ordered and fit on 8/14/24.    The patient was educated and fit by a healthcare professional with expert knowledge and specialization in brace application while under the direct supervision of the treating physician.  Verbal and written instructions for the use of and application of this item were provided.   They were instructed to contact the office immediately should the brace result in increased pain, decreased sensation, increased swelling or worsening of the condition.       Plan:   Emily Motta presents today

## 2024-08-15 NOTE — PROGRESS NOTES
Subjective:      Patient ID: Emily Motta is a 84 y.o. female.    HPI  PREOPERATIVE PHYSICAL:    Patient was sent by Dr. Yazmin Lacey for preoperative clearance to have Left Reverse Shoulder Replacement on 8-30-24 at [facility].      Allergies   Allergen Reactions    Latex Itching and Rash    Bactrim Rash    Flonase [Fluticasone]      Eye irritation    Lipitor [Atorvastatin]      Headache and Nausea      Current Outpatient Medications   Medication Sig Dispense Refill    latanoprost (XALATAN) 0.005 % ophthalmic solution       MYRBETRIQ 50 MG TB24 TAKE 1 TABLET EVERY DAY 90 tablet 1    gabapentin (NEURONTIN) 100 MG capsule Take 1 capsule by mouth 3 times daily for 180 days. Intended supply: 90 days 270 capsule 1    rosuvastatin (CRESTOR) 5 MG tablet TAKE 1 TABLET EVERY NIGHT 90 tablet 0    verapamil (CALAN SR) 120 MG extended release tablet TAKE 1 TABLET EVERY NIGHT 90 tablet 0    metoprolol tartrate (LOPRESSOR) 50 MG tablet TAKE 1 TABLET TWICE DAILY 180 tablet 0    triamterene-hydroCHLOROthiazide (MAXZIDE-25) 37.5-25 MG per tablet TAKE 1 TABLET EVERY DAY 90 tablet 0    omeprazole (PRILOSEC) 20 MG delayed release capsule TAKE 1 CAPSULE EVERY DAY 90 capsule 0    Calcium Carbonate-Vitamin D (CALTRATE 600+D PO) Take 1 capsule by mouth in the morning, at noon, and at bedtime      timolol (BETIMOL) 0.5 % ophthalmic solution 1 drop 2 times daily      docusate sodium (COLACE) 100 MG capsule Take 1 capsule by mouth 2 times daily 40 capsule 0     No current facility-administered medications for this visit.     Past Medical History:   Diagnosis Date    Anxiety     Chronic kidney disease, stage 3b (HCC)     Chronic midline low back pain without sciatica     Coronary artery disease involving native coronary artery without angina pectoris     Essential hypertension     Foot drop, left     GERD without esophagitis     Hypercholesterolemia     Internal hemorrhoids     Intrinsic eczema     Lesion of lateral popliteal nerve

## 2024-08-19 ENCOUNTER — OFFICE VISIT (OUTPATIENT)
Dept: FAMILY MEDICINE CLINIC | Age: 84
End: 2024-08-19
Payer: MEDICARE

## 2024-08-19 VITALS
BODY MASS INDEX: 37.93 KG/M2 | HEIGHT: 66 IN | DIASTOLIC BLOOD PRESSURE: 74 MMHG | WEIGHT: 236 LBS | SYSTOLIC BLOOD PRESSURE: 120 MMHG

## 2024-08-19 DIAGNOSIS — Z01.818 PREOP GENERAL PHYSICAL EXAM: Primary | ICD-10-CM

## 2024-08-19 DIAGNOSIS — M19.012 PRIMARY OSTEOARTHRITIS, LEFT SHOULDER: ICD-10-CM

## 2024-08-19 LAB
ALBUMIN SERPL-MCNC: 4.3 G/DL (ref 3.4–5)
ALBUMIN/GLOB SERPL: 1.9 {RATIO} (ref 1.1–2.2)
ALP SERPL-CCNC: 65 U/L (ref 40–129)
ALT SERPL-CCNC: 10 U/L (ref 10–40)
ANION GAP SERPL CALCULATED.3IONS-SCNC: 14 MMOL/L (ref 3–16)
APTT BLD: 27.8 SEC (ref 22.1–36.4)
AST SERPL-CCNC: 20 U/L (ref 15–37)
BILIRUB SERPL-MCNC: 0.4 MG/DL (ref 0–1)
BUN SERPL-MCNC: 25 MG/DL (ref 7–20)
CALCIUM SERPL-MCNC: 9.3 MG/DL (ref 8.3–10.6)
CHLORIDE SERPL-SCNC: 101 MMOL/L (ref 99–110)
CO2 SERPL-SCNC: 25 MMOL/L (ref 21–32)
CREAT SERPL-MCNC: 1.4 MG/DL (ref 0.6–1.2)
CRP SERPL-MCNC: <3 MG/L (ref 0–5.1)
DEPRECATED RDW RBC AUTO: 15.2 % (ref 12.4–15.4)
ERYTHROCYTE [SEDIMENTATION RATE] IN BLOOD BY WESTERGREN METHOD: 38 MM/HR (ref 0–30)
GFR SERPLBLD CREATININE-BSD FMLA CKD-EPI: 37 ML/MIN/{1.73_M2}
GLUCOSE SERPL-MCNC: 79 MG/DL (ref 70–99)
HCT VFR BLD AUTO: 33.4 % (ref 36–48)
HGB BLD-MCNC: 10.9 G/DL (ref 12–16)
INR PPP: 1.05 (ref 0.85–1.15)
MCH RBC QN AUTO: 26.9 PG (ref 26–34)
MCHC RBC AUTO-ENTMCNC: 32.6 G/DL (ref 31–36)
MCV RBC AUTO: 82.6 FL (ref 80–100)
PLATELET # BLD AUTO: 163 K/UL (ref 135–450)
PMV BLD AUTO: 9.5 FL (ref 5–10.5)
POTASSIUM SERPL-SCNC: 4.5 MMOL/L (ref 3.5–5.1)
PROT SERPL-MCNC: 6.6 G/DL (ref 6.4–8.2)
PROTHROMBIN TIME: 13.9 SEC (ref 11.9–14.9)
RBC # BLD AUTO: 4.04 M/UL (ref 4–5.2)
SODIUM SERPL-SCNC: 140 MMOL/L (ref 136–145)
WBC # BLD AUTO: 6 K/UL (ref 4–11)

## 2024-08-19 PROCEDURE — 1090F PRES/ABSN URINE INCON ASSESS: CPT | Performed by: FAMILY MEDICINE

## 2024-08-19 PROCEDURE — 3078F DIAST BP <80 MM HG: CPT | Performed by: FAMILY MEDICINE

## 2024-08-19 PROCEDURE — 93000 ELECTROCARDIOGRAM COMPLETE: CPT | Performed by: FAMILY MEDICINE

## 2024-08-19 PROCEDURE — G8399 PT W/DXA RESULTS DOCUMENT: HCPCS | Performed by: FAMILY MEDICINE

## 2024-08-19 PROCEDURE — G8417 CALC BMI ABV UP PARAM F/U: HCPCS | Performed by: FAMILY MEDICINE

## 2024-08-19 PROCEDURE — 1036F TOBACCO NON-USER: CPT | Performed by: FAMILY MEDICINE

## 2024-08-19 PROCEDURE — 1123F ACP DISCUSS/DSCN MKR DOCD: CPT | Performed by: FAMILY MEDICINE

## 2024-08-19 PROCEDURE — G8427 DOCREV CUR MEDS BY ELIG CLIN: HCPCS | Performed by: FAMILY MEDICINE

## 2024-08-19 PROCEDURE — 36415 COLL VENOUS BLD VENIPUNCTURE: CPT | Performed by: FAMILY MEDICINE

## 2024-08-19 PROCEDURE — 99214 OFFICE O/P EST MOD 30 MIN: CPT | Performed by: FAMILY MEDICINE

## 2024-08-19 PROCEDURE — 3074F SYST BP LT 130 MM HG: CPT | Performed by: FAMILY MEDICINE

## 2024-08-19 RX ORDER — TRIAMTERENE AND HYDROCHLOROTHIAZIDE 37.5; 25 MG/1; MG/1
1 TABLET ORAL DAILY
Qty: 90 TABLET | Refills: 0 | Status: SHIPPED | OUTPATIENT
Start: 2024-08-19

## 2024-08-19 RX ORDER — GABAPENTIN 100 MG/1
100 CAPSULE ORAL 3 TIMES DAILY
Qty: 270 CAPSULE | Refills: 0 | Status: SHIPPED | OUTPATIENT
Start: 2024-08-19 | End: 2025-02-15

## 2024-08-19 RX ORDER — ROSUVASTATIN CALCIUM 5 MG/1
5 TABLET, COATED ORAL NIGHTLY
Qty: 90 TABLET | Refills: 0 | Status: SHIPPED | OUTPATIENT
Start: 2024-08-19

## 2024-08-19 RX ORDER — OMEPRAZOLE 20 MG/1
CAPSULE, DELAYED RELEASE ORAL
Qty: 90 CAPSULE | Refills: 0 | Status: SHIPPED | OUTPATIENT
Start: 2024-08-19

## 2024-08-19 RX ORDER — METOPROLOL TARTRATE 50 MG/1
50 TABLET, FILM COATED ORAL 2 TIMES DAILY
Qty: 180 TABLET | Refills: 0 | Status: SHIPPED | OUTPATIENT
Start: 2024-08-19

## 2024-08-19 ASSESSMENT — ENCOUNTER SYMPTOMS
BLOOD IN STOOL: 0
VOMITING: 0
WHEEZING: 0
COUGH: 1
RHINORRHEA: 0
SORE THROAT: 0
SHORTNESS OF BREATH: 0
NAUSEA: 0
ABDOMINAL PAIN: 0
CONSTIPATION: 1
DIARRHEA: 0

## 2024-08-20 LAB
EST. AVERAGE GLUCOSE BLD GHB EST-MCNC: 111.2 MG/DL
HBA1C MFR BLD: 5.5 %

## 2024-08-21 DIAGNOSIS — F41.9 ANXIETY: ICD-10-CM

## 2024-08-21 LAB — MRSA SPEC QL CULT: NORMAL

## 2024-08-21 RX ORDER — LORAZEPAM 1 MG/1
TABLET ORAL
Qty: 90 TABLET | Refills: 0 | Status: SHIPPED | OUTPATIENT
Start: 2024-08-21 | End: 2024-09-20

## 2024-08-21 NOTE — TELEPHONE ENCOUNTER
Patient called requesting a of refill of lorazepam 1mg to be sent to [facility]

## 2024-08-21 NOTE — TELEPHONE ENCOUNTER
Last office visit 8/19/2024       Next office visit scheduled 9/10/2024    Requested Prescriptions     Pending Prescriptions Disp Refills    LORazepam (ATIVAN) 1 MG tablet 90 tablet 0     Sig: TAKE 1 TABLET EVERY 8 HOURS AS NEEDED FOR ANXIETY FOR UP TO 30 DAYS

## 2024-08-22 ENCOUNTER — TELEPHONE (OUTPATIENT)
Dept: FAMILY MEDICINE CLINIC | Age: 84
End: 2024-08-22

## 2024-08-22 LAB
BACTERIA UR CULT: ABNORMAL
ORGANISM: ABNORMAL

## 2024-08-22 RX ORDER — CIPROFLOXACIN 250 MG/1
250 TABLET, FILM COATED ORAL 2 TIMES DAILY
Qty: 14 TABLET | Refills: 0 | Status: SHIPPED | OUTPATIENT
Start: 2024-08-22 | End: 2024-08-29

## 2024-08-22 NOTE — TELEPHONE ENCOUNTER
Patient called stating she received a call from RAUL Cerda regarding lab results and a new medication that Dr Motta was going to call to the pharmacy for her. Patient states she has a few questions regarding the medication and requesting a return call from RAUL Cerda

## 2024-08-22 NOTE — TELEPHONE ENCOUNTER
It is an antibiotic for a UTI.  They would definitely want her to take the medication and have the infection cleared up prior to surgery.  She should go ahead and take it.

## 2024-08-22 NOTE — TELEPHONE ENCOUNTER
Spoke with patient and she states she is having surgery in a week (next Friday 8/30). She is suppose to stop all medications a week prior. Patient wanting to know if she should take this.

## 2024-08-26 ENCOUNTER — TELEPHONE (OUTPATIENT)
Dept: ORTHOPEDIC SURGERY | Age: 84
End: 2024-08-26

## 2024-08-26 NOTE — PROGRESS NOTES
[facility] video emailed & reviewed to patient by DHIRAJ Gonzalez instructions reviewed to use x 5 days preop.  CINDI screening done. TJ book, IS instructions, TJ video link, and fall contract placed on chart for DOS.

## 2024-08-26 NOTE — PROGRESS NOTES
[facility] PRE-SURGICAL TESTING INSTRUCTIONS                      PRIOR TO PROCEDURE DATE:    1. PLEASE FOLLOW ANY INSTRUCTIONS GIVEN TO YOU PER YOUR SURGEON.      2. Arrange for someone to drive you home and be with you for the first 24 hours after discharge for your safety after your procedure for which you received sedation. Ensure it is someone we can share information with regarding your discharge.     NOTE: At this time ONLY 2 ADULTS may accompany you   One person ENCOURAGED to stay at hospital entire time if outpatient surgery      3. You must contact your surgeon for instructions IF:  You are taking any blood thinners, aspirin, anti-inflammatory or vitamins.  There is a change in your physical condition such as a cold, fever, rash, cuts, sores, or any other infection, especially near your surgical site.    4. Do not drink alcohol the day before or day of your procedure.  Do not use any recreational marijuana at least 24 hours or street drugs (heroin, cocaine) at minimum 5 days prior to your procedure.     5. A Pre-Surgical History and Physical MUST be completed WITHIN 30 DAYS OR LESS prior to your procedure.by your Physician or an Urgent Care        THE DAY OF YOUR PROCEDURE:  1.  Follow instructions for ARRIVAL TIME as DIRECTED BY YOUR SURGEON.     2. Enter the MAIN entrance from [facility] and follow the signs to the free Parking Garage or  Parking (offered free of charge 7 am-5pm).      3. Enter the Main Entrance of the hospital (do not enter from the lower level of the parking garage). Upon entrance, check in with the  at the surgical information desk on your LEFT.   Bring your insurance card and photo ID to register      4. DO NOT EAT ANYTHING 8 hours prior to arrival for surgery.  You may have up to 8 ounces of water 4 hours prior to your arrival for surgery.   NOTE: ALL Gastric, Bariatric & Bowel surgery patients - you MUST follow your surgeon's instructions regarding

## 2024-08-26 NOTE — TELEPHONE ENCOUNTER
General Question     Subject: INQUIRING IF SHE CAN TAKE A TYLENOL.  Patient and /or Facility Request: Emily Motta   Contact Number: [phone]

## 2024-08-27 PROBLEM — M19.012 ARTHRITIS OF LEFT SHOULDER REGION: Status: ACTIVE | Noted: 2024-07-16

## 2024-08-29 ENCOUNTER — ANESTHESIA EVENT (OUTPATIENT)
Dept: OPERATING ROOM | Age: 84
End: 2024-08-29
Payer: MEDICARE

## 2024-08-30 ENCOUNTER — ANESTHESIA (OUTPATIENT)
Dept: OPERATING ROOM | Age: 84
End: 2024-08-30
Payer: MEDICARE

## 2024-08-30 ENCOUNTER — HOSPITAL ENCOUNTER (OUTPATIENT)
Age: 84
Setting detail: OBSERVATION
LOS: 1 days | Discharge: HOME | End: 2024-08-31
Attending: ORTHOPAEDIC SURGERY | Admitting: ORTHOPAEDIC SURGERY
Payer: MEDICARE

## 2024-08-30 ENCOUNTER — APPOINTMENT (OUTPATIENT)
Dept: GENERAL RADIOLOGY | Age: 84
End: 2024-08-30
Attending: ORTHOPAEDIC SURGERY
Payer: MEDICARE

## 2024-08-30 DIAGNOSIS — M19.012 ARTHRITIS OF LEFT SHOULDER REGION: Primary | ICD-10-CM

## 2024-08-30 LAB
ABO + RH BLD: NORMAL
BLD GP AB SCN SERPL QL: NORMAL

## 2024-08-30 PROCEDURE — 2580000003 HC RX 258: Performed by: ANESTHESIOLOGY

## 2024-08-30 PROCEDURE — 86901 BLOOD TYPING SEROLOGIC RH(D): CPT

## 2024-08-30 PROCEDURE — 6360000002 HC RX W HCPCS: Performed by: ORTHOPAEDIC SURGERY

## 2024-08-30 PROCEDURE — 64415 NJX AA&/STRD BRCH PLXS IMG: CPT | Performed by: ANESTHESIOLOGY

## 2024-08-30 PROCEDURE — 1200000000 HC SEMI PRIVATE

## 2024-08-30 PROCEDURE — 6370000000 HC RX 637 (ALT 250 FOR IP): Performed by: INTERNAL MEDICINE

## 2024-08-30 PROCEDURE — 97530 THERAPEUTIC ACTIVITIES: CPT

## 2024-08-30 PROCEDURE — 6360000002 HC RX W HCPCS

## 2024-08-30 PROCEDURE — 86900 BLOOD TYPING SEROLOGIC ABO: CPT

## 2024-08-30 PROCEDURE — 3700000001 HC ADD 15 MINUTES (ANESTHESIA): Performed by: ORTHOPAEDIC SURGERY

## 2024-08-30 PROCEDURE — 94150 VITAL CAPACITY TEST: CPT

## 2024-08-30 PROCEDURE — C1776 JOINT DEVICE (IMPLANTABLE): HCPCS | Performed by: ORTHOPAEDIC SURGERY

## 2024-08-30 PROCEDURE — 2709999900 HC NON-CHARGEABLE SUPPLY: Performed by: ORTHOPAEDIC SURGERY

## 2024-08-30 PROCEDURE — 6360000002 HC RX W HCPCS: Performed by: ANESTHESIOLOGY

## 2024-08-30 PROCEDURE — 2580000003 HC RX 258

## 2024-08-30 PROCEDURE — 6370000000 HC RX 637 (ALT 250 FOR IP): Performed by: PHYSICIAN ASSISTANT

## 2024-08-30 PROCEDURE — 3E0T3BZ INTRODUCTION OF ANESTHETIC AGENT INTO PERIPHERAL NERVES AND PLEXI, PERCUTANEOUS APPROACH: ICD-10-PCS | Performed by: ORTHOPAEDIC SURGERY

## 2024-08-30 PROCEDURE — A4217 STERILE WATER/SALINE, 500 ML: HCPCS | Performed by: ORTHOPAEDIC SURGERY

## 2024-08-30 PROCEDURE — 7100000001 HC PACU RECOVERY - ADDTL 15 MIN: Performed by: ORTHOPAEDIC SURGERY

## 2024-08-30 PROCEDURE — 6360000002 HC RX W HCPCS: Performed by: PHYSICIAN ASSISTANT

## 2024-08-30 PROCEDURE — C9290 INJ, BUPIVACAINE LIPOSOME: HCPCS | Performed by: ANESTHESIOLOGY

## 2024-08-30 PROCEDURE — 7100000000 HC PACU RECOVERY - FIRST 15 MIN: Performed by: ORTHOPAEDIC SURGERY

## 2024-08-30 PROCEDURE — 3600000014 HC SURGERY LEVEL 4 ADDTL 15MIN: Performed by: ORTHOPAEDIC SURGERY

## 2024-08-30 PROCEDURE — 2580000003 HC RX 258: Performed by: ORTHOPAEDIC SURGERY

## 2024-08-30 PROCEDURE — 97535 SELF CARE MNGMENT TRAINING: CPT

## 2024-08-30 PROCEDURE — 97161 PT EVAL LOW COMPLEX 20 MIN: CPT

## 2024-08-30 PROCEDURE — 6370000000 HC RX 637 (ALT 250 FOR IP): Performed by: STUDENT IN AN ORGANIZED HEALTH CARE EDUCATION/TRAINING PROGRAM

## 2024-08-30 PROCEDURE — 86850 RBC ANTIBODY SCREEN: CPT

## 2024-08-30 PROCEDURE — 0RRK00Z REPLACEMENT OF LEFT SHOULDER JOINT WITH REVERSE BALL AND SOCKET SYNTHETIC SUBSTITUTE, OPEN APPROACH: ICD-10-PCS | Performed by: ORTHOPAEDIC SURGERY

## 2024-08-30 PROCEDURE — 3700000000 HC ANESTHESIA ATTENDED CARE: Performed by: ORTHOPAEDIC SURGERY

## 2024-08-30 PROCEDURE — 73020 X-RAY EXAM OF SHOULDER: CPT

## 2024-08-30 PROCEDURE — 3600000004 HC SURGERY LEVEL 4 BASE: Performed by: ORTHOPAEDIC SURGERY

## 2024-08-30 PROCEDURE — 97165 OT EVAL LOW COMPLEX 30 MIN: CPT

## 2024-08-30 PROCEDURE — 2720000010 HC SURG SUPPLY STERILE: Performed by: ORTHOPAEDIC SURGERY

## 2024-08-30 PROCEDURE — 6370000000 HC RX 637 (ALT 250 FOR IP): Performed by: ANESTHESIOLOGY

## 2024-08-30 PROCEDURE — 2500000003 HC RX 250 WO HCPCS

## 2024-08-30 PROCEDURE — 2580000003 HC RX 258: Performed by: STUDENT IN AN ORGANIZED HEALTH CARE EDUCATION/TRAINING PROGRAM

## 2024-08-30 PROCEDURE — 2580000003 HC RX 258: Performed by: PHYSICIAN ASSISTANT

## 2024-08-30 DEVICE — RSP BASEPLATE, 30MM, W/P2 COATING
Type: IMPLANTABLE DEVICE | Site: SHOULDER | Status: FUNCTIONAL
Brand: DJO SURGICAL

## 2024-08-30 DEVICE — GLENOID, HEAD W/RETAINING SCREW, RSP, 36MM, NEUTRAL
Type: IMPLANTABLE DEVICE | Site: SHOULDER | Status: FUNCTIONAL
Brand: DJO SURGICAL

## 2024-08-30 DEVICE — SCREW, LOCKING BONE, RSP, 5X34
Type: IMPLANTABLE DEVICE | Site: SHOULDER | Status: FUNCTIONAL
Brand: DJO SURGICAL

## 2024-08-30 DEVICE — SCREW, LOCKING BONE, RSP, 5X22
Type: IMPLANTABLE DEVICE | Site: SHOULDER | Status: FUNCTIONAL
Brand: DJO SURGICAL

## 2024-08-30 DEVICE — AR, SMALL SOCKET INSERT, 36MM NEUTRAL EPLUS
Type: IMPLANTABLE DEVICE | Site: SHOULDER | Status: FUNCTIONAL
Brand: DJO SURGICAL

## 2024-08-30 DEVICE — IMPL CAPPED SHOULDER S3 TOTAL ADV REVERSE DJO: Type: IMPLANTABLE DEVICE | Site: SHOULDER | Status: FUNCTIONAL

## 2024-08-30 DEVICE — SCREW, LOCKING BONE, RSP, 5X14
Type: IMPLANTABLE DEVICE | Site: SHOULDER | Status: FUNCTIONAL
Brand: DJO SURGICAL

## 2024-08-30 DEVICE — ALTIVATE REVERSE, HUMERAL STEM, SMALL SHELL, SZ 12X48MM
Type: IMPLANTABLE DEVICE | Site: SHOULDER | Status: FUNCTIONAL
Brand: DJO SURGICAL

## 2024-08-30 RX ORDER — HYDROCODONE BITARTRATE AND ACETAMINOPHEN 5; 325 MG/1; MG/1
1 TABLET ORAL EVERY 6 HOURS PRN
Qty: 20 TABLET | Refills: 0 | Status: SHIPPED | OUTPATIENT
Start: 2024-08-30 | End: 2024-09-06

## 2024-08-30 RX ORDER — SODIUM CHLORIDE 0.9 % (FLUSH) 0.9 %
5-40 SYRINGE (ML) INJECTION EVERY 12 HOURS SCHEDULED
Status: DISCONTINUED | OUTPATIENT
Start: 2024-08-30 | End: 2024-08-30 | Stop reason: HOSPADM

## 2024-08-30 RX ORDER — DOCUSATE SODIUM 100 MG/1
100 CAPSULE, LIQUID FILLED ORAL 2 TIMES DAILY
Status: DISCONTINUED | OUTPATIENT
Start: 2024-08-30 | End: 2024-08-31 | Stop reason: HOSPADM

## 2024-08-30 RX ORDER — NALOXONE HYDROCHLORIDE 0.4 MG/ML
INJECTION, SOLUTION INTRAMUSCULAR; INTRAVENOUS; SUBCUTANEOUS PRN
Status: DISCONTINUED | OUTPATIENT
Start: 2024-08-30 | End: 2024-08-30 | Stop reason: HOSPADM

## 2024-08-30 RX ORDER — ACETAMINOPHEN 325 MG/1
650 TABLET ORAL EVERY 6 HOURS
Status: DISCONTINUED | OUTPATIENT
Start: 2024-08-30 | End: 2024-08-31 | Stop reason: HOSPADM

## 2024-08-30 RX ORDER — SODIUM CHLORIDE 9 MG/ML
INJECTION, SOLUTION INTRAVENOUS PRN
Status: DISCONTINUED | OUTPATIENT
Start: 2024-08-30 | End: 2024-08-30 | Stop reason: HOSPADM

## 2024-08-30 RX ORDER — FENTANYL CITRATE 50 UG/ML
100 INJECTION, SOLUTION INTRAMUSCULAR; INTRAVENOUS ONCE
Status: COMPLETED | OUTPATIENT
Start: 2024-08-30 | End: 2024-08-30

## 2024-08-30 RX ORDER — TIMOLOL MALEATE 5 MG/ML
1 SOLUTION/ DROPS OPHTHALMIC 2 TIMES DAILY
Status: DISCONTINUED | OUTPATIENT
Start: 2024-08-30 | End: 2024-08-31 | Stop reason: HOSPADM

## 2024-08-30 RX ORDER — ONDANSETRON 4 MG/1
4 TABLET, FILM COATED ORAL 3 TIMES DAILY PRN
Qty: 15 TABLET | Refills: 0 | Status: SHIPPED | OUTPATIENT
Start: 2024-08-30

## 2024-08-30 RX ORDER — HYDRALAZINE HYDROCHLORIDE 20 MG/ML
10 INJECTION INTRAMUSCULAR; INTRAVENOUS
Status: DISCONTINUED | OUTPATIENT
Start: 2024-08-30 | End: 2024-08-30 | Stop reason: HOSPADM

## 2024-08-30 RX ORDER — DOXYCYCLINE HYCLATE 100 MG
100 TABLET ORAL 2 TIMES DAILY
Qty: 10 TABLET | Refills: 0 | Status: SHIPPED | OUTPATIENT
Start: 2024-08-30 | End: 2024-09-04

## 2024-08-30 RX ORDER — LABETALOL HYDROCHLORIDE 5 MG/ML
10 INJECTION, SOLUTION INTRAVENOUS
Status: DISCONTINUED | OUTPATIENT
Start: 2024-08-30 | End: 2024-08-30 | Stop reason: HOSPADM

## 2024-08-30 RX ORDER — POLYETHYLENE GLYCOL 3350 17 G/17G
17 POWDER, FOR SOLUTION ORAL DAILY PRN
Status: DISCONTINUED | OUTPATIENT
Start: 2024-08-30 | End: 2024-08-31 | Stop reason: HOSPADM

## 2024-08-30 RX ORDER — MAGNESIUM HYDROXIDE 1200 MG/15ML
LIQUID ORAL CONTINUOUS PRN
Status: DISCONTINUED | OUTPATIENT
Start: 2024-08-30 | End: 2024-08-30 | Stop reason: HOSPADM

## 2024-08-30 RX ORDER — CELECOXIB 200 MG/1
400 CAPSULE ORAL ONCE
Status: COMPLETED | OUTPATIENT
Start: 2024-08-30 | End: 2024-08-30

## 2024-08-30 RX ORDER — ACETAMINOPHEN 500 MG
1000 TABLET ORAL ONCE
Status: COMPLETED | OUTPATIENT
Start: 2024-08-30 | End: 2024-08-30

## 2024-08-30 RX ORDER — BUPIVACAINE HYDROCHLORIDE 5 MG/ML
30 INJECTION, SOLUTION EPIDURAL; INTRACAUDAL ONCE
Status: DISCONTINUED | OUTPATIENT
Start: 2024-08-30 | End: 2024-08-31 | Stop reason: HOSPADM

## 2024-08-30 RX ORDER — LATANOPROST 50 UG/ML
1 SOLUTION/ DROPS OPHTHALMIC NIGHTLY
Status: DISCONTINUED | OUTPATIENT
Start: 2024-08-31 | End: 2024-08-31 | Stop reason: HOSPADM

## 2024-08-30 RX ORDER — BUPIVACAINE HYDROCHLORIDE 5 MG/ML
INJECTION, SOLUTION EPIDURAL; INTRACAUDAL
Status: COMPLETED | OUTPATIENT
Start: 2024-08-30 | End: 2024-08-30

## 2024-08-30 RX ORDER — ONDANSETRON 2 MG/ML
INJECTION INTRAMUSCULAR; INTRAVENOUS PRN
Status: DISCONTINUED | OUTPATIENT
Start: 2024-08-30 | End: 2024-08-30 | Stop reason: SDUPTHER

## 2024-08-30 RX ORDER — SODIUM CHLORIDE 0.9 % (FLUSH) 0.9 %
5-40 SYRINGE (ML) INJECTION PRN
Status: DISCONTINUED | OUTPATIENT
Start: 2024-08-30 | End: 2024-08-30 | Stop reason: HOSPADM

## 2024-08-30 RX ORDER — DOXYCYCLINE 100 MG/1
100 CAPSULE ORAL EVERY 12 HOURS SCHEDULED
Status: DISCONTINUED | OUTPATIENT
Start: 2024-08-31 | End: 2024-08-31 | Stop reason: HOSPADM

## 2024-08-30 RX ORDER — VERAPAMIL HYDROCHLORIDE 120 MG/1
120 TABLET, FILM COATED, EXTENDED RELEASE ORAL NIGHTLY
Status: DISCONTINUED | OUTPATIENT
Start: 2024-08-30 | End: 2024-08-31 | Stop reason: HOSPADM

## 2024-08-30 RX ORDER — VANCOMYCIN HYDROCHLORIDE 1 G/20ML
INJECTION, POWDER, LYOPHILIZED, FOR SOLUTION INTRAVENOUS PRN
Status: DISCONTINUED | OUTPATIENT
Start: 2024-08-30 | End: 2024-08-30 | Stop reason: HOSPADM

## 2024-08-30 RX ORDER — PROPOFOL 10 MG/ML
INJECTION, EMULSION INTRAVENOUS PRN
Status: DISCONTINUED | OUTPATIENT
Start: 2024-08-30 | End: 2024-08-30 | Stop reason: SDUPTHER

## 2024-08-30 RX ORDER — SODIUM CHLORIDE 0.9 % (FLUSH) 0.9 %
5-40 SYRINGE (ML) INJECTION PRN
Status: DISCONTINUED | OUTPATIENT
Start: 2024-08-30 | End: 2024-08-31 | Stop reason: HOSPADM

## 2024-08-30 RX ORDER — ONDANSETRON 2 MG/ML
4 INJECTION INTRAMUSCULAR; INTRAVENOUS EVERY 6 HOURS PRN
Status: DISCONTINUED | OUTPATIENT
Start: 2024-08-30 | End: 2024-08-31 | Stop reason: HOSPADM

## 2024-08-30 RX ORDER — ONDANSETRON 4 MG/1
4 TABLET, ORALLY DISINTEGRATING ORAL EVERY 8 HOURS PRN
Status: DISCONTINUED | OUTPATIENT
Start: 2024-08-30 | End: 2024-08-31 | Stop reason: HOSPADM

## 2024-08-30 RX ORDER — ROCURONIUM BROMIDE 10 MG/ML
INJECTION, SOLUTION INTRAVENOUS PRN
Status: DISCONTINUED | OUTPATIENT
Start: 2024-08-30 | End: 2024-08-30 | Stop reason: SDUPTHER

## 2024-08-30 RX ORDER — METOCLOPRAMIDE HYDROCHLORIDE 5 MG/ML
10 INJECTION INTRAMUSCULAR; INTRAVENOUS
Status: DISCONTINUED | OUTPATIENT
Start: 2024-08-30 | End: 2024-08-30 | Stop reason: HOSPADM

## 2024-08-30 RX ORDER — GLYCOPYRROLATE 0.2 MG/ML
INJECTION INTRAMUSCULAR; INTRAVENOUS PRN
Status: DISCONTINUED | OUTPATIENT
Start: 2024-08-30 | End: 2024-08-30 | Stop reason: SDUPTHER

## 2024-08-30 RX ORDER — HYDROMORPHONE HYDROCHLORIDE 1 MG/ML
0.5 INJECTION, SOLUTION INTRAMUSCULAR; INTRAVENOUS; SUBCUTANEOUS EVERY 5 MIN PRN
Status: DISCONTINUED | OUTPATIENT
Start: 2024-08-30 | End: 2024-08-30 | Stop reason: HOSPADM

## 2024-08-30 RX ORDER — TRIAMTERENE/HYDROCHLOROTHIAZID 37.5-25 MG
1 TABLET ORAL DAILY
Status: DISCONTINUED | OUTPATIENT
Start: 2024-08-31 | End: 2024-08-31 | Stop reason: HOSPADM

## 2024-08-30 RX ORDER — HYDROMORPHONE HYDROCHLORIDE 1 MG/ML
0.5 INJECTION, SOLUTION INTRAMUSCULAR; INTRAVENOUS; SUBCUTANEOUS EVERY 10 MIN PRN
Status: DISCONTINUED | OUTPATIENT
Start: 2024-08-30 | End: 2024-08-30 | Stop reason: HOSPADM

## 2024-08-30 RX ORDER — MIDAZOLAM HYDROCHLORIDE 1 MG/ML
2 INJECTION INTRAMUSCULAR; INTRAVENOUS ONCE
Status: COMPLETED | OUTPATIENT
Start: 2024-08-30 | End: 2024-08-30

## 2024-08-30 RX ORDER — DIPHENHYDRAMINE HYDROCHLORIDE 50 MG/ML
12.5 INJECTION INTRAMUSCULAR; INTRAVENOUS
Status: DISCONTINUED | OUTPATIENT
Start: 2024-08-30 | End: 2024-08-30 | Stop reason: HOSPADM

## 2024-08-30 RX ORDER — SENNOSIDES 8.6 MG
1 TABLET ORAL DAILY
Qty: 30 TABLET | Refills: 0 | Status: SHIPPED | OUTPATIENT
Start: 2024-08-30

## 2024-08-30 RX ORDER — PHENYLEPHRINE HYDROCHLORIDE 10 MG/ML
INJECTION INTRAVENOUS PRN
Status: DISCONTINUED | OUTPATIENT
Start: 2024-08-30 | End: 2024-08-30 | Stop reason: SDUPTHER

## 2024-08-30 RX ORDER — SODIUM CHLORIDE 9 MG/ML
INJECTION, SOLUTION INTRAVENOUS PRN
Status: DISCONTINUED | OUTPATIENT
Start: 2024-08-30 | End: 2024-08-31 | Stop reason: HOSPADM

## 2024-08-30 RX ORDER — OXYCODONE HYDROCHLORIDE 5 MG/1
10 TABLET ORAL EVERY 4 HOURS PRN
Status: DISCONTINUED | OUTPATIENT
Start: 2024-08-30 | End: 2024-08-31 | Stop reason: HOSPADM

## 2024-08-30 RX ORDER — OXYCODONE HYDROCHLORIDE 5 MG/1
5 TABLET ORAL EVERY 4 HOURS PRN
Status: DISCONTINUED | OUTPATIENT
Start: 2024-08-30 | End: 2024-08-31 | Stop reason: HOSPADM

## 2024-08-30 RX ORDER — PREGABALIN 150 MG/1
150 CAPSULE ORAL ONCE
Status: COMPLETED | OUTPATIENT
Start: 2024-08-30 | End: 2024-08-30

## 2024-08-30 RX ORDER — METOPROLOL TARTRATE 25 MG/1
50 TABLET, FILM COATED ORAL 2 TIMES DAILY
Status: DISCONTINUED | OUTPATIENT
Start: 2024-08-30 | End: 2024-08-31 | Stop reason: HOSPADM

## 2024-08-30 RX ORDER — PANTOPRAZOLE SODIUM 40 MG/1
40 TABLET, DELAYED RELEASE ORAL
Status: DISCONTINUED | OUTPATIENT
Start: 2024-08-31 | End: 2024-08-31 | Stop reason: HOSPADM

## 2024-08-30 RX ORDER — SODIUM CHLORIDE 0.9 % (FLUSH) 0.9 %
5-40 SYRINGE (ML) INJECTION EVERY 12 HOURS SCHEDULED
Status: DISCONTINUED | OUTPATIENT
Start: 2024-08-30 | End: 2024-08-31 | Stop reason: HOSPADM

## 2024-08-30 RX ORDER — DEXAMETHASONE SODIUM PHOSPHATE 4 MG/ML
INJECTION, SOLUTION INTRA-ARTICULAR; INTRALESIONAL; INTRAMUSCULAR; INTRAVENOUS; SOFT TISSUE PRN
Status: DISCONTINUED | OUTPATIENT
Start: 2024-08-30 | End: 2024-08-30 | Stop reason: SDUPTHER

## 2024-08-30 RX ORDER — SODIUM CHLORIDE, SODIUM LACTATE, POTASSIUM CHLORIDE, CALCIUM CHLORIDE 600; 310; 30; 20 MG/100ML; MG/100ML; MG/100ML; MG/100ML
INJECTION, SOLUTION INTRAVENOUS CONTINUOUS
Status: DISCONTINUED | OUTPATIENT
Start: 2024-08-30 | End: 2024-08-30 | Stop reason: HOSPADM

## 2024-08-30 RX ORDER — MEPERIDINE HYDROCHLORIDE 25 MG/ML
12.5 INJECTION INTRAMUSCULAR; INTRAVENOUS; SUBCUTANEOUS EVERY 5 MIN PRN
Status: DISCONTINUED | OUTPATIENT
Start: 2024-08-30 | End: 2024-08-30 | Stop reason: HOSPADM

## 2024-08-30 RX ORDER — NALOXONE HYDROCHLORIDE 2 MG/.4ML
2 INJECTION, SOLUTION INTRAMUSCULAR; SUBCUTANEOUS
Qty: 0.4 ML | Refills: 0 | Status: SHIPPED | OUTPATIENT
Start: 2024-08-30 | End: 2024-08-30

## 2024-08-30 RX ORDER — GABAPENTIN 100 MG/1
100 CAPSULE ORAL 3 TIMES DAILY
Status: DISCONTINUED | OUTPATIENT
Start: 2024-08-30 | End: 2024-08-31 | Stop reason: HOSPADM

## 2024-08-30 RX ORDER — ROSUVASTATIN CALCIUM 5 MG/1
5 TABLET, COATED ORAL NIGHTLY
Status: DISCONTINUED | OUTPATIENT
Start: 2024-08-30 | End: 2024-08-31 | Stop reason: HOSPADM

## 2024-08-30 RX ADMIN — PHENYLEPHRINE HYDROCHLORIDE 100 MCG: 10 INJECTION, SOLUTION INTRAVENOUS at 12:01

## 2024-08-30 RX ADMIN — FENTANYL CITRATE 50 MCG: 50 INJECTION, SOLUTION INTRAMUSCULAR; INTRAVENOUS at 11:01

## 2024-08-30 RX ADMIN — SODIUM CHLORIDE, PRESERVATIVE FREE 10 ML: 5 INJECTION INTRAVENOUS at 20:08

## 2024-08-30 RX ADMIN — PREGABALIN 150 MG: 150 CAPSULE ORAL at 09:25

## 2024-08-30 RX ADMIN — SUGAMMADEX 200 MG: 100 INJECTION, SOLUTION INTRAVENOUS at 13:52

## 2024-08-30 RX ADMIN — DOCUSATE SODIUM 100 MG: 100 CAPSULE, LIQUID FILLED ORAL at 20:07

## 2024-08-30 RX ADMIN — DEXAMETHASONE SODIUM PHOSPHATE 4 MG: 4 INJECTION INTRA-ARTICULAR; INTRALESIONAL; INTRAMUSCULAR; INTRAVENOUS; SOFT TISSUE at 11:55

## 2024-08-30 RX ADMIN — BUPIVACAINE HYDROCHLORIDE 20 ML: 5 INJECTION, SOLUTION EPIDURAL; INTRACAUDAL; PERINEURAL at 11:01

## 2024-08-30 RX ADMIN — MIDAZOLAM HYDROCHLORIDE 1 MG: 2 INJECTION, SOLUTION INTRAMUSCULAR; INTRAVENOUS at 11:01

## 2024-08-30 RX ADMIN — PROPOFOL 100 MG: 10 INJECTION, EMULSION INTRAVENOUS at 11:41

## 2024-08-30 RX ADMIN — ROCURONIUM BROMIDE 30 MG: 10 INJECTION, SOLUTION INTRAVENOUS at 12:33

## 2024-08-30 RX ADMIN — ROCURONIUM BROMIDE 50 MG: 10 INJECTION, SOLUTION INTRAVENOUS at 11:41

## 2024-08-30 RX ADMIN — SODIUM CHLORIDE 1500 MG: 9 INJECTION, SOLUTION INTRAVENOUS at 11:56

## 2024-08-30 RX ADMIN — PHENYLEPHRINE HYDROCHLORIDE 100 MCG: 10 INJECTION, SOLUTION INTRAVENOUS at 12:16

## 2024-08-30 RX ADMIN — FENTANYL CITRATE 50 MCG: 50 INJECTION, SOLUTION INTRAMUSCULAR; INTRAVENOUS at 11:08

## 2024-08-30 RX ADMIN — PHENYLEPHRINE HYDROCHLORIDE 100 MCG: 10 INJECTION, SOLUTION INTRAVENOUS at 12:38

## 2024-08-30 RX ADMIN — GABAPENTIN 100 MG: 100 CAPSULE ORAL at 20:08

## 2024-08-30 RX ADMIN — BENZOCAINE 6 MG-MENTHOL 10 MG LOZENGES 1 LOZENGE: at 15:07

## 2024-08-30 RX ADMIN — METOPROLOL TARTRATE 50 MG: 25 TABLET, FILM COATED ORAL at 20:07

## 2024-08-30 RX ADMIN — CELECOXIB 400 MG: 200 CAPSULE ORAL at 09:25

## 2024-08-30 RX ADMIN — SODIUM CHLORIDE, POTASSIUM CHLORIDE, SODIUM LACTATE AND CALCIUM CHLORIDE: 600; 310; 30; 20 INJECTION, SOLUTION INTRAVENOUS at 09:13

## 2024-08-30 RX ADMIN — TIMOLOL MALEATE 1 DROP: 5 SOLUTION OPHTHALMIC at 20:12

## 2024-08-30 RX ADMIN — VERAPAMIL HYDROCHLORIDE 120 MG: 120 TABLET, FILM COATED, EXTENDED RELEASE ORAL at 20:08

## 2024-08-30 RX ADMIN — ACETAMINOPHEN 1000 MG: 500 TABLET ORAL at 09:25

## 2024-08-30 RX ADMIN — MIDAZOLAM HYDROCHLORIDE 1 MG: 2 INJECTION, SOLUTION INTRAMUSCULAR; INTRAVENOUS at 11:08

## 2024-08-30 RX ADMIN — CEFTRIAXONE SODIUM 2000 MG: 2 INJECTION, POWDER, FOR SOLUTION INTRAMUSCULAR; INTRAVENOUS at 11:52

## 2024-08-30 RX ADMIN — PHENYLEPHRINE HYDROCHLORIDE 100 MCG: 10 INJECTION, SOLUTION INTRAVENOUS at 12:25

## 2024-08-30 RX ADMIN — BUPIVACAINE 10 ML: 13.3 INJECTION, SUSPENSION, LIPOSOMAL INFILTRATION at 11:01

## 2024-08-30 RX ADMIN — BENZOCAINE 6 MG-MENTHOL 10 MG LOZENGES 1 LOZENGE: at 23:20

## 2024-08-30 RX ADMIN — PHENYLEPHRINE HYDROCHLORIDE 100 MCG: 10 INJECTION, SOLUTION INTRAVENOUS at 12:42

## 2024-08-30 RX ADMIN — GLYCOPYRROLATE 0.2 MG: 0.2 INJECTION INTRAMUSCULAR; INTRAVENOUS at 12:50

## 2024-08-30 RX ADMIN — ROSUVASTATIN CALCIUM 5 MG: 5 TABLET, COATED ORAL at 20:07

## 2024-08-30 RX ADMIN — ONDANSETRON 4 MG: 2 INJECTION INTRAMUSCULAR; INTRAVENOUS at 11:55

## 2024-08-30 RX ADMIN — PHENYLEPHRINE HYDROCHLORIDE 10 MCG/MIN: 10 INJECTION INTRAVENOUS at 12:32

## 2024-08-30 RX ADMIN — ACETAMINOPHEN 650 MG: 325 TABLET ORAL at 23:05

## 2024-08-30 ASSESSMENT — PAIN SCALES - GENERAL
PAINLEVEL_OUTOF10: 0

## 2024-08-30 NOTE — PROGRESS NOTES
4 Eyes Skin Assessment     NAME:  Emily Motta  YOB: 1940  MEDICAL RECORD NUMBER:  [id number]    The patient is being assessed for  Admission    I agree that at least one RN has performed a thorough Head to Toe Skin Assessment on the patient. ALL assessment sites listed below have been assessed.      Areas assessed by both nurses:    Head, Face, Ears, Shoulders, Back, Chest, Arms, Elbows, Hands, Sacrum. Buttock, Coccyx, Ischium, and Legs. Feet and Heels        Does the Patient have a Wound?   --surgical incision to left shoulder, dry dressing in place; ZEESHANE remains in immobilizer        Hemanth Prevention initiated by RN: Yes  Wound Care Orders initiated by RN: No    Pressure Injury (Stage 3,4, Unstageable, DTI, NWPT, and Complex wounds) if present, place Wound referral order by RN under : No    New Ostomies, if present place, Ostomy referral order under : No     Nurse 1 eSignature: Electronically signed by Berta Gore RN on 8/30/24 at 5:58 PM EDT    **SHARE this note so that the co-signing nurse can place an eSignature**    Nurse 2 eSignature: Electronically signed by ANTONIETTA NOBLES RN on 8/30/24 at 7:00 PM EDT

## 2024-08-30 NOTE — PROGRESS NOTES
Physical Therapy  Facility/Department: [facility] GENERAL SURGERY  Physical Therapy Initial Assessment and Treatment    Name: Emily Motta  : 1940  MRN: [id number]  Date of Service: 2024    Discharge Recommendations:  24 hour supervision or assist, Outpatient PT    DME - no needs noted      Patient Diagnosis(es): The encounter diagnosis was Arthritis of left shoulder region.  Past Medical History:  has a past medical history of Anxiety, Chronic kidney disease, stage 3b (HCC), Chronic midline low back pain without sciatica, Coronary artery disease involving native coronary artery without angina pectoris, Essential hypertension, Foot drop, left, GERD without esophagitis, History of blood transfusion, Hypercholesterolemia, Internal hemorrhoids, Intrinsic eczema, Lesion of lateral popliteal nerve, Osteoporosis of multiple sites, Overactive bladder, Paroxysmal supraventricular tachycardia (HCC), Peripheral arterial disease (HCC), Primary osteoarthritis involving multiple joints, Prolonged emergence from general anesthesia, PUD (peptic ulcer disease), and Restless leg syndrome.  Past Surgical History:  has a past surgical history that includes Cholecystectomy; Hysterectomy; lumbar laminectomy; [place] tooth extraction; Abdominal exploration surgery (); Colonoscopy; cyst removal; Breast surgery (Right); Total knee arthroplasty (Left, 2015); Total hip arthroplasty (Bilateral); Hemorrhoid surgery (2017); Upper gastrointestinal endoscopy (N/A, 03/10/2020); Carpal tunnel release (Right, 2020); Intracapsular cataract extraction (Right, 2021); Intracapsular cataract extraction (Left, 2021); Pain management procedure (Bilateral, 2021); LASIK (Bilateral); lumbar fusion (Left, 2022); lumbar fusion (N/A, 2022); Knee Arthroplasty (Left, left knee replaced in ); Hysterectomy, vaginal (); Total shoulder arthroplasty (Right); and back surgery.    Assessment  Assessment:  Independent  Homemaking Assistance: Needs assistance (daughter is primary)  Ambulation Assistance: Independent (with or without the cane - holds onto furniture)  Transfer Assistance: Independent  Active : Yes (has not recently)  Occupation: Retired  Leisure & Hobbies: TV, music  Vision/Hearing  Vision  Vision: Impaired  Vision Exceptions: Wears glasses for reading  Hearing  Hearing: Within functional limits    Cognition   Orientation  Overall Orientation Status: Within Functional Limits  Cognition  Overall Cognitive Status: WFL    Objective  Temp: 97.7 °F (36.5 °C)  Pulse: 72  Heart Rate Source: Monitor  Respirations: 13  SpO2: 100 %  O2 Device: None (Room air)  BP: (!) 142/71  MAP (Calculated): 95  BP Location: Right upper arm  BP Method: Automatic  Patient Position: Semi fowlers     Observation/Palpation  Observation: left shoulder immobilizer       AROM RLE (degrees)  RLE AROM: WFL  AROM LLE (degrees)  LLE AROM : WFL  LLE General AROM: except left foot drop.  Strength RLE  Strength RLE: WFL  Strength LLE  Strength LLE: WFL  Comment: except left foot drop           Bed mobility  Sit to Supine: Minimal assistance;2 Person assistance  Transfers  Sit to Stand: Contact guard assistance  Stand to Sit: Contact guard assistance  Ambulation  Comments: unable to ambulate 2/2 dizziness.         AM-PAC - Mobility    AM-PAC Basic Mobility - Inpatient   How much help is needed turning from your back to your side while in a flat bed without using bedrails?: A Lot  How much help is needed moving from lying on your back to sitting on the side of a flat bed without using bedrails?: A Lot  How much help is needed moving to and from a bed to a chair?: A Lot  How much help is needed standing up from a chair using your arms?: A Little  How much help is needed walking in hospital room?: A Lot  How much help is needed climbing 3-5 steps with a railing?: Total  AM-PAC Inpatient Mobility Raw Score : 12  AM-PAC Inpatient T-Scale

## 2024-08-30 NOTE — PROGRESS NOTES
Patient alert and oriented x 4. IV placed and IV fluids infusing. Consents signed and placed on chart.

## 2024-08-30 NOTE — PLAN OF CARE
Problem: Pain  Goal: Verbalizes/displays adequate comfort level or baseline comfort level  Outcome: Progressing  Flowsheets (Taken 8/30/2024 9526)  Verbalizes/displays adequate comfort level or baseline comfort level:   Encourage patient to monitor pain and request assistance   Administer analgesics based on type and severity of pain and evaluate response   Assess pain using appropriate pain scale   Implement non-pharmacological measures as appropriate and evaluate response  Note: Pt denies pain at this time. Pt and family member instructed to notify RN of worsening or new pain. Educated on pain medication administration schedule. Pain medication available per mar. Call light left within reach. Hourly rounding. Plan of care continues.     Problem: Safety - Adult  Goal: Free from fall injury  Outcome: Progressing  Note: Bed locked and lowered. Bed alarm on. Grippy socks on. Instructed to use call light for assistance and to not exit bed without staff. Verbalizes understanding. Call light left within reach. Hourly rounding. Plan of care continues.

## 2024-08-30 NOTE — FLOWSHEET NOTE
[facility] Same Day Readiness Screen  PAT Questionnaire    Does patient have at least one day of 24 hr assist of capable caregiver at d/c?  [x] Yes = 0  [] No = 2      Was patient using an assistive device to walk prior to surgery?  [] No = 0  [x] Yes = 1    How many steps do you have to get to the floor where you plan to initially sleep and use the restroom? (At least 1/2 bath)  [x] 0-2 steps= 0 [] 3+ steps = 1    Has patient fallen in the last 3 months? If yes, how many times?  [x] 0 falls = 0 [] 1 fall = 1     [] 2+ falls = 2    Does patient have a hx of post-op nausea/vomiting?  [x] No = 0 [] Yes = 2    Other Factors    Age  [] <70 = 0 [] 71-79 = 1  [x] 80+ = 2    BMI  [] <30 = 0       [x]31-39 = 1    [] >40 = 2    Co-morbidities  [] 0 = 0           [] 1-2 = 1       [x] 3+ = 2    Sleep apnea  [x] No = 0         [] Yes = 1    Hx of prolonged emergence from general anesthesia  [x] No = 0         [] Yes = 1      Score: 5      Interpretation:  Red (10-16): Low probability of safe same day discharge  Yellow (6-9): Moderate probability of safe same day discharge  Green (0-5): High probability of safe same day discharge    Score completed by:   Ayanna Yancey PT  [id number]

## 2024-08-30 NOTE — INTERVAL H&P NOTE
Update History & Physical    The patient's History and Physical of August 14, 2024 was reviewed with the patient and I examined the patient. There was no change. The surgical site was confirmed by the patient and me.     Plan: The risks, benefits, expected outcome, and alternative to the recommended procedure have been discussed with the patient. Patient understands and wants to proceed with the procedure.     Electronically signed by Peter Lucas MD on 8/30/2024 at 10:19 AM

## 2024-08-30 NOTE — BRIEF OP NOTE
Brief Postoperative Note      Patient: Emily Motta  YOB: 1940  MRN: [id number]    Date of Procedure: 8/30/2024    Pre-Op Diagnosis Codes:      * Arthritis of left shoulder region [M19.012]    Post-Op Diagnosis: Same       Procedure(s):  Left SHOULDER TOTAL ARTHROPLASTY REVERSE AND OPEN BICEP TENODESIS    Surgeon(s):  Yazmin Lacey MD    Assistant:  Surgical Assistant: Darryn Sigala  Fellow: Petre Lucas MD    Anesthesia: General    Estimated Blood Loss (mL): 300     Complications: None    Specimens:   * No specimens in log *    Implants:  * No implants in log *      Drains: * No LDAs found *    Findings:  Infection Present At Time Of Surgery (PATOS) (choose all levels that have infection present):  No infection present  Other Findings: left shoulder OA    Electronically signed by Peter Lucas MD on 8/30/2024 at 1:27 PM

## 2024-08-30 NOTE — PROGRESS NOTES
Occupational Therapy  Facility/Department: [facility] 5T ORTHO/NEURO  Occupational Therapy Initial Assessment/Treatment    Name: Emily Motta  : 1940  MRN: [id number]  Date of Service: 2024    Discharge Recommendations:  24 hour supervision or assist  OT Equipment Recommendations  Equipment Needed: No       Patient Diagnosis(es): The encounter diagnosis was Arthritis of left shoulder region.  Past Medical History:  has a past medical history of Anxiety, Chronic kidney disease, stage 3b (HCC), Chronic midline low back pain without sciatica, Coronary artery disease involving native coronary artery without angina pectoris, Essential hypertension, Foot drop, left, GERD without esophagitis, History of blood transfusion, Hypercholesterolemia, Internal hemorrhoids, Intrinsic eczema, Lesion of lateral popliteal nerve, Osteoporosis of multiple sites, Overactive bladder, Paroxysmal supraventricular tachycardia (HCC), Peripheral arterial disease (HCC), Primary osteoarthritis involving multiple joints, Prolonged emergence from general anesthesia, PUD (peptic ulcer disease), and Restless leg syndrome.  Past Surgical History:  has a past surgical history that includes Cholecystectomy; Hysterectomy; lumbar laminectomy; [place] tooth extraction; Abdominal exploration surgery (); Colonoscopy; cyst removal; Breast surgery (Right); Total knee arthroplasty (Left, 2015); Total hip arthroplasty (Bilateral); Hemorrhoid surgery (2017); Upper gastrointestinal endoscopy (N/A, 03/10/2020); Carpal tunnel release (Right, 2020); Intracapsular cataract extraction (Right, 2021); Intracapsular cataract extraction (Left, 2021); Pain management procedure (Bilateral, 2021); LASIK (Bilateral); lumbar fusion (Left, 2022); lumbar fusion (N/A, 2022); Knee Arthroplasty (Left, left knee replaced in ); Hysterectomy, vaginal (); Total shoulder arthroplasty (Right); and back  surgery.    Treatment Diagnosis: Impaired ADL and functional mobility      Assessment  Performance deficits / Impairments: Decreased functional mobility ;Decreased ADL status;Decreased ROM;Decreased balance  Assessment: Pt seen POD0 Lft TSR.  Pt with c/o dizziness with standing.  Pt also with weak LE.  Unable to attempt ambulation.  Pt to be admitted.  Will follow for acute OT.  Treatment Diagnosis: Impaired ADL and functional mobility  Prognosis: Good  Decision Making: Medium Complexity  REQUIRES OT FOLLOW-UP: Yes  Activity Tolerance  Activity Tolerance Comments: c/o dizziness.     Plan  Occupational Therapy Plan  Times Per Week: 7  Current Treatment Recommendations: ROM, Balance training, Functional mobility training, Endurance training, Self-Care / ADL    Restrictions  Position Activity Restriction  Other position/activity restrictions: NWB Lft UE.  Shoulder immobilizer: May remove for physical therapy, dressing change and hygiene under supervision of a nurse, physician or physical therapy.    Subjective  General  Chart Reviewed: Yes  Additional Pertinent Hx: 8/30/24 Left SHOULDER TOTAL ARTHROPLASTY REVERSE AND OPEN BICEP TENODESIS  Family / Caregiver Present: No  Referring Practitioner: Dr. Lucas  Diagnosis: Arthritis of left shoulder region  Subjective  Subjective: Pt on stretcher upon entry.   Pt c/o dizziness.  Pain: 0/10     Social/Functional History  Social/Functional History  Lives With: Daughter, Family  Type of Home: House  Home Layout: Laundry in basement, Able to Live on Main level with bedroom/bathroom, Performs ADL's on one level  Home Access: Stairs to enter with rails  Entrance Stairs - Number of Steps: six steps bilateral rails  Bathroom Shower/Tub: Walk-in shower  Bathroom Toilet: Handicap height (rails on the toilet and on the wall.)  Bathroom Equipment: Grab bars in shower, Hand-held shower, Shower chair  Bathroom Accessibility: Accessible  Home Equipment: Cane, Reacher  Has the patient had  two or more falls in the past year or any fall with injury in the past year?: No  Receives Help From: Family  ADL Assistance: Independent  Homemaking Assistance: Needs assistance (daughter is primary)  Ambulation Assistance: Independent (with or without the cane - holds onto furniture)  Transfer Assistance: Independent  Active : Yes (has not recently)  Occupation: Retired  Leisure & Hobbies: TV, music    Objective     Observation/Palpation  Observation: left shoulder immobilizer  Safety Devices  Type of Devices: Call light within reach;Nurse notified;Left in bed (in pacu)        AROM: Within functional limits (Rt UE)  Strength: Within functional limits (Rt UE)  Coordination: Within functional limits (Rt UE)  ADL  UE Dressing: Maximum assistance (to adjust shoulder immobilizer)  Toileting:  (Denied need)     Activity Tolerance  Activity Tolerance: Patient tolerated evaluation without incident  Activity Tolerance Comments: Pt unable to progress to gait today 2/2 dizziness upon standing.  Bed mobility  Sit to Supine: Minimal assistance;2 Person assistance  Transfers  Sit to stand: Minimal assistance (to CG)  Stand to sit: Minimal assistance (to CG)  Transfer Comments: Unable to ambulate secondary dizziness  Vision  Vision: Impaired  Vision Exceptions: Wears glasses for reading  Hearing  Hearing: Within functional limits  Cognition  Overall Cognitive Status: WFL  Orientation  Overall Orientation Status: Within Functional Limits                  Education Given To: Patient  Education Provided: Role of Therapy;Plan of Care;Home Exercise Program;Precautions;ADL Adaptive Strategies;Transfer Training  Education Method: Verbal;Demonstration  Barriers to Learning: None  Education Outcome: Verbalized understanding           Hand Dominance  Hand Dominance: Right     Treatment included ADL and transfer training.      AM-PAC - ADL  AM-PAC Daily Activity - Inpatient   How much help is needed for putting on and taking off  regular lower body clothing?: A Lot  How much help is needed for bathing (which includes washing, rinsing, drying)?: A Lot  How much help is needed for toileting (which includes using toilet, bedpan, or urinal)?: A Lot  How much help is needed for putting on and taking off regular upper body clothing?: A Lot  How much help is needed for taking care of personal grooming?: A Little  How much help for eating meals?: A Little  AM-[facility] Inpatient Daily Activity Raw Score: 14  AM-PAC Inpatient ADL T-Scale Score : 33.39  ADL Inpatient CMS 0-100% Score: 59.67  ADL Inpatient CMS G-Code Modifier : CK    Goals                                No goals met  Short Term Goals  Time Frame for Short Term Goals: Discharge  Short Term Goal 1: Transfer to/from toilet with SBA  Short Term Goal 2: Stance with SBA x4 min while engaging in ADL  Short Term Goal 3: Don shld immobilizer with min assist  Patient Goals   Patient goals : Go home      Therapy Time   Individual Concurrent Group Co-treatment   Time In 1548         Time Out 1620         Minutes 32         Timed Code Treatment Minutes: 22 Minutes   Total Treatment Time:32    Randi Freedman, OTR/L [id number]

## 2024-08-30 NOTE — DISCHARGE INSTRUCTIONS
Dr. Lacey's Discharge Instructions    Take pain medication as directed. If taking narcotic pain medication, do not take tylenol with this as there is tylenol in percocet/norco. Do not operate machinery while taking narcotic pain medications  Non weight bearing to effected extremity  Maintain sling until follow up or therapy. OK to remove sling several times a day to move elbow and wrist, no shoulder motion unless directed by therapy or Dr. Lacey.  Therapy as instructed. You should receive a call regarding therapy  Resume regular diet  May take down dressing in 72 hours and replace with a clean dressing  May shower after 72 hours. Avoid any submersion of operative extremity. Avoid any hot tubs, tub baths, pools, lakes, ocean ect. Avoid contact with dishwater or dirty water.  Call immediately if any increasing redness or drainage, any fevers.  Colace for constipation  zofran for nausea  ELIQUIS 2.5 MG TWICE A DAY for next 14 days to decrease risk of blood clots  Please take doxycycline as prescribed, this is to prevent infection      Dr. Yazmin Lacey  [facility]  Call [phone] for appointment            [facility] AMBULATORY PROCEDURE DISCHARGE INSTRUCTIONS    There are potential side effects of anesthesia or sedation you may experience for the first 24 hours.  These side effects include:    Confusion or Memory loss, Dizziness, or Delayed Reaction Times   [x]A responsible person should be with you for the next 24 hours.  Do not operate any vehicles (automobiles, bicycles, motorcycles) or power tools or machinery for 24 hours.  Do not sign any legal documents or make any legal decisions for 24 hours. Do not drink alcohol for 24 hours or while taking narcotic pain medication.      Nausea    [x]Start with light diet and progress to your normal diet as you feel like eating. However, if you experience nausea or repeated episodes of vomiting which persist beyond 12-24 hours,  notify your physician.  Once nausea has passed, remember to keep drinking fluids.    Difficulty Passing Urine  [x]Drink extra amounts of fluid today.  Notify your physician if you have not urinated within 8 hours after your procedure or you feel uncomfortable.      Irritated Throat from a Breathing Tube  [x]Drink extra amounts of fluid today.  Lozenges may help.    Muscle Aches  [x]You may experience some generalized body aches as your muscles recover from medications used to relax them during surgery.  These will gradually subside.    MEDICATION INSTRUCTIONS:  []Prescription(S) x     sent with you.  Use as directed.  When taking pain medications, you may experience the side effect of dizziness or drowsiness.  Do not drink alcohol or drive when taking these medications.  [x]Prescription(S) x   7  Called to Pharmacy Name and location: [facility] [address] [place] [address]     [x]Give the list of your medications to your primary care physician on your next visit. Keep your med list updated and carry it with in case of emergencies.    [x] Narcotic pain medications can cause the side effect of significant constipation.  You may want to add a stool softener to your postoperative medication schedule or speak to your surgeon on how best to manage this side effect.    NARCOTIC SAFETY:  Your pain medicine is only for you to take.  Safely store your medicines.  Store pills up high and out of reach of children and pets.  Ensure safety caps are snapped tightly  Keep track of how many pills you have left    Unused medication can be disposed of by taking them to a drop-off box or take-back program that is authorized by the [employer].  Access to a site near you can be found on the [employer]'s D[employer] website (deadiversion.usdoj.gov).    If you have a CPAP machine, it is very important that you use it daily during all periods of sleep and daytime rest during your recovery at home.  Surgery and  Anesthesia place a significant amount of stress on your body.  Using your CPAP will help keep you safe and lessen the negative effects of that stress.    FOLLOW-UP RECOVERY CARE:  [x]Call the office at [phone] for follow-up appointment and problems    Watch for these possible complications, symptoms, or side effects of anesthesia.  Call physician if they or any other problems occur:  Signs of INFECTION   > Fever over 101°     > Redness, swelling, hardness or warmth at the operative site   >Foul smelling or cloudy drainage at the operative site   Unrelieved PAIN  Unrelieved NAUSEA  Blood soaked dressing.  (Some oozing may be normal)  Inability to urinate      Numb, pale, blue, cold or tingling extremity      Physician:  Dr. Lacey    The above instructions were reviewed with patient/significant other.  The following additional patient specific information was reviewed with the patient/significant other:  [x]Procedure/physician specific instructions  []Medication information sheet(S) including potential side effects  []Bre’s egress test  []Pain Ball management  []FAQ Catheter associated blood stream infections  []FAQ Surgical Site Infections  []Other-    I have read and understand the instructions given to me: ____________________________________________   (Patient/S.O. Signature)            Date/time 8/30/2024 2:40 PM                 If you smoke STOP. We care about your health!                If you smoke STOP. We care about your health!Please remember while having a nerve block you are at an increased risk for BALANCE ISSUES AND FALLS  You were given a nerve block today from the anesthesiologist. Most nerve blocks last anywhere from 6-36 hours.  You should start taking your pain medication before the block wears off or when you first begin feeling discomfort. It takes at least 30-60 minutes for a pain pill to take effect. Pain medications should be taken with food.  Consider setting an alarm through the  anesthetic provides pain relief by numbing the tissue around the surgical site.  Exparel releases pain medication over time lasting up to 5 days or 120 hours.  Exparel may cause a temporary loss of sensation or the ability to move in the area where the medication was injected.    Side effects of Exparel that may occur include nausea, vomiting or constipation.  Call immediately if you experience numbness or tingling of the mouth or lips, lightheadedness or severe anxiety.  Notify your physician if you experience these or any other possible side effects of the medication.    Note: Other forms of bupivacaine should not be administered within 96 hours (4 days) following administration of Exparel.  Please keep ID band in place for 96 hours (4 days).

## 2024-08-30 NOTE — PROGRESS NOTES
PACU Transfer to [facility]    Vitals:    08/30/24 1700   BP: 130/79   Pulse: 62   Resp: 15   Temp: 97.6 °F (36.4 °C)   SpO2: 98%         Intake/Output Summary (Last 24 hours) at 8/30/2024 1708  Last data filed at 8/30/2024 1708  Gross per 24 hour   Intake 1930 ml   Output 200 ml   Net 1730 ml       Pain assessment:  none  Pain Level: 0    Patient transferred to care of [facility] RN.    8/30/2024 5:08 PM

## 2024-08-30 NOTE — PROGRESS NOTES
Procedure: left interscalene brachial plexus block   MD: Dr. Cooley  Timeout performed.  Pt monitored closely on heart monitor, 2L NC, continuous pulse oximetry, EtCO2, and frequent BPs.   Pt remained alert and oriented x4. pt tolerated procedure well.

## 2024-08-30 NOTE — FLOWSHEET NOTE
Pt daughter Ritika called and updated on pt status. Ritika made aware pt is waiting for PT/OT at this time who is working in unit with another pt. Pt encouraged to lay back and close eyes.

## 2024-08-30 NOTE — PROGRESS NOTES
Pt admitted to the floor with daughter at bedside. 4 eyes completed with second RN. VSS on room air. Pt is A&Ox4. Denies pain at this time. Incision on left shoulder CDI. Immobilizer in place. Decreased sensation on left arm, able to squeeze hand and wiggle fingers on command. 2+ radial pulses bilaterally noted. Foot drop and tingling present in LLE per patient. C/o dizziness and a cough at this time. Wheezing noted bilaterally upon auscultation. Denies n/v. Tolerating diet. Pt has not voided since admitting to this unit. Denies further needs at this time.    Fall precautions in place. Bed locked and lowered. Bed alarm on. Grippy socks on Call light left within reach. Hourly rounding. Plan of care continues.

## 2024-08-30 NOTE — ANESTHESIA PROCEDURE NOTES
Peripheral Block    Patient location during procedure: pre-op  Reason for block: post-op pain management and at surgeon's request  Start time: 8/30/2024 11:00 AM  End time: 8/30/2024 11:05 AM  Staffing  Performed: resident/CRNA   Anesthesiologist: Tor Cooley MD  Resident/CRNA: Karin Patel APRN - CRNA  Performed by: Karin Patel APRN - CRNA  Authorized by: Tor Cooley MD    Preanesthetic Checklist  Completed: patient identified, IV checked, site marked, risks and benefits discussed, surgical/procedural consents, equipment checked, pre-op evaluation, timeout performed, anesthesia consent given, oxygen available, monitors applied/VS acknowledged, fire risk safety assessment completed and verbalized and blood product R/B/A discussed and consented  Peripheral Block   Patient position: sitting  Prep: ChloraPrep  Provider prep: mask and sterile gloves  Patient monitoring: responsive to questions, oxygen, IV access, frequent blood pressure checks, continuous capnometry, continuous pulse ox and cardiac monitor  Block type: Brachial plexus  Interscalene  Laterality: left  Injection technique: single-shot  Guidance: ultrasound guided    Needle   Needle type: insulated echogenic nerve stimulator needle   Needle gauge: 22 G  Needle localization: ultrasound guidance  Assessment   Injection assessment: negative aspiration for heme, no paresthesia on injection, local visualized surrounding nerve on ultrasound and no intravascular symptoms  Paresthesia pain: none  Slow fractionated injection: yes  Hemodynamics: stable  Outcomes: uncomplicated and patient tolerated procedure well    Medications Administered  BUPivacaine liposome (EXPAREL) injection 1.3% - Perineural   10 mL - 8/30/2024 11:01:00 AM  BUPivacaine (MARCAINE) PF injection 0.5% - Perineural   20 mL - 8/30/2024 11:01:00 AM

## 2024-08-30 NOTE — PROGRESS NOTES
Patient admitted to PACU # 09 from OR at 1405 post Left SHOULDER TOTAL ARTHROPLASTY REVERSE AND OPEN BICEP TENODESIS - Left  per Dr. Lacey.  Attached to PACU monitoring system and report received from anesthesia provider.  Patient was reported to be hemodynamically stable during procedure.  Patient drowsy on admission and denied pain. Pt arrived to pacu with simple mask in place and is now on 2 L NC. Pt NSR on monitor. Pt with nonproductive cough. LUE surgical drsg c/d/I with immobilizer in place and drsg is c/d/I. Ice pack applied. RUE elevated on pillow.  Exparel band in place. Post op xray completed. Will continue to monitor.

## 2024-08-30 NOTE — ANESTHESIA POSTPROCEDURE EVALUATION
Department of Anesthesiology  Postprocedure Note    Patient: Emily Motta  MRN: [id number]  YOB: 1940  Date of evaluation: 8/30/2024    Procedure Summary       Date: 08/30/24 Room / Location: [address]    Anesthesia Start: 1123 Anesthesia Stop: 1407    Procedure: Left SHOULDER TOTAL ARTHROPLASTY REVERSE AND OPEN BICEP TENODESIS (Left: Shoulder) Diagnosis:       Arthritis of left shoulder region      (Arthritis of left shoulder region [M19.012])    Surgeons: Yazmin Lacey MD Responsible Provider: Tor Cooley MD    Anesthesia Type: general ASA Status: 3            Anesthesia Type: No value filed.    Bandar Phase I: Bandar Score: 10    Bandar Phase II:      Anesthesia Post Evaluation    Patient location during evaluation: PACU  Patient participation: complete - patient participated  Level of consciousness: awake and awake and alert  Pain score: 0  Airway patency: patent  Nausea & Vomiting: no nausea and no vomiting  Cardiovascular status: hemodynamically stable  Respiratory status: acceptable  Hydration status: euvolemic  Pain management: adequate and satisfactory to patient        No notable events documented.

## 2024-08-30 NOTE — ANESTHESIA PRE PROCEDURE
Department of Anesthesiology  Preprocedure Note       Name:  Emily Motta   Age:  84 y.o.  :  1940                                          MRN:  [id number]         Date:  2024      Surgeon: Surgeon(s):  Yazmin Lacey MD    Procedure: Procedure(s):  Left SHOULDER TOTAL ARTHROPLASTY REVERSE    Medications prior to admission:   Prior to Admission medications    Medication Sig Start Date End Date Taking? Authorizing Provider   LORazepam (ATIVAN) 1 MG tablet TAKE 1 TABLET EVERY 8 HOURS AS NEEDED FOR ANXIETY FOR UP TO 30 DAYS 24 Yes Konstantin Motta DO   gabapentin (NEURONTIN) 100 MG capsule Take 1 capsule by mouth 3 times daily for 180 days. Intended supply: 90 days 8/19/24 2/15/25 Yes Konstantin Motta DO   rosuvastatin (CRESTOR) 5 MG tablet Take 1 tablet by mouth nightly 24  Yes Konstantin Motta DO   metoprolol tartrate (LOPRESSOR) 50 MG tablet Take 1 tablet by mouth 2 times daily 24  Yes Konstantin Motta DO   verapamil (CALAN SR) 120 MG extended release tablet Take 1 tablet by mouth nightly 24  Yes Konstantin Motta DO   triamterene-hydroCHLOROthiazide (MAXZIDE-25) 37.5-25 MG per tablet Take 1 tablet by mouth daily 24  Yes Konstantin Motta DO   omeprazole (PRILOSEC) 20 MG delayed release capsule TAKE 1 CAPSULE EVERY DAY 24  Yes Konstantin Motta DO   latanoprost (XALATAN) 0.005 % ophthalmic solution Place 1 drop into both eyes nightly 24  Yes Jocelin Bruce MD   MYRBETRIQ 50 MG TB24 TAKE 1 TABLET EVERY DAY 24  Yes Konstantin Motta DO   Calcium Carbonate-Vitamin D (CALTRATE 600+D PO) Take 1 capsule by mouth in the morning, at noon, and at bedtime   Yes Jocelin Bruce MD   timolol (BETIMOL) 0.5 % ophthalmic solution Place 1 drop into both eyes 2 times daily   Yes Jocelin Bruce MD   docusate sodium (COLACE) 100 MG capsule Take 1 capsule by mouth 2 times daily 17  Yes Taye Cardenas MD

## 2024-08-31 VITALS
HEART RATE: 76 BPM | RESPIRATION RATE: 16 BRPM | DIASTOLIC BLOOD PRESSURE: 74 MMHG | BODY MASS INDEX: 38.09 KG/M2 | TEMPERATURE: 97 F | OXYGEN SATURATION: 97 % | HEIGHT: 66 IN | WEIGHT: 237 LBS | SYSTOLIC BLOOD PRESSURE: 124 MMHG

## 2024-08-31 LAB
ANION GAP SERPL CALCULATED.3IONS-SCNC: 8 MMOL/L (ref 3–16)
BUN SERPL-MCNC: 22 MG/DL (ref 7–20)
CALCIUM SERPL-MCNC: 8.7 MG/DL (ref 8.3–10.6)
CHLORIDE SERPL-SCNC: 101 MMOL/L (ref 99–110)
CO2 SERPL-SCNC: 30 MMOL/L (ref 21–32)
CREAT SERPL-MCNC: 1.3 MG/DL (ref 0.6–1.2)
DEPRECATED RDW RBC AUTO: 15.5 % (ref 12.4–15.4)
GFR SERPLBLD CREATININE-BSD FMLA CKD-EPI: 40 ML/MIN/{1.73_M2}
GLUCOSE SERPL-MCNC: 120 MG/DL (ref 70–99)
HCT VFR BLD AUTO: 29 % (ref 36–48)
HGB BLD-MCNC: 9.3 G/DL (ref 12–16)
MCH RBC QN AUTO: 26.8 PG (ref 26–34)
MCHC RBC AUTO-ENTMCNC: 32.2 G/DL (ref 31–36)
MCV RBC AUTO: 83.4 FL (ref 80–100)
PLATELET # BLD AUTO: 159 K/UL (ref 135–450)
PMV BLD AUTO: 9.1 FL (ref 5–10.5)
POTASSIUM SERPL-SCNC: 3.9 MMOL/L (ref 3.5–5.1)
RBC # BLD AUTO: 3.48 M/UL (ref 4–5.2)
SODIUM SERPL-SCNC: 139 MMOL/L (ref 136–145)
WBC # BLD AUTO: 7.7 K/UL (ref 4–11)

## 2024-08-31 PROCEDURE — 97530 THERAPEUTIC ACTIVITIES: CPT

## 2024-08-31 PROCEDURE — 6370000000 HC RX 637 (ALT 250 FOR IP): Performed by: STUDENT IN AN ORGANIZED HEALTH CARE EDUCATION/TRAINING PROGRAM

## 2024-08-31 PROCEDURE — 36415 COLL VENOUS BLD VENIPUNCTURE: CPT

## 2024-08-31 PROCEDURE — 97116 GAIT TRAINING THERAPY: CPT

## 2024-08-31 PROCEDURE — 97535 SELF CARE MNGMENT TRAINING: CPT

## 2024-08-31 PROCEDURE — 85027 COMPLETE CBC AUTOMATED: CPT

## 2024-08-31 PROCEDURE — G0378 HOSPITAL OBSERVATION PER HR: HCPCS

## 2024-08-31 PROCEDURE — 6370000000 HC RX 637 (ALT 250 FOR IP): Performed by: INTERNAL MEDICINE

## 2024-08-31 PROCEDURE — 80048 BASIC METABOLIC PNL TOTAL CA: CPT

## 2024-08-31 PROCEDURE — 2580000003 HC RX 258: Performed by: STUDENT IN AN ORGANIZED HEALTH CARE EDUCATION/TRAINING PROGRAM

## 2024-08-31 RX ADMIN — GABAPENTIN 100 MG: 100 CAPSULE ORAL at 13:15

## 2024-08-31 RX ADMIN — SODIUM CHLORIDE, PRESERVATIVE FREE 10 ML: 5 INJECTION INTRAVENOUS at 09:48

## 2024-08-31 RX ADMIN — TRIAMTERENE AND HYDROCHLOROTHIAZIDE 1 TABLET: 37.5; 25 TABLET ORAL at 09:46

## 2024-08-31 RX ADMIN — BENZOCAINE 6 MG-MENTHOL 10 MG LOZENGES 1 LOZENGE: at 04:51

## 2024-08-31 RX ADMIN — DOXYCYCLINE 100 MG: 100 CAPSULE ORAL at 09:46

## 2024-08-31 RX ADMIN — APIXABAN 2.5 MG: 2.5 TABLET, FILM COATED ORAL at 09:47

## 2024-08-31 RX ADMIN — GABAPENTIN 100 MG: 100 CAPSULE ORAL at 09:46

## 2024-08-31 RX ADMIN — METOPROLOL TARTRATE 50 MG: 25 TABLET, FILM COATED ORAL at 09:47

## 2024-08-31 RX ADMIN — PANTOPRAZOLE SODIUM 40 MG: 40 TABLET, DELAYED RELEASE ORAL at 09:46

## 2024-08-31 RX ADMIN — TIMOLOL MALEATE 1 DROP: 5 SOLUTION OPHTHALMIC at 09:48

## 2024-08-31 RX ADMIN — DOCUSATE SODIUM 100 MG: 100 CAPSULE, LIQUID FILLED ORAL at 09:47

## 2024-08-31 RX ADMIN — ACETAMINOPHEN 650 MG: 325 TABLET ORAL at 10:57

## 2024-08-31 RX ADMIN — BENZOCAINE 6 MG-MENTHOL 10 MG LOZENGES 1 LOZENGE: at 10:56

## 2024-08-31 ASSESSMENT — PAIN SCALES - GENERAL
PAINLEVEL_OUTOF10: 0
PAINLEVEL_OUTOF10: 4
PAINLEVEL_OUTOF10: 0

## 2024-08-31 ASSESSMENT — PAIN DESCRIPTION - LOCATION: LOCATION: THROAT

## 2024-08-31 ASSESSMENT — PAIN DESCRIPTION - DESCRIPTORS: DESCRIPTORS: SORE

## 2024-08-31 NOTE — PROGRESS NOTES
Pt A/O x4. VSS on RMA. Pt has no complaints of pain at this time. PT immobilizer in place. Pt tolerating PO diet and fluids well. Pt voiding via BPR. Pt ambulates x2 with walker and GB. All needs met at this time and call light is within reach. Care is ongoing.

## 2024-08-31 NOTE — PROGRESS NOTES
Orthopaedic Surgery Fellow Post Operative Rounding Progress Note    History of Present Illness:  Emily Motta is a 84 y.o. female who was seen this morning. She is POD  1.  There were no events overnight.  Pain is controlled.  They currently deny any dizziness, fevers, chills, shortness of breath, chest pain, paresthesias, or any other current complaints.     Procedure: [unfilled]     Medical History:  Patient's medications, allergies, past medical, surgical, social and family histories were reviewed and updated as appropriate. They are as noted.     Social History     Socioeconomic History    Marital status:      Spouse name: Not on file    Number of children: 2    Years of education: Not on file    Highest education level: Not on file   Occupational History    Occupation: Retired    Tobacco Use    Smoking status: Former     Current packs/day: 0.00     Types: Cigarettes     Quit date: 1997     Years since quittin.6    Smokeless tobacco: Never    Tobacco comments:     none   Vaping Use    Vaping status: Never Used   Substance and Sexual Activity    Alcohol use: Not Currently     Comment: Currently I drink water coffee and gingerale    Drug use: Never    Sexual activity: Not Currently     Partners: Male   Other Topics Concern    Not on file   Social History Narrative    Not on file     Social Determinants of Health     Financial Resource Strain: Low Risk  (2024)    Overall Financial Resource Strain (CARDIA)     Difficulty of Paying Living Expenses: Not very hard   Food Insecurity: No Food Insecurity (2024)    Hunger Vital Sign     Worried About Running Out of Food in the Last Year: Never true     Ran Out of Food in the Last Year: Never true   Transportation Needs: No Transportation Needs (2024)    PRAPARE - Transportation     Lack of Transportation (Medical): No     Lack of Transportation (Non-Medical): No   Physical Activity: Insufficiently Active (2024)     Osteoporosis of multiple sites     Overactive bladder     Paroxysmal supraventricular tachycardia (HCC)     Peripheral arterial disease (HCC) 03/20/2023    Mild per in home eval by insurance.    Primary osteoarthritis involving multiple joints     Prolonged emergence from general anesthesia     PUD (peptic ulcer disease)     Restless leg syndrome      Past Medical History:   Diagnosis Date    Anxiety     Chronic kidney disease, stage 3b (HCC)     Chronic midline low back pain without sciatica     Coronary artery disease involving native coronary artery without angina pectoris     Essential hypertension     Foot drop, left     GERD without esophagitis     History of blood transfusion     Hypercholesterolemia     Internal hemorrhoids     Intrinsic eczema     Lesion of lateral popliteal nerve     Osteoporosis of multiple sites     Overactive bladder     Paroxysmal supraventricular tachycardia (HCC)     Peripheral arterial disease (HCC) 03/20/2023    Mild per in home eval by insurance.    Primary osteoarthritis involving multiple joints     Prolonged emergence from general anesthesia     PUD (peptic ulcer disease)     Restless leg syndrome      Active Ambulatory Problems     Diagnosis Date Noted    Paroxysmal supraventricular tachycardia (HCC)     Foot drop, left     Anxiety     PUD (peptic ulcer disease)     Restless leg syndrome     GERD without esophagitis     Primary osteoarthritis involving multiple joints     Non morbid obesity due to excess calories     Primary insomnia     Coronary artery disease involving native coronary artery without angina pectoris     Essential hypertension     Intrinsic eczema     Chronic midline low back pain without sciatica     Anesthesia complication     Lesion of lateral popliteal nerve     Postmenopausal status     Seborrheic keratosis     Grade IV hemorrhoids     Glaucoma     Internal hemorrhoids     Positive PPD     Chronic kidney disease, stage 3b (HCC)     Osteoporosis of  multiple sites 03/30/2022    Scoliosis of lumbar region due to degenerative disease of spine in adult 04/21/2022    S/P spinal fusion 04/26/2022    Chronic renal disease, stage III ([facility]) [613427] 05/25/2022    Overactive bladder 12/05/2022    Peripheral arterial disease (HCC) 03/20/2023    Hypercholesterolemia 12/05/2023    Arthritis of left shoulder region 07/16/2024     Resolved Ambulatory Problems     Diagnosis Date Noted    Carpal tunnel syndrome of right wrist 08/28/2020     Past Medical History:   Diagnosis Date    History of blood transfusion     Prolonged emergence from general anesthesia        Review of Systems:  Gen: No dizziness, fevers, chills  HEENT: Negative for double vision, visual changes  CV: Negative for chest pain, palpitations  Lungs: Negative for shortness of breath or wheezing  Abdomen: Negative for abdominal pain or bloating   Neurological: Negative for numbness, paresthesias, or weakness  Psychiatric: alert and oriented to person, place and time.  Urological: Negative for urinary retention    Vital Signs:  Vitals:    08/31/24 0315   BP: 138/73   Pulse: 70   Resp: 16   Temp: 97.8 °F (36.6 °C)   SpO2: 97%     Height: 166.4 cm (5' 5.51\"), Weight - Scale: 107.5 kg (237 lb), Body mass index is 38.82 kg/m².,    Physical Exam:     Appearance: Emily Motta is alert, oriented x 3, resting comforably, no acute distress.     Left Upper Extremity  Exam:  Dressing is clean, dry, and intact.   Sling in place.   Compartments are soft and compressible.    Motor intact with Axillary/AIN/PIN/ulnar nerve distributions.   Sensation intact in all dermatomes of the upper extremity.    Radial pulse palpable  Hand warm with brisk capillary refill < 2 seconds.     Assessment:  1). POD1 s/p L TSA reverse    Treatment Plan:    1). Maintain dressing, reinforce as needed per nursing  2). Pain control PRN  3). DVT ppx:  325 mg Aspirin bid x2 weeks   4). Ice/elevate PRN  5). Encourage diet  6). GI ppx  7). PT/OT,

## 2024-08-31 NOTE — CONSULTS
V2.0  History and Physical      Name:  Emily Motta /Age/Sex: 1940  (84 y.o. female)   MRN & CSN:  [id number] & [id number] Encounter Date/Time: 2024 12:23 PM EDT   Location[facility]  [facility] PCP: Konstantin Motta DO       Hospital Day: 2    Assessment and Plan:   Emily Motta is a 84 y.o. female with a pmh of  who presents with <principal problem not specified>    Hospital Problems             Last Modified POA     Arthritis of left shoulder region 2024 Unknown    Osteoarthritis of left shoulder, unspecified osteoarthritis type 2024 Yes         Osteoarthritis of left shoulder  S/p left shoulder arthrotomy, open biceps tenodesis and reverse total shoulder replacement  Pain control  PT OT    History of hypertension  On Lopressor 50 mg twice daily, verapamil, Maxzide    Glaucoma  On latanoprost    Hyperlipidemia, on Crestor, continue    Chronic back pain  On gabapentin, continue      Medically stable for discharge        patient    History of Present Illness:     Chief Complaint: <principal problem not specified>  Emily Motta is a 84 y.o. female with pmh of hypertension, hyperlipidemia, osteoarthritis of left shoulder who presents for elective L TSA reverse.  Internal medicine consulted for medical management patient seen on POD1.  Doing well.  Denies any complaints.  Vitals and meds reviewed     Review of Systems: Need 10 Elements   Review of Systems    Negative if not mentioned above    Objective:     Intake/Output Summary (Last 24 hours) at 2024 1223  Last data filed at 2024 0951  Gross per 24 hour   Intake 2165 ml   Output 300 ml   Net 1865 ml      Vitals:   Vitals:    24 1930 24 2300 24 0315 24 0947   BP: (!) 152/74 (!) 108/58 138/73 124/74   Pulse: 63 63 70 76   Resp: 16 16 16 16   Temp: 97.6 °F (36.4 °C) 97.8 °F (36.6 °C) 97.8 °F (36.6 °C) 97 °F (36.1 °C)   TempSrc: Oral Oral Oral Oral   SpO2: 95% 98% 97% 97%   Weight:       Height:      9.3*        BMP:    Recent Labs     08/31/24  0512      K 3.9      CO2 30   BUN 22*   CREATININE 1.3*   GLUCOSE 120*     Hepatic: No results for input(s): \"AST\", \"ALT\", \"BILITOT\", \"ALKPHOS\" in the last 72 hours.    Invalid input(s): \"ALB\"  Lipids:   Lab Results   Component Value Date/Time    CHOL 167 03/07/2024 08:13 PM    HDL 71 03/07/2024 08:13 PM    TRIG 70 03/07/2024 08:13 PM     Hemoglobin A1C:   Lab Results   Component Value Date/Time    LABA1C 5.5 08/19/2024 01:09 PM     TSH:   Lab Results   Component Value Date/Time    TSH 1.48 09/26/2012 11:16 AM     Troponin: No results found for: \"TROPONINT\"  Lactic Acid: No results for input(s): \"LACTA\" in the last 72 hours.  BNP: No results for input(s): \"PROBNP\" in the last 72 hours.  UA:  Lab Results   Component Value Date/Time    NITRU Negative 02/06/2015 01:00 AM    COLORU yellow 10/25/2021 03:04 PM    COLORU Yellow 02/06/2015 01:00 AM    PHUR 6.0 10/25/2021 03:04 PM    PHUR 6.5 02/06/2015 01:00 AM    CLARITYU clear 10/25/2021 03:04 PM    CLARITYU Clear 02/06/2015 01:00 AM    SPECGRAV 1.020 10/25/2021 03:04 PM    LEUKOCYTESUR neg 10/25/2021 03:04 PM    LEUKOCYTESUR Negative 02/06/2015 01:00 AM    UROBILINOGEN 0.2 02/06/2015 01:00 AM    BILIRUBINUR neg 10/25/2021 03:04 PM    BLOODU neg 10/25/2021 03:04 PM    BLOODU Negative 02/06/2015 01:00 AM    GLUCOSEU neg 10/25/2021 03:04 PM    GLUCOSEU Negative 02/06/2015 01:00 AM    KETUA neg 10/25/2021 03:04 PM    KETUA Negative 02/06/2015 01:00 AM     Urine Cultures:   Lab Results   Component Value Date/Time    LABURIN >100,000 CFU/ml 08/19/2024 11:07 AM     Blood Cultures: No results found for: \"BC\"  No results found for: \"BLOODCULT2\"  Organism:   Lab Results   Component Value Date/Time    ORG Klebsiella aerogenes 08/19/2024 11:07 AM       Imaging/Diagnostics Last 24 Hours   XR SHOULDER LEFT 1 VW    Result Date: 8/30/2024  EXAM: XR SHOULDER LEFT 1 VW INDICATION: sp shoulder replacement COMPARISON: None  available. TECHNIQUE: Single AP view of the left shoulder FINDINGS: Postsurgical changes of reverse total shoulder arthroplasty. No periprosthetic fracture or lucency. Alignment is maintained. Soft tissue edema and gas around the shoulder.     Expected postsurgical appearance following reverse total shoulder arthroplasty. Electronically signed by Slade Kearney      Personally reviewed Lab Studies, Imaging, and discussed case with     Electronically signed by Marie Panchal MD on 8/31/2024 at 12:23 PM

## 2024-08-31 NOTE — OP NOTE
[address] [address]                            OPERATIVE REPORT      PATIENT NAME: MARYANA COLIN               : 1940  MED REC NO: [id number]                      ROOM: [facility]  ACCOUNT NO: [id number]                       ADMIT DATE: 2024  PROVIDER: Yazmin Lacey MD      DATE OF PROCEDURE:  2024    SURGEON:  Yazmin Lacey MD    PREOPERATIVE DIAGNOSIS:  End-stage glenohumeral osteoarthritis with chronic rotator cuff tear, left shoulder.    POSTOPERATIVE DIAGNOSIS:  End-stage glenohumeral osteoarthritis with chronic rotator cuff tear, left shoulder with biceps tendinosis and partial tear.    PROCEDURES:  Left shoulder arthrotomy, open biceps tenodesis, and reverse total shoulder replacement.    ANESTHESIA:  General anesthesia, interscalene block.    IV FLUIDS:  1000 mL of crystalloid.    ESTIMATED BLOOD LOSS:  200 mL.    COMPLICATIONS:  None.    ASSISTANT SURGEON:  Sanjuana Marcum DO, and Peter Lucas MD.    The availability of at least one of these skilled first assistant was critically important as they had to help with humeral glenoid exposure and layered closure including subscapularis repair.    IMPLANTS:  DJO/Enovis 12 small shell short stem AltiVate implant, 36 neutral liner, 36 neutral glenosphere, standard baseplate, 4 locking screws.    FINDINGS:  Examination under anesthesia revealed global motion deficits.  Arthrotomy revealed thick clavipectoral fascia.  Posterior surface of the rotator cuff was torn.  The anterior cable was still attached and had to be removed because it was degenerative.  Biceps was tendinopathic amenable to tenotomy and tenodesis.  Posterior rotator cuff was intact and the subscapularis was intact, could be repaired at the conclusion of the procedure.  There were large peripheral humeral osteophytes.  Bone quality was surprisingly good in this 84-year-old woman.  She had  around the subscapularis recess.  There was quite a bit of cystic-type thick fluid, and this was all removed.  No signs of infection were noted.  We irrigated copiously.  We drilled our center line freehand basically maintaining slight inferior tilt and neutral version.  We measured with a depth gauge of 26 mm and inserted a tap.  We reamed over the tap with a cannulated reamer starting with a starter small and medium reamer, reamed concentrically a little bit of bone and periphery was burred down.  We removed some osteophytes in the process.  We removed the tap.  We irrigated copiously and inserted our baseplate with excellent scratch fit.  We placed our guide and drilled for peripheral locking screws.  We recorded the screw lengths with the calibrated drill bit.  We inserted screws under power and then hand tightened these one by one.  We used a chamfer reamer around the baseplate.  We used a bur to remove some of the bone more peripherally and removed all the soft tissue with cautery.  We chose empirically a 36 neutral glenosphere because the glenoid was largely impacted with that in place.  We inserted the set screw.  We engaged the torque limited .  We dislocated the humeral head and removed the bone protector.  We repaired the humerus.  We used a canal finder to find the canal, reamed up to a size 12, broached up to a size 12 broach.  We used a metaphyseal reamer and reamed concentrically.  We collected some of the reamings and returned this to bone later on.  At this point, we used a wire passing drill drilled 7 drill holes at the center of the osteotomy site, we placed #2 Ethibond sutures through these for subscapularis repair.  We brought in our 12 short stem small shell implant and impacted that in place.  It seated very well and was just flushed with the cut surface.  We trialed the 36 neutral liner and fit well with good balancing, mobility, and stability, and the subscapularis mobilized well.  We

## 2024-08-31 NOTE — PROGRESS NOTES
Occupational Therapy  Facility/Department: [facility] 5T ORTHO/NEURO  Occupational Therapy Treatment Note    Name: Emily Motta  : 1940  MRN: [id number]  Date of Service: 2024    Discharge Recommendations:  24 hour supervision or assist, Home with Home health OT  OT Equipment Recommendations  Equipment Needed: No       Patient Diagnosis(es): The encounter diagnosis was Arthritis of left shoulder region.  Past Medical History:  has a past medical history of Anxiety, Chronic kidney disease, stage 3b (HCC), Chronic midline low back pain without sciatica, Coronary artery disease involving native coronary artery without angina pectoris, Essential hypertension, Foot drop, left, GERD without esophagitis, History of blood transfusion, Hypercholesterolemia, Internal hemorrhoids, Intrinsic eczema, Lesion of lateral popliteal nerve, Osteoporosis of multiple sites, Overactive bladder, Paroxysmal supraventricular tachycardia (HCC), Peripheral arterial disease (HCC), Primary osteoarthritis involving multiple joints, Prolonged emergence from general anesthesia, PUD (peptic ulcer disease), and Restless leg syndrome.  Past Surgical History:  has a past surgical history that includes Cholecystectomy; Hysterectomy; lumbar laminectomy; [place] tooth extraction; Abdominal exploration surgery (); Colonoscopy; cyst removal; Breast surgery (Right); Total knee arthroplasty (Left, 2015); Total hip arthroplasty (Bilateral); Hemorrhoid surgery (2017); Upper gastrointestinal endoscopy (N/A, 03/10/2020); Carpal tunnel release (Right, 2020); Intracapsular cataract extraction (Right, 2021); Intracapsular cataract extraction (Left, 2021); Pain management procedure (Bilateral, 2021); LASIK (Bilateral); lumbar fusion (Left, 2022); lumbar fusion (N/A, 2022); Knee Arthroplasty (Left, left knee replaced in ); Hysterectomy, vaginal (); Total shoulder arthroplasty (Right); and back

## 2024-08-31 NOTE — PROGRESS NOTES
Bedside report complete, pt stated she didn't get her breakfast order in. The pt is on a regular diet, I called dietary and made them aware. OT is on the unit and states they can work with her now. I will continue to follow. Erika Ng RN

## 2024-08-31 NOTE — PLAN OF CARE
Problem: Pain  Goal: Verbalizes/displays adequate comfort level or baseline comfort level  8/30/2024 2255 by Karin Chris, RN  Outcome: Progressing     Problem: Safety - Adult  Goal: Free from fall injury  8/30/2024 2255 by Karin Chris, RN  Outcome: Progressing     Problem: Skin/Tissue Integrity  Goal: Absence of new skin breakdown  Description: 1.  Monitor for areas of redness and/or skin breakdown  2.  Assess vascular access sites hourly  3.  Every 4-6 hours minimum:  Change oxygen saturation probe site  4.  Every 4-6 hours:  If on nasal continuous positive airway pressure, respiratory therapy assess nares and determine need for appliance change or resting period.  Outcome: Progressing

## 2024-08-31 NOTE — CARE COORDINATION
Case Management Assessment            Discharge Note                    Date / Time of Note: 8/31/2024 11:10 AM                  Discharge Note Completed by: Carley Baron RN    Patient Name: Emily Motta   YOB: 1940  Diagnosis: Arthritis of left shoulder region [M19.012]  Osteoarthritis of left shoulder, unspecified osteoarthritis type [M19.012]   Date / Time: 8/30/2024  8:38 AM    Current PCP: Konstantin Motta DO  Clinic patient: No    Hospitalization in the last 30 days: No       Advance Directives:  Code Status: Full Code  [place] DNR form completed and on chart: Not Indicated    Financial:  Payor: HU[employer]NA MEDICARE / Plan: HU[employer]NA GOLD PLUS HMO / Product Type: *No Product type* /      Pharmacy:    [facility] [address] [place] [address] [address] [facility] [phone] - F [phone] [address] [facility] [address]  Phone: [phone] Fax: [phone]    [facility] [address] [facility] [phone] - F [phone]  [address] [facility] [address]  Phone: [phone] Fax: [phone]      Assistance purchasing medications?:    Assistance provided by Case Management: None at this time    Does patient want to participate in local refill/ meds to beds program?:      Meds To Beds General Rules:  1. Can ONLY be done Monday- Friday between 8:30am-5pm  2. Prescription(s) must be in pharmacy by 3pm to be filled same day  3.Copy of patient's insurance/ prescription drug card and patient face sheet must be sent along with the prescription(s)  4. Cost of Rx cannot be added to hospital bill. If financial assistance is needed, please contact unit  or ;  or  CANNOT provide pharmacy voucher for patients co-pays  5. Patients can then  the prescription on their way out of the hospital at discharge, or pharmacy can deliver to the bedside if staff is available. (payment due  at time of pick-up or delivery - cash, check, or card accepted)     Able to afford home medications/ co-pay costs: Yes    ADLS:  Current PT AM-PAC Score: 18 /24  Current OT AM-PAC Score: 15 /24    DISCHARGE Disposition: Home with Home Health Care: [facility]     LOC at discharge: Not Applicable  ANIBAL Completed: Not Indicated    Notification completed in HENS/PAS?:  Not Applicable    IMM Completed:   Yes, Case management has presented and reviewed IMM letter #2 to the patient and/or family/ POA. Patient and/or family/POA verbalized understanding of their medicare rights and appeal process if needed. Patient and/or family/POA verbalized understanding of DC within 4 hours of signing IMM letter #2. Patient and/or family/POA has signed and placed today's date (8/31/24) and time (1003) on IMM letter #2 on the the appropriate lines. Patient and/or family/POA, provided copy of letter and they are aware that original copy of IMM letter #2 is available prior to discharge from the paper chart on the unit.  Electronic documentation has been entered into epic for IMM letter #2 and original paper copy has been added to the paper chart at the nurses station.         Transportation:  Transportation PLAN for discharge: family   Mode of Transport: Private Car      Home Care:  Home Care ordered at discharge: Yes  Home Care Agency: [facility]  Phone: [phone]  Fax: [phone]  Orders faxed: Yes          Additional CM Notes:   Patient is discharging home with family, agreeable to home care, no preference to agency.  [facility] accepted.  Family to transport home.    COVID Result:    Lab Results   Component Value Date/Time    COVID19 NOT DETECTED 10/28/2020 08:48 AM       The Plan for Transition of Care is related to the following treatment goals of Arthritis of left shoulder region [M19.012]  Osteoarthritis of left shoulder, unspecified osteoarthritis type [M19.012]    The Patient and/or patient representative  Emily and her family were provided with a choice of provider and agrees with the discharge plan Yes    Freedom of choice list was provided with basic dialogue that supports the patient's individualized plan of care/goals and shares the quality data associated with the providers. Yes    Care Transitions patient: No    Carley Baron RN  [facility]  Case Management Department  Ph: [phone]  Fax: [phone]     student, full time

## 2024-08-31 NOTE — PROGRESS NOTES
Discharge instructions reviewed with the pt at this time, IV removed and prescriptions were sent to her pharmacy, pt is aware.  All questions answered.  Pt voiced understanding.  Copy of discharge papers given to the pt. Pt will be leaving with her daughter. Erika Ng RN

## 2024-08-31 NOTE — PLAN OF CARE
Problem: Discharge Planning  Goal: Discharge to home or other facility with appropriate resources  8/31/2024 1202 by Erika Ng RN  Outcome: Completed  8/30/2024 2255 by Karin Chris RN  Outcome: Progressing     Problem: Pain  Goal: Verbalizes/displays adequate comfort level or baseline comfort level  8/31/2024 1202 by Erika Ng RN  Outcome: Completed  8/30/2024 2255 by Karin Chris RN  Outcome: Progressing     Problem: ABCDS Injury Assessment  Goal: Absence of physical injury  8/31/2024 1202 by Erika Ng RN  Outcome: Completed  8/30/2024 2255 by Karin Chris RN  Outcome: Progressing     Problem: Safety - Adult  Goal: Free from fall injury  8/31/2024 1202 by Erika Ng RN  Outcome: Completed  8/30/2024 2255 by Karin Chris RN  Outcome: Progressing     Problem: Skin/Tissue Integrity  Goal: Absence of new skin breakdown  Description: 1.  Monitor for areas of redness and/or skin breakdown  2.  Assess vascular access sites hourly  3.  Every 4-6 hours minimum:  Change oxygen saturation probe site  4.  Every 4-6 hours:  If on nasal continuous positive airway pressure, respiratory therapy assess nares and determine need for appliance change or resting period.  8/31/2024 1202 by Erika Ng RN  Outcome: Completed  8/30/2024 2255 by Karin Chris RN  Outcome: Progressing

## 2024-08-31 NOTE — PROGRESS NOTES
PT/OT recommend home PT/OT, sent perfect serve to the on call ortho MD about home PT/OT orders, waiting on response. Erika Ng RN

## 2024-08-31 NOTE — PROGRESS NOTES
Dr. Lamont Escamilla gave telephone order for home PT/OT, CM called and message left. Erika Ng RN

## 2024-08-31 NOTE — PROGRESS NOTES
Physical Therapy  Facility/Department: [facility] 5T ORTHO/NEURO  Physical Therapy Treatment    Name: Emily Motta  : 1940  MRN: [id number]  Date of Service: 2024    Discharge Recommendations:  24 hour supervision or assist, Home with Home health PT (home PT for gait training only)   PT Equipment Recommendations  Equipment Needed: No      Patient Diagnosis(es): The encounter diagnosis was Arthritis of left shoulder region.  Past Medical History:  has a past medical history of Anxiety, Chronic kidney disease, stage 3b (HCC), Chronic midline low back pain without sciatica, Coronary artery disease involving native coronary artery without angina pectoris, Essential hypertension, Foot drop, left, GERD without esophagitis, History of blood transfusion, Hypercholesterolemia, Internal hemorrhoids, Intrinsic eczema, Lesion of lateral popliteal nerve, Osteoporosis of multiple sites, Overactive bladder, Paroxysmal supraventricular tachycardia (HCC), Peripheral arterial disease (HCC), Primary osteoarthritis involving multiple joints, Prolonged emergence from general anesthesia, PUD (peptic ulcer disease), and Restless leg syndrome.  Past Surgical History:  has a past surgical history that includes Cholecystectomy; Hysterectomy; lumbar laminectomy; [place] tooth extraction; Abdominal exploration surgery (); Colonoscopy; cyst removal; Breast surgery (Right); Total knee arthroplasty (Left, 2015); Total hip arthroplasty (Bilateral); Hemorrhoid surgery (2017); Upper gastrointestinal endoscopy (N/A, 03/10/2020); Carpal tunnel release (Right, 2020); Intracapsular cataract extraction (Right, 2021); Intracapsular cataract extraction (Left, 2021); Pain management procedure (Bilateral, 2021); LASIK (Bilateral); lumbar fusion (Left, 2022); lumbar fusion (N/A, 2022); Knee Arthroplasty (Left, left knee replaced in ); Hysterectomy, vaginal (); Total shoulder arthroplasty  (Right); and back surgery.    Assessment  Assessment: 83 yo seen POD 1 from Left reverse TSR.  Attempted ambulation with quad cane & pyramid cane (pt did not care for). Did fairly well with her straight cane but pt felt safest & did the best using the rolling walker with her R hand only & therapist assisting walker/pt on the other side.  Spoke with dtr on phone who is a nurse & explained how therapy session went & best option for now would be to use the walker with dtr physically assisting as therapy did.  Dtr verbalized understanding & states she feels completely confident in doing this.  Would recommend home PT for gait training only right now.  Discussed with pt/dtr/RN & all in agreement. RN will reach out to MD.   Pt wanting to be discharged home today.  Treatment Diagnosis: impaired mobility  Therapy Prognosis: Good  Requires PT Follow-Up: Yes  Activity Tolerance  Activity Tolerance: Patient tolerated treatment well    Plan  Physical Therapy Plan  General Plan: 1 time a day 7 days a week  Current Treatment Recommendations: Functional mobility training, Transfer training, Gait training, Stair training, Safety education & training, Pain management  Safety Devices  Type of Devices: Left in chair, Call light within reach, Nurse notified, Chair alarm in place    Restrictions  Position Activity Restriction  Other position/activity restrictions: NWB Lft UE.  Shoulder immobilizer: May remove for physical therapy, dressing change and hygiene under supervision of a nurse, physician or physical therapy.     Subjective  General  Chart Reviewed: Yes  Additional Pertinent Hx: Pt to OR 8/30 for Left reverse TSR  Family / Caregiver Present: No  Follows Commands: Within Functional Limits  Subjective  Subjective: Found in chair, agreeable to PT.  Denies pain.  Hopes to go home today with dtr to A.  Pt dressed & has worked with OT.  Reports using a cane at baseline.         Social/Functional History  Social/Functional  Given To: Patient;Family  Education Provided: Role of Therapy;Plan of Care;Transfer Training;Precautions  Education Provided Comments: how to use walker properly with dtr assisting by having her hand on L side of walker to keep it steady & help push it forward; dtr to also have hand on pt using gait belt at all times  Education Method: Demonstration;Verbal;Other (Comment) (Phone conversation with daughter, Ritika)  Barriers to Learning: None  Education Outcome: Verbalized understanding;Demonstrated understanding;Continued education needed      Therapy Time   Individual Concurrent Group Co-treatment   Time In 0857         Time Out 0950         Minutes 53          Timed Code Treatment Minutes:   53    Total Treatment Minutes:  53         Ritika Hilliard, PT [id number]

## 2024-08-31 NOTE — PROGRESS NOTES
Pt is now working with PT, she has been working with therapy all morning, I will be around to assess her and give her morning medications once she is finished with therapy. Erika Ng RN

## 2024-09-03 ENCOUNTER — TELEPHONE (OUTPATIENT)
Dept: ORTHOPEDIC SURGERY | Age: 84
End: 2024-09-03

## 2024-09-03 NOTE — TELEPHONE ENCOUNTER
PHARMACY CALLING ASKING ABOUT PRESCRIPTION FOR NORCO, PATIENT IS ALSO TAKING LORAZEPAM SO THEY NEED A REASON TO PRESCRIBE TOGETHER.  PLEASE CALL PHARMACY  [phone].

## 2024-09-04 ENCOUNTER — OFFICE VISIT (OUTPATIENT)
Dept: ORTHOPEDIC SURGERY | Age: 84
End: 2024-09-04

## 2024-09-04 VITALS — WEIGHT: 237 LBS | HEIGHT: 66 IN | BODY MASS INDEX: 38.09 KG/M2

## 2024-09-04 DIAGNOSIS — Z96.612 STATUS POST REVERSE TOTAL REPLACEMENT OF LEFT SHOULDER: Primary | ICD-10-CM

## 2024-09-04 PROCEDURE — 99024 POSTOP FOLLOW-UP VISIT: CPT | Performed by: ORTHOPAEDIC SURGERY

## 2024-09-04 NOTE — DISCHARGE SUMMARY
Discharge Summary  Total Shoulder Arthroplasty      Date:  9/3/2024    Name:  Emily Motta  Address:  [address] [place] [id number]    :  1940      Age:   84 y.o.    SSN:  [id number]      Medical Record Number:  [id number]    Discharge Date:  9/3/2024     Admitting Physician  Yazmin Lacey MD     Discharge Physician:  Peter Lucas MD     Admission Diagnoses:  Arthritis of left shoulder region [M19.012]  Osteoarthritis of left shoulder, unspecified osteoarthritis type [M19.012]     Discharge Diagnoses:  Arthritis of left shoulder region [M19.012]  Osteoarthritis of left shoulder, unspecified osteoarthritis type [M19.012]     Treatments:  left reverse shoulder arthroplasty     Hospital Course:   This is a 84 y.o. female with a history of  left shoulder DJD that failed conservative treatment.  The patient elected to undergo  left shoulder arthroplasty after discussing the risks, benefits, and alternatives to surgery.  Left shoulder arthroplasty was performed without complication.  Post op their diet was advanced as tolerated.  Pain was controlled with a combination of IV and PO meds. DVT prophylaxis was with a combination of SCDs and ASA 325mg BID. On POD #1 the patient participated in physical therapy and made appropriate progress.  After evaluation by the orthopaedic surgeon and the physical therapist, discharge was allowed on POD  #1.  The patient was medically stable during hospitalization.    Consults:   Medicine     Significant Diagnostic Studies:  none     Disposition:  Home     Meds:  See Med Reconciliation    Patient Instructions:   Activity: NWB operative arm in sling at all times  Diet: regular diet  Wound Care: keep wound clean and dry  DVT Prophylaxis:  mg BID  Antibiotic Prophylaxis: doxycycline 100 mg twice a day for 5 days     Follow-up with Physical Therapy and Surgeon as currently scheduled    Peter Lucas MD  Orthopedic Surgery Sports Medicine Fellow

## 2024-09-04 NOTE — PROGRESS NOTES
subluxations or dislocations.    Impression: Stable postop x-ray.     Assessment :  Ms. Emily Motta is a pleasant 84 y.o. patient who is s/p left reverse shoulder arthroplasty 8/30/24    Impression:  Encounter Diagnosis   Name Primary?    Status post reverse total replacement of left shoulder Yes       Office Procedures:  Orders Placed This Encounter   Procedures    XR SHOULDER LEFT (MIN 2 VIEWS)     Standing Status:   Future     Number of Occurrences:   1     Standing Expiration Date:   9/4/2025     Order Specific Question:   Reason for exam:     Answer:   P/O    [facility] OSR     Referral Priority:   Routine     Referral Type:   Eval and Treat     Referral Reason:   Specialty Services Required     Requested Specialty:   Physical Therapy     Number of Visits Requested:   1       Treatment Plan:    Overall Emily Motta is doing well. Her postoperative pain is well-controlled. We recommend that She wear the UltraSling brace at all times with the exception of clothing, bathing and physical therapy. The patient was told that she is restricted from driving for at least 3 weeks postop. . All of her questions were fully answered today. We would like to see Emily Motta back in 2 weeks for follow-up visit. Emily Motta is in agreement with this plan.     9/4/2024  9:54 AM    Peter Lucas MD  Orthopedic Surgery Sports Medicine Fellow   [facility]  ______________________  I was physically present and personally supervised the Orthopaedic Sports Medicine Fellow in the evaluation and development of a treatment plan for this patient. I personally interviewed the patient and performed a physical examination. In addition, I discussed the patient's condition and treatment options with them. I have also reviewed and agree with the past medical, family and social history unless otherwise noted. All of the patient's questions were

## 2024-09-06 ENCOUNTER — HOSPITAL ENCOUNTER (OUTPATIENT)
Dept: PHYSICAL THERAPY | Age: 84
Setting detail: THERAPIES SERIES
Discharge: HOME | End: 2024-09-06
Payer: MEDICARE

## 2024-09-06 DIAGNOSIS — M25.60 STIFFNESS IN JOINT: ICD-10-CM

## 2024-09-06 DIAGNOSIS — M25.512 ACUTE PAIN OF LEFT SHOULDER: Primary | ICD-10-CM

## 2024-09-06 DIAGNOSIS — R53.1 WEAKNESS GENERALIZED: ICD-10-CM

## 2024-09-06 PROCEDURE — 97110 THERAPEUTIC EXERCISES: CPT | Performed by: PHYSICAL THERAPIST

## 2024-09-06 PROCEDURE — 97161 PT EVAL LOW COMPLEX 20 MIN: CPT | Performed by: PHYSICAL THERAPIST

## 2024-09-06 NOTE — PLAN OF CARE
[facility]- [facility] [address], [address], [place] [address] office: [phone] fax: [phone]     Physical Therapy Initial Evaluation Certification      Dear Yazmin Lacey MD,    We had the pleasure of evaluating the following patient for physical therapy services at [facility].  A summary of our findings can be found in the initial assessment below.  This includes our plan of care.  If you have any questions or concerns regarding these findings, please do not hesitate to contact me at the office phone number listed above.  Thank you for the referral.     Physician Signature:_______________________________Date:__________________  By signing above (or electronic signature), therapist’s plan is approved by physician       Physical Therapy: TREATMENT/PROGRESS NOTE   Patient: Emily Motta (84 y.o. female)   Examination Date: 2024   :  1940 MRN: [id number]   Visit #: 1   Insurance Allowable Auth Needed   [place] [x]Yes    []No    Insurance: Payor: HUMANA MEDICARE / Plan: HUMANA GOLD PLUS HMO / Product Type: *No Product type* /   Insurance ID: [id number] - (Medicare Managed)  Secondary Insurance (if applicable):    Treatment Diagnosis:     ICD-10-CM    1. Acute pain of left shoulder  M25.512       2. Weakness generalized  R53.1       3. Stiffness in joint  M25.60          Medical Diagnosis:  Left shoulder pain [M25.512]   Referring Physician: Yazmin Lacey MD  PCP: Konstantin Motta DO     Plan of care signed (Y/N):     Date of Patient follow up with Physician:      Plan of Care Report: EVAL today  POC update due: (10 visits /OR AUTH LIMITS, whichever is less)  10/4/2024                                             Medical History:  Comorbidities:  None  Relevant Medical History: see intake form                                         Precautions/ Contra-indications:           Latex allergy:  NO  Pacemaker:

## 2024-09-10 ENCOUNTER — OFFICE VISIT (OUTPATIENT)
Dept: FAMILY MEDICINE CLINIC | Age: 84
End: 2024-09-10

## 2024-09-10 VITALS
DIASTOLIC BLOOD PRESSURE: 72 MMHG | BODY MASS INDEX: 39.21 KG/M2 | WEIGHT: 244 LBS | SYSTOLIC BLOOD PRESSURE: 130 MMHG | HEIGHT: 66 IN

## 2024-09-10 DIAGNOSIS — N18.32 CHRONIC KIDNEY DISEASE, STAGE 3B (HCC): ICD-10-CM

## 2024-09-10 DIAGNOSIS — I47.10 PAROXYSMAL SUPRAVENTRICULAR TACHYCARDIA (HCC): ICD-10-CM

## 2024-09-10 DIAGNOSIS — K21.9 GERD WITHOUT ESOPHAGITIS: ICD-10-CM

## 2024-09-10 DIAGNOSIS — E78.00 HYPERCHOLESTEROLEMIA: ICD-10-CM

## 2024-09-10 DIAGNOSIS — E66.09 NON MORBID OBESITY DUE TO EXCESS CALORIES: ICD-10-CM

## 2024-09-10 DIAGNOSIS — N32.81 OVERACTIVE BLADDER: ICD-10-CM

## 2024-09-10 DIAGNOSIS — M54.50 CHRONIC MIDLINE LOW BACK PAIN WITHOUT SCIATICA: ICD-10-CM

## 2024-09-10 DIAGNOSIS — M25.541 ARTHRALGIA OF HANDS, BILATERAL: ICD-10-CM

## 2024-09-10 DIAGNOSIS — F41.9 ANXIETY: ICD-10-CM

## 2024-09-10 DIAGNOSIS — M25.542 ARTHRALGIA OF HANDS, BILATERAL: ICD-10-CM

## 2024-09-10 DIAGNOSIS — I73.9 PERIPHERAL ARTERIAL DISEASE (HCC): ICD-10-CM

## 2024-09-10 DIAGNOSIS — I10 ESSENTIAL HYPERTENSION: Primary | ICD-10-CM

## 2024-09-10 DIAGNOSIS — M81.0 OSTEOPOROSIS OF MULTIPLE SITES: ICD-10-CM

## 2024-09-10 DIAGNOSIS — G89.29 CHRONIC MIDLINE LOW BACK PAIN WITHOUT SCIATICA: ICD-10-CM

## 2024-09-10 DIAGNOSIS — Z23 NEED FOR INFLUENZA VACCINATION: ICD-10-CM

## 2024-09-12 ENCOUNTER — HOSPITAL ENCOUNTER (OUTPATIENT)
Dept: PHYSICAL THERAPY | Age: 84
Setting detail: THERAPIES SERIES
Discharge: HOME | End: 2024-09-12
Payer: MEDICARE

## 2024-09-12 DIAGNOSIS — Z96.612 STATUS POST REVERSE TOTAL REPLACEMENT OF LEFT SHOULDER: Primary | ICD-10-CM

## 2024-09-12 PROCEDURE — 97110 THERAPEUTIC EXERCISES: CPT | Performed by: SPECIALIST/TECHNOLOGIST

## 2024-09-13 ENCOUNTER — TELEPHONE (OUTPATIENT)
Dept: ORTHOPEDIC SURGERY | Age: 84
End: 2024-09-13

## 2024-09-16 ENCOUNTER — TELEPHONE (OUTPATIENT)
Dept: FAMILY MEDICINE CLINIC | Age: 84
End: 2024-09-16

## 2024-09-16 ENCOUNTER — APPOINTMENT (OUTPATIENT)
Dept: OCCUPATIONAL THERAPY | Age: 84
End: 2024-09-16
Payer: MEDICARE

## 2024-09-16 ENCOUNTER — TELEPHONE (OUTPATIENT)
Dept: ORTHOPEDIC SURGERY | Age: 84
End: 2024-09-16

## 2024-09-16 RX ORDER — SPIRONOLACTONE 50 MG/1
50 TABLET, FILM COATED ORAL DAILY
Qty: 14 TABLET | Refills: 0 | Status: SHIPPED | OUTPATIENT
Start: 2024-09-16 | End: 2024-09-30

## 2024-09-16 NOTE — TELEPHONE ENCOUNTER
General Question     Subject: POST BELINDA   Patient and /or Facility Request: Emily Motta   Contact Number: [phone]      PATIENT CALLED REGARDING HER LT SHOULDER TOTAL ARTHROPLASTY REVERSE AND OPEN BICEP BELINDA SHE HAD WITH  ON 08/30/24    SHE STATED BOTH OF HER LEGS AND FEET HAS BEEN SWOLLEN AND TIGHT SINCE AFTER THE SURGERY AND SHE IS WANTING TO VERIFY WHAT TO DO OR HOW TO GO ABOUT THIS    PLEASE CALL PATIENT BACK AT THE ABOVE NUMBER TO FURTHER ASSIST

## 2024-09-17 ENCOUNTER — APPOINTMENT (OUTPATIENT)
Dept: PHYSICAL THERAPY | Age: 84
End: 2024-09-17
Payer: MEDICARE

## 2024-09-17 ENCOUNTER — HOSPITAL ENCOUNTER (OUTPATIENT)
Dept: PHYSICAL THERAPY | Age: 84
Setting detail: THERAPIES SERIES
Discharge: HOME | End: 2024-09-17
Payer: MEDICARE

## 2024-09-17 PROCEDURE — 97110 THERAPEUTIC EXERCISES: CPT | Performed by: PHYSICAL THERAPIST

## 2024-09-17 PROCEDURE — 97140 MANUAL THERAPY 1/> REGIONS: CPT | Performed by: PHYSICAL THERAPIST

## 2024-09-18 ENCOUNTER — OFFICE VISIT (OUTPATIENT)
Dept: ORTHOPEDIC SURGERY | Age: 84
End: 2024-09-18

## 2024-09-18 VITALS — HEIGHT: 66 IN | WEIGHT: 224 LBS | BODY MASS INDEX: 36 KG/M2

## 2024-09-18 DIAGNOSIS — Z96.612 STATUS POST REVERSE TOTAL REPLACEMENT OF LEFT SHOULDER: Primary | ICD-10-CM

## 2024-09-18 DIAGNOSIS — M21.372 FOOT DROP, LEFT FOOT: ICD-10-CM

## 2024-09-26 ENCOUNTER — TELEPHONE (OUTPATIENT)
Dept: FAMILY MEDICINE CLINIC | Age: 84
End: 2024-09-26

## 2024-09-26 ENCOUNTER — APPOINTMENT (OUTPATIENT)
Dept: PHYSICAL THERAPY | Age: 84
End: 2024-09-26
Payer: MEDICARE

## 2024-09-26 RX ORDER — SPIRONOLACTONE 50 MG/1
50 TABLET, FILM COATED ORAL DAILY
Qty: 14 TABLET | Refills: 0 | Status: SHIPPED | OUTPATIENT
Start: 2024-09-26 | End: 2024-10-10

## 2024-10-01 ENCOUNTER — APPOINTMENT (OUTPATIENT)
Dept: PHYSICAL THERAPY | Age: 84
End: 2024-10-01
Payer: MEDICARE

## 2024-10-07 ENCOUNTER — TELEPHONE (OUTPATIENT)
Dept: ORTHOPEDIC SURGERY | Age: 84
End: 2024-10-07

## 2024-10-07 ENCOUNTER — APPOINTMENT (OUTPATIENT)
Dept: PHYSICAL THERAPY | Age: 84
End: 2024-10-07
Payer: MEDICARE

## 2024-10-07 ENCOUNTER — TELEPHONE (OUTPATIENT)
Dept: FAMILY MEDICINE CLINIC | Age: 84
End: 2024-10-07

## 2024-10-07 DIAGNOSIS — R60.0 EDEMA OF BOTH LEGS: Primary | ICD-10-CM

## 2024-10-07 NOTE — TELEPHONE ENCOUNTER
The edema may be from her Verapamil.  Please have her hold that for now.  I have also faxed an Rx for compression hose to [facility].  She should continue the spironolactone.

## 2024-10-07 NOTE — TELEPHONE ENCOUNTER
OTHER    Subject: SWELLING IN BOTH FEET/LEGS  Patient and /or Facility Request: Emily Motta   Contact Number: [phone]         PATIENT DID HAVE TO CX APPT FOR TOMORROW DUE TO SWELLING IN BOTH FEET AND LEGS. THIS HAS BEEN ONGOING FOR 1 MONTH. ALSO HAD TO CX HER THERAPY APPT. SHE IS REQ A RETURN CALL REGARDING RESCHEDULING HER FOLLOW UP FOR HER SHLDR, OR SHOULD SHE WAIT UNTIL SWELLING IS GONE?      PLEASE RETURN CALL TO THE ABOVE NUMBER

## 2024-10-07 NOTE — TELEPHONE ENCOUNTER
Pt called to let Dr Motta know she started on her diuretics about seven days ago. She states so far they are not helping. Her feet are so swollen she can not get a shoe on. Her feet are so swollen she can not go to therapy. This is the second week she had to cancel therapy. She called to ortho doc and they suggested to call her PCP. She is not having any success with the two diuretics she has been on. She is elevating her feet in a recliner. She can not get them over her heart. She has to sleep with her head up because of the Acid Reflux. Please give pt a call [phone]. Does she need to try and come in for an appt?

## 2024-10-08 NOTE — TELEPHONE ENCOUNTER
PATIENT WANTED TO ADVISE TO OMAR SHE SPOKE TO PCP WHO HAS TAKEN HER OFF ANOTHER MEDICATION TO SEE IF SWELLING GOES DOWN SHE WILL TRY TO START BACK PHYSICAL THERAPY ONCE SWELLING GOES DOWN

## 2024-10-11 NOTE — PROGRESS NOTES
Subjective:      Patient ID: Emily Motta is a 84 y.o. female.    HPI    Edema:  Patient called our office on 9-16-24, stating that she had bilateral foot and ankle swelling since her shoulder surgery on 8-30-24.  I changed her Maxzide to Spironolactone 50 mg daily.  She called back on 9-26-24 stating that they had improved slightly, but were still swollen.  I extended the Spironolactone for 2 more weeks.  I also suggested leg elevation.  She called again on 10-7-24 with continued swelling, causing her to not be able to go to PT.  At that time, I told her to hold her Verapamil and prescribed compression hose.  She is wearing the hose daily and elevates her legs as much as she can.  She continues to have swelling, but states that it has improved significantly.      Review of Systems   Constitutional:  Negative for chills and fever.   Respiratory:  Negative for shortness of breath.    Cardiovascular:  Positive for leg swelling. Negative for chest pain and palpitations.     BP Readings from Last 3 Encounters:   10/17/24 (!) 152/70   09/10/24 130/72   08/31/24 124/74      /66   Ht 1.651 m (5' 5\")   Wt 108.9 kg (240 lb)   BMI 39.94 kg/m²    BP (!) 152/70   Ht 1.651 m (5' 5\")   Wt 108.9 kg (240 lb)   BMI 39.94 kg/m²    Objective:   Physical Exam  Constitutional:       General: She is not in acute distress.     Appearance: She is well-developed.   HENT:      Head: Normocephalic.      Right Ear: External ear normal.      Left Ear: External ear normal.      Mouth/Throat:      Pharynx: No oropharyngeal exudate.   Neck:      Thyroid: No thyromegaly.      Vascular: No JVD.   Cardiovascular:      Rate and Rhythm: Normal rate and regular rhythm.      Heart sounds: Normal heart sounds. No murmur heard.  Pulmonary:      Effort: Pulmonary effort is normal.      Breath sounds: Normal breath sounds. No wheezing or rales.   Musculoskeletal:      Right lower leg: Edema present.      Left lower leg: Edema present.

## 2024-10-17 ENCOUNTER — OFFICE VISIT (OUTPATIENT)
Dept: FAMILY MEDICINE CLINIC | Age: 84
End: 2024-10-17

## 2024-10-17 VITALS
HEIGHT: 65 IN | WEIGHT: 240 LBS | DIASTOLIC BLOOD PRESSURE: 66 MMHG | BODY MASS INDEX: 39.99 KG/M2 | SYSTOLIC BLOOD PRESSURE: 132 MMHG

## 2024-10-17 DIAGNOSIS — R60.0 EDEMA OF BOTH LEGS: Primary | ICD-10-CM

## 2024-10-17 RX ORDER — SPIRONOLACTONE 50 MG/1
50 TABLET, FILM COATED ORAL DAILY
Qty: 7 TABLET | Refills: 0 | Status: SHIPPED | OUTPATIENT
Start: 2024-10-17 | End: 2024-10-17 | Stop reason: SDUPTHER

## 2024-10-17 RX ORDER — SPIRONOLACTONE 50 MG/1
50 TABLET, FILM COATED ORAL DAILY
Qty: 90 TABLET | Refills: 0 | Status: SHIPPED | OUTPATIENT
Start: 2024-10-17 | End: 2024-10-18 | Stop reason: SDUPTHER

## 2024-10-18 RX ORDER — SPIRONOLACTONE 50 MG/1
50 TABLET, FILM COATED ORAL DAILY
Qty: 7 TABLET | Refills: 0 | Status: SHIPPED | OUTPATIENT
Start: 2024-10-18

## 2024-10-21 ENCOUNTER — TELEPHONE (OUTPATIENT)
Dept: ORTHOPEDIC SURGERY | Age: 84
End: 2024-10-21

## 2024-10-21 NOTE — TELEPHONE ENCOUNTER
General Question     Subject: LT SHOULDER PHYSICAL THERAPY   Patient and /or Facility Request: Emily Motta   Contact Number: [phone]     PATIENT CALLED IN TO LET THE OFFICE KNOW THAT SHE WILL STARTING PT FOR HER LT SHOULDER ON 10-. HER LT SHOULDER SWELLING WENT DOWN. AND NEED AN APPT FOR AFTER PT...    REQ A CALL BACK.    PLEASE ADVISE

## 2024-10-23 ENCOUNTER — HOSPITAL ENCOUNTER (OUTPATIENT)
Dept: PHYSICAL THERAPY | Age: 84
Setting detail: THERAPIES SERIES
Discharge: HOME | End: 2024-10-23
Payer: MEDICARE

## 2024-10-23 PROCEDURE — 97110 THERAPEUTIC EXERCISES: CPT | Performed by: PHYSICAL THERAPIST

## 2024-10-23 NOTE — PLAN OF CARE
Auth Needed   Auth 24 visits 9/6/24 - 12/7/24 [x]Yes    []No    Insurance: Payor: HUMANA MEDICARE / Plan: HUMANA GOLD PLUS HMO / Product Type: *No Product type* /   Insurance ID: [id number] - (Medicare Managed)  Secondary Insurance (if applicable):    Treatment Diagnosis:     ICD-10-CM    1. Acute pain of left shoulder  M25.512       2. Weakness generalized  R53.1       3. Stiffness in joint  M25.60          Medical Diagnosis:  Left shoulder pain [M25.512]   Referring Physician: Yazmin Lacey MD  PCP: Konstantin Motta DO     Plan of care signed (Y/N):     Date of Patient follow up with Physician:      Plan of Care Report: YES, Date Range for this report: 9/6/24 to 10/23/24  POC update due: (10 visits /OR AUTH LIMITS, whichever is less)  11/23/2024                                             Medical History:  Comorbidities:  None  Relevant Medical History: see intake form                                         Precautions/ Contra-indications:           Latex allergy:  NO  Pacemaker:    NO  Contraindications for Manipulation: NA  Date of Surgery: 8/30/24 L rTSA, bicep tenodesis  Other:    Red Flags:  None    Suicide Screening:   The patient did not verbalize a primary behavioral concern, suicidal ideation, suicidal intent, or demonstrate suicidal behaviors.    Preferred Language for Healthcare:   [x] English       [] other:    SUBJECTIVE EXAMINATION     Patient stated complaint: S/P 7.5 wks.Pt last seen 9/17. Pt states that she has been feeling pretty good, she doesn't really have any pain but does get some occasional soreness that is generalized to the top and front of her shoulder. Pt states that she still has trouble pulling up her pants. Pt is not doing anything overhead.     Test used Initial score  9/6/24 10/23/2024   Pain Summary VAS 1/10 0/10   Functional questionnaire Upper Extremity functional Scale 0/80=0% (100% deficit) 18/80=22.5% (77.5% deficit)   Other:                OBJECTIVE EXAMINATION

## 2024-10-24 RX ORDER — SPIRONOLACTONE 50 MG/1
50 TABLET, FILM COATED ORAL DAILY
Qty: 90 TABLET | Refills: 1 | Status: SHIPPED | OUTPATIENT
Start: 2024-10-24

## 2024-10-24 NOTE — TELEPHONE ENCOUNTER
Pt called stating She spoke to Devyn yesterday because she has not received her meds.    They stated that Dr Motta canceled the script for Spironolactone, but she knows he didn't.  She would like a 90 day supply to go to [facility] on [place].    90 day pending    Also, she stated that she is at the point of her insurance that she has to pay more. Her Myrbetriq refill for 90 days will be $400.00    She stated she can't afford that, she would like to know if Dr Motta could change to something else that may be less expensive.    She would also like that sent to [facility] [place].      Please call pt back to let her know  [phone]

## 2024-10-29 ENCOUNTER — OFFICE VISIT (OUTPATIENT)
Dept: ORTHOPEDIC SURGERY | Age: 84
End: 2024-10-29

## 2024-10-29 ENCOUNTER — TELEPHONE (OUTPATIENT)
Dept: FAMILY MEDICINE CLINIC | Age: 84
End: 2024-10-29

## 2024-10-29 VITALS — WEIGHT: 240 LBS | BODY MASS INDEX: 39.99 KG/M2 | HEIGHT: 65 IN

## 2024-10-29 DIAGNOSIS — Z96.612 STATUS POST REVERSE TOTAL REPLACEMENT OF LEFT SHOULDER: Primary | ICD-10-CM

## 2024-10-29 NOTE — TELEPHONE ENCOUNTER
Spoke with patient, she is going to follow up with urology and get back with us probably tomorrow. She is uncomfortable with trying Ditropan due to its possible side effects.

## 2024-10-29 NOTE — TELEPHONE ENCOUNTER
Her Myrbetriq is a bladder relaxant for her overactive bladder.  Flomax is a medication used for urinary obstruction (usually from the prostate in men).  It would not treat an overactive bladder.  There are cheaper bladder relaxant medications, but she has likely been on them before taking the Myrbetriq.  I can switch her to Ditropan (Oxybutinin) if she would like me to.

## 2024-10-29 NOTE — TELEPHONE ENCOUNTER
Pt called last week to inform Dr Motta the rx for Myrbetriq 50 mg is to expensive. She can not afford to pay $400 for this medication. Pt spoke with the pharmacist and Flomax was the cheaper medicine. Please give pt a call [phone].

## 2024-10-30 ENCOUNTER — HOSPITAL ENCOUNTER (OUTPATIENT)
Dept: PHYSICAL THERAPY | Age: 84
Setting detail: THERAPIES SERIES
Discharge: HOME | End: 2024-10-30
Payer: MEDICARE

## 2024-10-30 PROCEDURE — 97110 THERAPEUTIC EXERCISES: CPT | Performed by: PHYSICAL THERAPIST

## 2024-10-30 NOTE — FLOWSHEET NOTE
[facility]- Outpatient Rehabilitation and Therapy [address], [address], [place] [address] office: [phone] fax: [phone]    Physical Therapy: TREATMENT/PROGRESS NOTE   Patient: Emily Motta (84 y.o. female)   Examination Date: 10/30/2024   :  1940 MRN: [id number]   Visit #: 5   Insurance Allowable Auth Needed   Auth 24 visits 24 - 24 [x]Yes    []No    Insurance: Payor: HUMANA MEDICARE / Plan: HUMANA GOLD PLUS HMO / Product Type: *No Product type* /   Insurance ID: [id number] - (Medicare Managed)  Secondary Insurance (if applicable):    Treatment Diagnosis:     ICD-10-CM    1. Acute pain of left shoulder  M25.512       2. Weakness generalized  R53.1       3. Stiffness in joint  M25.60          Medical Diagnosis:  Left shoulder pain [M25.512]   Referring Physician: Yazmin Lacey MD  PCP: Konstantin Motta DO     Plan of care signed (Y/N):     Date of Patient follow up with Physician:      Plan of Care Report: YES, Date Range for this report: 24 to 10/23/24  POC update due: (10 visits /OR AUTH LIMITS, whichever is less)  2024                                             Medical History:  Comorbidities:  None  Relevant Medical History: see intake form                                         Precautions/ Contra-indications:           Latex allergy:  NO  Pacemaker:    NO  Contraindications for Manipulation: NA  Date of Surgery: 24 L rTSA, bicep tenodesis  Other:    Red Flags:  None    Suicide Screening:   The patient did not verbalize a primary behavioral concern, suicidal ideation, suicidal intent, or demonstrate suicidal behaviors.    Preferred Language for Healthcare:   [x] English       [] other:    SUBJECTIVE EXAMINATION     Patient stated complaint: S/P 8.5 wks.Pt states that her shoulder feels good, some soreness. Does have some occasional trouble with HEP d/t RA in hands, sometimes hard to hold/grab things.     Test used Initial score  24

## 2024-11-01 ENCOUNTER — TELEPHONE (OUTPATIENT)
Dept: FAMILY MEDICINE CLINIC | Age: 84
End: 2024-11-01

## 2024-11-01 NOTE — TELEPHONE ENCOUNTER
Patient was able to get the Myrbertriq covered through December, but she stated she does need a new prescription sent into the [facility]

## 2024-11-01 NOTE — TELEPHONE ENCOUNTER
Left VM for patient informing her [facility] shipped refill yesterday and current RX has 2 refills left per the pharmacy. Advised her to call back with questions or concerns.

## 2024-11-01 NOTE — TELEPHONE ENCOUNTER
Pt calling requesting to speak with RAUL Manjarrez.     She can be reached at [phone].     Thank you!

## 2024-11-04 DIAGNOSIS — K21.9 GERD WITHOUT ESOPHAGITIS: Primary | ICD-10-CM

## 2024-11-04 RX ORDER — METOPROLOL TARTRATE 50 MG
50 TABLET ORAL 2 TIMES DAILY
Qty: 180 TABLET | Refills: 0 | Status: SHIPPED | OUTPATIENT
Start: 2024-11-04

## 2024-11-04 RX ORDER — ROSUVASTATIN CALCIUM 5 MG/1
5 TABLET, COATED ORAL NIGHTLY
Qty: 90 TABLET | Refills: 0 | Status: SHIPPED | OUTPATIENT
Start: 2024-11-04

## 2024-11-04 NOTE — TELEPHONE ENCOUNTER
Last office visit 10/17/2024     Last written      Next office visit scheduled 12/11/2024    Requested Prescriptions     Pending Prescriptions Disp Refills    omeprazole (PRILOSEC) 20 MG delayed release capsule [Pharmacy Med Name: Omeprazole Oral Capsule Delayed Release 20 MG] 90 capsule 3     Sig: TAKE 1 CAPSULE EVERY DAY    rosuvastatin (CRESTOR) 5 MG tablet [Pharmacy Med Name: Rosuvastatin Calcium Oral Tablet 5 MG] 90 tablet 3     Sig: TAKE 1 TABLET EVERY NIGHT    metoprolol tartrate (LOPRESSOR) 50 MG tablet [Pharmacy Med Name: Metoprolol Tartrate Oral Tablet 50 MG] 180 tablet 3     Sig: TAKE 1 TABLET TWICE DAILY

## 2024-11-06 ENCOUNTER — APPOINTMENT (OUTPATIENT)
Dept: PHYSICAL THERAPY | Age: 84
End: 2024-11-06
Payer: MEDICARE

## 2024-11-06 NOTE — PROGRESS NOTES
History of Present Illness:  Emily Motta is a pleasant 84 y.o. female who presents for a post operative visit. She is 9 weeks status post from a left reverse shoulder arthroplasty.  Her surgery was on August 30, 2024.  She is going to physical therapy once a week at the [place] office.  Patient presents today with her daughter.  She is reports she has been compliant with wearing the UltraSling brace at all times.  She denies any new injuries.  She denies fevers, chills, numbness, tingling, and shortness of breath.    Medical History:  Patient's medications, allergies, past medical, surgical, social and family histories were reviewed and updated as appropriate.    No notes on file    Review of Systems  A 14 point review of systems was completed by the patient and is available in the media section of the scanned medical record and was reviewed.    Vital Signs:  There were no vitals filed for this visit.    General/Appearance: Alert and oriented and in no apparent distress.    Skin:  There are no skin lesions, cellulitis, or extreme edema. The patient has warm and well-perfused Bilateral upper extremities with brisk capillary refill.      Left Shoulder Exam:    Inspection: Shoulder incision is healed. There is no erythema, drainage or other signs of infection    Palpation:  No crepitus to gentle motion    Active Range of Motion: Forward elevation of 120 degrees, external rotation of 10 degrees  Passive Range of Motion: Forward elevation of 140  Strength: -4/5 external rotation with resistance.    Special Tests:  Deferred.    Neurovascular: Sensation to light touch is intact, no motor deficits, palpable radial pulses 2+    Radiology:     Plain radiographs of the left shoulder including 3 views AP in and out and axillary lateral was obtained and reviewed in the office.  Showed a reverse total shoulder arthroplasty with all the components in good position.  No signs of loosening or fractures noted.    Assessment :  Ms.

## 2024-11-08 ENCOUNTER — HOSPITAL ENCOUNTER (OUTPATIENT)
Dept: PHYSICAL THERAPY | Age: 84
Setting detail: THERAPIES SERIES
Discharge: HOME | End: 2024-11-08
Payer: MEDICARE

## 2024-11-08 PROCEDURE — 97110 THERAPEUTIC EXERCISES: CPT | Performed by: PHYSICAL THERAPIST

## 2024-11-08 NOTE — FLOWSHEET NOTE
[facility]- Outpatient Rehabilitation and Therapy [address], [address], [place] [address] office: [phone] fax: [phone]    Physical Therapy: TREATMENT/PROGRESS NOTE   Patient: Emily Motta (84 y.o. female)   Examination Date: 2024   :  1940 MRN: [id number]   Visit #: 6   Insurance Allowable Auth Needed   Auth 24 visits 24 - 24 [x]Yes    []No    Insurance: Payor: HUMANA MEDICARE / Plan: HUMANA GOLD PLUS HMO / Product Type: *No Product type* /   Insurance ID: [id number] - (Medicare Managed)  Secondary Insurance (if applicable):    Treatment Diagnosis:     ICD-10-CM    1. Acute pain of left shoulder  M25.512       2. Weakness generalized  R53.1       3. Stiffness in joint  M25.60          Medical Diagnosis:  Left shoulder pain [M25.512]   Referring Physician: Yazmin Lacey MD  PCP: Konstantin Motta DO     Plan of care signed (Y/N):     Date of Patient follow up with Physician:      Plan of Care Report: YES, Date Range for this report: 24 to 10/23/24  POC update due: (10 visits /OR AUTH LIMITS, whichever is less)  2024                                             Medical History:  Comorbidities:  None  Relevant Medical History: see intake form                                         Precautions/ Contra-indications:           Latex allergy:  NO  Pacemaker:    NO  Contraindications for Manipulation: NA  Date of Surgery: 24 L rTSA, bicep tenodesis  Other:    Red Flags:  None    Suicide Screening:   The patient did not verbalize a primary behavioral concern, suicidal ideation, suicidal intent, or demonstrate suicidal behaviors.    Preferred Language for Healthcare:   [x] English       [] other:    SUBJECTIVE EXAMINATION     Patient stated complaint: S/P 10 wks. Pt states that she gets occasional discomfort but does not last, overall doesn't have much to complain about.     Test used Initial score  9/6/24 10/23/2024   Pain Summary VAS 1/10 0/10

## 2024-11-12 ENCOUNTER — HOSPITAL ENCOUNTER (OUTPATIENT)
Dept: PHYSICAL THERAPY | Age: 84
Setting detail: THERAPIES SERIES
Discharge: HOME | End: 2024-11-12
Payer: MEDICARE

## 2024-11-12 PROCEDURE — 97110 THERAPEUTIC EXERCISES: CPT | Performed by: PHYSICAL THERAPIST

## 2024-11-12 NOTE — FLOWSHEET NOTE
flexibility, following either an injury or surgery.   (76443) HOME EXERCISE PROGRAM - Reviewed/Progressed HEP activities related to strengthening, flexibility, endurance, ROM performed to prevent loss of range of motion, maintain or improve muscular strength or increase flexibility, following either an injury or surgery.    GOALS     Patient stated goal: get back to use the computer  [] Progressing: [] Met: [] Not Met: [] Adjusted    Therapist goals for Patient:   Short Term Goals: To be achieved in: 4 weeks  1. Independent in HEP and progression per patient tolerance, in order to prevent re-injury.   [] Progressing: [x] Met: [] Not Met: [] Adjusted  2. Patient will have a decrease in pain to <0/10 to facilitate improvement in movement, function, and ADLs as indicated by Functional Deficits.  [] Progressing: [x] Met: [] Not Met: [] Adjusted    Long Term Goals: To be achieved in: 12+ weeks  1. Disability index score of 10% or less for the Upper Extremity functional Scale to assist with reaching prior level of function with activities such as dressing.  [] Progressing: [] Met: [] Not Met: [] Adjusted  2. Patient will demonstrate increased AROM of LUE to WFL for rTSA without pain to allow for proper joint functioning to enable patient to get dishes from bottom shelf.   [] Progressing: [] Met: [] Not Met: [] Adjusted  3. Patient will demonstrate increased Strength of LUE to at least 4- or 4/5 throughout without pain to allow for proper functional mobility to enable patient to return to ADLs.   [] Progressing: [] Met: [] Not Met: [] Adjusted  4. Patient will return to using the computer without increased symptoms or restriction.   [] Progressing: [] Met: [] Not Met: [] Adjusted     Overall Progression Towards Functional goals/ Treatment Progress Update:  [x] Patient is progressing as expected towards functional goals listed.    [] Progression is slowed due to complexities/Impairments listed.  [] Progression has been

## 2024-11-14 ENCOUNTER — HOSPITAL ENCOUNTER (OUTPATIENT)
Dept: PHYSICAL THERAPY | Age: 84
Setting detail: THERAPIES SERIES
Discharge: HOME | End: 2024-11-14
Payer: MEDICARE

## 2024-11-14 PROCEDURE — 97110 THERAPEUTIC EXERCISES: CPT | Performed by: PHYSICAL THERAPIST

## 2024-11-14 NOTE — FLOWSHEET NOTE
[facility]- Outpatient Rehabilitation and Therapy [address], [place] [address] office: [phone] fax: [phone]    Physical Therapy: TREATMENT/PROGRESS NOTE   Patient: Emily Motta (84 y.o. female)   Examination Date: 2024   :  1940 MRN: [id number]   Visit #: 8   Insurance Allowable Auth Needed   Auth 24 visits 24 - 24 [x]Yes    []No    Insurance: Payor: HUMANA MEDICARE / Plan: HUMANA GOLD PLUS HMO / Product Type: *No Product type* /   Insurance ID: [id number] - (Medicare Managed)  Secondary Insurance (if applicable):    Treatment Diagnosis:     ICD-10-CM    1. Acute pain of left shoulder  M25.512       2. Weakness generalized  R53.1       3. Stiffness in joint  M25.60          Medical Diagnosis:  Left shoulder pain [M25.512]   Referring Physician: Yazmin Lacey MD  PCP: Konstantin Motta DO     Plan of care signed (Y/N):     Date of Patient follow up with Physician:      Plan of Care Report: NO  POC update due: (10 visits /OR AUTH LIMITS, whichever is less)  2024                                             Medical History:  Comorbidities:  None  Relevant Medical History: see intake form                                         Precautions/ Contra-indications:           Latex allergy:  NO  Pacemaker:    NO  Contraindications for Manipulation: NA  Date of Surgery: 24 L rTSA, bicep tenodesis  Other:    Red Flags:  None    Suicide Screening:   The patient did not verbalize a primary behavioral concern, suicidal ideation, suicidal intent, or demonstrate suicidal behaviors.    Preferred Language for Healthcare:   [x] English       [] other:    SUBJECTIVE EXAMINATION     Patient stated complaint: S/P 11 wks. Pt states that her shoulder feels good, no changes in status since LPV.     Test used Initial score  9/6/24 10/23/2024   Pain Summary VAS 1/10 0/10   Functional questionnaire Upper Extremity functional Scale 0/80=0% (100% deficit)

## 2024-11-19 ENCOUNTER — HOSPITAL ENCOUNTER (OUTPATIENT)
Dept: PHYSICAL THERAPY | Age: 84
Setting detail: THERAPIES SERIES
Discharge: HOME | End: 2024-11-19
Payer: MEDICARE

## 2024-11-19 PROCEDURE — 97110 THERAPEUTIC EXERCISES: CPT | Performed by: PHYSICAL THERAPIST

## 2024-11-19 NOTE — FLOWSHEET NOTE
[facility]- Outpatient Rehabilitation and Therapy [address], [place] [address] office: [phone] fax: [phone]    Physical Therapy: TREATMENT/PROGRESS NOTE   Patient: Emily Motta (84 y.o. female)   Examination Date: 2024   :  1940 MRN: [id number]   Visit #: 9   Insurance Allowable Auth Needed   Auth 24 visits 24 - 24 [x]Yes    []No    Insurance: Payor: HUMANA MEDICARE / Plan: HUMANA GOLD PLUS HMO / Product Type: *No Product type* /   Insurance ID: [id number] - (Medicare Managed)  Secondary Insurance (if applicable):    Treatment Diagnosis:     ICD-10-CM    1. Acute pain of left shoulder  M25.512       2. Weakness generalized  R53.1       3. Stiffness in joint  M25.60          Medical Diagnosis:  Left shoulder pain [M25.512]   Referring Physician: Yazmin Lacey MD  PCP: Konstantin Motta DO     Plan of care signed (Y/N):     Date of Patient follow up with Physician:      Plan of Care Report: NO  POC update due: (10 visits /OR AUTH LIMITS, whichever is less)  2024                                             Medical History:  Comorbidities:  None  Relevant Medical History: see intake form                                         Precautions/ Contra-indications:           Latex allergy:  NO  Pacemaker:    NO  Contraindications for Manipulation: NA  Date of Surgery: 24 L rTSA, bicep tenodesis  Other:    Red Flags:  None    Suicide Screening:   The patient did not verbalize a primary behavioral concern, suicidal ideation, suicidal intent, or demonstrate suicidal behaviors.    Preferred Language for Healthcare:   [x] English       [] other:    SUBJECTIVE EXAMINATION     Patient stated complaint: S/P 12 wks. Pt states that her shoulder feels good but the R shoulder has been bothering her a little bit more. Pt had to go out of town over the weekend so did not get her exercises in.     Test used Initial score  9/6/24 10/23/2024   Pain Summary

## 2024-11-21 ENCOUNTER — HOSPITAL ENCOUNTER (OUTPATIENT)
Dept: PHYSICAL THERAPY | Age: 84
Setting detail: THERAPIES SERIES
Discharge: HOME | End: 2024-11-21
Payer: MEDICARE

## 2024-11-21 PROCEDURE — 97110 THERAPEUTIC EXERCISES: CPT | Performed by: PHYSICAL THERAPIST

## 2024-11-21 NOTE — FLOWSHEET NOTE
improve muscular strength or increase flexibility, following either an injury or surgery.   (93767) HOME EXERCISE PROGRAM - Reviewed/Progressed HEP activities related to strengthening, flexibility, endurance, ROM performed to prevent loss of range of motion, maintain or improve muscular strength or increase flexibility, following either an injury or surgery.    GOALS     Patient stated goal: get back to use the computer  [] Progressing: [] Met: [] Not Met: [] Adjusted    Therapist goals for Patient:   Short Term Goals: To be achieved in: 4 weeks  1. Independent in HEP and progression per patient tolerance, in order to prevent re-injury.   [] Progressing: [x] Met: [] Not Met: [] Adjusted  2. Patient will have a decrease in pain to <0/10 to facilitate improvement in movement, function, and ADLs as indicated by Functional Deficits.  [] Progressing: [x] Met: [] Not Met: [] Adjusted    Long Term Goals: To be achieved in: 12+ weeks  1. Disability index score of 10% or less for the Upper Extremity functional Scale to assist with reaching prior level of function with activities such as dressing.  [] Progressing: [] Met: [] Not Met: [] Adjusted  2. Patient will demonstrate increased AROM of LUE to WFL for rTSA without pain to allow for proper joint functioning to enable patient to get dishes from bottom shelf.   [] Progressing: [] Met: [] Not Met: [] Adjusted  3. Patient will demonstrate increased Strength of LUE to at least 4- or 4/5 throughout without pain to allow for proper functional mobility to enable patient to return to ADLs.   [] Progressing: [] Met: [] Not Met: [] Adjusted  4. Patient will return to using the computer without increased symptoms or restriction.   [] Progressing: [] Met: [] Not Met: [] Adjusted     Overall Progression Towards Functional goals/ Treatment Progress Update:  [x] Patient is progressing as expected towards functional goals listed.    [] Progression is slowed due to complexities/Impairments

## 2024-11-26 ENCOUNTER — HOSPITAL ENCOUNTER (OUTPATIENT)
Dept: PHYSICAL THERAPY | Age: 84
Setting detail: THERAPIES SERIES
Discharge: HOME | End: 2024-11-26
Payer: MEDICARE

## 2024-11-26 PROCEDURE — 97110 THERAPEUTIC EXERCISES: CPT | Performed by: PHYSICAL THERAPIST

## 2024-11-26 NOTE — PLAN OF CARE
Range of Motion, Soft Tissue Mobilization, Trigger Point Release, and Myofascial Release  Modalities as needed that may include: Cryotherapy and Thermal Agents  Patient education on joint protection, postural re-education, activity modification, and progression of HEP    Plan: Cont POC- Continue emphasis/focus on exercise progression, improving proper muscle recruitment and activation/motor control patterns, promoting relaxation, and increasing ROM. Next visit plan to continue current phase     Electronically Signed by Yamile Hendricks, PT  Date: 11/26/2024     Note: Portions of this note have been templated and/or copied from initial evaluation, reassessments and prior notes for documentation efficiency.    Note: If patient does not return for scheduled/recommended follow up visits, this note will serve as a discharge from care along with the most recent update on progress.    Ortho Evaluation

## 2024-12-03 ENCOUNTER — OFFICE VISIT (OUTPATIENT)
Dept: ORTHOPEDIC SURGERY | Age: 84
End: 2024-12-03
Payer: MEDICARE

## 2024-12-03 ENCOUNTER — HOSPITAL ENCOUNTER (OUTPATIENT)
Dept: PHYSICAL THERAPY | Age: 84
Setting detail: THERAPIES SERIES
Discharge: HOME | End: 2024-12-03
Payer: MEDICARE

## 2024-12-03 VITALS — HEIGHT: 65 IN | BODY MASS INDEX: 39.99 KG/M2 | WEIGHT: 240 LBS

## 2024-12-03 DIAGNOSIS — M25.539 PAIN IN WRIST, UNSPECIFIED LATERALITY: ICD-10-CM

## 2024-12-03 DIAGNOSIS — Z96.612 STATUS POST REVERSE TOTAL REPLACEMENT OF LEFT SHOULDER: Primary | ICD-10-CM

## 2024-12-03 PROCEDURE — 1036F TOBACCO NON-USER: CPT | Performed by: ORTHOPAEDIC SURGERY

## 2024-12-03 PROCEDURE — 1125F AMNT PAIN NOTED PAIN PRSNT: CPT | Performed by: ORTHOPAEDIC SURGERY

## 2024-12-03 PROCEDURE — 1090F PRES/ABSN URINE INCON ASSESS: CPT | Performed by: ORTHOPAEDIC SURGERY

## 2024-12-03 PROCEDURE — G8482 FLU IMMUNIZE ORDER/ADMIN: HCPCS | Performed by: ORTHOPAEDIC SURGERY

## 2024-12-03 PROCEDURE — G8399 PT W/DXA RESULTS DOCUMENT: HCPCS | Performed by: ORTHOPAEDIC SURGERY

## 2024-12-03 PROCEDURE — 1123F ACP DISCUSS/DSCN MKR DOCD: CPT | Performed by: ORTHOPAEDIC SURGERY

## 2024-12-03 PROCEDURE — G8427 DOCREV CUR MEDS BY ELIG CLIN: HCPCS | Performed by: ORTHOPAEDIC SURGERY

## 2024-12-03 PROCEDURE — G8417 CALC BMI ABV UP PARAM F/U: HCPCS | Performed by: ORTHOPAEDIC SURGERY

## 2024-12-03 PROCEDURE — 97110 THERAPEUTIC EXERCISES: CPT | Performed by: PHYSICAL THERAPIST

## 2024-12-03 PROCEDURE — 1159F MED LIST DOCD IN RCRD: CPT | Performed by: ORTHOPAEDIC SURGERY

## 2024-12-03 PROCEDURE — 99213 OFFICE O/P EST LOW 20 MIN: CPT | Performed by: ORTHOPAEDIC SURGERY

## 2024-12-03 RX ORDER — FOLIC ACID 1 MG/1
1000 TABLET ORAL DAILY
COMMUNITY

## 2024-12-03 RX ORDER — PREDNISONE 5 MG/1
TABLET ORAL
COMMUNITY

## 2024-12-03 RX ORDER — METHOTREXATE 2.5 MG/1
15 TABLET ORAL WEEKLY
COMMUNITY

## 2024-12-03 NOTE — PROGRESS NOTES
Chief Complaint    Post-Op Check (Left SHOULDER)      History of Present Illness:  Emily Motta is a pleasant, 84 y.o., female, here today for follow up of her left shoulder. 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 She has continued in physical therapy at the [facility]. She is here today accompanied by her daughter.  She reports no new injuries or setbacks.     Pain Assessment  Location of Pain: Shoulder  Location Modifiers: Right  Limiting Behavior: Some  Relieving Factors: Rest  Work-Related Injury: No  Are there other pain locations you wish to document?: No      Medical History:  Patient's medications, allergies, past medical, surgical, social and family histories were reviewed and updated as appropriate.    No notes on file    Review of Systems  A 14 point review of systems was completed by the patient and is available in the media section of the scanned medical record and was reviewed on 12/3/2024.  The review is negative with the exception of those things mentioned in the HPI and Past Medical History    Vital Signs:  There were no vitals filed for this visit.    General/Appearance: Alert and oriented and in no apparent distress.    Skin: There are no skin lesions, cellulitis, or extreme edema. The patient has warm and well-perfused Bilateral upper extremities with brisk capillary refill.      Left Shoulder Exam:  Inspection: No gross deformities, no signs of infection.    Palpation: Deferred    Active Range of Motion:  Forward Elevation 150, External Rotation neutral , Internal Rotation L3    Passive Range of Motion: External Rotation 30 (20 degree lag)    Strength: Deferred    Special Tests:  No Wes muscle deformity.    Neurovascular: Sensation to light touch is intact, no motor deficits, palpable radial pulses 2+      Radiology:     No new XR obtained at this time.         Assessment :  Ms. Emily Motta is a pleasant, 84 y.o. patient who is 3 months post op

## 2024-12-03 NOTE — FLOWSHEET NOTE
[facility]- Outpatient Rehabilitation and Therapy [address], [place] [address] office: [phone] fax: [phone]    Physical Therapy: TREATMENT/PROGRESS NOTE   Patient: Emily Motta (84 y.o. female)   Examination Date: 2024   :  1940 MRN: [id number]   Visit #: 12   Insurance Allowable Auth Needed   Auth 24 visits 24 - 24 [x]Yes    []No    Insurance: Payor: HUMANA MEDICARE / Plan: HUMANA GOLD PLUS HMO / Product Type: *No Product type* /   Insurance ID: [id number] - (Medicare Managed)  Secondary Insurance (if applicable):    Treatment Diagnosis:     ICD-10-CM    1. Acute pain of left shoulder  M25.512       2. Weakness generalized  R53.1       3. Stiffness in joint  M25.60          Medical Diagnosis:  Left shoulder pain [M25.512]   Referring Physician: Yazmin Lacey MD  PCP: Konstantin Motta DO     Plan of care signed (Y/N):     Date of Patient follow up with Physician: 12/3/24     Plan of Care Report: NO  POC update due: (10 visits /OR AUTH LIMITS, whichever is less)  2024                                             Medical History:  Comorbidities:  None  Relevant Medical History: see intake form                                         Precautions/ Contra-indications:           Latex allergy:  NO  Pacemaker:    NO  Contraindications for Manipulation: NA  Date of Surgery: 24 L rTSA, bicep tenodesis  Other:    Red Flags:  None    Suicide Screening:   The patient did not verbalize a primary behavioral concern, suicidal ideation, suicidal intent, or demonstrate suicidal behaviors.    Preferred Language for Healthcare:   [x] English       [] other:    SUBJECTIVE EXAMINATION     Patient stated complaint: S/P 14 wks. Pt states that shoulder was a little sore yesterday but just thinks she slept funny.     Test used Initial score  9/6/24 10/23/2024   Pain Summary VAS 1/10 0/10   Functional questionnaire Upper Extremity functional Scale 0/80=0%

## 2024-12-05 ENCOUNTER — HOSPITAL ENCOUNTER (OUTPATIENT)
Dept: PHYSICAL THERAPY | Age: 84
Setting detail: THERAPIES SERIES
Discharge: HOME | End: 2024-12-05
Payer: MEDICARE

## 2024-12-05 ENCOUNTER — APPOINTMENT (OUTPATIENT)
Dept: PHYSICAL THERAPY | Age: 84
End: 2024-12-05
Payer: MEDICARE

## 2024-12-05 PROCEDURE — 97110 THERAPEUTIC EXERCISES: CPT | Performed by: PHYSICAL THERAPIST

## 2024-12-05 NOTE — FLOWSHEET NOTE
[facility]- Outpatient Rehabilitation and Therapy [address], [place] [address] office: [phone] fax: [phone]    Physical Therapy: TREATMENT/PROGRESS NOTE   Patient: Emily Motta (84 y.o. female)   Examination Date: 2024   :  1940 MRN: [id number]   Visit #: 13   Insurance Allowable Auth Needed   Auth 24 visits 24 - 24 [x]Yes    []No    Insurance: Payor: HUMANA MEDICARE / Plan: HUMANA GOLD PLUS HMO / Product Type: *No Product type* /   Insurance ID: [id number] - (Medicare Managed)  Secondary Insurance (if applicable):    Treatment Diagnosis:     ICD-10-CM    1. Acute pain of left shoulder  M25.512       2. Weakness generalized  R53.1       3. Stiffness in joint  M25.60          Medical Diagnosis:  Left shoulder pain [M25.512]   Referring Physician: Yazmin Lacey MD  PCP: Konstantin Motta DO     Plan of care signed (Y/N):     Date of Patient follow up with Physician: 12/3/24     Plan of Care Report: NO  POC update due: (10 visits /OR AUTH LIMITS, whichever is less)  2024                                             Medical History:  Comorbidities:  None  Relevant Medical History: see intake form                                         Precautions/ Contra-indications:           Latex allergy:  NO  Pacemaker:    NO  Contraindications for Manipulation: NA  Date of Surgery: 24 L rTSA, bicep tenodesis  Other:    Red Flags:  None    Suicide Screening:   The patient did not verbalize a primary behavioral concern, suicidal ideation, suicidal intent, or demonstrate suicidal behaviors.    Preferred Language for Healthcare:   [x] English       [] other:    SUBJECTIVE EXAMINATION     Patient stated complaint: S/P 14 wks. Pt states that her shoulder has been feeling okay \"just slightly sore every once in awhile.\"     Test used Initial score  9/6/24 10/23/2024   Pain Summary VAS 1/10 0/10   Functional questionnaire Upper Extremity functional Scale

## 2024-12-10 ENCOUNTER — HOSPITAL ENCOUNTER (OUTPATIENT)
Dept: PHYSICAL THERAPY | Age: 84
Setting detail: THERAPIES SERIES
End: 2024-12-10
Payer: MEDICARE

## 2024-12-10 NOTE — PROGRESS NOTES
Subjective:      Patient ID: Emily Motta is a 84 y.o. female.    HPI    Anxiety:  Patient takes Ativan 1 mg every 8 hrs as needed for anxiety.  She generally takes it once daily and feels that it works well to control her anxiety symptoms.      Review of Systems   Constitutional:  Negative for chills and fever.   Psychiatric/Behavioral:  Negative for dysphoric mood and suicidal ideas. The patient is nervous/anxious.      /72   Ht 1.651 m (5' 5\")   Wt 107 kg (236 lb)   BMI 39.27 kg/m²    Objective:   Physical Exam  Constitutional:       General: She is not in acute distress.     Appearance: She is well-developed.   HENT:      Head: Normocephalic.      Right Ear: External ear normal.      Left Ear: External ear normal.      Mouth/Throat:      Pharynx: No oropharyngeal exudate.   Neck:      Thyroid: No thyromegaly.      Vascular: No JVD.   Cardiovascular:      Rate and Rhythm: Normal rate and regular rhythm.      Heart sounds: Normal heart sounds. No murmur heard.  Pulmonary:      Effort: Pulmonary effort is normal.      Breath sounds: Normal breath sounds. No wheezing or rales.   Lymphadenopathy:      Cervical: No cervical adenopathy.   Neurological:      Mental Status: She is alert and oriented to person, place, and time.         Assessment:      Anxiety       Plan:   Assessment & Plan   OARRS report was reviewed  Medications refilled   Recommended that patient have an AWV.   RTO 3 months for Hypertension / CKD / GERD / Back Pain / Anxiety       Controlled Substance Monitoring:    Acute and Chronic Pain Monitoring:   RX Monitoring Periodic Controlled Substance Monitoring   12/11/2024   9:39 AM Assessed functional status (ability to engage in work or other purposeful activities, the pain intensity and its interference with activities of daily living, quality of family life and social activities, and the physical activity)                   ENRICO VORA,

## 2024-12-11 ENCOUNTER — OFFICE VISIT (OUTPATIENT)
Dept: FAMILY MEDICINE CLINIC | Age: 84
End: 2024-12-11

## 2024-12-11 VITALS
BODY MASS INDEX: 39.32 KG/M2 | WEIGHT: 236 LBS | SYSTOLIC BLOOD PRESSURE: 132 MMHG | DIASTOLIC BLOOD PRESSURE: 72 MMHG | HEIGHT: 65 IN

## 2024-12-11 DIAGNOSIS — F41.9 ANXIETY: Primary | ICD-10-CM

## 2024-12-11 RX ORDER — ROSUVASTATIN CALCIUM 5 MG/1
5 TABLET, COATED ORAL NIGHTLY
Qty: 90 TABLET | Refills: 0 | Status: SHIPPED | OUTPATIENT
Start: 2024-12-11

## 2024-12-11 RX ORDER — LORAZEPAM 1 MG/1
TABLET ORAL
Qty: 90 TABLET | Refills: 0 | Status: SHIPPED | OUTPATIENT
Start: 2024-12-11 | End: 2025-01-10

## 2024-12-12 ENCOUNTER — HOSPITAL ENCOUNTER (OUTPATIENT)
Dept: PHYSICAL THERAPY | Age: 84
Setting detail: THERAPIES SERIES
Discharge: HOME | End: 2024-12-12
Payer: MEDICARE

## 2024-12-12 ENCOUNTER — APPOINTMENT (OUTPATIENT)
Dept: PHYSICAL THERAPY | Age: 84
End: 2024-12-12
Payer: MEDICARE

## 2024-12-12 PROCEDURE — 97110 THERAPEUTIC EXERCISES: CPT | Performed by: PHYSICAL THERAPIST

## 2024-12-12 NOTE — FLOWSHEET NOTE
for scheduled/recommended follow up visits, this note will serve as a discharge from care along with the most recent update on progress.    Ortho Evaluation

## 2024-12-13 RX ORDER — METOPROLOL TARTRATE 50 MG
50 TABLET ORAL 2 TIMES DAILY
Qty: 180 TABLET | Refills: 3 | OUTPATIENT
Start: 2024-12-13

## 2024-12-13 NOTE — TELEPHONE ENCOUNTER
Last office visit 12/11/2024     Last written      Next office visit scheduled 3/12/2025    Requested Prescriptions     Pending Prescriptions Disp Refills    metoprolol tartrate (LOPRESSOR) 50 MG tablet [Pharmacy Med Name: Metoprolol Tartrate Oral Tablet 50 MG] 180 tablet 3     Sig: TAKE 1 TABLET TWICE DAILY

## 2024-12-17 ENCOUNTER — HOSPITAL ENCOUNTER (OUTPATIENT)
Dept: PHYSICAL THERAPY | Age: 84
Setting detail: THERAPIES SERIES
Discharge: HOME | End: 2024-12-17
Payer: MEDICARE

## 2024-12-17 PROCEDURE — 97110 THERAPEUTIC EXERCISES: CPT | Performed by: PHYSICAL THERAPIST

## 2024-12-17 NOTE — FLOWSHEET NOTE
[facility]- Outpatient Rehabilitation and Therapy [address], [place] [address] office: [phone] fax: [phone]    Physical Therapy: TREATMENT/PROGRESS NOTE   Patient: Emily Motta (84 y.o. female)   Examination Date: 2024   :  1940 MRN: [id number]   Visit #: 15   Insurance Allowable Auth Needed   Auth 24 visits 24 - 24  +8 thru  [x]Yes    []No    Insurance: Payor: HUMANA MEDICARE / Plan: HUMANA GOLD PLUS HMO / Product Type: *No Product type* /   Insurance ID: [id number] - (Medicare Managed)  Secondary Insurance (if applicable):    Treatment Diagnosis:     ICD-10-CM    1. Acute pain of left shoulder  M25.512       2. Weakness generalized  R53.1       3. Stiffness in joint  M25.60          Medical Diagnosis:  Left shoulder pain [M25.512]   Referring Physician: Yazmin Lacey MD  PCP: Konstantin Motta DO     Plan of care signed (Y/N):     Date of Patient follow up with Physician: 12/3/24     Plan of Care Report: NO  POC update due: (10 visits /OR AUTH LIMITS, whichever is less)  2024                                             Medical History:  Comorbidities:  None  Relevant Medical History: see intake form                                         Precautions/ Contra-indications:           Latex allergy:  NO  Pacemaker:    NO  Contraindications for Manipulation: NA  Date of Surgery: 24 L rTSA, bicep tenodesis  Other:    Red Flags:  None    Suicide Screening:   The patient did not verbalize a primary behavioral concern, suicidal ideation, suicidal intent, or demonstrate suicidal behaviors.    Preferred Language for Healthcare:   [x] English       [] other:    SUBJECTIVE EXAMINATION     Patient stated complaint: S/P 15 wks. Pt states that her shoulder has been okay, still some soreness but still attributes it to sleeping. Pt notes that reaching to a high shelf is still challenging.     Test used Initial score  9/6/24 10/23/2024   Pain

## 2024-12-19 ENCOUNTER — APPOINTMENT (OUTPATIENT)
Dept: PHYSICAL THERAPY | Age: 84
End: 2024-12-19
Payer: MEDICARE

## 2024-12-19 ENCOUNTER — HOSPITAL ENCOUNTER (OUTPATIENT)
Dept: PHYSICAL THERAPY | Age: 84
Setting detail: THERAPIES SERIES
Discharge: HOME | End: 2024-12-19
Payer: MEDICARE

## 2024-12-19 PROCEDURE — 97110 THERAPEUTIC EXERCISES: CPT | Performed by: PHYSICAL THERAPIST

## 2024-12-26 ENCOUNTER — HOSPITAL ENCOUNTER (OUTPATIENT)
Dept: PHYSICAL THERAPY | Age: 84
Setting detail: THERAPIES SERIES
Discharge: HOME | End: 2024-12-26
Payer: MEDICARE

## 2024-12-26 ENCOUNTER — APPOINTMENT (OUTPATIENT)
Dept: PHYSICAL THERAPY | Age: 84
End: 2024-12-26
Payer: MEDICARE

## 2024-12-26 PROCEDURE — 97110 THERAPEUTIC EXERCISES: CPT | Performed by: PHYSICAL THERAPIST

## 2024-12-26 NOTE — FLOWSHEET NOTE
[facility]- Outpatient Rehabilitation and Therapy [address], [address], [place] [address] office: [phone] fax: [phone]    Physical Therapy: TREATMENT/PROGRESS NOTE   Patient: Emily Motta (84 y.o. female)   Examination Date: 2024   :  1940 MRN: [id number]   Visit #: 17   Insurance Allowable Auth Needed   Auth 24 visits 24 - 24  +8 thru  [x]Yes    []No    Insurance: Payor: HU[employer] MEDICARE / Plan: [employer] GOLD PLUS HMO / Product Type: *No Product type* /   Insurance ID: [id number] - (Medicare Managed)  Secondary Insurance (if applicable):    Treatment Diagnosis:     ICD-10-CM    1. Acute pain of left shoulder  M25.512       2. Weakness generalized  R53.1       3. Stiffness in joint  M25.60          Medical Diagnosis:  Left shoulder pain [M25.512]   Referring Physician: Yazmin Lacey MD  PCP: Konstantin Motta DO     Plan of care signed (Y/N):     Date of Patient follow up with Physician: 12/3/24     Plan of Care Report: NO  POC update due: (10 visits /OR AUTH LIMITS, whichever is less)  2024                                             Medical History:  Comorbidities:  None  Relevant Medical History: see intake form                                         Precautions/ Contra-indications:           Latex allergy:  NO  Pacemaker:    NO  Contraindications for Manipulation: NA  Date of Surgery: 24 L rTSA, bicep tenodesis  Other:    Red Flags:  None    Suicide Screening:   The patient did not verbalize a primary behavioral concern, suicidal ideation, suicidal intent, or demonstrate suicidal behaviors.    Preferred Language for Healthcare:   [x] English       [] other:    SUBJECTIVE EXAMINATION     Patient stated complaint: S/P 16 wks. Pt states that her shoulder feels pretty good, she does wake up in the night d/t discomfort but thinks it is from sleeping position.     Test used Initial score  9/6/24 10/23/2024   Pain Summary VAS 1/10 0/10

## 2025-01-03 ENCOUNTER — HOSPITAL ENCOUNTER (OUTPATIENT)
Dept: PHYSICAL THERAPY | Age: 85
Setting detail: THERAPIES SERIES
Discharge: HOME | End: 2025-01-03
Payer: COMMERCIAL

## 2025-01-03 PROCEDURE — 97110 THERAPEUTIC EXERCISES: CPT | Performed by: PHYSICAL THERAPIST

## 2025-01-03 NOTE — PLAN OF CARE
Flexion       Extension       RD       UD                 Palpation:   Unremarkable  Location:    Posture:   rounded shoulders    Bandages/Dressings/Incisions:  Patients wound/incision appears to be healing as expected    Dermatomes: Abnormal findings listed below  NT    Myotomes: Abnormal findings listed below  NT    Reflexes: Abnormal findings listed below  Not Tested    Specific Joint Mobility Testing/Accessory Motions:      Glenohumeral joint: WNL for rTSA    Gait:    Pattern:  Pt has foot drop, normally ambulates with SPC but arrived in WC today   Assistive Device Used: Single point cane d/t LUE surgery    Special Tests:  [] None Assessed   [] Following tests noted:    NT    Balance:  [x] WNL      [] NT       [] Dysfunction noted  Comment:     Falls Risk Assessment (30 days):   Falls Risk assessed and no intervention required.  Time Up and Go (TUG):   Not Assessed        Exercises/Interventions     Therapeutic Ex (17839)  Parameters Notes/Cues/Progressions   Forward lean    Seated ER with cane To 20°   Tableslide on clear board AAROM to 90°, TC's to assist exercise    Supine OH reach Self assisted, hands clasped    Supine ER    BB IR 10x10\"    Wall ER 10x10\"         Supine flexion 3x8 1#    Chest press 3x10 5# wand    Seated chest press 3x10 0# wand    Supine flexion 3x10 3# wand    Seated AROM ABD    Seated AROM flexion 3x8 wand UT shrug after 4 reps   Retraction  Cues to pause in retraction   Shrugs 3x12 4#    Bicep curl 3x10 1# supinated  3x10 2# neutral    W ER 3x12 no resistance         Rows 3x10 green seated   TB ER 3x6 yellow seated   TB IR 3x10 green seated                Manual Intervention (39204)     PROM                        NMR re-education (42038) resistance CUES NEEDED   Ball on wall 2x10 up/down, side/side                        Therapeutic Activity (93105)                                Modalities:    No modalities applied this session    Education/Home Exercise Program: Patient HEP

## 2025-01-07 ENCOUNTER — APPOINTMENT (OUTPATIENT)
Dept: PHYSICAL THERAPY | Age: 85
End: 2025-01-07
Payer: MEDICARE

## 2025-01-08 ENCOUNTER — HOSPITAL ENCOUNTER (OUTPATIENT)
Dept: PHYSICAL THERAPY | Age: 85
Setting detail: THERAPIES SERIES
End: 2025-01-08
Payer: MEDICARE

## 2025-01-08 ENCOUNTER — OFFICE VISIT (OUTPATIENT)
Dept: ORTHOPEDIC SURGERY | Age: 85
End: 2025-01-08

## 2025-01-08 VITALS — BODY MASS INDEX: 39.32 KG/M2 | WEIGHT: 236 LBS | HEIGHT: 65 IN

## 2025-01-08 DIAGNOSIS — Z96.612 STATUS POST REVERSE TOTAL REPLACEMENT OF LEFT SHOULDER: Primary | ICD-10-CM

## 2025-01-09 ENCOUNTER — APPOINTMENT (OUTPATIENT)
Dept: PHYSICAL THERAPY | Age: 85
End: 2025-01-09
Payer: MEDICARE

## 2025-01-09 NOTE — PROGRESS NOTES
Status post reverse total replacement of left shoulder Yes       Office Procedures:  Orders Placed This Encounter   Procedures    [facility] Physical Therapy [facility]     Referral Priority:   Routine     Referral Type:   Eval and Treat     Referral Reason:   Specialty Services Required     Requested Specialty:   Physical Therapy     Number of Visits Requested:   1       Treatment Plan:  Overall, Emily Motta is progressing well and she is happy with her shoulder status. We recommend this patient continue in physical therapy for 2-3 more visits and then transition to a home program. A new physical therapy letter was documented in [facility] today.     We will see Emily back in 1 year and/or as needed. All questions were answered to patient's satisfaction and She was encouraged to call with any further questions or concerns. Emily Motta is in agreement with this plan.    1/9/2025  9:29 AM    Josue Noe ATC    [facility]    During this examination, Josue LINDSEY ATC, functioned as a scribe for Dr. Yazmin Lacey. The history taking and physical examination were performed by Dr. Lacey. All counseling during the appointment was performed between the patient and Dr. Lacey. 1/9/25  ______________  I, Dr. Yazmin Lacey, personally performed the services described in this documentation as described by Josue Noe ATC in my presence, and it is both accurate and complete.    Yazmin Lacey MD, PhD  1/8/2025

## 2025-01-13 ENCOUNTER — TELEPHONE (OUTPATIENT)
Dept: FAMILY MEDICINE CLINIC | Age: 85
End: 2025-01-13

## 2025-01-13 RX ORDER — GUAIFENESIN AND PSEUDOEPHEDRINE HCL 1200; 120 MG/1; MG/1
1 TABLET, EXTENDED RELEASE ORAL 2 TIMES DAILY PRN
Qty: 20 TABLET | Refills: 0 | Status: SHIPPED | OUTPATIENT
Start: 2025-01-13 | End: 2025-01-23

## 2025-01-13 NOTE — TELEPHONE ENCOUNTER
Patient comes to clinic for follow up anticoagulation visit.   Last INR 5/6 was 1.8.  Dose maintained per protocol.   Today's INR is 2.1 and is within goal range.    Current warfarin dosing verified with patient. Patient was informed that their INR result is within therapeutic range and instructed to maintain current dose per protocol. Discussed dose and return date for next INR.    Dr. Shah is in the office today supervising the treatment.    Patient was instructed to contact the clinic with any unusual bleeding or bruising, any changes in medications, diet, health status, lifestyle, or any other changes, questions or concerns. Patient verbalized understanding of all discussed.      Pt called to ask Dr Motta if she can get a rx sent to her pharmacy for a head cold and chest congestion. This has been going on for 3 weeks. Is there something she can take OTC. She states in the evenings she the chills that last a couple hours.She has to cover up with several covers to get warm. She was on Methotrexate daily and the Rheumatologist took her off due to her symptoms. Dr Lo (Rheumotologist) told her to go back on the Prednisone. She has no energy. Please give pt a call [phone]

## 2025-01-14 ENCOUNTER — APPOINTMENT (OUTPATIENT)
Dept: PHYSICAL THERAPY | Age: 85
End: 2025-01-14
Payer: MEDICARE

## 2025-01-15 RX ORDER — MIRABEGRON 50 MG/1
50 TABLET, FILM COATED, EXTENDED RELEASE ORAL DAILY
Qty: 90 TABLET | Refills: 0 | Status: SHIPPED | OUTPATIENT
Start: 2025-01-15

## 2025-01-15 RX ORDER — GABAPENTIN 100 MG/1
100 CAPSULE ORAL 3 TIMES DAILY
Qty: 270 CAPSULE | Refills: 0 | Status: SHIPPED | OUTPATIENT
Start: 2025-01-15 | End: 2025-04-15

## 2025-01-15 NOTE — TELEPHONE ENCOUNTER
Last office visit 12/11/2024     Last written     Next office visit scheduled 3/12/2025    Requested Prescriptions     Pending Prescriptions Disp Refills    MYRBETRIQ 50 MG TB24 90 tablet 1     Sig: Take 50 mg by mouth daily    gabapentin (NEURONTIN) 100 MG capsule 270 capsule 0     Sig: Take 1 capsule by mouth 3 times daily for 180 days. Intended supply: 90 days

## 2025-01-16 ENCOUNTER — APPOINTMENT (OUTPATIENT)
Dept: PHYSICAL THERAPY | Age: 85
End: 2025-01-16
Payer: MEDICARE

## 2025-01-17 ENCOUNTER — APPOINTMENT (OUTPATIENT)
Dept: CT IMAGING | Age: 85
DRG: 193 | End: 2025-01-17
Payer: MEDICARE

## 2025-01-17 ENCOUNTER — TELEPHONE (OUTPATIENT)
Dept: FAMILY MEDICINE CLINIC | Age: 85
End: 2025-01-17

## 2025-01-17 ENCOUNTER — APPOINTMENT (OUTPATIENT)
Dept: GENERAL RADIOLOGY | Age: 85
DRG: 193 | End: 2025-01-17
Payer: MEDICARE

## 2025-01-17 ENCOUNTER — HOSPITAL ENCOUNTER (INPATIENT)
Age: 85
LOS: 2 days | Discharge: HOME | DRG: 193 | End: 2025-01-19
Attending: HOSPITALIST | Admitting: HOSPITALIST
Payer: MEDICARE

## 2025-01-17 ENCOUNTER — APPOINTMENT (OUTPATIENT)
Age: 85
DRG: 193 | End: 2025-01-17
Payer: MEDICARE

## 2025-01-17 DIAGNOSIS — J18.9 MULTIFOCAL PNEUMONIA: ICD-10-CM

## 2025-01-17 DIAGNOSIS — J96.01 ACUTE RESPIRATORY FAILURE WITH HYPOXIA: Primary | ICD-10-CM

## 2025-01-17 DIAGNOSIS — N18.9 CHRONIC KIDNEY DISEASE, UNSPECIFIED CKD STAGE: ICD-10-CM

## 2025-01-17 DIAGNOSIS — R06.02 SHORTNESS OF BREATH: ICD-10-CM

## 2025-01-17 LAB
ALBUMIN SERPL-MCNC: 4.3 G/DL (ref 3.4–5)
ALBUMIN/GLOB SERPL: 1.3 {RATIO} (ref 1.1–2.2)
ALP SERPL-CCNC: 61 U/L (ref 40–129)
ALT SERPL-CCNC: 7 U/L (ref 10–40)
ANION GAP SERPL CALCULATED.3IONS-SCNC: 14 MMOL/L (ref 3–16)
AST SERPL-CCNC: 21 U/L (ref 15–37)
BASOPHILS # BLD: 0 K/UL (ref 0–0.2)
BASOPHILS NFR BLD: 0.5 %
BILIRUB SERPL-MCNC: 0.3 MG/DL (ref 0–1)
BUN SERPL-MCNC: 24 MG/DL (ref 7–20)
CALCIUM SERPL-MCNC: 10.6 MG/DL (ref 8.3–10.6)
CHLORIDE SERPL-SCNC: 100 MMOL/L (ref 99–110)
CO2 SERPL-SCNC: 21 MMOL/L (ref 21–32)
CREAT SERPL-MCNC: 1.3 MG/DL (ref 0.6–1.2)
DEPRECATED RDW RBC AUTO: 17.3 % (ref 12.4–15.4)
ECHO AO ASC DIAM: 3.6 CM
ECHO AO ASCENDING AORTA INDEX: 1.71 CM/M2
ECHO AO ROOT DIAM: 3.1 CM
ECHO AO ROOT INDEX: 1.48 CM/M2
ECHO AV AREA PEAK VELOCITY: 2.3 CM2
ECHO AV AREA VTI: 1.9 CM2
ECHO AV AREA/BSA PEAK VELOCITY: 1.1 CM2/M2
ECHO AV AREA/BSA VTI: 0.9 CM2/M2
ECHO AV MEAN GRADIENT: 4 MMHG
ECHO AV MEAN VELOCITY: 1 M/S
ECHO AV PEAK GRADIENT: 6 MMHG
ECHO AV PEAK VELOCITY: 1.3 M/S
ECHO AV VELOCITY RATIO: 0.62
ECHO AV VTI: 28 CM
ECHO BSA: 2.18 M2
ECHO LA AREA 2C: 16.7 CM2
ECHO LA AREA 4C: 18 CM2
ECHO LA MAJOR AXIS: 4.8 CM
ECHO LA MINOR AXIS: 4.7 CM
ECHO LA VOL BP: 49 ML (ref 22–52)
ECHO LA VOL MOD A2C: 47 ML (ref 22–52)
ECHO LA VOL MOD A4C: 50 ML (ref 22–52)
ECHO LA VOL/BSA BIPLANE: 23 ML/M2 (ref 16–34)
ECHO LA VOLUME INDEX MOD A2C: 22 ML/M2 (ref 16–34)
ECHO LA VOLUME INDEX MOD A4C: 24 ML/M2 (ref 16–34)
ECHO LV E' LATERAL VELOCITY: 5.55 CM/S
ECHO LV E' SEPTAL VELOCITY: 3.23 CM/S
ECHO LV EF PHYSICIAN: 60 %
ECHO LV FRACTIONAL SHORTENING: 33 % (ref 28–44)
ECHO LV INTERNAL DIMENSION DIASTOLE INDEX: 2.29 CM/M2
ECHO LV INTERNAL DIMENSION DIASTOLIC: 4.8 CM (ref 3.9–5.3)
ECHO LV INTERNAL DIMENSION SYSTOLIC INDEX: 1.52 CM/M2
ECHO LV INTERNAL DIMENSION SYSTOLIC: 3.2 CM
ECHO LV IVSD: 1.1 CM (ref 0.6–0.9)
ECHO LV MASS 2D: 181.9 G (ref 67–162)
ECHO LV MASS INDEX 2D: 86.6 G/M2 (ref 43–95)
ECHO LV POSTERIOR WALL DIASTOLIC: 1 CM (ref 0.6–0.9)
ECHO LV RELATIVE WALL THICKNESS RATIO: 0.42
ECHO LVOT AREA: 3.5 CM2
ECHO LVOT AV VTI INDEX: 0.53
ECHO LVOT DIAM: 2.1 CM
ECHO LVOT MEAN GRADIENT: 1 MMHG
ECHO LVOT PEAK GRADIENT: 3 MMHG
ECHO LVOT PEAK VELOCITY: 0.8 M/S
ECHO LVOT STROKE VOLUME INDEX: 24.2 ML/M2
ECHO LVOT SV: 50.9 ML
ECHO LVOT VTI: 14.7 CM
ECHO MV A VELOCITY: 0.83 M/S
ECHO MV AREA VTI: 2.6 CM2
ECHO MV E DECELERATION TIME (DT): 210 MS
ECHO MV E VELOCITY: 0.6 M/S
ECHO MV E/A RATIO: 0.72
ECHO MV E/E' LATERAL: 10.81
ECHO MV E/E' RATIO (AVERAGED): 14.69
ECHO MV E/E' SEPTAL: 18.58
ECHO MV LVOT VTI INDEX: 1.32
ECHO MV MAX VELOCITY: 0.9 M/S
ECHO MV MEAN GRADIENT: 1 MMHG
ECHO MV MEAN VELOCITY: 0.5 M/S
ECHO MV PEAK GRADIENT: 3 MMHG
ECHO MV VTI: 19.4 CM
ECHO PV MAX VELOCITY: 0.9 M/S
ECHO PV PEAK GRADIENT: 3 MMHG
ECHO RA AREA 4C: 13.9 CM2
ECHO RA END SYSTOLIC VOLUME APICAL 4 CHAMBER INDEX BSA: 18 ML/M2
ECHO RA VOLUME: 38 ML
ECHO RV BASAL DIMENSION: 3.8 CM
ECHO RV FREE WALL PEAK S': 18.5 CM/S
ECHO RV MID DIMENSION: 2.8 CM
ECHO RV TAPSE: 2 CM (ref 1.7–?)
EKG ATRIAL RATE: 82 BPM
EKG DIAGNOSIS: NORMAL
EKG P AXIS: -6 DEGREES
EKG P-R INTERVAL: 138 MS
EKG Q-T INTERVAL: 392 MS
EKG QRS DURATION: 82 MS
EKG QTC CALCULATION (BAZETT): 457 MS
EKG R AXIS: -16 DEGREES
EKG T AXIS: 13 DEGREES
EKG VENTRICULAR RATE: 82 BPM
EOSINOPHIL # BLD: 0 K/UL (ref 0–0.6)
EOSINOPHIL NFR BLD: 0.2 %
FLUAV + FLUBV AG NOSE IA.RAPID: NOT DETECTED
FLUAV + FLUBV AG NOSE IA.RAPID: NOT DETECTED
GFR SERPLBLD CREATININE-BSD FMLA CKD-EPI: 40 ML/MIN/{1.73_M2}
GLUCOSE SERPL-MCNC: 108 MG/DL (ref 70–99)
HCT VFR BLD AUTO: 33.3 % (ref 36–48)
HGB BLD-MCNC: 10.8 G/DL (ref 12–16)
LYMPHOCYTES # BLD: 0.5 K/UL (ref 1–5.1)
LYMPHOCYTES NFR BLD: 5.3 %
MAGNESIUM SERPL-MCNC: 1.59 MG/DL (ref 1.8–2.4)
MCH RBC QN AUTO: 25.9 PG (ref 26–34)
MCHC RBC AUTO-ENTMCNC: 32.5 G/DL (ref 31–36)
MCV RBC AUTO: 79.9 FL (ref 80–100)
MONOCYTES # BLD: 0.6 K/UL (ref 0–1.3)
MONOCYTES NFR BLD: 7.1 %
NEUTROPHILS # BLD: 7.5 K/UL (ref 1.7–7.7)
NEUTROPHILS NFR BLD: 86.9 %
NT-PROBNP SERPL-MCNC: 348 PG/ML (ref 0–449)
PLATELET # BLD AUTO: 250 K/UL (ref 135–450)
PMV BLD AUTO: 8.4 FL (ref 5–10.5)
POTASSIUM SERPL-SCNC: 4.2 MMOL/L (ref 3.5–5.1)
PROT SERPL-MCNC: 7.6 G/DL (ref 6.4–8.2)
RBC # BLD AUTO: 4.17 M/UL (ref 4–5.2)
SARS-COV-2 RDRP RESP QL NAA+PROBE: NOT DETECTED
SODIUM SERPL-SCNC: 135 MMOL/L (ref 136–145)
TROPONIN, HIGH SENSITIVITY: 18 NG/L (ref 0–14)
TROPONIN, HIGH SENSITIVITY: 23 NG/L (ref 0–14)
WBC # BLD AUTO: 8.7 K/UL (ref 4–11)

## 2025-01-17 PROCEDURE — 83880 ASSAY OF NATRIURETIC PEPTIDE: CPT

## 2025-01-17 PROCEDURE — 93010 ELECTROCARDIOGRAM REPORT: CPT | Performed by: INTERNAL MEDICINE

## 2025-01-17 PROCEDURE — 80053 COMPREHEN METABOLIC PANEL: CPT

## 2025-01-17 PROCEDURE — 93005 ELECTROCARDIOGRAM TRACING: CPT | Performed by: HOSPITALIST

## 2025-01-17 PROCEDURE — 2580000003 HC RX 258: Performed by: HOSPITALIST

## 2025-01-17 PROCEDURE — 83735 ASSAY OF MAGNESIUM: CPT

## 2025-01-17 PROCEDURE — 87040 BLOOD CULTURE FOR BACTERIA: CPT

## 2025-01-17 PROCEDURE — 2580000003 HC RX 258: Performed by: NURSE PRACTITIONER

## 2025-01-17 PROCEDURE — 84484 ASSAY OF TROPONIN QUANT: CPT

## 2025-01-17 PROCEDURE — 6360000004 HC RX CONTRAST MEDICATION: Performed by: NURSE PRACTITIONER

## 2025-01-17 PROCEDURE — 87502 INFLUENZA DNA AMP PROBE: CPT

## 2025-01-17 PROCEDURE — 36415 COLL VENOUS BLD VENIPUNCTURE: CPT

## 2025-01-17 PROCEDURE — 2060000000 HC ICU INTERMEDIATE R&B

## 2025-01-17 PROCEDURE — 6360000002 HC RX W HCPCS: Performed by: NURSE PRACTITIONER

## 2025-01-17 PROCEDURE — 87635 SARS-COV-2 COVID-19 AMP PRB: CPT

## 2025-01-17 PROCEDURE — 71260 CT THORAX DX C+: CPT

## 2025-01-17 PROCEDURE — 93306 TTE W/DOPPLER COMPLETE: CPT

## 2025-01-17 PROCEDURE — 6360000002 HC RX W HCPCS: Performed by: HOSPITALIST

## 2025-01-17 PROCEDURE — 93306 TTE W/DOPPLER COMPLETE: CPT | Performed by: INTERNAL MEDICINE

## 2025-01-17 PROCEDURE — 85025 COMPLETE CBC W/AUTO DIFF WBC: CPT

## 2025-01-17 PROCEDURE — 6370000000 HC RX 637 (ALT 250 FOR IP): Performed by: HOSPITALIST

## 2025-01-17 PROCEDURE — 96365 THER/PROPH/DIAG IV INF INIT: CPT

## 2025-01-17 PROCEDURE — 1200000000 HC SEMI PRIVATE

## 2025-01-17 PROCEDURE — 99285 EMERGENCY DEPT VISIT HI MDM: CPT

## 2025-01-17 PROCEDURE — 2500000003 HC RX 250 WO HCPCS: Performed by: HOSPITALIST

## 2025-01-17 PROCEDURE — 71046 X-RAY EXAM CHEST 2 VIEWS: CPT

## 2025-01-17 RX ORDER — ONDANSETRON 4 MG/1
4 TABLET, ORALLY DISINTEGRATING ORAL EVERY 8 HOURS PRN
Status: DISCONTINUED | OUTPATIENT
Start: 2025-01-17 | End: 2025-01-19 | Stop reason: HOSPADM

## 2025-01-17 RX ORDER — LINEZOLID 2 MG/ML
600 INJECTION, SOLUTION INTRAVENOUS EVERY 12 HOURS
Status: DISCONTINUED | OUTPATIENT
Start: 2025-01-17 | End: 2025-01-18

## 2025-01-17 RX ORDER — ENOXAPARIN SODIUM 100 MG/ML
30 INJECTION SUBCUTANEOUS 2 TIMES DAILY
Status: DISCONTINUED | OUTPATIENT
Start: 2025-01-17 | End: 2025-01-19 | Stop reason: HOSPADM

## 2025-01-17 RX ORDER — PANTOPRAZOLE SODIUM 40 MG/1
40 TABLET, DELAYED RELEASE ORAL
Status: DISCONTINUED | OUTPATIENT
Start: 2025-01-18 | End: 2025-01-19 | Stop reason: HOSPADM

## 2025-01-17 RX ORDER — ACETAMINOPHEN 650 MG/1
650 SUPPOSITORY RECTAL EVERY 6 HOURS PRN
Status: DISCONTINUED | OUTPATIENT
Start: 2025-01-17 | End: 2025-01-19 | Stop reason: HOSPADM

## 2025-01-17 RX ORDER — ACETAMINOPHEN 325 MG/1
650 TABLET ORAL EVERY 6 HOURS PRN
Status: DISCONTINUED | OUTPATIENT
Start: 2025-01-17 | End: 2025-01-19 | Stop reason: HOSPADM

## 2025-01-17 RX ORDER — SODIUM CHLORIDE 9 MG/ML
INJECTION, SOLUTION INTRAVENOUS CONTINUOUS
Status: DISCONTINUED | OUTPATIENT
Start: 2025-01-17 | End: 2025-01-18

## 2025-01-17 RX ORDER — ONDANSETRON 2 MG/ML
4 INJECTION INTRAMUSCULAR; INTRAVENOUS EVERY 6 HOURS PRN
Status: DISCONTINUED | OUTPATIENT
Start: 2025-01-17 | End: 2025-01-19 | Stop reason: HOSPADM

## 2025-01-17 RX ORDER — SODIUM CHLORIDE 0.9 % (FLUSH) 0.9 %
5-40 SYRINGE (ML) INJECTION EVERY 12 HOURS SCHEDULED
Status: DISCONTINUED | OUTPATIENT
Start: 2025-01-17 | End: 2025-01-19 | Stop reason: HOSPADM

## 2025-01-17 RX ORDER — TROSPIUM CHLORIDE 20 MG/1
20 TABLET, FILM COATED ORAL
Status: DISCONTINUED | OUTPATIENT
Start: 2025-01-18 | End: 2025-01-18 | Stop reason: DRUGHIGH

## 2025-01-17 RX ORDER — MAGNESIUM SULFATE 1 G/100ML
1000 INJECTION INTRAVENOUS
Status: COMPLETED | OUTPATIENT
Start: 2025-01-17 | End: 2025-01-17

## 2025-01-17 RX ORDER — SODIUM CHLORIDE 9 MG/ML
INJECTION, SOLUTION INTRAVENOUS PRN
Status: DISCONTINUED | OUTPATIENT
Start: 2025-01-17 | End: 2025-01-19 | Stop reason: HOSPADM

## 2025-01-17 RX ORDER — GABAPENTIN 100 MG/1
100 CAPSULE ORAL 3 TIMES DAILY
Status: DISCONTINUED | OUTPATIENT
Start: 2025-01-17 | End: 2025-01-19 | Stop reason: HOSPADM

## 2025-01-17 RX ORDER — SODIUM CHLORIDE 0.9 % (FLUSH) 0.9 %
5-40 SYRINGE (ML) INJECTION PRN
Status: DISCONTINUED | OUTPATIENT
Start: 2025-01-17 | End: 2025-01-19 | Stop reason: HOSPADM

## 2025-01-17 RX ORDER — POLYETHYLENE GLYCOL 3350 17 G/17G
17 POWDER, FOR SOLUTION ORAL DAILY PRN
Status: DISCONTINUED | OUTPATIENT
Start: 2025-01-17 | End: 2025-01-19 | Stop reason: HOSPADM

## 2025-01-17 RX ORDER — IOPAMIDOL 755 MG/ML
75 INJECTION, SOLUTION INTRAVASCULAR
Status: COMPLETED | OUTPATIENT
Start: 2025-01-17 | End: 2025-01-17

## 2025-01-17 RX ORDER — LINEZOLID 2 MG/ML
600 INJECTION, SOLUTION INTRAVENOUS ONCE
Status: DISCONTINUED | OUTPATIENT
Start: 2025-01-17 | End: 2025-01-17

## 2025-01-17 RX ORDER — ROSUVASTATIN CALCIUM 10 MG/1
5 TABLET, COATED ORAL NIGHTLY
Status: DISCONTINUED | OUTPATIENT
Start: 2025-01-17 | End: 2025-01-19 | Stop reason: HOSPADM

## 2025-01-17 RX ORDER — METOPROLOL TARTRATE 50 MG
50 TABLET ORAL 2 TIMES DAILY
Status: DISCONTINUED | OUTPATIENT
Start: 2025-01-17 | End: 2025-01-19 | Stop reason: HOSPADM

## 2025-01-17 RX ORDER — LORAZEPAM 1 MG/1
1 TABLET ORAL EVERY 8 HOURS PRN
COMMUNITY

## 2025-01-17 RX ADMIN — MAGNESIUM SULFATE HEPTAHYDRATE 1000 MG: 1 INJECTION, SOLUTION INTRAVENOUS at 17:26

## 2025-01-17 RX ADMIN — CEFEPIME 2000 MG: 2 INJECTION, POWDER, FOR SOLUTION INTRAVENOUS at 18:26

## 2025-01-17 RX ADMIN — ENOXAPARIN SODIUM 30 MG: 100 INJECTION SUBCUTANEOUS at 21:59

## 2025-01-17 RX ADMIN — IOPAMIDOL 75 ML: 755 INJECTION, SOLUTION INTRAVENOUS at 15:50

## 2025-01-17 RX ADMIN — METOPROLOL TARTRATE 50 MG: 50 TABLET, FILM COATED ORAL at 22:59

## 2025-01-17 RX ADMIN — LINEZOLID 600 MG: 600 INJECTION, SOLUTION INTRAVENOUS at 22:16

## 2025-01-17 RX ADMIN — SODIUM CHLORIDE, PRESERVATIVE FREE 10 ML: 5 INJECTION INTRAVENOUS at 21:39

## 2025-01-17 RX ADMIN — MAGNESIUM SULFATE HEPTAHYDRATE 1000 MG: 1 INJECTION, SOLUTION INTRAVENOUS at 16:07

## 2025-01-17 RX ADMIN — ROSUVASTATIN CALCIUM 5 MG: 10 TABLET, FILM COATED ORAL at 22:59

## 2025-01-17 RX ADMIN — SODIUM CHLORIDE: 9 INJECTION, SOLUTION INTRAVENOUS at 21:38

## 2025-01-17 RX ADMIN — PIPERACILLIN AND TAZOBACTAM 4500 MG: 4; .5 INJECTION, POWDER, FOR SOLUTION INTRAVENOUS at 21:53

## 2025-01-17 RX ADMIN — GABAPENTIN 100 MG: 100 CAPSULE ORAL at 22:59

## 2025-01-17 NOTE — ED PROVIDER NOTES
[facility] EMERGENCY DEPARTMENT  [address] [address]  Dept: [phone]  Loc: [phone]  eMERGENCYdEPARTMENT eNCOUnter      Pt Name: Emily Motta  MRN: [id number]  Birthdate 1940  Date of evaluation: 1/17/2025  Provider:MARIANO Kauffman CNP    Independently seen and evaluated by the advanced practice provider  CHIEF COMPLAINT       Chief Complaint   Patient presents with    Shortness of Breath     Pt arrives with c/o shortness of breath x 3 wks. States it has worsened, now with only a few steps. States she developed cough x 1 wk ago and dr placed her on amoxicillin, and mucinex. States cough is less productive.     Cough       CRITICAL CARE TIME         HISTORY OF PRESENT ILLNESS  (Location/Symptom, Timing/Onset, Context/Setting, Quality, Duration,Modifying Factors, Severity.)   Emily Motta is a 84 y.o. female who presents to the emergency department PMHx: Anxiety, CAD, GERD, HLD, paroxysmal SVT, PAD, CKD    Lives at home  CODE STATUS full  Anticoagulation therapy: None as per patient  Social determinants: Does not use a walker, uses a cane, does not require oxygen therapy, independent otherwise, no housing or food disparity, patient's daughter lives with her      HPI provided by the patient    Patient is presenting to the emergency department under the advisement of family MD.  Patient has had shortness of breath for 3 weeks.  Its progressively getting worse.  Positive cough nonproductive.  Negative for fever.  Negative for chills.  Primary care provider was treating with Augmentin and Mucinex for URI.    Patient reporting that she can barely walk across her floor without becoming so short of breath she has to stop.    Denies swelling of the feet or ankles or known weight gain.  Denies chest pain.    Nursing Notes were reviewedand agreed with or any disagreements were addressed in the HPI.    REVIEW OF SYSTEMS    (2-9 systems for

## 2025-01-17 NOTE — TELEPHONE ENCOUNTER
I would actually recommend that she go to ER if she is that short of breath.  They will be able to do CXR and labs right away.

## 2025-01-17 NOTE — ED NOTES
.wst  
Patient denies chest pain.   
Pt ambulated by this edt at this time with O2 sensor at this time. Initial spO2% at rest 94, dropped to 82% upon ambulation in hallway. Pt returned to bed where spO2 sats recovered in timely fashion, to 92 observed by this edt. Daphne HUI and Shannan QUINTANA CNP notified.   
Pt and visitor provided with blanket and she is out with xray.  
Pt brought to restroom via wheelchair at this time. Wheelchair brought next to toilet with room for pt to stand and pivot, which she is able to do with no assistance as demonstrated. Pt instructed only to go to toilet and return to chair, no other ambulating around the bathroom.   
Pt returned to room via wheelchair at this time, CT technologist came and took pt to CT via wheelchair. Monitor given to CT technologist.   
Troponin, High Sensitivity 23 (*)     All other components within normal limits   TROPONIN - Abnormal; Notable for the following components:    Troponin, High Sensitivity 18 (*)     All other components within normal limits       Background  Allergies:   Allergies   Allergen Reactions    Latex Itching and Rash    Bactrim Rash    Flonase [Fluticasone]      Eye irritation    Lipitor [Atorvastatin]      Headache and Nausea      History:   Past Medical History:   Diagnosis Date    Anxiety     Carpal tunnel syndrome 2014    Chronic kidney disease, stage 3b (HCC)     Chronic midline low back pain without sciatica     Complication of anesthesia 1980    Coronary artery disease involving native coronary artery without angina pectoris     Essential hypertension     Foot drop, left     GERD without esophagitis     History of blood transfusion     Hypercholesterolemia     Internal hemorrhoids     Intrinsic eczema     Lesion of lateral popliteal nerve     Osteoporosis of multiple sites     Overactive bladder     Paroxysmal supraventricular tachycardia (HCC)     Peripheral arterial disease (HCC) 03/20/2023    Mild per in home eval by insurance.    Primary osteoarthritis involving multiple joints     Prolonged emergence from general anesthesia     PUD (peptic ulcer disease)     Restless leg syndrome        Assessment  Vitals:        Vitals:    01/17/25 1800 01/17/25 1802 01/17/25 1803 01/17/25 1830   BP:    119/67   Pulse: 79 75  73   Resp: 17 15  19   Temp:       SpO2: (!) 81% 90% 97% 100%   Weight:       Height:         Deterioration Index (DI): Deterioration Index: 33.8  Deterioration Index (DI) Interventions Performed:    O2 Flow Rate: O2 Flow Rate (L/min): 1.5 L/min  O2 Device: O2 Device: Nasal cannula  Cardiac Rhythm:    Critical Lab Results: [unfilled]  Cultures: Cultures:None  NIH Score: NIH     Active LDA's:   Peripheral IV 01/17/25 Right Antecubital (Active)   Site Assessment Clean, dry & intact 01/17/25 1324   Line Status 
Antecubital (Active)     Active Central Lines:                          Active Wounds:    Active Arrieta's:    Active Feeding Tubes:      Administered Medications:   Medications   linezolid (ZYVOX) IVPB 600 mg (has no administration in time range)   ceFEPIme (MAXIPIME) 2,000 mg in sodium chloride 0.9 % 100 mL IVPB (mini-bag) (2,000 mg IntraVENous New Bag 1/17/25 1826)   magnesium sulfate 1000 mg in dextrose 5% 100 mL IVPB (1,000 mg IntraVENous New Bag 1/17/25 1726)   iopamidol (ISOVUE-370) 76 % injection 75 mL (75 mLs IntraVENous Given 1/17/25 1550)     Last documented pain medication administration: ***  Pertinent or High Risk Medications/Drips: no   If Yes, please provide details: ***  Blood Product Administration: no  If Yes, please provide details: ***  Process Protocols/Bundles: {Process Protocols:99381}    Recommendation  Incomplete STAT orders: ***  Overdue Medications: ***  Patient Belongings:    Additional Comments: ***  If any further questions, please call Sending RN at ***      Admitting Unit Notification  Name of person notified and time: ***      Electronically signed by: Electronically signed by Steffany Stewart RN on 1/17/2025 at 6:47 PM

## 2025-01-17 NOTE — TELEPHONE ENCOUNTER
Pt calling concerned about SOB, was recently prescribed Augmentin and Mucinex D. Pt states she will be completely out of breath and have to rest for 3-4 minutes after taking only a few steps.     Advised pt to schedule appt with Dr. Motta for Monday. Appt scheduled.     Agree or should pt go to Urgent care or ER today?

## 2025-01-17 NOTE — H&P
Mild per in home eval by insurance.    Primary osteoarthritis involving multiple joints     Prolonged emergence from general anesthesia     PUD (peptic ulcer disease)     Restless leg syndrome      PSHX:  has a past surgical history that includes Cholecystectomy; Hysterectomy; lumbar laminectomy; [place] tooth extraction; Abdominal exploration surgery (); Colonoscopy; cyst removal; Breast surgery (Right); Total knee arthroplasty (Left, 2015); Total hip arthroplasty (Bilateral); Hemorrhoid surgery (2017); Upper gastrointestinal endoscopy (N/A, 03/10/2020); Carpal tunnel release (Right, 2020); Intracapsular cataract extraction (Right, 2021); Intracapsular cataract extraction (Left, 2021); Pain management procedure (Bilateral, 2021); LASIK (Bilateral); lumbar fusion (Left, 2022); lumbar fusion (N/A, 2022); Knee Arthroplasty (Left, left knee replaced in ); Hysterectomy, vaginal (); Total shoulder arthroplasty (Right); back surgery; shoulder surgery (Left, 2024); Shoulder arthroscopy; joint replacement (2002); and Spinal fusion (2022).  Allergies:   Allergies   Allergen Reactions    Latex Itching and Rash    Bactrim Rash    Flonase [Fluticasone]      Eye irritation    Lipitor [Atorvastatin]      Headache and Nausea      Fam HX: family history includes Alcohol Abuse in her maternal uncle and maternal uncle; Asthma in her maternal uncle; Cancer in her maternal uncle and son; Depression in her daughter and son; Diabetes in her maternal aunt; High Blood Pressure in her father and mother; Stroke in her father.  Soc HX:   Social History     Socioeconomic History    Marital status:     Number of children: 2   Occupational History    Occupation: Retired    Tobacco Use    Smoking status: Former     Current packs/day: 0.00     Types: Cigarettes     Quit date: 1974     Years since quittin.0    Smokeless tobacco:

## 2025-01-18 LAB
ANION GAP SERPL CALCULATED.3IONS-SCNC: 13 MMOL/L (ref 3–16)
BASOPHILS # BLD: 0 K/UL (ref 0–0.2)
BASOPHILS NFR BLD: 0.4 %
BUN SERPL-MCNC: 20 MG/DL (ref 7–20)
CALCIUM SERPL-MCNC: 9.6 MG/DL (ref 8.3–10.6)
CHLORIDE SERPL-SCNC: 103 MMOL/L (ref 99–110)
CO2 SERPL-SCNC: 21 MMOL/L (ref 21–32)
CREAT SERPL-MCNC: 1.2 MG/DL (ref 0.6–1.2)
DEPRECATED RDW RBC AUTO: 17.6 % (ref 12.4–15.4)
EOSINOPHIL # BLD: 0.1 K/UL (ref 0–0.6)
EOSINOPHIL NFR BLD: 1.3 %
GFR SERPLBLD CREATININE-BSD FMLA CKD-EPI: 44 ML/MIN/{1.73_M2}
GLUCOSE SERPL-MCNC: 82 MG/DL (ref 70–99)
HCT VFR BLD AUTO: 31 % (ref 36–48)
HGB BLD-MCNC: 9.9 G/DL (ref 12–16)
LEGIONELLA AG UR QL: NORMAL
LYMPHOCYTES # BLD: 1.3 K/UL (ref 1–5.1)
LYMPHOCYTES NFR BLD: 17 %
MCH RBC QN AUTO: 25.9 PG (ref 26–34)
MCHC RBC AUTO-ENTMCNC: 32.1 G/DL (ref 31–36)
MCV RBC AUTO: 80.8 FL (ref 80–100)
MONOCYTES # BLD: 0.8 K/UL (ref 0–1.3)
MONOCYTES NFR BLD: 10.1 %
NEUTROPHILS # BLD: 5.6 K/UL (ref 1.7–7.7)
NEUTROPHILS NFR BLD: 71.2 %
PLATELET # BLD AUTO: 233 K/UL (ref 135–450)
PMV BLD AUTO: 8.6 FL (ref 5–10.5)
POTASSIUM SERPL-SCNC: 3.9 MMOL/L (ref 3.5–5.1)
RBC # BLD AUTO: 3.84 M/UL (ref 4–5.2)
S PNEUM AG UR QL: NORMAL
SODIUM SERPL-SCNC: 137 MMOL/L (ref 136–145)
WBC # BLD AUTO: 7.8 K/UL (ref 4–11)

## 2025-01-18 PROCEDURE — 2500000003 HC RX 250 WO HCPCS: Performed by: HOSPITALIST

## 2025-01-18 PROCEDURE — 97116 GAIT TRAINING THERAPY: CPT | Performed by: PHYSICAL THERAPIST

## 2025-01-18 PROCEDURE — 97161 PT EVAL LOW COMPLEX 20 MIN: CPT | Performed by: PHYSICAL THERAPIST

## 2025-01-18 PROCEDURE — 87070 CULTURE OTHR SPECIMN AEROBIC: CPT

## 2025-01-18 PROCEDURE — 87641 MR-STAPH DNA AMP PROBE: CPT

## 2025-01-18 PROCEDURE — 6370000000 HC RX 637 (ALT 250 FOR IP): Performed by: HOSPITALIST

## 2025-01-18 PROCEDURE — 80048 BASIC METABOLIC PNL TOTAL CA: CPT

## 2025-01-18 PROCEDURE — 87449 NOS EACH ORGANISM AG IA: CPT

## 2025-01-18 PROCEDURE — 36415 COLL VENOUS BLD VENIPUNCTURE: CPT

## 2025-01-18 PROCEDURE — 2580000003 HC RX 258: Performed by: HOSPITALIST

## 2025-01-18 PROCEDURE — 94760 N-INVAS EAR/PLS OXIMETRY 1: CPT

## 2025-01-18 PROCEDURE — 6360000002 HC RX W HCPCS: Performed by: HOSPITALIST

## 2025-01-18 PROCEDURE — 2060000000 HC ICU INTERMEDIATE R&B

## 2025-01-18 PROCEDURE — 6370000000 HC RX 637 (ALT 250 FOR IP): Performed by: NURSE PRACTITIONER

## 2025-01-18 PROCEDURE — 85025 COMPLETE CBC W/AUTO DIFF WBC: CPT

## 2025-01-18 PROCEDURE — 87205 SMEAR GRAM STAIN: CPT

## 2025-01-18 PROCEDURE — 97530 THERAPEUTIC ACTIVITIES: CPT | Performed by: PHYSICAL THERAPIST

## 2025-01-18 RX ORDER — LATANOPROST 50 UG/ML
1 SOLUTION/ DROPS OPHTHALMIC NIGHTLY
Status: DISCONTINUED | OUTPATIENT
Start: 2025-01-18 | End: 2025-01-19 | Stop reason: HOSPADM

## 2025-01-18 RX ORDER — TROSPIUM CHLORIDE 20 MG/1
20 TABLET, FILM COATED ORAL DAILY
Status: DISCONTINUED | OUTPATIENT
Start: 2025-01-18 | End: 2025-01-19 | Stop reason: HOSPADM

## 2025-01-18 RX ORDER — TROSPIUM CHLORIDE 20 MG/1
20 TABLET, FILM COATED ORAL NIGHTLY
Status: DISCONTINUED | OUTPATIENT
Start: 2025-01-18 | End: 2025-01-18

## 2025-01-18 RX ORDER — LEVOFLOXACIN 500 MG/1
500 TABLET, FILM COATED ORAL DAILY
Status: DISCONTINUED | OUTPATIENT
Start: 2025-01-18 | End: 2025-01-18 | Stop reason: DRUGHIGH

## 2025-01-18 RX ORDER — LORAZEPAM 1 MG/1
1 TABLET ORAL EVERY 8 HOURS PRN
Status: DISCONTINUED | OUTPATIENT
Start: 2025-01-18 | End: 2025-01-19 | Stop reason: HOSPADM

## 2025-01-18 RX ORDER — TIMOLOL MALEATE 5 MG/ML
1 SOLUTION/ DROPS OPHTHALMIC 2 TIMES DAILY
Status: DISCONTINUED | OUTPATIENT
Start: 2025-01-18 | End: 2025-01-19 | Stop reason: HOSPADM

## 2025-01-18 RX ADMIN — LORAZEPAM 1 MG: 1 TABLET ORAL at 21:21

## 2025-01-18 RX ADMIN — TROSPIUM CHLORIDE 20 MG: 20 TABLET, FILM COATED ORAL at 09:29

## 2025-01-18 RX ADMIN — LATANOPROST 1 DROP: 50 SOLUTION OPHTHALMIC at 22:06

## 2025-01-18 RX ADMIN — METOPROLOL TARTRATE 50 MG: 50 TABLET, FILM COATED ORAL at 21:20

## 2025-01-18 RX ADMIN — SODIUM CHLORIDE, PRESERVATIVE FREE 10 ML: 5 INJECTION INTRAVENOUS at 21:21

## 2025-01-18 RX ADMIN — METOPROLOL TARTRATE 50 MG: 50 TABLET, FILM COATED ORAL at 08:43

## 2025-01-18 RX ADMIN — ENOXAPARIN SODIUM 30 MG: 100 INJECTION SUBCUTANEOUS at 21:21

## 2025-01-18 RX ADMIN — ROSUVASTATIN CALCIUM 5 MG: 10 TABLET, FILM COATED ORAL at 21:20

## 2025-01-18 RX ADMIN — PIPERACILLIN AND TAZOBACTAM 3375 MG: 3; .375 INJECTION, POWDER, LYOPHILIZED, FOR SOLUTION INTRAVENOUS at 07:00

## 2025-01-18 RX ADMIN — ACETAMINOPHEN 650 MG: 325 TABLET ORAL at 18:08

## 2025-01-18 RX ADMIN — SODIUM CHLORIDE, PRESERVATIVE FREE 10 ML: 5 INJECTION INTRAVENOUS at 08:44

## 2025-01-18 RX ADMIN — GABAPENTIN 100 MG: 100 CAPSULE ORAL at 08:43

## 2025-01-18 RX ADMIN — ENOXAPARIN SODIUM 30 MG: 100 INJECTION SUBCUTANEOUS at 08:43

## 2025-01-18 RX ADMIN — LEVOFLOXACIN 750 MG: 500 TABLET, FILM COATED ORAL at 13:41

## 2025-01-18 RX ADMIN — PANTOPRAZOLE SODIUM 40 MG: 40 TABLET, DELAYED RELEASE ORAL at 06:31

## 2025-01-18 RX ADMIN — GABAPENTIN 100 MG: 100 CAPSULE ORAL at 21:21

## 2025-01-18 RX ADMIN — SODIUM CHLORIDE: 9 INJECTION, SOLUTION INTRAVENOUS at 07:14

## 2025-01-18 RX ADMIN — TIMOLOL MALEATE 1 DROP: 5 SOLUTION OPHTHALMIC at 22:06

## 2025-01-18 RX ADMIN — GABAPENTIN 100 MG: 100 CAPSULE ORAL at 13:41

## 2025-01-19 VITALS
DIASTOLIC BLOOD PRESSURE: 63 MMHG | HEIGHT: 65 IN | SYSTOLIC BLOOD PRESSURE: 106 MMHG | BODY MASS INDEX: 36.18 KG/M2 | WEIGHT: 217.15 LBS | HEART RATE: 71 BPM | TEMPERATURE: 99.1 F | RESPIRATION RATE: 18 BRPM | OXYGEN SATURATION: 97 %

## 2025-01-19 LAB — MRSA DNA SPEC QL NAA+PROBE: NORMAL

## 2025-01-19 PROCEDURE — 6360000002 HC RX W HCPCS: Performed by: HOSPITALIST

## 2025-01-19 PROCEDURE — 94680 O2 UPTK RST&XERS DIR SIMPLE: CPT

## 2025-01-19 PROCEDURE — 6370000000 HC RX 637 (ALT 250 FOR IP): Performed by: HOSPITALIST

## 2025-01-19 PROCEDURE — 2500000003 HC RX 250 WO HCPCS: Performed by: HOSPITALIST

## 2025-01-19 RX ORDER — LEVOFLOXACIN 750 MG/1
750 TABLET, FILM COATED ORAL EVERY OTHER DAY
Qty: 3 TABLET | Refills: 0 | Status: SHIPPED | OUTPATIENT
Start: 2025-01-20 | End: 2025-01-25

## 2025-01-19 RX ADMIN — GABAPENTIN 100 MG: 100 CAPSULE ORAL at 09:45

## 2025-01-19 RX ADMIN — PANTOPRAZOLE SODIUM 40 MG: 40 TABLET, DELAYED RELEASE ORAL at 05:23

## 2025-01-19 RX ADMIN — TIMOLOL MALEATE 1 DROP: 5 SOLUTION OPHTHALMIC at 09:46

## 2025-01-19 RX ADMIN — METOPROLOL TARTRATE 50 MG: 50 TABLET, FILM COATED ORAL at 09:45

## 2025-01-19 RX ADMIN — SODIUM CHLORIDE, PRESERVATIVE FREE 10 ML: 5 INJECTION INTRAVENOUS at 09:46

## 2025-01-19 RX ADMIN — ENOXAPARIN SODIUM 30 MG: 100 INJECTION SUBCUTANEOUS at 09:44

## 2025-01-19 RX ADMIN — TROSPIUM CHLORIDE 20 MG: 20 TABLET, FILM COATED ORAL at 09:44

## 2025-01-19 NOTE — PLAN OF CARE
Problem: Discharge Planning  Goal: Discharge to home or other facility with appropriate resources  1/18/2025 1251 by Sandra Kern RN  Outcome: Progressing     Problem: Safety - Adult  Goal: Free from fall injury  1/18/2025 1251 by Sandra Kern RN  Outcome: Progressing     Problem: ABCDS Injury Assessment  Goal: Absence of physical injury  1/18/2025 1251 by Sandra Kern RN  Outcome: Progressing     Problem: Respiratory - Adult  Goal: Achieves optimal ventilation and oxygenation  1/18/2025 1251 by Sandra Kern RN  Outcome: Progressing     Problem: Cardiovascular - Adult  Goal: Maintains optimal cardiac output and hemodynamic stability  1/18/2025 1251 by Sandra Kern RN  Outcome: Progressing     Problem: Skin/Tissue Integrity - Adult  Goal: Skin integrity remains intact  1/18/2025 1251 by Sandra Kern RN  Outcome: Progressing  Flowsheets (Taken 1/18/2025 1251)  Skin Integrity Remains Intact: Monitor for areas of redness and/or skin breakdown     Problem: Infection - Adult  Goal: Absence of infection at discharge  1/18/2025 1251 by Sandra Kern RN  Outcome: Progressing     Problem: Infection - Adult  Goal: Absence of infection during hospitalization  1/18/2025 1251 by Sandra Kern RN  Outcome: Progressing     Problem: Metabolic/Fluid and Electrolytes - Adult  Goal: Electrolytes maintained within normal limits  1/18/2025 1251 by Sandra Kern RN  Outcome: Progressing     
  Problem: Discharge Planning  Goal: Discharge to home or other facility with appropriate resources  Outcome: Progressing  Flowsheets  Taken 1/17/2025 2315  Discharge to home or other facility with appropriate resources:   Identify barriers to discharge with patient and caregiver   Identify discharge learning needs (meds, wound care, etc)   Arrange for needed discharge resources and transportation as appropriate   Refer to discharge planning if patient needs post-hospital services based on physician order or complex needs related to functional status, cognitive ability or social support system  Taken 1/17/2025 2100  Discharge to home or other facility with appropriate resources:   Identify barriers to discharge with patient and caregiver   Arrange for needed discharge resources and transportation as appropriate   Identify discharge learning needs (meds, wound care, etc)   Refer to discharge planning if patient needs post-hospital services based on physician order or complex needs related to functional status, cognitive ability or social support system     Problem: Safety - Adult  Goal: Free from fall injury  Outcome: Progressing  Flowsheets (Taken 1/17/2025 2300)  Free From Fall Injury:   Instruct family/caregiver on patient safety   Based on caregiver fall risk screen, instruct family/caregiver to ask for assistance with transferring infant if caregiver noted to have fall risk factors     Problem: ABCDS Injury Assessment  Goal: Absence of physical injury  Outcome: Progressing     Problem: Respiratory - Adult  Goal: Achieves optimal ventilation and oxygenation  Outcome: Progressing     Problem: Cardiovascular - Adult  Goal: Maintains optimal cardiac output and hemodynamic stability  Outcome: Progressing     Problem: Skin/Tissue Integrity - Adult  Goal: Skin integrity remains intact  Outcome: Progressing     Problem: Infection - Adult  Goal: Absence of infection at discharge  Outcome: Progressing  Goal: Absence of 
changes in amount and color   [place] appropriate cooling/warming therapies per order   Administer medications as ordered   Instruct and encourage patient and family to use good hand hygiene technique   Identify and instruct in appropriate isolation precautions for identified infection/condition  1/18/2025 1251 by Sandra Kern RN  Outcome: Progressing  Goal: Absence of infection during hospitalization  1/18/2025 2320 by Calli Gibbs RN  Outcome: Progressing  Flowsheets (Taken 1/18/2025 2215)  Absence of infection during hospitalization:   Assess and monitor for signs and symptoms of infection   Monitor lab/diagnostic results   Monitor all insertion sites i.e., indwelling lines, tubes and drains   Administer medications as ordered   [place] appropriate cooling/warming therapies per order   Instruct and encourage patient and family to use good hand hygiene technique   Identify and instruct in appropriate isolation precautions for identified infection/condition  1/18/2025 1251 by Sandra Kern RN  Outcome: Progressing     Problem: Metabolic/Fluid and Electrolytes - Adult  Goal: Electrolytes maintained within normal limits  1/18/2025 2320 by Calli Gibbs RN  Outcome: Progressing  Flowsheets (Taken 1/18/2025 2215)  Electrolytes maintained within normal limits:   Monitor labs and assess patient for signs and symptoms of electrolyte imbalances   Administer electrolyte replacement as ordered   Monitor response to electrolyte replacements, including repeat lab results as appropriate   Fluid restriction as ordered   Instruct patient on fluid and nutrition restrictions as appropriate  1/18/2025 1251 by Sandra Kern RN  Outcome: Progressing     
Rotation Flap Text: The defect edges were debeveled with a #15c scalpel blade.  Given the location of the defect, shape of the defect and the proximity to free margins a rotation flap was deemed most appropriate.  Using a sterile surgical marker, an appropriate rotation flap was drawn incorporating the defect and placing the expected incisions within the relaxed skin tension lines where possible.    The area thus outlined was incised deep to adipose tissue with a #15 scalpel blade.  The skin margins were undermined to an appropriate distance in all directions utilizing iris scissors.  Following this, the designed flap was advanced and carried over into the primary defect and sutured into place.

## 2025-01-19 NOTE — CARE COORDINATION
Case Management Assessment  Initial Evaluation    Date/Time of Evaluation: 1/19/2025 11:58 AM  Assessment Completed by: Ritika Terrell RN    If patient is discharged prior to next notation, then this note serves as note for discharge by case management.    Patient Name: Emily Motta                   YOB: 1940  Diagnosis: Shortness of breath [R06.02]  Acute respiratory failure with hypoxia [J96.01]  Multifocal pneumonia [J18.9]  Chronic kidney disease, unspecified CKD stage [N18.9]  Community acquired pneumonia, bilateral [J18.9]                   Date / Time: 1/17/2025  1:00 PM    Patient Admission Status: Inpatient   Readmission Risk (Low < 19, Mod (19-27), High > 27): Readmission Risk Score: 15.9    Current PCP: Konstantin Motta, DO  PCP verified by CM? Yes    Chart Reviewed: Yes      History Provided by: Patient  Patient Orientation: Alert and Oriented    Patient Cognition: Alert    Hospitalization in the last 30 days (Readmission):  No    If yes, Readmission Assessment in CM Navigator will be completed.    Advance Directives:      Code Status: Full Code   Patient's Primary Decision Maker is: Legal Next of Kin    Primary Decision Maker (Active): Ritika Motta - Child - [phone]    Discharge Planning:    Patient lives with: Children Type of Home: House  Primary Care Giver: Self  Patient Support Systems include: Children, Family Members   Current Financial resources: Medicare  Current community resources: None  Current services prior to admission: Durable Medical Equipment            Current DME: Cane, Walker            Type of Home Care services:  None    ADLS  Prior functional level: Assistance with the following:, Housework  Current functional level: Assistance with the following:, Mobility    PT AM-PAC: 19 /24  OT AM-PAC:   /24    Family can provide assistance at DC: Yes  Would you like Case Management to discuss the discharge plan with any other family members/significant

## 2025-01-19 NOTE — DISCHARGE INSTR - COC
Continuity of Care Form    Patient Name: Emily Motta   :  1940  MRN:  [id number]    Admit date:  2025  Discharge date:  ***    Code Status Order: Full Code   Advance Directives:   Advance Care Flowsheet Documentation             Admitting Physician:  Israel Arreguin MD  PCP: Konstantin Motta DO    Discharging Nurse: ***  Discharging Hospital Unit/Room#: A6C-3645/5265-01  Discharging Unit Phone Number: ***    Emergency Contact:   Extended Emergency Contact Information  Primary Emergency Contact: Ritika Motta  Address: [address] [place] [address]            [address]  Home Phone: [phone]  Mobile Phone: [phone]  Relation: Child    Past Surgical History:  Past Surgical History:   Procedure Laterality Date    ABDOMINAL EXPLORATION SURGERY      Lysis of Adhesions    BACK SURGERY      BREAST SURGERY Right     Biopsy    CARPAL TUNNEL RELEASE Right 2020    RIGHT CARPAL TUNNEL RELEASE performed by Srinivasan Toney MD at [facility] OR    CHOLECYSTECTOMY      COLONOSCOPY      CYST REMOVAL      from back    HEMORRHOID SURGERY  2017    HYSTERECTOMY (CERVIX STATUS UNKNOWN)      Complete    HYSTERECTOMY, VAGINAL  1991    INTRACAPSULAR CATARACT EXTRACTION Right 2021    PHACOEMULSIFICATION WITH INTRAOCULAR LENS IMPLANT RIGHT EYE, performed by Simón Horn MD at [facility]    INTRACAPSULAR CATARACT EXTRACTION Left 2021    PHACOEMULSIFICATION WITH INTRAOCULAR LENS IMPLANT - LEFT EYE performed by Simón Horn MD at [facility]    JOINT REPLACEMENT  2002    KNEE ARTHROPLASTY Left left knee replaced in     LASIK Bilateral     LUMBAR FUSION Left 2022    L1-2, L2-3 EXTREME LATERAL INTERBODY FUSION, T10-PELVIS DECOMPRESSION, FIXATION AND FUSION, ILIAC WING SCREWS performed by Jose Leal MD at [facility] OR    LUMBAR FUSION N/A 2022    . performed by Jose Leal MD at [facility] OR    LUMBAR LAMINECTOMY      x 3 with

## 2025-01-19 NOTE — PROGRESS NOTES
V2.0  [facility] Hospitalist Progress Note      Name:  Emily Motta /Age/Sex: 1940  (84 y.o. female)   MRN & CSN:  [id number] & [id number] Encounter Date/Time: 2025 12:27 PM EST    Location:  C6K-8223/5265-01 PCP: Konstantin Motta DO       Hospital Day: 2  Subjective:   Chief Complaint: Follow-up pneumonia    Patient seen and evaluated at bedside, doing much better today, oxygen needs are better, denies any new complaints    Review of Systems:    Review of Systems    10 point ROS negative except as stated above in \"subjective\" section    Objective:     Intake/Output Summary (Last 24 hours) at 2025 1227  Last data filed at 2025 0715  Gross per 24 hour   Intake 1325 ml   Output 400 ml   Net 925 ml      Vitals:   Vitals:    25 1203   BP: 111/64   Pulse: 73   Resp: 18   Temp: 98.8 °F (37.1 °C)   SpO2: 95%     Physical Exam:   General: Awake, alert and oriented, NAD  Cardiovascular: S1S2 present, regular rate and rhythm, no murmurs  Respiratory: Diffuse scattered crackles in the lower mid lung zones  Gastrointestinal: Soft, non tender, positive bowel sounds   Genitourinary: no suprapubic tenderness  Musculoskeletal: No edema    Medications:     Medications:    trospium  20 mg Oral Daily    levoFLOXacin  750 mg Oral Every Other Day    sodium chloride flush  5-40 mL IntraVENous 2 times per day    enoxaparin  30 mg SubCUTAneous BID    gabapentin  100 mg Oral TID    metoprolol tartrate  50 mg Oral BID    pantoprazole  40 mg Oral QAM AC    rosuvastatin  5 mg Oral Nightly      Infusions:    sodium chloride       PRN Meds: LORazepam, 1 mg, Q8H PRN  sodium chloride flush, 5-40 mL, PRN  sodium chloride, , PRN  ondansetron, 4 mg, Q8H PRN   Or  ondansetron, 4 mg, Q6H PRN  polyethylene glycol, 17 g, Daily PRN  acetaminophen, 650 mg, Q6H PRN   Or  acetaminophen, 650 mg, Q6H PRN        Labs      Recent Results (from the past 24 hour(s))   CBC with Auto Differential    Collection Time: 25  1:21 
4 Eyes Skin Assessment     NAME:  Emily Motta  YOB: 1940  MEDICAL RECORD NUMBER:  [id number]    The patient is being assessed for  Admission    I agree that at least one RN has performed a thorough Head to Toe Skin Assessment on the patient. ALL assessment sites listed below have been assessed.      Areas assessed by both nurses:    Head, Face, Ears, Shoulders, Back, Chest, Arms, Elbows, Hands, Sacrum. Buttock, Coccyx, Ischium, Legs. Feet and Heels, and Under Medical Devices         Does the Patient have a Wound? No noted wound(s)       Hemanth Prevention initiated by RN: No  Wound Care Orders initiated by RN: No    Pressure Injury (Stage 3,4, Unstageable, DTI, NWPT, and Complex wounds) if present, place Wound referral order by RN under : No    New Ostomies, if present place, Ostomy referral order under : No     Nurse 1 eSignature: Electronically signed by Calli Gibbs RN on 1/18/25 at 2:10 AM EST    **SHARE this note so that the co-signing nurse can place an eSignature**    Nurse 2 eSignature: Electronically signed by Tal Chaves RN on 1/18/25 at 2:11 AM EST   
Gave 650 mg tylenol for temp 99.5 and 'slight' head ache.  
Home O2 evaluation:  Spo2 88-90 on room air at rest. Spo2 drastically drops to 70's on room air with ambulation.    Pt qualifies for 1-2lpm @ rest and needs 4lpm with exertion. Will need portability. RN informed         01/19/25 0855   Resting (Room Air)   SpO2 89   HR 90   During Walk (Room Air)   SpO2 77      Walk/Assistance Device Cane   Rate of Dyspnea 2   Symptoms Shortness of breath   During Walk (On O2)   SpO2 83      O2 Device Nasal cannula   O2 Flow Rate (l/min) 2 l/min   Need Additional O2 Flow Rate Rows Yes   O2 Flow Rate (l/min) 3 l/min   O2 Saturation 86   O2 Flow Rate (l/min) 4 l/min   O2 Saturation 90   Walk/Assistance Device Cane   Rate of Dyspnea 2   Symptoms Shortness of breath   After Walk   SpO2 92   HR 95   O2 Device Nasal cannula   O2 Flow Rate (l/min) 2 l/min   Rate of Dyspnea 2   Symptoms Shortness of breath       
Pt arrived to floor via stretcher from ED and ambulated to bed. Telemetry activated and confirmed with [facility]. Patient oriented to room and use of call light. Call light and personal items within reach. Admission and assessment initiated. Education initiated and reviewed with patient/family. Denied further needs or questions at this time.    Electronically signed by Calli Gibbs RN on 1/17/25 at 22:00 PM EST   
  Orientation  Overall Orientation Status: Within Functional Limits  Cognition  Overall Cognitive Status: WFL    Objective    AROM RLE (degrees)  RLE AROM: WFL  AROM LLE (degrees)  LLE AROM : WFL  Strength RLE  Strength RLE: WFL  Strength LLE  Strength LLE: WFL           Bed mobility  Supine to Sit: Stand by assistance (HOB elevated)  Sit to Supine: Unable to assess  Bed Mobility Comments: pt up in chair at end of session  Transfers  Sit to Stand: Contact guard assistance  Stand to Sit: Contact guard assistance  Ambulation  Surface: Level tile  Device: Single point cane  Assistance: Contact guard assistance  Quality of Gait: no LOB; slow pace; pt felt winded at end of walk; spO2 decreased initially at 86% then down to 82% before increasing back to 90s on 1 L O2  Gait Deviations: Slow Talya;Decreased step height;Decreased step length  Distance: 15'  Comments: pt declined further amb at this time; set up in chair for comfort with LEs elevate and pillows/waffle cushion in place for support; all needs in reach and warm blankets applied     Balance  Sitting - Static: Good  Sitting - Dynamic: Good  Standing - Static: Fair  Standing - Dynamic: Fair          AM-PAC - Mobility    AM-PAC Basic Mobility - Inpatient   How much help is needed turning from your back to your side while in a flat bed without using bedrails?: None  How much help is needed moving from lying on your back to sitting on the side of a flat bed without using bedrails?: A Little  How much help is needed moving to and from a bed to a chair?: A Little  How much help is needed standing up from a chair using your arms?: A Little  How much help is needed walking in hospital room?: A Little  How much help is needed climbing 3-5 steps with a railing?: A Little  AM-[facility] Inpatient Mobility Raw Score : 19  AM-PAC Inpatient T-Scale Score : 45.44  Mobility Inpatient CMS 0-100% Score: 41.77  Mobility Inpatient CMS G-Code Modifier : CK       Goals  Short Term

## 2025-01-19 NOTE — ACP (ADVANCE CARE PLANNING)
Advance Care Planning   Healthcare Decision Maker:    Primary Decision Maker (Active): KaliaRitika - Child - [phone]    Today we documented Decision Maker(s) consistent with Legal Next of Kin hierarchy.       Electronically signed by Ritika Terrell RN Case Management on 1/19/2025 at 12:00 PM

## 2025-01-19 NOTE — DISCHARGE SUMMARY
V2.0  Discharge Summary    Name:  Emily Motta /Age/Sex: 1940 (84 y.o. female)   Admit Date: 2025  Discharge Date: 25    MRN & CSN:  [id number] & [id number] Encounter Date and Time 25 11:53 AM EST    Attending:  Israel Arreguin MD Discharging Provider: Israel Arreguin MD     Discharge Diagnosis:     # Acute hypoxemic respiratory failure  # Community-acquired pneumonia  # LISY  # Elevated troponin    Consultants:  IP CONSULT TO HOSPITALIST  IP CONSULT TO HOSPITALIST  IP CONSULT TO PHARMACY    Brief HPI:    Per admitting H and P...\" Emily Motta is a 84 y.o. female with a past medical history significant for CKD, CAD, hypertension, states that has been sick for the last few weeks, she finished a course of amoxicillin, she was supposed to have a dental treatment, unfortunately she came down with a pneumonia, she was started on Augmentin by her PCP, finished 5 days of it, comes to the ED because of worsening shortness of breath, workup in the ED revealed significant hypoxemia, CT scan revealed multi received Zyvox and cefepime, being admitted for further \"      Brief hospital course:     Please refer to the admitting H and P for details surrounding the initial presentation.   Patient admitted for multifocal pneumonia, was hypoxemic with shortness of breath, needing oxygen, over the last day or so, patient has improved significantly.  Still needing oxygen, home oxygen will be arranged.  Patient was started on empiric antibiotics, it is to be noted that the patient finished 5 days of Augmentin and prior to that finish 7 days of amoxicillin for oral issues.  She had worsening of her symptoms in spite of being on those antibiotics and hence patient was started on cefepime and vancomycin after initial presentation, her white count was normal, since then antibiotics have been de-escalated, MRSA PCR is negative, sputum culture has not grown anything thus far, blood culture no growth thus far, influenza is

## 2025-01-19 NOTE — DISCHARGE INSTRUCTIONS
Please follow-up with your PCP in 1 week, a follow-up chest x-ray is recommended in 4 to 6 weeks.  Please finish the antibiotic course, it should be every other day for another 3 doses, prescription called into [facility]

## 2025-01-20 ENCOUNTER — CARE COORDINATION (OUTPATIENT)
Dept: CASE MANAGEMENT | Age: 85
End: 2025-01-20

## 2025-01-20 LAB
BACTERIA SPEC RESP CULT: NORMAL
GRAM STN SPEC: NORMAL

## 2025-01-20 NOTE — CARE COORDINATION
Care Transitions Note    Initial Call - Call within 2 business days of discharge: Yes    Attempted to reach patient for transitions of care follow up. Unable to reach patient.    Outreach Attempts:   1ST CTC attempt to reach Pt regarding recent hospital discharge.  CTC left voice recording with call back number requesting a call back. Will attempt to reach patient again.    Patient: Emily Motta    Patient : 1940   MRN: [id number]     Reason for Admission: SOB, Pneumonia  Discharge Date: 25    RURS: Readmission Risk Score: 15.9    Last Discharge Facility       Date Complaint Diagnosis Description Type Department Provider    25 Shortness of Breath; Cough Acute respiratory failure with hypoxia ... ED to Hosp-Admission (Discharged) (ADMITTED) Israel Ayoub MD            Was this an external facility discharge? No    Follow Up Appointment:   Patient has hospital follow up appointment scheduled within 7 days of discharge.    Future Appointments         Provider Specialty Dept Phone    2025 8:40 AM Yamile Hendricks PT Physical Therapy [phone]    2025 1:20 PM Yamile Hendricks PT Physical Therapy [phone]    2025 9:45 AM Konstantin Motta DO Family Medicine [phone]    2025 8:40 AM Yamile Hendricks PT Physical Therapy [phone]    2025 8:40 AM Yamile Hendricks PT Physical Therapy [phone]    2025 12:30 PM Srinivasan Toney MD Orthopedic Surgery [phone]    3/12/2025 10:15 AM Konstantin Motta DO Family Medicine [phone]            Plan for follow-up on next business day.      Thank You,    Madison Kent RN  Care Transition Coordinator  Contact Number:[phone]

## 2025-01-21 ENCOUNTER — APPOINTMENT (OUTPATIENT)
Dept: PHYSICAL THERAPY | Age: 85
End: 2025-01-21
Payer: MEDICARE

## 2025-01-21 ENCOUNTER — CARE COORDINATION (OUTPATIENT)
Dept: CASE MANAGEMENT | Age: 85
End: 2025-01-21

## 2025-01-21 DIAGNOSIS — J18.9 COMMUNITY ACQUIRED PNEUMONIA, BILATERAL: Primary | ICD-10-CM

## 2025-01-21 LAB — BACTERIA BLD CULT: NORMAL

## 2025-01-21 PROCEDURE — 1111F DSCHRG MED/CURRENT MED MERGE: CPT | Performed by: FAMILY MEDICINE

## 2025-01-21 RX ORDER — GABAPENTIN 100 MG/1
100 CAPSULE ORAL 3 TIMES DAILY
Qty: 270 CAPSULE | Refills: 0 | Status: SHIPPED | OUTPATIENT
Start: 2025-01-21 | End: 2025-04-21

## 2025-01-21 RX ORDER — MIRABEGRON 50 MG/1
50 TABLET, FILM COATED, EXTENDED RELEASE ORAL DAILY
Qty: 90 TABLET | Refills: 0 | Status: SHIPPED | OUTPATIENT
Start: 2025-01-21

## 2025-01-21 NOTE — CARE COORDINATION
Care Transitions Note    Initial Call - Call within 2 business days of discharge: Yes    Attempted to reach patient for transitions of care follow up. Unable to reach patient.    Outreach Attempts:   HIPAA compliant voicemail left for patient.     Patient: Emily Motta    Patient : 1940   MRN: [id number]    Reason for Admission: [facility] x 2 days -> acute hypoxemic resp failure, CAP, LISY, elevated troponin -> home no services, new O2 2L at rest and 4L with activity ([facility])  Discharge Date: 25  RURS: Readmission Risk Score: 15.9    Last Discharge Facility       Date Complaint Diagnosis Description Type Department Provider    25 Shortness of Breath; Cough Acute respiratory failure with hypoxia ... ED to Hosp-Admission (Discharged) (ADMITTED) Israel Ayoub MD     Was this an external facility discharge? No    Follow Up Appointment:   Patient has hospital follow up appointment scheduled within 7 days of discharge.      Future Appointments         Provider Specialty Dept Phone    2025 9:45 AM Konstantin Motta DO Family Medicine [phone]    2025 8:40 AM Yamile Hendricks, PT Physical Therapy [phone]    2025 8:40 AM Yamile Hendricks, PT Physical Therapy [phone]    2025 9:00 AM Kale Boyd MD Cardiology [phone]    2025 12:30 PM Srinivasan Toney MD Orthopedic Surgery [phone]    3/12/2025 10:15 AM Konstantin Motta DO Family Medicine [phone]          No further follow-up call indicated     Annita Cutler RN

## 2025-01-21 NOTE — TELEPHONE ENCOUNTER
Last office visit 12/11/2024       Next office visit scheduled 1/27/2025    Requested Prescriptions     Pending Prescriptions Disp Refills    gabapentin (NEURONTIN) 100 MG capsule 270 capsule 0     Sig: Take 1 capsule by mouth 3 times daily for 90 days. Intended supply: 90 days    MYRBETRIQ 50 MG TB24 90 tablet 0     Sig: Take 50 mg by mouth daily

## 2025-01-21 NOTE — TELEPHONE ENCOUNTER
Pt is requesting refills  for her Gabapentin 100 mg and Myrbetriq 50 mg be resent to [facility] [phone].     The rx's were requested last week and went to the wrong pharmacy.  She no longer has [employer] or use [facility].     If any questions give pt a call [phone]

## 2025-01-23 ENCOUNTER — APPOINTMENT (OUTPATIENT)
Dept: PHYSICAL THERAPY | Age: 85
End: 2025-01-23
Payer: MEDICARE

## 2025-01-23 NOTE — PROGRESS NOTES
Subjective:      Patient ID: Emily Motta is a 85 y.o. female.    [facility]    Hospital Follow Up / Community Acquired Pneumonia / Acute Hypoxemic Respiratory Failure / Acute Kidney Injury:  Patient called the office on 1-13-25 with a 3 week history of a head cold and chills and poor energy.  I treated her with Augmentin and Mucinex-D.  Her condition worsened and she presented to ER on 1-17-25 with dyspnea on minimal exertion and a cough.  She had Rhonchi present on exam.  EKG was unremarkable.   CXR was normal.  CT chest showed no PE, but did show ground-glass airspace infiltrates throughout both lungs compatible with multifocal pneumonia.  Her troponin levels were also elevated.  She was found to be hypoxic.  Flu, COVID and Legionella tests were negative.  Echo showed no CHF.  Her GFR was more reduced than usual.  She was admitted and treated with IV antibiotics, fluids and oxygen.  Her elevated troponin was felt to be due to demand ischemia.  Her condition improved and she was discharged on 1-19-25 with Levaquin 750 mg daily for 3 doses and oxygen 2 liters at rest and 4 liters with exertion.  She will need a follow up CXR in 1 month.  She is improving, but still has a cough and some chest congestion.      Review of Systems   Constitutional:  Negative for chills and fever.   HENT:  Positive for congestion.    Respiratory:  Positive for cough and shortness of breath. Negative for wheezing.      /62   Pulse 79   Ht 1.651 m (5' 5\")   Wt 99.8 kg (220 lb)   SpO2 100%   BMI 36.61 kg/m²    Objective:   Physical Exam  Constitutional:       General: She is not in acute distress.     Appearance: She is well-developed.   HENT:      Head: Normocephalic.      Right Ear: External ear normal.      Left Ear: External ear normal.      Mouth/Throat:      Pharynx: No oropharyngeal exudate.   Neck:      Thyroid: No thyromegaly.      Vascular: No JVD.   Cardiovascular:      Rate and Rhythm: Normal rate and regular rhythm.

## 2025-01-27 ENCOUNTER — OFFICE VISIT (OUTPATIENT)
Dept: FAMILY MEDICINE CLINIC | Age: 85
End: 2025-01-27

## 2025-01-27 VITALS
SYSTOLIC BLOOD PRESSURE: 120 MMHG | DIASTOLIC BLOOD PRESSURE: 62 MMHG | BODY MASS INDEX: 36.65 KG/M2 | HEART RATE: 79 BPM | HEIGHT: 65 IN | OXYGEN SATURATION: 100 % | WEIGHT: 220 LBS

## 2025-01-27 DIAGNOSIS — J96.01 ACUTE HYPOXEMIC RESPIRATORY FAILURE: ICD-10-CM

## 2025-01-27 DIAGNOSIS — N17.9 ACUTE KIDNEY INJURY (HCC): ICD-10-CM

## 2025-01-27 DIAGNOSIS — J18.9 COMMUNITY ACQUIRED PNEUMONIA, UNSPECIFIED LATERALITY: Primary | ICD-10-CM

## 2025-01-27 DIAGNOSIS — Z09 HOSPITAL DISCHARGE FOLLOW-UP: ICD-10-CM

## 2025-01-27 ASSESSMENT — PATIENT HEALTH QUESTIONNAIRE - PHQ9
SUM OF ALL RESPONSES TO PHQ QUESTIONS 1-9: 0
SUM OF ALL RESPONSES TO PHQ QUESTIONS 1-9: 0
SUM OF ALL RESPONSES TO PHQ9 QUESTIONS 1 & 2: 0
2. FEELING DOWN, DEPRESSED OR HOPELESS: NOT AT ALL
SUM OF ALL RESPONSES TO PHQ QUESTIONS 1-9: 0
SUM OF ALL RESPONSES TO PHQ QUESTIONS 1-9: 0
1. LITTLE INTEREST OR PLEASURE IN DOING THINGS: NOT AT ALL

## 2025-01-28 ENCOUNTER — APPOINTMENT (OUTPATIENT)
Dept: PHYSICAL THERAPY | Age: 85
End: 2025-01-28
Payer: MEDICARE

## 2025-01-30 ENCOUNTER — APPOINTMENT (OUTPATIENT)
Dept: PHYSICAL THERAPY | Age: 85
End: 2025-01-30
Payer: MEDICARE

## 2025-01-31 ENCOUNTER — CARE COORDINATION (OUTPATIENT)
Dept: CASE MANAGEMENT | Age: 85
End: 2025-01-31

## 2025-01-31 NOTE — CARE COORDINATION
Care Transitions Note    Follow Up Call     Attempted to reach patient for transitions of care follow up.  Unable to reach patient.      Outreach Attempts:   1ST CTC attempt to reach Pt regarding recent hospital discharge.  CTC left voice recording with call back number requesting a call back. Will attempt to reach patient again.      Follow Up Appointment:   Future Appointments         Provider Specialty Dept Phone    2/4/2025 9:00 AM Kale Boyd MD Cardiology [phone]    2/21/2025 12:30 PM Srinivasan Toney MD Orthopedic Surgery [phone]    3/20/2025 9:30 AM Konstantin Motta,  Family Medicine [phone]            Plan for follow-up call in 6-10 days based on severity of symptoms and risk factors.     Thank You,    Madison Kent RN  Care Transition Coordinator  Contact Number:[phone]

## 2025-01-31 NOTE — PROGRESS NOTES
[facility]      Cardiology Consult    Emily Motta  1940 January 31, 2025    Primary Physician: Dr. CONNOR Motta   Reason for Referral: Possible atrial fibrillation    CC: \"I had some fluttering\"     HPI:  The patient is 85 y.o. female with a past medical history significant for a SVT and hypertension who presents for chronic management of presumed atrial fibrillation. She previously followed with Dr. Green for the arrhythmia. Unfortunately I am unable to find the actual ECG of the event and there is some inconsistencies in documentation. She says the arrhythmia was diagnosed >15 years ago at [facility] when post-op from back surgery. She denied any documented recurrence of the arrhythmia. She endorsed rare brief palpitations of 3-4 times over the past 5-10 years but the events were not sustained and she did not seek medical attention. The oldest notes from her prior cardiologist lists PSVT as the diagnosis. Without another known event, years ago the diagnosis was changed to PAF. She was never on anticoagulation. She was previously on propafenone. She underwent an epidural steroid injection 12/8/21. She was on telemetry and was instructed to bring in a copy of the monitor strips which showed normal sinus rhythm with PACs. She underwent went a lumbar fusion 4/2022 and atrial tachycardia was documented on telemetry but no documented atrial fibrillation.         She presented to the ED 1/17/25 for worsening shortness of breath after recently being diagnosed with pneumonia. Her troponin was elevated likely demand ischemia in setting of pneumonia/respiratory failure. She denied chest pain.       Today she presents for follow up and states that overall she is feeling well. She reports a few episodes of hear \"fluttering\" since she was last seen in office. She reports the last episode was about 3 weeks ago and does not recall specifically when a previous episode occurred but was months ago.

## 2025-02-03 NOTE — PROGRESS NOTES
[facility]      Cardiology Progress Note    Emily Motta  1940 February 4, 2025    Primary Physician: Dr. CONNOR Motta   Reason for Referral: Possible atrial fibrillation    CC: \"I am feeling better since the hospitalization.\"     HPI:  The patient is 85 y.o. female with a past medical history significant for a SVT and hypertension who presents for chronic management of presumed atrial fibrillation. She previously followed with Dr. Green for the arrhythmia. Unfortunately I am unable to find the actual ECG of the event and there is some inconsistencies in documentation. She says the arrhythmia was diagnosed >15 years ago at [facility] when post-op from back surgery. She denied any documented recurrence of the arrhythmia. She endorsed rare brief palpitations of 3-4 times over the past 5-10 years but the events were not sustained and she did not seek medical attention. The oldest notes from her prior cardiologist lists PSVT as the diagnosis. Without another known event, years ago the diagnosis was changed to PAF. She was never on anticoagulation. She was previously on propafenone. She underwent an epidural steroid injection 12/8/21. She was on telemetry and was instructed to bring in a copy of the monitor strips which showed normal sinus rhythm with PACs. She underwent went a lumbar fusion 4/2022 and atrial tachycardia was documented on telemetry but no documented atrial fibrillation.  She was admitted to the hospital 1/2025 for pneumonia.  She presented to the ED 1/17/25 for worsening shortness of breath after recently being diagnosed with pneumonia and she was discharged on supplemental oxygen. Today, she is accompanied to this visit with her family. The patient reports that she is feeling better since hospital discharge and is now off of her oxygen therapy. She cotinines to monitor her pulse oximeter, which has remained stable. She feels her breathing is stable. The patient denies

## 2025-02-04 ENCOUNTER — OFFICE VISIT (OUTPATIENT)
Dept: CARDIOLOGY CLINIC | Age: 85
End: 2025-02-04
Payer: MEDICARE

## 2025-02-04 VITALS
DIASTOLIC BLOOD PRESSURE: 64 MMHG | BODY MASS INDEX: 36.49 KG/M2 | HEIGHT: 65 IN | OXYGEN SATURATION: 95 % | SYSTOLIC BLOOD PRESSURE: 102 MMHG | WEIGHT: 219 LBS | HEART RATE: 85 BPM

## 2025-02-04 DIAGNOSIS — R00.2 PALPITATIONS: ICD-10-CM

## 2025-02-04 DIAGNOSIS — R79.89 ELEVATED TROPONIN: ICD-10-CM

## 2025-02-04 DIAGNOSIS — I10 ESSENTIAL HYPERTENSION: ICD-10-CM

## 2025-02-04 DIAGNOSIS — I47.10 PAROXYSMAL SUPRAVENTRICULAR TACHYCARDIA (HCC): Primary | ICD-10-CM

## 2025-02-04 PROCEDURE — 1123F ACP DISCUSS/DSCN MKR DOCD: CPT | Performed by: INTERNAL MEDICINE

## 2025-02-04 PROCEDURE — 3078F DIAST BP <80 MM HG: CPT | Performed by: INTERNAL MEDICINE

## 2025-02-04 PROCEDURE — 1159F MED LIST DOCD IN RCRD: CPT | Performed by: INTERNAL MEDICINE

## 2025-02-04 PROCEDURE — 3074F SYST BP LT 130 MM HG: CPT | Performed by: INTERNAL MEDICINE

## 2025-02-04 PROCEDURE — 99214 OFFICE O/P EST MOD 30 MIN: CPT | Performed by: INTERNAL MEDICINE

## 2025-02-06 ENCOUNTER — CARE COORDINATION (OUTPATIENT)
Dept: CASE MANAGEMENT | Age: 85
End: 2025-02-06

## 2025-02-06 NOTE — CARE COORDINATION
Care Transitions Note    Follow Up Call     Attempted to reach patient for transitions of care follow up.  Unable to reach patient.      Outreach Attempts:   2ND CTC attempt to reach Pt regarding recent hospital discharge.  CTC left voice recording with call back number requesting a call back.  CTN will close out CTN episode at this time.    Follow Up Appointment:   Future Appointments         Provider Specialty Dept Phone    2/21/2025 12:30 PM Srinivasan Toney MD Orthopedic Surgery [phone]    3/20/2025 9:30 AM Konstantin Motta,  Family Medicine [phone]            No further follow-up call indicated based on severity of symptoms and risk factors.     Thank You,    Madison Kent RN  Care Transition Coordinator  Contact Number:[phone]

## 2025-02-14 DIAGNOSIS — K21.9 GERD WITHOUT ESOPHAGITIS: ICD-10-CM

## 2025-02-14 RX ORDER — METOPROLOL TARTRATE 50 MG
50 TABLET ORAL 2 TIMES DAILY
Qty: 180 TABLET | Refills: 0 | Status: SHIPPED | OUTPATIENT
Start: 2025-02-14

## 2025-02-14 RX ORDER — ROSUVASTATIN CALCIUM 5 MG/1
5 TABLET, COATED ORAL NIGHTLY
Qty: 90 TABLET | Refills: 0 | Status: SHIPPED | OUTPATIENT
Start: 2025-02-14

## 2025-02-21 ENCOUNTER — HOSPITAL ENCOUNTER (OUTPATIENT)
Age: 85
Discharge: HOME | End: 2025-02-21
Payer: MEDICARE

## 2025-02-21 ENCOUNTER — HOSPITAL ENCOUNTER (OUTPATIENT)
Dept: GENERAL RADIOLOGY | Age: 85
Discharge: HOME | End: 2025-02-21
Attending: FAMILY MEDICINE
Payer: MEDICARE

## 2025-02-21 DIAGNOSIS — J18.9 COMMUNITY ACQUIRED PNEUMONIA, UNSPECIFIED LATERALITY: ICD-10-CM

## 2025-02-21 PROCEDURE — 71046 X-RAY EXAM CHEST 2 VIEWS: CPT

## 2025-02-23 NOTE — PROGRESS NOTES
Subjective:      Patient ID: Emily Motta is a 85 y.o. female.    HPI    Kidney Pain:  Patient has a history of chronic low back pain.  Patient had lumbar fusion with cage insertion at L1-2 and L2-3 on 4-21-22.  She was taken off Mobic due to CKD.  She takes Neurontin 100 mg TID and feels that it works well to control her pain.  She presents today with a 1 week history of intermittent mild bilateral flank pain.  It can last for hours at a time.  It does not occur every day.  She had similar symptoms about 2 months ago.  She denies any UTI symptoms or odor.      Review of Systems   Constitutional:  Negative for chills and fever.   Genitourinary:  Positive for flank pain. Negative for dysuria, frequency, hematuria and urgency.   Musculoskeletal:  Positive for back pain.     /80   Ht 1.651 m (5' 5\")   Wt 101.2 kg (223 lb)   BMI 37.11 kg/m²    Objective:   Physical Exam  Constitutional:       General: She is not in acute distress.     Appearance: She is well-developed.   HENT:      Head: Normocephalic.      Right Ear: External ear normal.      Left Ear: External ear normal.      Mouth/Throat:      Pharynx: No oropharyngeal exudate.   Neck:      Thyroid: No thyromegaly.      Vascular: No JVD.   Cardiovascular:      Rate and Rhythm: Normal rate and regular rhythm.      Heart sounds: Normal heart sounds. No murmur heard.  Pulmonary:      Effort: Pulmonary effort is normal.      Breath sounds: Normal breath sounds. No wheezing or rales.   Musculoskeletal:      Comments: No CVA tenderness bilaterally.     Lymphadenopathy:      Cervical: No cervical adenopathy.   Neurological:      Mental Status: She is alert and oriented to person, place, and time.         Assessment:      Bilateral Flank Pain       Plan:   Assessment & Plan   UA:  Unable to urinate.  Will bring a sample back when able.   CT of abdomen and pelvis with no contrast.    Recommended that patient have an AWV.   RTO 1 month for Hypertension /

## 2025-02-24 ENCOUNTER — OFFICE VISIT (OUTPATIENT)
Dept: FAMILY MEDICINE CLINIC | Age: 85
End: 2025-02-24
Payer: MEDICARE

## 2025-02-24 VITALS
DIASTOLIC BLOOD PRESSURE: 80 MMHG | HEIGHT: 65 IN | WEIGHT: 223 LBS | BODY MASS INDEX: 37.15 KG/M2 | SYSTOLIC BLOOD PRESSURE: 128 MMHG

## 2025-02-24 DIAGNOSIS — R10.9 FLANK PAIN: ICD-10-CM

## 2025-02-24 DIAGNOSIS — R10.9 BILATERAL FLANK PAIN: Primary | ICD-10-CM

## 2025-02-24 PROCEDURE — 3079F DIAST BP 80-89 MM HG: CPT | Performed by: FAMILY MEDICINE

## 2025-02-24 PROCEDURE — 3074F SYST BP LT 130 MM HG: CPT | Performed by: FAMILY MEDICINE

## 2025-02-24 PROCEDURE — 81002 URINALYSIS NONAUTO W/O SCOPE: CPT | Performed by: FAMILY MEDICINE

## 2025-02-24 PROCEDURE — 99213 OFFICE O/P EST LOW 20 MIN: CPT | Performed by: FAMILY MEDICINE

## 2025-02-24 PROCEDURE — 1123F ACP DISCUSS/DSCN MKR DOCD: CPT | Performed by: FAMILY MEDICINE

## 2025-02-24 PROCEDURE — 1159F MED LIST DOCD IN RCRD: CPT | Performed by: FAMILY MEDICINE

## 2025-02-24 PROCEDURE — 1160F RVW MEDS BY RX/DR IN RCRD: CPT | Performed by: FAMILY MEDICINE

## 2025-03-10 DIAGNOSIS — F41.9 ANXIETY: ICD-10-CM

## 2025-03-10 RX ORDER — LORAZEPAM 1 MG/1
TABLET ORAL
Qty: 90 TABLET | Refills: 0 | Status: SHIPPED | OUTPATIENT
Start: 2025-03-10 | End: 2025-04-09

## 2025-03-10 NOTE — TELEPHONE ENCOUNTER
Pt is requesting a refill on her Lorazepam (Ativan) 1 mg sent to [facility].   [phone].      Last ov 3/5/24  No Future appt         If any questions give pt [phone]

## 2025-03-12 ENCOUNTER — RESULTS FOLLOW-UP (OUTPATIENT)
Dept: FAMILY MEDICINE CLINIC | Age: 85
End: 2025-03-12

## 2025-03-12 ENCOUNTER — RESULTS FOLLOW-UP (OUTPATIENT)
Dept: CT IMAGING | Age: 85
End: 2025-03-12

## 2025-03-12 ENCOUNTER — LAB (OUTPATIENT)
Dept: FAMILY MEDICINE CLINIC | Age: 85
End: 2025-03-12
Payer: MEDICARE

## 2025-03-12 ENCOUNTER — HOSPITAL ENCOUNTER (OUTPATIENT)
Dept: CT IMAGING | Age: 85
Discharge: HOME | End: 2025-03-12
Attending: FAMILY MEDICINE
Payer: MEDICARE

## 2025-03-12 DIAGNOSIS — R39.9 UTI SYMPTOMS: Primary | ICD-10-CM

## 2025-03-12 DIAGNOSIS — R10.9 BILATERAL FLANK PAIN: ICD-10-CM

## 2025-03-12 LAB
BILIRUBIN, POC: NEGATIVE
BLOOD URINE, POC: NEGATIVE
CLARITY, POC: NORMAL
COLOR, POC: NORMAL
GLUCOSE URINE, POC: NEGATIVE MG/DL
KETONES, POC: NEGATIVE MG/DL
LEUKOCYTE EST, POC: NORMAL
NITRITE, POC: NEGATIVE
PH, POC: 6
PROTEIN, POC: NEGATIVE MG/DL
SPECIFIC GRAVITY, POC: <=1.005
UROBILINOGEN, POC: 0.2 MG/DL

## 2025-03-12 PROCEDURE — 81002 URINALYSIS NONAUTO W/O SCOPE: CPT | Performed by: FAMILY MEDICINE

## 2025-03-12 PROCEDURE — 74176 CT ABD & PELVIS W/O CONTRAST: CPT

## 2025-03-12 NOTE — PROGRESS NOTES
Patient here to leave urine sample. Complaints of frequency and urgency.     UA ran and CS sent out.

## 2025-03-15 LAB
BACTERIA UR CULT: ABNORMAL
BACTERIA UR CULT: ABNORMAL
ORGANISM: ABNORMAL
ORGANISM: ABNORMAL

## 2025-03-17 ENCOUNTER — TELEPHONE (OUTPATIENT)
Dept: FAMILY MEDICINE CLINIC | Age: 85
End: 2025-03-17

## 2025-03-18 ASSESSMENT — ENCOUNTER SYMPTOMS: SHORTNESS OF BREATH: 0

## 2025-03-19 NOTE — PROGRESS NOTES
Subjective:      Patient ID: Emily Motta is a 85 y.o. female.    Hypertension  This is a chronic problem. The current episode started more than 1 year ago. The problem is unchanged. The problem is controlled. Pertinent negatives include no chest pain, palpitations, peripheral edema or shortness of breath. Risk factors for coronary artery disease include family history, obesity, post-menopausal state and sedentary lifestyle. Past treatments include beta blockers and diuretics. The current treatment provides significant improvement. There are no compliance problems.      Hyperlipidemia:  Patient is tolerating and compliant with Crestor 5 mg daily.  She is fasting today for a lipid recheck.      Chronic Kidney Disease Stage 3b:  Patient takes Spironolactone 50 mg once daily.  She is due for a renal panel today.       GERD:  Patient is tolerating and compliant with Omeprazole 20 mg daily.  She feels that the medication completely controls her symptoms.  She previously failed on Pepcid.      Chronic Low Back Pain:  Patient had lumbar fusion with cage insertion at L1-2 and L2-3 on 4-21-22.  She no longer complains of back pain.  She was taken off Mobic due to CKD.  She takes Neurontin 100 mg TID and feels that it works well to control her pain.       Anxiety:  Patient takes Ativan 1 mg every 8 hrs as needed for anxiety.  She generally takes it once daily and feels that it works well to control her anxiety symptoms.       PSVT:  Patient sees Dr. Boyd and was taken off the Rythmol on 4-26-18 and had an event monitor ordered by him.   She had no events during the 30 day period.  She continues to take Lopressor.      Osteoporosis:  Patient was previously treated with Fosamax.  Her DEXA scan on 7-18-24 showed normal bone density.  The Fosamax was discontinued.      Overactive Bladder:  Patient was put on Myrbetric 50 mg daily per Dr. Chavez.  She now states that it is helping with her bladder symptoms.

## 2025-03-20 ENCOUNTER — OFFICE VISIT (OUTPATIENT)
Dept: FAMILY MEDICINE CLINIC | Age: 85
End: 2025-03-20
Payer: MEDICARE

## 2025-03-20 VITALS
BODY MASS INDEX: 36.65 KG/M2 | WEIGHT: 220 LBS | SYSTOLIC BLOOD PRESSURE: 136 MMHG | HEIGHT: 65 IN | DIASTOLIC BLOOD PRESSURE: 82 MMHG

## 2025-03-20 DIAGNOSIS — I73.9 PERIPHERAL ARTERIAL DISEASE: ICD-10-CM

## 2025-03-20 DIAGNOSIS — E66.09 NON MORBID OBESITY DUE TO EXCESS CALORIES: ICD-10-CM

## 2025-03-20 DIAGNOSIS — E78.00 HYPERCHOLESTEROLEMIA: ICD-10-CM

## 2025-03-20 DIAGNOSIS — K21.9 GERD WITHOUT ESOPHAGITIS: ICD-10-CM

## 2025-03-20 DIAGNOSIS — M54.50 CHRONIC MIDLINE LOW BACK PAIN WITHOUT SCIATICA: ICD-10-CM

## 2025-03-20 DIAGNOSIS — N18.32 CHRONIC KIDNEY DISEASE, STAGE 3B (HCC): ICD-10-CM

## 2025-03-20 DIAGNOSIS — N32.81 OVERACTIVE BLADDER: ICD-10-CM

## 2025-03-20 DIAGNOSIS — I10 ESSENTIAL HYPERTENSION: Primary | ICD-10-CM

## 2025-03-20 DIAGNOSIS — R30.0 DYSURIA: ICD-10-CM

## 2025-03-20 DIAGNOSIS — F41.9 ANXIETY: ICD-10-CM

## 2025-03-20 DIAGNOSIS — I47.10 PAROXYSMAL SUPRAVENTRICULAR TACHYCARDIA: ICD-10-CM

## 2025-03-20 DIAGNOSIS — R10.9 BILATERAL FLANK PAIN: ICD-10-CM

## 2025-03-20 DIAGNOSIS — M81.0 OSTEOPOROSIS OF MULTIPLE SITES: ICD-10-CM

## 2025-03-20 DIAGNOSIS — G89.29 CHRONIC MIDLINE LOW BACK PAIN WITHOUT SCIATICA: ICD-10-CM

## 2025-03-20 LAB
ALT SERPL-CCNC: 8 U/L (ref 10–40)
ANION GAP SERPL CALCULATED.3IONS-SCNC: 15 MMOL/L (ref 3–16)
AST SERPL-CCNC: 19 U/L (ref 15–37)
BUN SERPL-MCNC: 20 MG/DL (ref 7–20)
CALCIUM SERPL-MCNC: 10.3 MG/DL (ref 8.3–10.6)
CHLORIDE SERPL-SCNC: 103 MMOL/L (ref 99–110)
CHOLEST SERPL-MCNC: 140 MG/DL (ref 0–199)
CO2 SERPL-SCNC: 21 MMOL/L (ref 21–32)
CREAT SERPL-MCNC: 1.5 MG/DL (ref 0.6–1.2)
GFR SERPLBLD CREATININE-BSD FMLA CKD-EPI: 34 ML/MIN/{1.73_M2}
GLUCOSE SERPL-MCNC: 92 MG/DL (ref 70–99)
HDLC SERPL-MCNC: 62 MG/DL (ref 40–60)
LDLC SERPL CALC-MCNC: 62 MG/DL
POTASSIUM SERPL-SCNC: 4.7 MMOL/L (ref 3.5–5.1)
SODIUM SERPL-SCNC: 139 MMOL/L (ref 136–145)
TRIGL SERPL-MCNC: 80 MG/DL (ref 0–150)
VLDLC SERPL CALC-MCNC: 16 MG/DL

## 2025-03-20 PROCEDURE — 1160F RVW MEDS BY RX/DR IN RCRD: CPT | Performed by: FAMILY MEDICINE

## 2025-03-20 PROCEDURE — 1159F MED LIST DOCD IN RCRD: CPT | Performed by: FAMILY MEDICINE

## 2025-03-20 PROCEDURE — 3075F SYST BP GE 130 - 139MM HG: CPT | Performed by: FAMILY MEDICINE

## 2025-03-20 PROCEDURE — 1123F ACP DISCUSS/DSCN MKR DOCD: CPT | Performed by: FAMILY MEDICINE

## 2025-03-20 PROCEDURE — 3079F DIAST BP 80-89 MM HG: CPT | Performed by: FAMILY MEDICINE

## 2025-03-21 ENCOUNTER — RESULTS FOLLOW-UP (OUTPATIENT)
Dept: FAMILY MEDICINE CLINIC | Age: 85
End: 2025-03-21

## 2025-03-21 LAB
BILIRUBIN, POC: NORMAL
BLOOD URINE, POC: NORMAL
CLARITY, POC: CLEAR
COLOR, POC: YELLOW
GLUCOSE URINE, POC: NORMAL MG/DL
KETONES, POC: NORMAL MG/DL
LEUKOCYTE EST, POC: NORMAL
NITRITE, POC: NORMAL
PH, POC: 6
PROTEIN, POC: NORMAL MG/DL
SPECIFIC GRAVITY, POC: <=1.005
UROBILINOGEN, POC: 0.2 MG/DL

## 2025-03-22 LAB — BACTERIA UR CULT: NORMAL

## 2025-03-26 RX ORDER — MIRABEGRON 50 MG/1
50 TABLET, FILM COATED, EXTENDED RELEASE ORAL DAILY
Qty: 90 TABLET | Refills: 1 | Status: SHIPPED | OUTPATIENT
Start: 2025-03-26

## 2025-03-26 NOTE — TELEPHONE ENCOUNTER
Last office visit 3/20/2025       Next office visit scheduled 4/2/2025    Requested Prescriptions     Pending Prescriptions Disp Refills    MYRBETRIQ 50 MG TB24 [Pharmacy Med Name: MYRBETRIQ TAB]  0

## 2025-03-31 SDOH — HEALTH STABILITY: PHYSICAL HEALTH: ON AVERAGE, HOW MANY MINUTES DO YOU ENGAGE IN EXERCISE AT THIS LEVEL?: 0 MIN

## 2025-03-31 SDOH — HEALTH STABILITY: PHYSICAL HEALTH: ON AVERAGE, HOW MANY DAYS PER WEEK DO YOU ENGAGE IN MODERATE TO STRENUOUS EXERCISE (LIKE A BRISK WALK)?: 0 DAYS

## 2025-03-31 ASSESSMENT — PATIENT HEALTH QUESTIONNAIRE - PHQ9
SUM OF ALL RESPONSES TO PHQ QUESTIONS 1-9: 0
1. LITTLE INTEREST OR PLEASURE IN DOING THINGS: NOT AT ALL
2. FEELING DOWN, DEPRESSED OR HOPELESS: NOT AT ALL
SUM OF ALL RESPONSES TO PHQ QUESTIONS 1-9: 0

## 2025-03-31 ASSESSMENT — LIFESTYLE VARIABLES
HOW MANY STANDARD DRINKS CONTAINING ALCOHOL DO YOU HAVE ON A TYPICAL DAY: PATIENT DOES NOT DRINK
HOW OFTEN DO YOU HAVE A DRINK CONTAINING ALCOHOL: NEVER

## 2025-04-05 SDOH — HEALTH STABILITY: PHYSICAL HEALTH: ON AVERAGE, HOW MANY MINUTES DO YOU ENGAGE IN EXERCISE AT THIS LEVEL?: 0 MIN

## 2025-04-05 SDOH — HEALTH STABILITY: PHYSICAL HEALTH: ON AVERAGE, HOW MANY DAYS PER WEEK DO YOU ENGAGE IN MODERATE TO STRENUOUS EXERCISE (LIKE A BRISK WALK)?: 0 DAYS

## 2025-04-05 ASSESSMENT — PATIENT HEALTH QUESTIONNAIRE - PHQ9
SUM OF ALL RESPONSES TO PHQ QUESTIONS 1-9: 0
SUM OF ALL RESPONSES TO PHQ QUESTIONS 1-9: 0
2. FEELING DOWN, DEPRESSED OR HOPELESS: NOT AT ALL
SUM OF ALL RESPONSES TO PHQ QUESTIONS 1-9: 0
SUM OF ALL RESPONSES TO PHQ QUESTIONS 1-9: 0
1. LITTLE INTEREST OR PLEASURE IN DOING THINGS: NOT AT ALL

## 2025-04-05 ASSESSMENT — LIFESTYLE VARIABLES
HOW MANY STANDARD DRINKS CONTAINING ALCOHOL DO YOU HAVE ON A TYPICAL DAY: PATIENT DOES NOT DRINK
HOW OFTEN DO YOU HAVE A DRINK CONTAINING ALCOHOL: NEVER
HOW OFTEN DO YOU HAVE A DRINK CONTAINING ALCOHOL: 1
HOW OFTEN DO YOU HAVE SIX OR MORE DRINKS ON ONE OCCASION: 1
HOW MANY STANDARD DRINKS CONTAINING ALCOHOL DO YOU HAVE ON A TYPICAL DAY: 0

## 2025-04-07 ENCOUNTER — TELEMEDICINE (OUTPATIENT)
Dept: FAMILY MEDICINE CLINIC | Age: 85
End: 2025-04-07
Payer: MEDICARE

## 2025-04-07 DIAGNOSIS — Z00.00 MEDICARE ANNUAL WELLNESS VISIT, SUBSEQUENT: Primary | ICD-10-CM

## 2025-04-07 PROCEDURE — G0439 PPPS, SUBSEQ VISIT: HCPCS | Performed by: FAMILY MEDICINE

## 2025-04-07 PROCEDURE — 1160F RVW MEDS BY RX/DR IN RCRD: CPT | Performed by: FAMILY MEDICINE

## 2025-04-07 PROCEDURE — 1123F ACP DISCUSS/DSCN MKR DOCD: CPT | Performed by: FAMILY MEDICINE

## 2025-04-07 PROCEDURE — 1159F MED LIST DOCD IN RCRD: CPT | Performed by: FAMILY MEDICINE

## 2025-04-07 ASSESSMENT — PATIENT HEALTH QUESTIONNAIRE - PHQ9
SUM OF ALL RESPONSES TO PHQ QUESTIONS 1-9: 0
2. FEELING DOWN, DEPRESSED OR HOPELESS: NOT AT ALL
SUM OF ALL RESPONSES TO PHQ QUESTIONS 1-9: 0
1. LITTLE INTEREST OR PLEASURE IN DOING THINGS: NOT AT ALL

## 2025-04-07 ASSESSMENT — LIFESTYLE VARIABLES: HOW OFTEN DO YOU HAVE A DRINK CONTAINING ALCOHOL: NEVER

## 2025-04-07 NOTE — PATIENT INSTRUCTIONS
can you learn more?  Go to [email or web address] and enter U357 to learn more about \"Starting a Weight-Loss Plan: Care Instructions.\"  Current as of: April 30, 2024  Content Version: 14.4  © 0753-2586 [employer].   Care instructions adapted under license by [employer]. If you have questions about a medical condition or this instruction, always ask your healthcare professional. [employer] disclaims any warranty or liability for your use of this information.         Advance Directives: Care Instructions  Overview  An advance directive is a legal way to state your wishes at the end of your life. It tells your family and your doctor what to do if you can't say what you want.  There are two main types of advance directives. You can change them any time your wishes change.  Living will.  This form tells your family and your doctor your wishes about life support and other treatment. The form is also called a declaration.  Medical power of .  This form lets you name a person to make treatment decisions for you when you can't speak for yourself. This person is called a health care agent (health care proxy, health care surrogate). The form is also called a durable power of  for health care.  If you do not have an advance directive, decisions about your medical care may be made by a family member, or by a doctor or a  who doesn't know you.  It may help to think of an advance directive as a gift to the people who care for you. If you have one, they won't have to make tough decisions by themselves.  For more information, including forms for your state, see the [employer] website (www.[employer].org/planning/advance-directives/).  Follow-up care is a key part of your treatment and safety. Be sure to make and go to all appointments, and call your doctor if you are having problems. It's also a good idea to know your test results and keep a list of the medicines you

## 2025-04-07 NOTE — PROGRESS NOTES
Documentation:  I communicated with the patient and/or health care decision maker about (see below).   Details of this discussion including any medical advice provided: (see below)    Total Time: minutes: 11-20 minutes    Emily Motta was evaluated through a synchronous (real-time) audio encounter. Patient identification was verified at the start of the visit. She (or guardian if applicable) is aware that this is a billable service, which includes applicable co-pays. This visit was conducted with the patient's (and/or legal guardian's) verbal consent. She has not had a related appointment within my department in the past 7 days or scheduled within the next 24 hours.   The patient was located at Home: [address] [place] [id number].  The provider was located at Facility (Appt Dept): [address] [place] [address].  Confirm you are appropriately licensed, registered, or certified to deliver care in the [facility] where the patient is located as indicated above. If you are not or unsure, please re-schedule the visit: Yes, I confirm.     Note: not billable if this call serves to triage the patient into an appointment for the relevant concern    Emily Motta is a 85 y.o. female evaluated via telephone on 2025 for No chief complaint on file.  .        ENRICO VORA DO         Medicare Annual Wellness Visit  Name: Emily Motta Today’s Date: 2025   MRN: [id number] Sex: Female   Age: 85 y.o. Ethnicity: Non- / Non    : 1940 Race: Black /       Emily Motta is here for No chief complaint on file.    Screenings for behavioral, psychosocial and functional/safety risks, and cognitive dysfunction are all negative except as indicated below. These results, as well as otherpatient data from the Health Risk Assessment form, are documented in Flowsheets linked to this Encounter.    Allergies   Allergen Reactions    Latex Itching and Rash

## 2025-04-11 RX ORDER — GABAPENTIN 100 MG/1
100 CAPSULE ORAL 3 TIMES DAILY
Qty: 270 CAPSULE | Refills: 0 | Status: SHIPPED | OUTPATIENT
Start: 2025-04-11 | End: 2025-07-10

## 2025-04-11 RX ORDER — SPIRONOLACTONE 50 MG/1
50 TABLET, FILM COATED ORAL DAILY
Qty: 90 TABLET | Refills: 1 | Status: SHIPPED | OUTPATIENT
Start: 2025-04-11

## 2025-04-11 NOTE — TELEPHONE ENCOUNTER
Last office visit 4/7/2025       Next office visit scheduled 6/24/2025    Requested Prescriptions     Pending Prescriptions Disp Refills    spironolactone (ALDACTONE) 50 MG tablet 90 tablet 1     Sig: Take 1 tablet by mouth daily    gabapentin (NEURONTIN) 100 MG capsule 270 capsule 0     Sig: Take 1 capsule by mouth 3 times daily for 90 days. Intended supply: 90 days

## 2025-05-19 DIAGNOSIS — K21.9 GERD WITHOUT ESOPHAGITIS: ICD-10-CM

## 2025-05-19 RX ORDER — ROSUVASTATIN CALCIUM 5 MG/1
5 TABLET, COATED ORAL NIGHTLY
Qty: 90 TABLET | Refills: 0 | Status: SHIPPED | OUTPATIENT
Start: 2025-05-19

## 2025-05-19 RX ORDER — OMEPRAZOLE 20 MG/1
CAPSULE, DELAYED RELEASE ORAL
Qty: 90 CAPSULE | Refills: 0 | Status: SHIPPED | OUTPATIENT
Start: 2025-05-19

## 2025-05-19 NOTE — TELEPHONE ENCOUNTER
Last office visit 4/7/2025     Next office visit scheduled 6/24/2025    Requested Prescriptions     Pending Prescriptions Disp Refills    omeprazole (PRILOSEC) 20 MG delayed release capsule 90 capsule 0     Sig: TAKE 1 CAPSULE EVERY DAY    rosuvastatin (CRESTOR) 5 MG tablet 90 tablet 0     Sig: Take 1 tablet by mouth nightly

## 2025-06-03 RX ORDER — MIRABEGRON 50 MG/1
50 TABLET, FILM COATED, EXTENDED RELEASE ORAL DAILY
Qty: 100 TABLET | Refills: 2 | OUTPATIENT
Start: 2025-06-03

## 2025-06-03 NOTE — TELEPHONE ENCOUNTER
Last office visit 4/7/2025     Next office visit scheduled 6/24/2025    Requested Prescriptions     Pending Prescriptions Disp Refills    MYRBETRIQ 50 MG TB24 [Pharmacy Med Name: Myrbetriq 50 MG Oral Tablet Extended Release 24 Hour] 100 tablet 2     Sig: TAKE 1 TABLET BY MOUTH DAILY

## 2025-06-05 ENCOUNTER — TELEPHONE (OUTPATIENT)
Dept: FAMILY MEDICINE CLINIC | Age: 85
End: 2025-06-05

## 2025-06-05 DIAGNOSIS — F41.9 ANXIETY: ICD-10-CM

## 2025-06-05 RX ORDER — LORAZEPAM 1 MG/1
TABLET ORAL
Qty: 90 TABLET | Refills: 0 | Status: SHIPPED | OUTPATIENT
Start: 2025-06-05 | End: 2025-07-05

## 2025-06-05 NOTE — TELEPHONE ENCOUNTER
Last office visit 4/7/2025     Last written      Next office visit scheduled 6/24/2025    Requested Prescriptions      No prescriptions requested or ordered in this encounter       Lorazapam 1 mg  [facility] [phone]

## 2025-06-22 RX ORDER — MIRABEGRON 50 MG/1
50 TABLET, FILM COATED, EXTENDED RELEASE ORAL DAILY
Qty: 90 TABLET | Refills: 0 | Status: SHIPPED | OUTPATIENT
Start: 2025-06-22

## 2025-06-23 RX ORDER — METOPROLOL TARTRATE 50 MG
50 TABLET ORAL 2 TIMES DAILY
Qty: 180 TABLET | Refills: 0 | Status: SHIPPED | OUTPATIENT
Start: 2025-06-23

## 2025-06-23 NOTE — PROGRESS NOTES
Subjective:      Patient ID: Emily Motta is a 85 y.o. female.    HPI    Anxiety:  Patient takes Ativan 1 mg every 8 hrs as needed for anxiety.  She generally takes it once daily and feels that it works well to control her anxiety symptoms.     Review of Systems   Constitutional:  Negative for chills and fever.   Psychiatric/Behavioral:  Negative for dysphoric mood and suicidal ideas. The patient is nervous/anxious.      /76   Ht 1.651 m (5' 5\")   Wt 101.2 kg (223 lb)   BMI 37.11 kg/m²    Objective:   Physical Exam  Constitutional:       General: She is not in acute distress.     Appearance: She is well-developed.   HENT:      Head: Normocephalic.      Right Ear: External ear normal.      Left Ear: External ear normal.      Mouth/Throat:      Pharynx: No oropharyngeal exudate.   Neck:      Thyroid: No thyromegaly.      Vascular: No JVD.   Cardiovascular:      Rate and Rhythm: Normal rate and regular rhythm.      Heart sounds: Normal heart sounds. No murmur heard.  Pulmonary:      Effort: Pulmonary effort is normal.      Breath sounds: Normal breath sounds. No wheezing or rales.   Lymphadenopathy:      Cervical: No cervical adenopathy.   Neurological:      Mental Status: She is alert and oriented to person, place, and time.         Assessment:      Anxiety       Plan:   Assessment & Plan   [place] report was reviewed  Medications refilled   I recommended the COVID booster at the pharmacy   RTO 3 months for Hypertension / CKD / GERD / Back Pain / Anxiety       Controlled Substance Monitoring:    Acute and Chronic Pain Monitoring:   RX Monitoring Periodic Controlled Substance Monitoring   6/24/2025   9:41 AM Assessed functional status (ability to engage in work or other purposeful activities, the pain intensity and its interference with activities of daily living, quality of family life and social activities, and the physical activity)                   ENRICO VORA,

## 2025-06-23 NOTE — TELEPHONE ENCOUNTER
Last office visit 4/7/2025       Next office visit scheduled 6/21/2025    Requested Prescriptions     Pending Prescriptions Disp Refills    metoprolol tartrate (LOPRESSOR) 50 MG tablet 180 tablet 0     Sig: Take 1 tablet by mouth 2 times daily

## 2025-06-24 ENCOUNTER — OFFICE VISIT (OUTPATIENT)
Dept: FAMILY MEDICINE CLINIC | Age: 85
End: 2025-06-24

## 2025-06-24 VITALS
SYSTOLIC BLOOD PRESSURE: 138 MMHG | BODY MASS INDEX: 37.15 KG/M2 | WEIGHT: 223 LBS | DIASTOLIC BLOOD PRESSURE: 76 MMHG | HEIGHT: 65 IN

## 2025-06-24 DIAGNOSIS — F41.9 ANXIETY: Primary | ICD-10-CM

## 2025-06-24 RX ORDER — MIRABEGRON 50 MG/1
50 TABLET, FILM COATED, EXTENDED RELEASE ORAL DAILY
Qty: 90 TABLET | Refills: 1 | Status: SHIPPED | OUTPATIENT
Start: 2025-06-24

## 2025-06-24 RX ORDER — PREDNISONE 5 MG/1
5 TABLET ORAL
COMMUNITY
Start: 2025-06-20

## 2025-07-09 DIAGNOSIS — Z12.31 ENCOUNTER FOR SCREENING MAMMOGRAM FOR MALIGNANT NEOPLASM OF BREAST: Primary | ICD-10-CM

## 2025-07-21 RX ORDER — GABAPENTIN 100 MG/1
100 CAPSULE ORAL 3 TIMES DAILY
Qty: 270 CAPSULE | Refills: 0 | Status: SHIPPED | OUTPATIENT
Start: 2025-07-21 | End: 2025-10-19

## 2025-07-21 NOTE — TELEPHONE ENCOUNTER
Last office visit 6/24/2025       Next office visit scheduled 9/23/2025    Requested Prescriptions     Pending Prescriptions Disp Refills    gabapentin (NEURONTIN) 100 MG capsule 270 capsule 0     Sig: Take 1 capsule by mouth 3 times daily for 90 days. Intended supply: 90 days

## 2025-08-19 ENCOUNTER — HOSPITAL ENCOUNTER (OUTPATIENT)
Dept: WOMENS IMAGING | Age: 85
Discharge: HOME | End: 2025-08-19
Attending: FAMILY MEDICINE
Payer: MEDICARE

## 2025-08-19 VITALS — HEIGHT: 64 IN | WEIGHT: 221 LBS | BODY MASS INDEX: 37.73 KG/M2

## 2025-08-19 DIAGNOSIS — Z12.31 ENCOUNTER FOR SCREENING MAMMOGRAM FOR MALIGNANT NEOPLASM OF BREAST: ICD-10-CM

## 2025-08-19 PROCEDURE — 77067 SCR MAMMO BI INCL CAD: CPT

## 2025-08-21 RX ORDER — ROSUVASTATIN CALCIUM 5 MG/1
5 TABLET, COATED ORAL NIGHTLY
Qty: 90 TABLET | Refills: 0 | Status: SHIPPED | OUTPATIENT
Start: 2025-08-21

## 2025-09-04 DIAGNOSIS — F41.9 ANXIETY: ICD-10-CM

## 2025-09-04 RX ORDER — LORAZEPAM 1 MG/1
TABLET ORAL
Qty: 90 TABLET | Refills: 0 | Status: SHIPPED | OUTPATIENT
Start: 2025-09-04 | End: 2025-10-04

## 2025-09-07 DIAGNOSIS — K21.9 GERD WITHOUT ESOPHAGITIS: ICD-10-CM

## 2025-09-07 RX ORDER — OMEPRAZOLE 20 MG/1
20 CAPSULE, DELAYED RELEASE ORAL DAILY
Qty: 90 CAPSULE | Refills: 0 | Status: SHIPPED | OUTPATIENT
Start: 2025-09-07

## (undated) DEVICE — NEURO SPONGES: Brand: DEROYAL

## (undated) DEVICE — GOWN,REINF,POLY,ECL,PP SLV,XL,XLONG: Brand: MEDLINE

## (undated) DEVICE — SYRINGE, LUER LOCK, 30ML: Brand: MEDLINE

## (undated) DEVICE — UNDERGLOVE SURG SZ 8 BLU LTX FREE SYN POLYISOPRENE POLYMER

## (undated) DEVICE — DRESSING PETRO W3XL3IN OIL EMUL N ADH GZ KNIT IMPREG CELOS

## (undated) DEVICE — STAPLER SKIN H3.9MM WIRE DIA0.58MM CRWN 6.9MM 35 STPL ROT

## (undated) DEVICE — 3M™ COBAN™ NL STERILE NON-LATEX SELF-ADHERENT WRAP, 2086S, 6 IN X 5 YD (15 CM X 4,5 M), 12 ROLLS/CASE: Brand: 3M™ COBAN™

## (undated) DEVICE — SOLUTION IV 1000ML 0.9% SOD CHL

## (undated) DEVICE — MARKER REFLECTIVE REFLECTIVE TWST ON SPHERZ 5PK

## (undated) DEVICE — NEPTUNE E-SEP SMOKE EVACUATION PENCIL, COATED, 70MM BLADE, PUSH BUTTON SWITCH: Brand: NEPTUNE E-SEP

## (undated) DEVICE — SUTURE STRATAFIX SYMMETRIC PDS + SZ 1 L18IN ABSRB VLT L48MM SXPP1A400

## (undated) DEVICE — PORT VLV 2 W NDL FREE SMRTSITE

## (undated) DEVICE — SUTURE VICRYL SZ 2-0 L18IN ABSRB UD CT-1 L36MM 1/2 CIR J839D

## (undated) DEVICE — TETHER 8219500 TETHER: Brand: TRANSLACE™ SPINAL TETHERING SYSTEM

## (undated) DEVICE — SURGICAL PROC PACK SHT WEST V4

## (undated) DEVICE — SURGICAL PROCEDURE PACK PIK PPK1029201] ALCON LABORATORIES INC]

## (undated) DEVICE — Device: Brand: MEDEX

## (undated) DEVICE — ADHESIVE SKIN CLOSURE WND 8.661X1.5 IN 22 CM LIQUIBAND SECUR

## (undated) DEVICE — STERILE POLYISOPRENE POWDER-FREE SURGICAL GLOVES: Brand: PROTEXIS

## (undated) DEVICE — GOWN,SIRUS,POLYRNF,BRTHSLV,LG,30/CS: Brand: MEDLINE

## (undated) DEVICE — STANDARD HYPODERMIC NEEDLE,POLYPROPYLENE HUB: Brand: MONOJECT

## (undated) DEVICE — SHEET,DRAPE,53X77,STERILE: Brand: MEDLINE

## (undated) DEVICE — THIS DEVICE IS A DOUBLE SEALING MALE LUER LOCK INTENDED FOR USE WITH THE BAXTER LOCKING TITANIUM ADAPTER FOR PERITONEAL DIALYSIS.: Brand: LOCKING CAP FOR PERITONEAL DIALYSIS CATHETER ADAPTER

## (undated) DEVICE — LAMINECTOMY: Brand: MEDLINE INDUSTRIES, INC.

## (undated) DEVICE — SUTURE MONOCRYL + SZ 4-0 L27IN ABSRB UD L19MM PS-2 3/8 CIR MCP426H

## (undated) DEVICE — ZIMMER® STERILE DISPOSABLE TOURNIQUET CUFF WITH PLC, DUAL PORT, SINGLE BLADDER, 18 IN. (46 CM)

## (undated) DEVICE — TOTAL SHOULDER: Brand: MEDLINE INDUSTRIES, INC.

## (undated) DEVICE — 3 ML SYRINGE LUER-LOCK TIP: Brand: MONOJECT

## (undated) DEVICE — GAUZE,SPONGE,4"X4",16PLY,XRAY,STRL,LF: Brand: MEDLINE

## (undated) DEVICE — COVER LT HNDL CAM BLU DISP W/ SURG CTRL

## (undated) DEVICE — BIT DRL L230MM DIA2.5MM ST 3 FLUT QUIK CPL NONRADIOPAQUE

## (undated) DEVICE — SUTURE MCRYL SZ 4-0 L27IN ABSRB UD L19MM PS-2 1/2 CIR PRIM Y426H

## (undated) DEVICE — SYRINGE MED 10ML LUERLOCK TIP W/O SFTY DISP

## (undated) DEVICE — C-ARM: Brand: UNBRANDED

## (undated) DEVICE — MEDIA CONTRAST RX ISOVUE-300 61% 30ML VIALS

## (undated) DEVICE — DRAPE HND W114XL142IN BLU POLYPR W O PCH FEN CRD AND TB HLDR

## (undated) DEVICE — GOWN,SIRUS,POLYRNF,XLN/3XL,18/CS: Brand: MEDLINE

## (undated) DEVICE — GOWN,SIRUS,POLYRNF,BRTHSLV,XL,30/CS: Brand: MEDLINE

## (undated) DEVICE — PROTECTOR ULN NRV PUR FOAM HK LOOP STRP ANATOMICALLY

## (undated) DEVICE — BLANKET WRM W29.9XL79.1IN UP BODY FORC AIR MISTRAL-AIR

## (undated) DEVICE — BLADE ES L4IN INSUL EDGE

## (undated) DEVICE — NEURO FUSION ADD-ON PACK: Brand: MEDLINE INDUSTRIES, INC.

## (undated) DEVICE — PROBE NEUROMONITORING FOR MINIMALLY INVASIVE SURG LAT

## (undated) DEVICE — GOWN,REINFRCE,POLY,ECLIPSE,SLV,3XLG: Brand: MEDLINE

## (undated) DEVICE — TOOL 14MH30 LEGEND 14CM 3MM: Brand: MIDAS REX ™

## (undated) DEVICE — SOLUTION IV IRRIG WATER 500ML POUR BRL ST 2F7113

## (undated) DEVICE — GLOVE SURG SZ 65 L12IN FNGR THK87MIL WHT LTX FREE

## (undated) DEVICE — NEEDLE SPNL 22GA L5IN BLK HUB S STL W/ QNCKE PNT W/OUT

## (undated) DEVICE — C-ARMOR C-ARM EQUIPMENT COVERS CLEAR STERILE UNIVERSAL FIT 12 PER CASE: Brand: C-ARMOR

## (undated) DEVICE — GARMENT,MEDLINE,DVT,INT,CALF,MED, GEN2: Brand: MEDLINE

## (undated) DEVICE — GOWN SIRUS NONREIN XL W/TWL: Brand: MEDLINE INDUSTRIES, INC.

## (undated) DEVICE — SPHERES FOR BRAINLAB

## (undated) DEVICE — CANNULA FENESTRATED OPEN ST

## (undated) DEVICE — GLOVE ORANGE PI 8   MSG9080

## (undated) DEVICE — 3M™ TEGADERM™ TRANSPARENT FILM DRESSING FRAME STYLE, 1626W, 4 IN X 4-3/4 IN (10 CM X 12 CM), 50/CT 4CT/CASE: Brand: 3M™ TEGADERM™

## (undated) DEVICE — BANDAGE COMPR W4INXL12FT E DISP ESMARCH EVEN

## (undated) DEVICE — GOWN,SIRUS,POLYRNF,BRTHSLV,XLN/XXL,18/CS: Brand: MEDLINE

## (undated) DEVICE — ADHESIVE SKIN CLSR 0.7ML TOP DERMBND ADV

## (undated) DEVICE — SOLUTION IRRIG 3000ML 0.9% SOD CHL USP UROMATIC PLAS CONT

## (undated) DEVICE — COUNTER NDL 40 COUNT HLD 70 NUM FOAM BLK SGL MAG W BLDE REMV

## (undated) DEVICE — GLOVE SURG SZ 65 CRM LTX FREE POLYISOPRENE POLYMER BEAD ANTI

## (undated) DEVICE — UNDERGLOVE SURG SZ 8 FNGR THK0.21MIL GRN LTX BEAD CUF

## (undated) DEVICE — SOLUTION IRRIG BSS ST 500ML

## (undated) DEVICE — WRAP COHESIVE W2INXL5YD TAN SELF ADH BNDG HND NON STERILE TEAR CARING

## (undated) DEVICE — BATTERY SURG DRVR DISP FOR MATRIXPRO

## (undated) DEVICE — Device

## (undated) DEVICE — TOWEL,STOP FLAG GOLD N-W: Brand: MEDLINE

## (undated) DEVICE — MINOR SET UP PK

## (undated) DEVICE — DRAPE MICSCP W54XL150IN W/ 4 BINOC GLS LENS LEICA

## (undated) DEVICE — SUTURE ETHIBOND EXCEL SZ 2 L30IN NONABSORBABLE GRN L40MM V-37 MX69G

## (undated) DEVICE — AEGIS 1" DISK 4MM HOLE, PEEL OPEN: Brand: MEDLINE

## (undated) DEVICE — ELECTRODE ELECSURG L 10.2 CM PTFE COAT MONOPOLAR BLADE OPN

## (undated) DEVICE — GLOVE SURG SZ 75 CRM LTX FREE POLYISOPRENE POLYMER BEAD ANTI

## (undated) DEVICE — Z DISCONTINUED USE 2275676 GLOVE SURG SZ 65 L12IN FNGR THK87MIL DK GRN LTX FREE ISOLEX

## (undated) DEVICE — SSC BONE WAX: Brand: SSC BONE WAX

## (undated) DEVICE — PROBE 8225101 5PK STD PRASS FL TIP ROHS

## (undated) DEVICE — SOLUTION IV IRRIG 250ML ST LF 0.9% SODIUM 2F7122

## (undated) DEVICE — GLOVE SURG SZ 75 L12IN FNGR THK94MIL TRNSLUC YEL LTX

## (undated) DEVICE — SOLUTION IRRIG 1000ML STRL H2O USP PLAS POUR BTL

## (undated) DEVICE — SUTURE VCRL SZ 2-0 L18IN ABSRB UD CT-1 L36MM 1/2 CIR J839D

## (undated) DEVICE — SUTURE CHROMIC GUT SZ 4-0 L27IN ABSRB BRN FS-2 L19MM 3/8 635H

## (undated) DEVICE — SUTURE VICRYL + SZ 0 L18IN ABSRB UD L36MM CT-1 1/2 CIR VCP840D

## (undated) DEVICE — APPLICATOR MEDICATED 26 CC SOLUTION HI LT ORNG CHLORAPREP

## (undated) DEVICE — CHLORAPREP 26ML ORANGE

## (undated) DEVICE — SUTURE VCRL SZ 0 L18IN ABSRB UD L36MM CT-1 1/2 CIR J840D

## (undated) DEVICE — TAP SURG DIA7MM CANN SELF DRL SCR 2 LD VIPER 2

## (undated) DEVICE — GLOVE SURG SZ 65 L12IN FNGR THK79MIL GRN LTX FREE

## (undated) DEVICE — SYRINGE TB 1ML NDL 25GA L0.625IN PLAS SLIP TIP CONVENTIONAL

## (undated) DEVICE — COVER LT HNDL BLU PLAS

## (undated) DEVICE — NEEDLE HYPO 25GA L1.5IN BVL ORIENTED ECLIPSE

## (undated) DEVICE — Device: Brand: JELCO

## (undated) DEVICE — MAT FLR W32XL58IN

## (undated) DEVICE — SPONGE GZ W4XL4IN COT 12 PLY TYP VII WVN C FLD DSGN

## (undated) DEVICE — NEEDLE HYPO 18GA L1.5IN THN WALL PIVOTING SHLD BVL ORIENTED

## (undated) DEVICE — SPONGE,NEURO,0.5"X3",XR,STRL,LF,10/PK: Brand: MEDLINE